# Patient Record
Sex: FEMALE | Race: WHITE | NOT HISPANIC OR LATINO | Employment: OTHER | URBAN - METROPOLITAN AREA
[De-identification: names, ages, dates, MRNs, and addresses within clinical notes are randomized per-mention and may not be internally consistent; named-entity substitution may affect disease eponyms.]

---

## 2017-03-17 ENCOUNTER — DOCUMENTATION ONLY (OUTPATIENT)
Dept: FAMILY MEDICINE | Facility: CLINIC | Age: 67
End: 2017-03-17

## 2017-03-17 NOTE — PROGRESS NOTES
Pre-Visit Chart Review  For Appointment Scheduled on 3-20-17    Health Maintenance Due   Topic Date Due    Fecal Occult Blood Test (FOBT)  1950    Colonoscopy  06/21/2001    Influenza Vaccine  08/01/2016

## 2017-04-24 DIAGNOSIS — J44.9 CHRONIC OBSTRUCTIVE PULMONARY DISEASE, UNSPECIFIED COPD TYPE: Primary | ICD-10-CM

## 2017-04-24 RX ORDER — LEVALBUTEROL TARTRATE 45 UG/1
AEROSOL, METERED ORAL
Qty: 3 INHALER | Refills: 1 | Status: SHIPPED | OUTPATIENT
Start: 2017-04-24 | End: 2017-06-27 | Stop reason: SDUPTHER

## 2017-06-19 ENCOUNTER — DOCUMENTATION ONLY (OUTPATIENT)
Dept: FAMILY MEDICINE | Facility: CLINIC | Age: 67
End: 2017-06-19

## 2017-06-19 NOTE — PROGRESS NOTES
Pre-Visit Chart Review  For Appointment Scheduled on 06/27/2017    Health Maintenance Due   Topic Date Due    TETANUS VACCINE  06/21/1969    Fecal Occult Blood Test (FOBT)/[employer]  06/21/2001

## 2017-06-19 NOTE — PATIENT INSTRUCTIONS
Controlling High Blood Pressure  High blood pressure (hypertension) is often called the silent killer. This is because many people who have it dont know it. High blood pressure is defined as 140/90 mm Hg or higher. Know your blood pressure and remember to check it regularly. Doing so can save your life. Here are some things you can do to help control your blood pressure.    Choose heart-healthy foods  · Select low-salt, low-fat foods. Limit sodium intake to 2,400 mg per day or the amount suggested by your healthcare provider.  · Limit canned, dried, cured, packaged, and fast foods. These can contain a lot of salt.  · Eat 8 to 10 servings of fruits and vegetables every day.  · Choose lean meats, fish, or chicken.  · Eat whole-grain pasta, brown rice, and beans.  · Eat 2 to 3 servings of low-fat or fat-free dairy products.  · Ask your doctor about the DASH eating plan. This plan helps reduce blood pressure.  · When you go to a restaurant, ask that your meal be prepared with no added salt.  Maintain a healthy weight  · Ask your healthcare provider how many calories to eat a day. Then stick to that number.  · Ask your healthcare provider what weight range is healthiest for you. If you are overweight, a weight loss of only 3% to 5% of your body weight can help lower blood pressure. Generally, a good weight loss goal is to lose 10% of your body weight in a year.  · Limit snacks and sweets.  · Get regular exercise.  Get up and get active  · Choose activities you enjoy. Find ones you can do with friends or family. This includes bicycling, dancing, walking, and jogging.  · Park farther away from building entrances.  · Use stairs instead of the elevator.  · When you can, walk or bike instead of driving.  · [place] leaves, garden, or do household repairs.  · Be active at a moderate to vigorous level of physical activity for at least 40 minutes for a minimum of 3 to 4 days a week.   Manage stress  · Make time to relax and enjoy  life. Find time to laugh.  · Communicate your concerns with your loved ones and your healthcare provider.  · Visit with family and friends, and keep up with hobbies.  Limit alcohol and quit smoking  · Men should have no more than 2 drinks per day.  · Women should have no more than 1 drink per day.  · Talk with your healthcare provider about quitting smoking. Smoking significantly increases your risk for heart disease and stroke. Ask your healthcare provider about community smoking cessation programs and other options.  Medicines  If lifestyle changes arent enough, your healthcare provider may prescribe high blood pressure medicine. Take all medicines as prescribed. If you have any questions about your medicines, ask your healthcare provider before stopping or changing them.   Date Last Reviewed: 4/27/2016  © 6176-3841 [employer]. [address] [place] [address], [place] [address]. All rights reserved. This information is not intended as a substitute for professional medical care. Always follow your healthcare professional's instructions.

## 2017-06-27 ENCOUNTER — LAB VISIT (OUTPATIENT)
Dept: LAB | Facility: HOSPITAL | Age: 67
End: 2017-06-27
Attending: FAMILY MEDICINE
Payer: MEDICARE

## 2017-06-27 ENCOUNTER — OFFICE VISIT (OUTPATIENT)
Dept: FAMILY MEDICINE | Facility: CLINIC | Age: 67
End: 2017-06-27
Payer: MEDICARE

## 2017-06-27 VITALS
TEMPERATURE: 98 F | BODY MASS INDEX: 19.95 KG/M2 | DIASTOLIC BLOOD PRESSURE: 72 MMHG | HEIGHT: 60 IN | HEART RATE: 70 BPM | WEIGHT: 101.63 LBS | SYSTOLIC BLOOD PRESSURE: 128 MMHG

## 2017-06-27 DIAGNOSIS — E78.5 HYPERLIPIDEMIA LDL GOAL <130: ICD-10-CM

## 2017-06-27 DIAGNOSIS — K59.09 CHRONIC CONSTIPATION: ICD-10-CM

## 2017-06-27 DIAGNOSIS — J01.00 ACUTE NON-RECURRENT MAXILLARY SINUSITIS: ICD-10-CM

## 2017-06-27 DIAGNOSIS — N32.81 OAB (OVERACTIVE BLADDER): ICD-10-CM

## 2017-06-27 DIAGNOSIS — I10 ESSENTIAL HYPERTENSION: ICD-10-CM

## 2017-06-27 DIAGNOSIS — E87.1 HYPONATREMIA: ICD-10-CM

## 2017-06-27 DIAGNOSIS — E87.6 HYPOKALEMIA: ICD-10-CM

## 2017-06-27 DIAGNOSIS — M85.80 OSTEOPENIA, UNSPECIFIED LOCATION: ICD-10-CM

## 2017-06-27 DIAGNOSIS — R94.6 BORDERLINE ABNORMAL TFTS: ICD-10-CM

## 2017-06-27 DIAGNOSIS — J44.9 CHRONIC OBSTRUCTIVE PULMONARY DISEASE, UNSPECIFIED COPD TYPE: Primary | ICD-10-CM

## 2017-06-27 DIAGNOSIS — R09.89 BILATERAL CAROTID BRUITS: ICD-10-CM

## 2017-06-27 LAB
25(OH)D3+25(OH)D2 SERPL-MCNC: 51 NG/ML
ALBUMIN SERPL BCP-MCNC: 4.1 G/DL
ALP SERPL-CCNC: 68 U/L
ALT SERPL W/O P-5'-P-CCNC: 12 U/L
ANION GAP SERPL CALC-SCNC: 9 MMOL/L
AST SERPL-CCNC: 20 U/L
BASOPHILS # BLD AUTO: 0.05 K/UL
BASOPHILS NFR BLD: 0.6 %
BILIRUB SERPL-MCNC: 0.5 MG/DL
BUN SERPL-MCNC: 7 MG/DL
CALCIUM SERPL-MCNC: 9.7 MG/DL
CHLORIDE SERPL-SCNC: 101 MMOL/L
CHOLEST/HDLC SERPL: 3 {RATIO}
CO2 SERPL-SCNC: 23 MMOL/L
CREAT SERPL-MCNC: 0.6 MG/DL
DIFFERENTIAL METHOD: NORMAL
EOSINOPHIL # BLD AUTO: 0.5 K/UL
EOSINOPHIL NFR BLD: 5.6 %
ERYTHROCYTE [DISTWIDTH] IN BLOOD BY AUTOMATED COUNT: 14.2 %
EST. GFR  (AFRICAN AMERICAN): >60 ML/MIN/1.73 M^2
EST. GFR  (NON AFRICAN AMERICAN): >60 ML/MIN/1.73 M^2
GLUCOSE SERPL-MCNC: 93 MG/DL
HCT VFR BLD AUTO: 38.7 %
HDL/CHOLESTEROL RATIO: 33 %
HDLC SERPL-MCNC: 185 MG/DL
HDLC SERPL-MCNC: 61 MG/DL
HGB BLD-MCNC: 13 G/DL
LDLC SERPL CALC-MCNC: 110.8 MG/DL
LYMPHOCYTES # BLD AUTO: 2.4 K/UL
LYMPHOCYTES NFR BLD: 27.2 %
MAGNESIUM SERPL-MCNC: 1.7 MG/DL
MCH RBC QN AUTO: 30.7 PG
MCHC RBC AUTO-ENTMCNC: 33.6 %
MCV RBC AUTO: 92 FL
MONOCYTES # BLD AUTO: 0.7 K/UL
MONOCYTES NFR BLD: 8 %
NEUTROPHILS # BLD AUTO: 5.2 K/UL
NEUTROPHILS NFR BLD: 58.4 %
NONHDLC SERPL-MCNC: 124 MG/DL
OSMOLALITY SERPL: 277 MOSM/KG
OSMOLALITY UR: 319 MOSM/KG
PLATELET # BLD AUTO: 333 K/UL
PMV BLD AUTO: 9.9 FL
POTASSIUM SERPL-SCNC: 4.3 MMOL/L
PROT SERPL-MCNC: 7.6 G/DL
RBC # BLD AUTO: 4.23 M/UL
SODIUM SERPL-SCNC: 133 MMOL/L
T4 FREE SERPL-MCNC: 1.04 NG/DL
TRIGL SERPL-MCNC: 66 MG/DL
TSH SERPL DL<=0.005 MIU/L-ACNC: 0.01 UIU/ML
WBC # BLD AUTO: 8.97 K/UL

## 2017-06-27 PROCEDURE — 36415 COLL VENOUS BLD VENIPUNCTURE: CPT | Mod: PO

## 2017-06-27 PROCEDURE — 84439 ASSAY OF FREE THYROXINE: CPT

## 2017-06-27 PROCEDURE — 83930 ASSAY OF BLOOD OSMOLALITY: CPT

## 2017-06-27 PROCEDURE — 84443 ASSAY THYROID STIM HORMONE: CPT

## 2017-06-27 PROCEDURE — 83735 ASSAY OF MAGNESIUM: CPT

## 2017-06-27 PROCEDURE — 80061 LIPID PANEL: CPT

## 2017-06-27 PROCEDURE — 99999 PR PBB SHADOW E&M-EST. PATIENT-LVL III: CPT | Mod: PBBFAC,,, | Performed by: FAMILY MEDICINE

## 2017-06-27 PROCEDURE — 99213 OFFICE O/P EST LOW 20 MIN: CPT | Mod: PBBFAC,PO | Performed by: FAMILY MEDICINE

## 2017-06-27 PROCEDURE — 80053 COMPREHEN METABOLIC PANEL: CPT

## 2017-06-27 PROCEDURE — 1159F MED LIST DOCD IN RCRD: CPT | Mod: ,,, | Performed by: FAMILY MEDICINE

## 2017-06-27 PROCEDURE — 85025 COMPLETE CBC W/AUTO DIFF WBC: CPT

## 2017-06-27 PROCEDURE — 82306 VITAMIN D 25 HYDROXY: CPT

## 2017-06-27 PROCEDURE — 1126F AMNT PAIN NOTED NONE PRSNT: CPT | Mod: ,,, | Performed by: FAMILY MEDICINE

## 2017-06-27 PROCEDURE — 99214 OFFICE O/P EST MOD 30 MIN: CPT | Mod: S$PBB,,, | Performed by: FAMILY MEDICINE

## 2017-06-27 RX ORDER — GLUCOSAMINE HCL 500 MG
TABLET ORAL
COMMUNITY

## 2017-06-27 RX ORDER — CALCIUM CARBONATE 600 MG
600 TABLET ORAL 2 TIMES DAILY WITH MEALS
COMMUNITY
End: 2019-08-21

## 2017-06-27 RX ORDER — AMOXICILLIN AND CLAVULANATE POTASSIUM 875; 125 MG/1; MG/1
1 TABLET, FILM COATED ORAL 2 TIMES DAILY
Qty: 20 TABLET | Refills: 0 | Status: SHIPPED | OUTPATIENT
Start: 2017-06-27 | End: 2017-07-26 | Stop reason: ALTCHOICE

## 2017-06-27 RX ORDER — LEVALBUTEROL TARTRATE 45 UG/1
AEROSOL, METERED ORAL
Qty: 3 INHALER | Status: SHIPPED | OUTPATIENT
Start: 2017-06-27 | End: 2018-09-07 | Stop reason: SDUPTHER

## 2017-06-27 RX ORDER — POTASSIUM CHLORIDE 750 MG/1
10 TABLET, EXTENDED RELEASE ORAL DAILY
Qty: 90 TABLET | Refills: 11 | Status: SHIPPED | OUTPATIENT
Start: 2017-06-27 | End: 2017-08-29 | Stop reason: SDUPTHER

## 2017-06-27 RX ORDER — BUDESONIDE AND FORMOTEROL FUMARATE DIHYDRATE 80; 4.5 UG/1; UG/1
AEROSOL RESPIRATORY (INHALATION)
Qty: 30.6 INHALER | Refills: 3 | Status: SHIPPED | OUTPATIENT
Start: 2017-06-27 | End: 2018-05-19 | Stop reason: SDUPTHER

## 2017-06-27 NOTE — PROGRESS NOTES
Subjective:       Patient ID: Anuja Cool is a 67 y.o. female.    Chief Complaint: Establish Care; Cough; and Sinus Problem    Patient Active Problem List   Diagnosis    Asthma    Allergy    Hypertension    Hyperlipidemia LDL goal <130    Tobacco use    COPD (chronic obstructive pulmonary disease)    Hyponatremia    Bilateral carotid bruits    Hypokalemia    OAB (overactive bladder)    Osteopenia       HPI  Review of Systems   Constitutional: Positive for fatigue. Negative for chills and fever.   HENT: Positive for congestion, postnasal drip, rhinorrhea, sneezing and sore throat. Negative for ear discharge, ear pain and sinus pressure.    Respiratory: Positive for cough. Negative for shortness of breath and wheezing.        Objective:      Physical Exam   Constitutional: She appears well-developed and well-nourished.   HENT:   Right Ear: Tympanic membrane and ear canal normal.   Left Ear: Tympanic membrane and ear canal normal.   Nose: Mucosal edema present. Right sinus exhibits maxillary sinus tenderness. Right sinus exhibits no frontal sinus tenderness. Left sinus exhibits maxillary sinus tenderness. Left sinus exhibits no frontal sinus tenderness.   Mouth/Throat: Mucous membranes are normal. Posterior oropharyngeal erythema present. No oropharyngeal exudate, posterior oropharyngeal edema or tonsillar abscesses.   Cardiovascular: Normal rate, regular rhythm and normal heart sounds.    Pulmonary/Chest: Effort normal and breath sounds normal.   Skin: Skin is warm.   Psychiatric: She has a normal mood and affect.   Vitals reviewed.      Assessment:       1. Chronic obstructive pulmonary disease, unspecified COPD type    2. Essential hypertension    3. Bilateral carotid bruits    4. Hyperlipidemia LDL goal <130    5. Hyponatremia    6. Hypokalemia    7. Borderline abnormal TFTs    8. Osteopenia, unspecified location    9. OAB (overactive bladder)    10. Chronic constipation    11. Acute non-recurrent  maxillary sinusitis        Plan:   1. Chronic obstructive pulmonary disease, unspecified COPD type  Controlled on current medications.  Continue current medications.    - budesonide-formoterol 80-4.5 mcg (SYMBICORT) 80-4.5 mcg/actuation HFAA; INHALE TWO PUFFS BY MOUTH TWICE A DAY  Dispense: 30.6 Inhaler; Refill: 3  - levalbuterol (XOPENEX HFA) 45 mcg/actuation inhaler; INHALE ONE OR TWO PUFFS BY MOUTH EVERY FOUR HOURS AS NEEDED FOR WHEEZING OR SHORTNESS OF BREATH  Dispense: 3 Inhaler; Refill: prn    2. Essential hypertension  Controlled on current medications.  Continue current medications.      3. Bilateral carotid bruits  Order:  - Radiology US Carotid Bilateral; Future    4. Hyperlipidemia LDL goal <130  Stable condition.  Continue current medications.  Will adjust based on lab findings or if condition changes.    - CBC auto differential; Future  - Comprehensive metabolic panel; Future  - Lipid panel; Future    5. Hyponatremia  Screen and treat as indicated:    - OSMOLALITY, URINE RANDOM  - OSMOLALITY; Future    6. Hypokalemia  Screen and treat as indicated:  - Magnesium; Future  - potassium chloride (KLOR-CON) 10 MEQ TbSR; Take 1 tablet (10 mEq total) by mouth once daily.  Dispense: 90 tablet; Refill: 11    7. Borderline abnormal TFTs  Screen and treat as indicated:    - TSH; Future  - T4, free; Future    8. Osteopenia, unspecified location  Screen and treat as indicated:    - Vitamin D; Future    9. OAB (overactive bladder)  Controlled on current medications.  Continue current medications.    - mirabegron (MYRBETRIQ) 25 mg Tb24 ER tablet; Take 1 tablet (25 mg total) by mouth once daily.  Dispense: 30 tablet; Refill: 11    10. Chronic constipation  Refer for eval and treatment  - Case request GI: COLONOSCOPY  Patient was counseled that these lifestyle changes can help prevent constipation:  · Diet. Eat a high-fiber diet, with fresh fruit and vegetables, and reduce dairy intake, meats, and processed foods.  An  over the counter fiber supplement is recommended such as Fiberchoice chewables or Metamucil.  · Fluids. It's important to get enough fluids each day. Drink plenty of water when you eat more fiber. If you are on diet that limits the amount of fluid you can have, talk about this with your health care provider.  · Regular exercise. Check with your health care provider first.  I also advised use of over the counter stool softeners like docusate as needed for persistent constipation.  OTC probiotics may also be of benefit like Align to help regulation.  If acutely constipated the patient was advised to use otc fleets enema(s) and/or magnesium citrate to relieve symptoms.      11. Acute non-recurrent maxillary sinusitis  Treat:  - amoxicillin-clavulanate 875-125mg (AUGMENTIN) 875-125 mg per tablet; Take 1 tablet by mouth 2 (two) times daily.  Dispense: 20 tablet; Refill: 0  I counseled the patient on general home care guidelines for cough and congestion including increasing fluid intake, getting plenty of rest and use of OTC cough and cold medications.  I recommended guafenesin for congestion and dextromethorphan as directed for cough.  A brand like Mucinex DM is recommended.  Avoidance of decongestants is recommended for patients with heart problems and hypertension.  Extra vitamin C may also benefit.  Return to clinic if symptoms last longer than 10 days or sooner if worsen with symptoms like fever > 100.4, severe sinus pain or headache, thick yellow nasal discharge or sputum, dehydration or lethargy.      [facility] goal documentation:  Patient readiness: acceptance and barriers:readiness  During the course of the visit the patient was educated and counseled about the following: Hypertension:   Dietary sodium restriction.  Regular aerobic exercise.  Goals: Hypertension: Reduce Blood Pressure  Goal/Outcomes met:Hypertension  The following self management tools provided:none  Patient Instructions (the written plan) was given  to the patient/family: Yes  Time spent with patient: 20 minutes    Patient with be reevaluated in 6 months or sooner prn    Greater than 50% of this visit was spent counseling as described in above documentation:Yes

## 2017-06-27 NOTE — PATIENT INSTRUCTIONS
Viactiv chocolate chews      Patient was counseled that these lifestyle changes can help prevent constipation:  · Diet. Eat a high-fiber diet, with fresh fruit and vegetables, and reduce dairy intake, meats, and processed foods.  An over the counter fiber supplement is recommended such as Fiberchoice chewables or Metamucil.  · Fluids. It's important to get enough fluids each day. Drink plenty of water when you eat more fiber. If you are on diet that limits the amount of fluid you can have, talk about this with your health care provider.  · Regular exercise. Check with your health care provider first.  I also advised use of over the counter stool softeners like docusate as needed for persistent constipation.  OTC probiotics may also be of benefit like Align to help regulation.  If acutely constipated the patient was advised to use otc fleets enema(s) and/or magnesium citrate to relieve symptoms.      Miralax OTC as needed as directed      Claritin (loratadine) for allergies

## 2017-06-29 ENCOUNTER — HOSPITAL ENCOUNTER (OUTPATIENT)
Dept: RADIOLOGY | Facility: CLINIC | Age: 67
Discharge: HOME | End: 2017-06-29
Attending: FAMILY MEDICINE
Payer: MEDICARE

## 2017-06-29 DIAGNOSIS — R09.89 BILATERAL CAROTID BRUITS: ICD-10-CM

## 2017-06-29 PROCEDURE — 93880 EXTRACRANIAL BILAT STUDY: CPT | Mod: TC,PO

## 2017-06-29 PROCEDURE — 93880 EXTRACRANIAL BILAT STUDY: CPT | Mod: 26,,, | Performed by: RADIOLOGY

## 2017-07-08 ENCOUNTER — TELEPHONE (OUTPATIENT)
Dept: FAMILY MEDICINE | Facility: CLINIC | Age: 67
End: 2017-07-08

## 2017-07-08 DIAGNOSIS — I65.29 STENOSIS OF CAROTID ARTERY, UNSPECIFIED LATERALITY: Primary | ICD-10-CM

## 2017-07-08 DIAGNOSIS — R94.6 BORDERLINE ABNORMAL TFTS: ICD-10-CM

## 2017-07-08 NOTE — TELEPHONE ENCOUNTER
Notify patient:  Your labs are all essentially in the normal range including: blood sugar, kidney function, liver function, blood cell counts and cholesterol levels.  Your sodium has improved and we will continue to monitor it periodically.  Your vitamin D and magnesium are normal.  Your thyroid labs are still indicating a borderline abnormality suggesting excessive thyroid hormone.  I will refer you to endocrine for an eval.       The carotid ultrasound showed Moderate (50-69%) stenosis involving the proximal right ICA segment. Otherwise, bilateral atherosclerosis with mild (<50%) bilateral stenosis by velocity criteria.  I will also refer you for a vascular consult

## 2017-07-08 NOTE — TELEPHONE ENCOUNTER
----- Message from Norma Square, MA sent at 7/7/2017  2:33 PM CDT -----  Contact: Patient  Patient requesting results of US and labs.  ----- Message -----  From: Carolina Erickson  Sent: 7/7/2017   2:22 PM  To: Louann MCKEON Staff    Patient states that she would like to know the results of her test and her Ultrasound.  Can you please call her back at [phone].  Thank you

## 2017-07-10 NOTE — TELEPHONE ENCOUNTER
Spoke with patient about her labs and US. I was able to get her scheduled with vascular surgery but not with endocrine. I did schedule patient with Dr. Lew in August to discuss options. Patient is ok with this .

## 2017-07-26 ENCOUNTER — OFFICE VISIT (OUTPATIENT)
Dept: VASCULAR SURGERY | Facility: CLINIC | Age: 67
End: 2017-07-26
Payer: MEDICARE

## 2017-07-26 VITALS
DIASTOLIC BLOOD PRESSURE: 78 MMHG | SYSTOLIC BLOOD PRESSURE: 159 MMHG | BODY MASS INDEX: 20.12 KG/M2 | HEART RATE: 88 BPM | HEIGHT: 60 IN | WEIGHT: 102.5 LBS

## 2017-07-26 DIAGNOSIS — I77.9 LEFT-SIDED CAROTID ARTERY DISEASE: Primary | ICD-10-CM

## 2017-07-26 PROCEDURE — 1159F MED LIST DOCD IN RCRD: CPT | Mod: S$GLB,,, | Performed by: THORACIC SURGERY (CARDIOTHORACIC VASCULAR SURGERY)

## 2017-07-26 PROCEDURE — 99204 OFFICE O/P NEW MOD 45 MIN: CPT | Mod: S$GLB,,, | Performed by: THORACIC SURGERY (CARDIOTHORACIC VASCULAR SURGERY)

## 2017-07-26 PROCEDURE — 1126F AMNT PAIN NOTED NONE PRSNT: CPT | Mod: S$GLB,,, | Performed by: THORACIC SURGERY (CARDIOTHORACIC VASCULAR SURGERY)

## 2017-07-26 NOTE — LETTER
July 27, 2017      Isis Lew MD  [address]  [place] [address]           [place] [facility]  [address], [address]  [place] [address]  Phone: [phone]          Patient: Anuja Cool   MR Number: [id number]   YOB: 1950   Date of Visit: 7/26/2017       Dear Dr. Isis Lew:    Thank you for referring Anuja Cool to me for evaluation. Attached you will find relevant portions of my assessment and plan of care.    If you have questions, please do not hesitate to call me. I look forward to following Anuja Cool along with you.    Sincerely,    Jayesh Beatty MD    Enclosure  CC:  No Recipients    If you would like to receive this communication electronically, please contact [email or web address] or [phone] to request more information on [employer] access.    For providers and/or their staff who would like to refer a patient to [facility], please contact us through our one-stop-shop provider referral line, [facility] [place], at [phone].    If you feel you have received this communication in error or would no longer like to receive these types of communications, please e-mail [email or web address]

## 2017-07-26 NOTE — PROGRESS NOTES
OFFICE NOTE    HISTORY OF PRESENT ILLNESS:  This is a pleasant 67-year-old lady with a right   carotid stenosis.  She has been asymptomatic.  The patient has a moderate degree   of stenosis.  The patient has hypertension, hyperlipidemia and COPD from   smoking.  She continues to smoke, but tells me she is trying to cut back and   smoking less than a pack a day.  Medicines are noted and they are part of the   recent EPIC record.  Her problem list is reviewed.    FAMILY AND SOCIAL HISTORY:  Otherwise, unremarkable.    REVIEW OF SYSTEMS:  Fairly innocuous as well.    PHYSICAL EXAMINATION:  VITAL SIGNS:  Stable.  HEENT:  Pupils equal, round and reactive to light.  Nose and throat are clear.  NECK:  Supple.  CHEST:  Clear to auscultation.  HEART:  Regular rate and rhythm.  ABDOMEN:  Benign.  EXTREMITIES:  Femoral pulses are equal.  Perfusion to the legs and feet seems to   be satisfactory.    At this point, as she has a moderate right carotid stenosis, I recommend a   repeat carotid ultrasound in three to four months with an abdominal aortic   ultrasound to rule out abdominal aortic aneurysm as a screening test.  Based on   results of these studies, we will make further recommendations, but I think   medical management with an aspirin right now would be satisfactory.      MAZIN  dd: 07/26/2017 11:39:44 (CDT)  td: 07/26/2017 12:29:58 (CDT)  Doc ID   #[id number]  Job ID #[id number]    CC:

## 2017-08-25 ENCOUNTER — DOCUMENTATION ONLY (OUTPATIENT)
Dept: FAMILY MEDICINE | Facility: CLINIC | Age: 67
End: 2017-08-25

## 2017-08-25 NOTE — PROGRESS NOTES
Pre-Visit Chart Review  For Appointment Scheduled on 8-28-17    Health Maintenance Due   Topic Date Due    TETANUS VACCINE  06/21/1969    Colonoscopy  06/21/2001    Influenza Vaccine  08/01/2017

## 2017-08-28 ENCOUNTER — TELEPHONE (OUTPATIENT)
Dept: FAMILY MEDICINE | Facility: CLINIC | Age: 67
End: 2017-08-28

## 2017-08-28 DIAGNOSIS — E05.90 OVERACTIVE THYROID GLAND: Primary | ICD-10-CM

## 2017-08-28 NOTE — TELEPHONE ENCOUNTER
----- Message from Daiana Morrow sent at 8/28/2017  9:03 AM CDT -----  Contact: self  Patient [phone] had to cancel her appt with Dr Lew today but is asking to speak with the nurse about giving her a thyroid prescription/please call patient to discuss

## 2017-08-28 NOTE — TELEPHONE ENCOUNTER
Patient does not want to come in today due to the weather. Wants you to write her a thyroid script. States she was just coming in to discuss that issue. Please advise.

## 2017-08-29 DIAGNOSIS — E87.6 HYPOKALEMIA: ICD-10-CM

## 2017-08-29 RX ORDER — POTASSIUM CHLORIDE 750 MG/1
10 TABLET, EXTENDED RELEASE ORAL DAILY
Qty: 90 TABLET | Refills: 3 | Status: SHIPPED | OUTPATIENT
Start: 2017-08-29 | End: 2017-12-19

## 2017-08-29 RX ORDER — OXYBUTYNIN CHLORIDE 10 MG/1
10 TABLET, EXTENDED RELEASE ORAL DAILY
Qty: 30 TABLET | Refills: 11 | OUTPATIENT
Start: 2017-08-29 | End: 2018-08-29

## 2017-08-29 NOTE — TELEPHONE ENCOUNTER
I do not have any thyroid medication on her current or past list. It looks like she may have overactive thyroid and needs a consult and work up.  Endocrine consult placed 7/8/17.  What is the status of it?  Also needs a thyroid ultrasound.  Will order.  Make f/u appt after u/s with me or SHANI Bah

## 2017-08-29 NOTE — TELEPHONE ENCOUNTER
Spoke with patient about testing and appointments that we need to schedule for her. She will have the US on Thursday 3/31, See SHANI Torrez on 9/1 and endocrine in November Dr. James. Unless Dr. Graves comes back.

## 2017-08-31 ENCOUNTER — DOCUMENTATION ONLY (OUTPATIENT)
Dept: FAMILY MEDICINE | Facility: CLINIC | Age: 67
End: 2017-08-31

## 2017-08-31 ENCOUNTER — HOSPITAL ENCOUNTER (OUTPATIENT)
Dept: RADIOLOGY | Facility: CLINIC | Age: 67
Discharge: HOME | End: 2017-08-31
Attending: FAMILY MEDICINE
Payer: MEDICARE

## 2017-08-31 DIAGNOSIS — E05.90 OVERACTIVE THYROID GLAND: ICD-10-CM

## 2017-08-31 PROCEDURE — 76536 US EXAM OF HEAD AND NECK: CPT | Mod: 26,,, | Performed by: RADIOLOGY

## 2017-08-31 PROCEDURE — 76536 US EXAM OF HEAD AND NECK: CPT | Mod: TC,PO

## 2017-08-31 NOTE — PROGRESS NOTES
Pre-Visit Chart Review  For Appointment Scheduled on 09/01/2017    Health Maintenance Due   Topic Date Due    TETANUS VACCINE  06/21/1969    Colonoscopy  06/21/2001    Influenza Vaccine  08/01/2017

## 2017-09-01 ENCOUNTER — TELEPHONE (OUTPATIENT)
Dept: FAMILY MEDICINE | Facility: CLINIC | Age: 67
End: 2017-09-01

## 2017-09-01 ENCOUNTER — OFFICE VISIT (OUTPATIENT)
Dept: FAMILY MEDICINE | Facility: CLINIC | Age: 67
End: 2017-09-01
Payer: MEDICARE

## 2017-09-01 VITALS
BODY MASS INDEX: 19.78 KG/M2 | WEIGHT: 100.75 LBS | SYSTOLIC BLOOD PRESSURE: 130 MMHG | HEIGHT: 60 IN | TEMPERATURE: 98 F | DIASTOLIC BLOOD PRESSURE: 77 MMHG | HEART RATE: 88 BPM

## 2017-09-01 DIAGNOSIS — R63.6 UNDERWEIGHT: ICD-10-CM

## 2017-09-01 DIAGNOSIS — I10 ESSENTIAL HYPERTENSION: ICD-10-CM

## 2017-09-01 DIAGNOSIS — Z72.0 TOBACCO USE: ICD-10-CM

## 2017-09-01 DIAGNOSIS — E04.2 MULTINODULAR GOITER: Primary | ICD-10-CM

## 2017-09-01 PROCEDURE — 99214 OFFICE O/P EST MOD 30 MIN: CPT | Mod: PBBFAC,PO | Performed by: PHYSICIAN ASSISTANT

## 2017-09-01 PROCEDURE — 1159F MED LIST DOCD IN RCRD: CPT | Mod: ,,, | Performed by: PHYSICIAN ASSISTANT

## 2017-09-01 PROCEDURE — 99999 PR PBB SHADOW E&M-EST. PATIENT-LVL IV: CPT | Mod: PBBFAC,,, | Performed by: PHYSICIAN ASSISTANT

## 2017-09-01 PROCEDURE — 1126F AMNT PAIN NOTED NONE PRSNT: CPT | Mod: ,,, | Performed by: PHYSICIAN ASSISTANT

## 2017-09-01 PROCEDURE — 3075F SYST BP GE 130 - 139MM HG: CPT | Mod: ,,, | Performed by: PHYSICIAN ASSISTANT

## 2017-09-01 PROCEDURE — 3078F DIAST BP <80 MM HG: CPT | Mod: ,,, | Performed by: PHYSICIAN ASSISTANT

## 2017-09-01 PROCEDURE — 99213 OFFICE O/P EST LOW 20 MIN: CPT | Mod: S$PBB,,, | Performed by: PHYSICIAN ASSISTANT

## 2017-09-01 NOTE — TELEPHONE ENCOUNTER
Please call radiology department and let them know that the impression for the US was cut off and I don't know if they had any additional information or findings to report.

## 2017-09-01 NOTE — TELEPHONE ENCOUNTER
Spoke to Tian in radiology he said the report is finished to take off the at, at the end of the sentence. No additional information or findings.

## 2017-09-01 NOTE — PROGRESS NOTES
Subjective:       Patient ID: Anuja Cool is a 67 y.o. female.    Chief Complaint: Follow-up (discuss US result)    Mrs. Cool is a 67 year old female who presents to clinic for follow up on abnormal thyroid function tests. She was found to have suppressed TSH on recent labs and additional work up was ordered. She has no history of thyroid disease or family hx of endocrine problem. Thyroid ultrasound:  Multinodular goiter with replacement particularly of the left lobe of the thyroid gland. At least one nodule meets the size criteria for which fine needle aspiration or biopsy which could be considered. Fine-needle aspiration of the largest nodule nodule in the left with followup of the others or followup for size stability of all of the nodules in approximately 6 months would be reasonable given the multiplicity of the findings. She denies symptoms of hyper/hypothyroidism. She has cut back on her smoking to 5 cigarettes per day, but is interested in the smoking cessation program. She is due for colonoscopy and is trying to arrange transportation for this.       Review of Systems   Constitutional: Negative for activity change, appetite change, chills, fatigue and fever.   Eyes: Negative for visual disturbance.   Respiratory: Negative for cough and shortness of breath.    Cardiovascular: Negative for chest pain, palpitations and leg swelling.   Gastrointestinal: Negative for abdominal pain, constipation, diarrhea, nausea and vomiting.   Musculoskeletal: Negative for arthralgias.   Neurological: Negative for dizziness, weakness, light-headedness and headaches.       Objective:      Vitals:    09/01/17 0814   BP: 130/77   Pulse: 88   Temp: 98.1 °F (36.7 °C)     Physical Exam   Constitutional: She is oriented to person, place, and time. She appears well-developed.   Thin framed female.    HENT:   Head: Normocephalic and atraumatic.   Eyes: Conjunctivae and EOM are normal. Pupils are equal, round, and reactive to  light.   Cardiovascular: Normal rate, regular rhythm, normal heart sounds and intact distal pulses.    Pulmonary/Chest: Effort normal and breath sounds normal.   Neurological: She is alert and oriented to person, place, and time.   Skin: Skin is warm and dry.   Psychiatric: She has a normal mood and affect. Her behavior is normal.   Vitals reviewed.      Assessment:       1. Multinodular goiter    2. Essential hypertension    3. Underweight    4. BMI less than 19,adult    5. Tobacco use        Plan:       Multinodular goiter  -     US Fine Needle Aspiration Thyroid Multi site (xpd); Future; Expected date: 09/01/2017        - Endocrinology consultation scheduled for November    Essential hypertension        - Controlled, not currently on anti-hypertensive medications    Underweight        - Weight has been stable for the last year; however, there was a 20 lb decrease between 4732-3658. Continue to follow with PCP.     BMI less than 19,adult    Tobacco use  -     Ambulatory referral to Smoking Cessation Program      Patient readiness: acceptance and barriers:none    During the course of the visit the patient was educated and counseled about the following:     Hypertension:   Regular aerobic exercise.  Underweight:  Follow up and re-weight in: 3  months and as needed.     Goals: Hypertension: Reduce Blood Pressure and Underweight: Increase calorie intake and BMI      Did patient meet goals/outcomes: Yes to HTN, No to Underweight    The following self management tools provided: declined    Patient Instructions (the written plan) was given to the patient/family.     Time spent with patient: 15 minutes

## 2017-09-12 ENCOUNTER — HOSPITAL ENCOUNTER (OUTPATIENT)
Dept: RADIOLOGY | Facility: HOSPITAL | Age: 67
Discharge: HOME | End: 2017-09-12
Attending: PHYSICIAN ASSISTANT
Payer: MEDICARE

## 2017-09-12 DIAGNOSIS — E04.2 MULTINODULAR GOITER: ICD-10-CM

## 2017-09-12 PROCEDURE — 76942 ECHO GUIDE FOR BIOPSY: CPT | Mod: 26,,, | Performed by: RADIOLOGY

## 2017-09-12 PROCEDURE — 10022 US FINE NEEDLE ASPIRATION THYROID MULTI SITE (XPD): CPT | Mod: 59,,, | Performed by: RADIOLOGY

## 2017-09-12 PROCEDURE — 88173 CYTOPATH EVAL FNA REPORT: CPT | Performed by: PATHOLOGY

## 2017-09-12 PROCEDURE — 76942 ECHO GUIDE FOR BIOPSY: CPT | Mod: TC

## 2017-09-19 ENCOUNTER — TELEPHONE (OUTPATIENT)
Dept: FAMILY MEDICINE | Facility: CLINIC | Age: 67
End: 2017-09-19

## 2017-09-19 DIAGNOSIS — E04.2 MULTINODULAR GOITER: Primary | ICD-10-CM

## 2017-09-19 NOTE — TELEPHONE ENCOUNTER
Thyroid biopsy was benign. We will continue to monitor her thyroid with serial US and repeat is due in 6 months, please schedule.

## 2017-09-19 NOTE — TELEPHONE ENCOUNTER
Spoke to patient and read message as written. Patient verbalized understanding and booked her follow up appointment for 3-16-18.

## 2017-11-28 DIAGNOSIS — I65.23 CAROTID STENOSIS, BILATERAL: Primary | ICD-10-CM

## 2017-11-28 DIAGNOSIS — I71.40 ABDOMINAL AORTIC ANEURYSM WITHOUT RUPTURE: ICD-10-CM

## 2017-12-12 ENCOUNTER — HOSPITAL ENCOUNTER (OUTPATIENT)
Dept: RADIOLOGY | Facility: HOSPITAL | Age: 67
Discharge: HOME | End: 2017-12-12
Attending: THORACIC SURGERY (CARDIOTHORACIC VASCULAR SURGERY)
Payer: MEDICARE

## 2017-12-12 DIAGNOSIS — I65.23 CAROTID STENOSIS, BILATERAL: ICD-10-CM

## 2017-12-12 DIAGNOSIS — I71.40 ABDOMINAL AORTIC ANEURYSM WITHOUT RUPTURE: ICD-10-CM

## 2017-12-12 PROCEDURE — 76775 US EXAM ABDO BACK WALL LIM: CPT | Mod: 26,,, | Performed by: RADIOLOGY

## 2017-12-12 PROCEDURE — 93880 EXTRACRANIAL BILAT STUDY: CPT | Mod: 26,,, | Performed by: RADIOLOGY

## 2017-12-12 PROCEDURE — 76775 US EXAM ABDO BACK WALL LIM: CPT | Mod: TC

## 2017-12-12 PROCEDURE — 93880 EXTRACRANIAL BILAT STUDY: CPT | Mod: TC

## 2017-12-15 DIAGNOSIS — Z12.11 COLON CANCER SCREENING: Primary | ICD-10-CM

## 2017-12-18 ENCOUNTER — DOCUMENTATION ONLY (OUTPATIENT)
Dept: FAMILY MEDICINE | Facility: CLINIC | Age: 67
End: 2017-12-18

## 2017-12-18 NOTE — PROGRESS NOTES
Pre-Visit Chart Review  For Appointment Scheduled on 12-19-17    Health Maintenance Due   Topic Date Due    TETANUS VACCINE  06/21/1969    Colonoscopy  06/21/2001    Influenza Vaccine  08/01/2017    Pneumococcal (65+) (2 of 2 - PCV13) 11/17/2017

## 2017-12-19 ENCOUNTER — OFFICE VISIT (OUTPATIENT)
Dept: FAMILY MEDICINE | Facility: CLINIC | Age: 67
End: 2017-12-19
Payer: MEDICARE

## 2017-12-19 ENCOUNTER — LAB VISIT (OUTPATIENT)
Dept: LAB | Facility: HOSPITAL | Age: 67
End: 2017-12-19
Attending: FAMILY MEDICINE
Payer: MEDICARE

## 2017-12-19 VITALS
DIASTOLIC BLOOD PRESSURE: 90 MMHG | SYSTOLIC BLOOD PRESSURE: 152 MMHG | WEIGHT: 112 LBS | HEIGHT: 60 IN | BODY MASS INDEX: 21.99 KG/M2 | HEART RATE: 73 BPM | TEMPERATURE: 98 F

## 2017-12-19 DIAGNOSIS — Z12.11 COLON CANCER SCREENING: ICD-10-CM

## 2017-12-19 DIAGNOSIS — Z23 NEED FOR PNEUMOCOCCAL VACCINATION: ICD-10-CM

## 2017-12-19 DIAGNOSIS — E78.5 HYPERLIPIDEMIA LDL GOAL <130: ICD-10-CM

## 2017-12-19 DIAGNOSIS — E87.6 HYPOKALEMIA: ICD-10-CM

## 2017-12-19 DIAGNOSIS — E05.90 OVERACTIVE THYROID GLAND: ICD-10-CM

## 2017-12-19 DIAGNOSIS — I10 ESSENTIAL HYPERTENSION: Primary | ICD-10-CM

## 2017-12-19 LAB
ALBUMIN SERPL BCP-MCNC: 3.7 G/DL
ALP SERPL-CCNC: 73 U/L
ALT SERPL W/O P-5'-P-CCNC: 12 U/L
ANION GAP SERPL CALC-SCNC: 8 MMOL/L
AST SERPL-CCNC: 19 U/L
BASOPHILS # BLD AUTO: 0.06 K/UL
BASOPHILS NFR BLD: 0.8 %
BILIRUB SERPL-MCNC: 0.6 MG/DL
BUN SERPL-MCNC: 12 MG/DL
CALCIUM SERPL-MCNC: 9.5 MG/DL
CHLORIDE SERPL-SCNC: 100 MMOL/L
CHOLEST SERPL-MCNC: 193 MG/DL
CHOLEST/HDLC SERPL: 2.9 {RATIO}
CO2 SERPL-SCNC: 29 MMOL/L
CREAT SERPL-MCNC: 0.6 MG/DL
DIFFERENTIAL METHOD: NORMAL
EOSINOPHIL # BLD AUTO: 0.4 K/UL
EOSINOPHIL NFR BLD: 4.7 %
ERYTHROCYTE [DISTWIDTH] IN BLOOD BY AUTOMATED COUNT: 13.4 %
EST. GFR  (AFRICAN AMERICAN): >60 ML/MIN/1.73 M^2
EST. GFR  (NON AFRICAN AMERICAN): >60 ML/MIN/1.73 M^2
GLUCOSE SERPL-MCNC: 103 MG/DL
HCT VFR BLD AUTO: 39.8 %
HDLC SERPL-MCNC: 67 MG/DL
HDLC SERPL: 34.7 %
HGB BLD-MCNC: 13.2 G/DL
IMM GRANULOCYTES # BLD AUTO: 0.02 K/UL
IMM GRANULOCYTES NFR BLD AUTO: 0.3 %
LDLC SERPL CALC-MCNC: 108.6 MG/DL
LYMPHOCYTES # BLD AUTO: 2.6 K/UL
LYMPHOCYTES NFR BLD: 33.4 %
MAGNESIUM SERPL-MCNC: 1.6 MG/DL
MCH RBC QN AUTO: 29.9 PG
MCHC RBC AUTO-ENTMCNC: 33.2 G/DL
MCV RBC AUTO: 90 FL
MONOCYTES # BLD AUTO: 0.7 K/UL
MONOCYTES NFR BLD: 9.2 %
NEUTROPHILS # BLD AUTO: 4 K/UL
NEUTROPHILS NFR BLD: 51.6 %
NONHDLC SERPL-MCNC: 126 MG/DL
NRBC BLD-RTO: 0 /100 WBC
PLATELET # BLD AUTO: 338 K/UL
PMV BLD AUTO: 9.7 FL
POTASSIUM SERPL-SCNC: 4.5 MMOL/L
PROT SERPL-MCNC: 7.1 G/DL
RBC # BLD AUTO: 4.41 M/UL
SODIUM SERPL-SCNC: 137 MMOL/L
T3FREE SERPL-MCNC: 2.6 PG/ML
T4 FREE SERPL-MCNC: 0.77 NG/DL
TRIGL SERPL-MCNC: 87 MG/DL
TSH SERPL DL<=0.005 MIU/L-ACNC: 0.04 UIU/ML
WBC # BLD AUTO: 7.79 K/UL

## 2017-12-19 PROCEDURE — 99999 PR PBB SHADOW E&M-EST. PATIENT-LVL III: CPT | Mod: PBBFAC,,, | Performed by: FAMILY MEDICINE

## 2017-12-19 PROCEDURE — 90670 PCV13 VACCINE IM: CPT | Mod: PBBFAC,PO

## 2017-12-19 PROCEDURE — 80061 LIPID PANEL: CPT

## 2017-12-19 PROCEDURE — 84481 FREE ASSAY (FT-3): CPT

## 2017-12-19 PROCEDURE — 85025 COMPLETE CBC W/AUTO DIFF WBC: CPT

## 2017-12-19 PROCEDURE — 80053 COMPREHEN METABOLIC PANEL: CPT

## 2017-12-19 PROCEDURE — 83735 ASSAY OF MAGNESIUM: CPT

## 2017-12-19 PROCEDURE — 36415 COLL VENOUS BLD VENIPUNCTURE: CPT | Mod: PO

## 2017-12-19 PROCEDURE — 99213 OFFICE O/P EST LOW 20 MIN: CPT | Mod: PBBFAC,PO | Performed by: FAMILY MEDICINE

## 2017-12-19 PROCEDURE — 99214 OFFICE O/P EST MOD 30 MIN: CPT | Mod: S$PBB,,, | Performed by: FAMILY MEDICINE

## 2017-12-19 PROCEDURE — 84443 ASSAY THYROID STIM HORMONE: CPT

## 2017-12-19 PROCEDURE — 84439 ASSAY OF FREE THYROXINE: CPT

## 2017-12-19 PROCEDURE — G0009 ADMIN PNEUMOCOCCAL VACCINE: HCPCS | Mod: PBBFAC,PO

## 2017-12-19 RX ORDER — LISINOPRIL 5 MG/1
5 TABLET ORAL DAILY
Qty: 90 TABLET | Refills: 3 | Status: SHIPPED | OUTPATIENT
Start: 2017-12-19 | End: 2018-04-10 | Stop reason: SDUPTHER

## 2017-12-19 NOTE — PROGRESS NOTES
Subjective:       Patient ID: Anuja Cool is a 67 y.o. female.    Chief Complaint: Follow-up    Patient Active Problem List   Diagnosis    Asthma    Allergy    Hypertension    Hyperlipidemia LDL goal <130    Tobacco use    COPD (chronic obstructive pulmonary disease)    Hyponatremia    Bilateral carotid bruits    Hypokalemia    OAB (overactive bladder)    Osteopenia    Underweight    BMI less than 19,adult     HPI  Review of Systems   Constitutional: Negative for fatigue and unexpected weight change.   Respiratory: Negative for chest tightness and shortness of breath.    Cardiovascular: Negative for chest pain, palpitations and leg swelling.   Gastrointestinal: Negative for abdominal pain.   Musculoskeletal: Negative for arthralgias.   Neurological: Negative for dizziness, syncope, light-headedness and headaches.       Objective:      Physical Exam   Constitutional: She is oriented to person, place, and time. She appears well-developed and well-nourished.   Cardiovascular: Normal rate, regular rhythm and normal heart sounds.    Pulmonary/Chest: Effort normal and breath sounds normal.   Musculoskeletal: She exhibits no edema.   Neurological: She is alert and oriented to person, place, and time.   Skin: Skin is warm and dry.   Psychiatric: She has a normal mood and affect.   Nursing note and vitals reviewed.      Assessment:       1. Essential hypertension    2. Hyperlipidemia LDL goal <130    3. Hypokalemia    4. Overactive thyroid gland    5. Need for pneumococcal vaccination    6. Colon cancer screening        Plan:       1. Essential hypertension  Uncontrolled.  Add:  - lisinopril (PRINIVIL,ZESTRIL) 5 MG tablet; Take 1 tablet (5 mg total) by mouth once daily.  Dispense: 90 tablet; Refill: 3    2. Hyperlipidemia LDL goal <130  Stable condition.  Continue current medications.  Will adjust based on lab findings or if condition changes.    - Lipid panel; Future    3. Hypokalemia  Screen and treat as  indicated:    - Magnesium; Future    4. Overactive thyroid gland  Screen and treat as indicated:    - TSH; Future  - CBC auto differential; Future  - Comprehensive metabolic panel; Future  - T3, free; Future  - T4, free; Future    5. Need for pneumococcal vaccination  Immunize today.  Counseled patient on risks, benefits and side effects.  Patient elected to proceed with vaccination.      6. Colon cancer screening  Screen and treat as indicated:    - Ambulatory referral to Gastroenterology    [facility] goal documentation:  Patient readiness: acceptance and barriers:readiness  During the course of the visit the patient was educated and counseled about the following: Hypertension:   Dietary sodium restriction.  Regular aerobic exercise.  Goals: Hypertension: Reduce Blood Pressure  Goal/Outcomes met:Hypertension  The following self management tools provided:none  Patient Instructions (the written plan) was given to the patient/family: Yes  Time spent with patient: 20 minutes    Patient with be reevaluated in 3 months or sooner prn    Greater than 50% of this visit was spent counseling as described in above documentation:Yes

## 2018-02-25 DIAGNOSIS — R79.89 LOW SERUM THYROID STIMULATING HORMONE (TSH): Primary | ICD-10-CM

## 2018-03-01 ENCOUNTER — TELEPHONE (OUTPATIENT)
Dept: FAMILY MEDICINE | Facility: CLINIC | Age: 68
End: 2018-03-01

## 2018-03-01 NOTE — TELEPHONE ENCOUNTER
----- Message from Snehal Perez sent at 2/26/2018  3:59 PM CST -----  Test results.  Please call patient at [phone].

## 2018-03-16 ENCOUNTER — HOSPITAL ENCOUNTER (OUTPATIENT)
Dept: RADIOLOGY | Facility: CLINIC | Age: 68
Discharge: HOME | End: 2018-03-16
Attending: PHYSICIAN ASSISTANT
Payer: MEDICARE

## 2018-03-16 DIAGNOSIS — E04.2 MULTINODULAR GOITER: ICD-10-CM

## 2018-03-16 PROCEDURE — 76536 US EXAM OF HEAD AND NECK: CPT | Mod: 26,,, | Performed by: RADIOLOGY

## 2018-03-16 PROCEDURE — 76536 US EXAM OF HEAD AND NECK: CPT | Mod: TC,PO

## 2018-04-10 ENCOUNTER — OFFICE VISIT (OUTPATIENT)
Dept: FAMILY MEDICINE | Facility: CLINIC | Age: 68
End: 2018-04-10
Payer: MEDICARE

## 2018-04-10 VITALS
TEMPERATURE: 98 F | SYSTOLIC BLOOD PRESSURE: 150 MMHG | WEIGHT: 116.38 LBS | HEIGHT: 60 IN | DIASTOLIC BLOOD PRESSURE: 78 MMHG | BODY MASS INDEX: 22.85 KG/M2 | HEART RATE: 91 BPM

## 2018-04-10 DIAGNOSIS — Z12.31 ENCOUNTER FOR SCREENING MAMMOGRAM FOR MALIGNANT NEOPLASM OF BREAST: ICD-10-CM

## 2018-04-10 DIAGNOSIS — E87.1 HYPONATREMIA: ICD-10-CM

## 2018-04-10 DIAGNOSIS — E55.9 VITAMIN D DEFICIENCY: ICD-10-CM

## 2018-04-10 DIAGNOSIS — J01.00 ACUTE NON-RECURRENT MAXILLARY SINUSITIS: ICD-10-CM

## 2018-04-10 DIAGNOSIS — I10 ESSENTIAL HYPERTENSION: ICD-10-CM

## 2018-04-10 DIAGNOSIS — E78.5 HYPERLIPIDEMIA LDL GOAL <130: Primary | ICD-10-CM

## 2018-04-10 DIAGNOSIS — E87.6 HYPOKALEMIA: ICD-10-CM

## 2018-04-10 DIAGNOSIS — R79.89 LOW SERUM THYROID STIMULATING HORMONE (TSH): ICD-10-CM

## 2018-04-10 DIAGNOSIS — I65.23 OCCLUSION AND STENOSIS OF BILATERAL CAROTID ARTERIES: ICD-10-CM

## 2018-04-10 DIAGNOSIS — J44.9 CHRONIC OBSTRUCTIVE PULMONARY DISEASE, UNSPECIFIED COPD TYPE: ICD-10-CM

## 2018-04-10 DIAGNOSIS — Z12.11 COLON CANCER SCREENING: ICD-10-CM

## 2018-04-10 DIAGNOSIS — E04.2 MULTINODULAR GOITER: ICD-10-CM

## 2018-04-10 DIAGNOSIS — I65.29 STENOSIS OF CAROTID ARTERY, UNSPECIFIED LATERALITY: ICD-10-CM

## 2018-04-10 PROCEDURE — 99214 OFFICE O/P EST MOD 30 MIN: CPT | Mod: PBBFAC,PO | Performed by: FAMILY MEDICINE

## 2018-04-10 PROCEDURE — 99214 OFFICE O/P EST MOD 30 MIN: CPT | Mod: S$PBB,,, | Performed by: FAMILY MEDICINE

## 2018-04-10 PROCEDURE — 96372 THER/PROPH/DIAG INJ SC/IM: CPT | Mod: PBBFAC,PO

## 2018-04-10 PROCEDURE — 99999 PR PBB SHADOW E&M-EST. PATIENT-LVL IV: CPT | Mod: PBBFAC,,, | Performed by: FAMILY MEDICINE

## 2018-04-10 RX ORDER — ASPIRIN 81 MG/1
81 TABLET ORAL DAILY
COMMUNITY
End: 2023-01-01

## 2018-04-10 RX ORDER — METHYLPREDNISOLONE ACETATE 40 MG/ML
60 INJECTION, SUSPENSION INTRA-ARTICULAR; INTRALESIONAL; INTRAMUSCULAR; SOFT TISSUE
Status: COMPLETED | OUTPATIENT
Start: 2018-04-10 | End: 2018-04-10

## 2018-04-10 RX ORDER — LISINOPRIL 10 MG/1
10 TABLET ORAL DAILY
Qty: 90 TABLET | Refills: 3 | Status: SHIPPED | OUTPATIENT
Start: 2018-04-10 | End: 2018-07-10 | Stop reason: SDUPTHER

## 2018-04-10 RX ORDER — AMOXICILLIN AND CLAVULANATE POTASSIUM 875; 125 MG/1; MG/1
1 TABLET, FILM COATED ORAL 2 TIMES DAILY
Qty: 20 TABLET | Refills: 0 | Status: ON HOLD | OUTPATIENT
Start: 2018-04-10 | End: 2018-05-08 | Stop reason: ALTCHOICE

## 2018-04-10 RX ADMIN — METHYLPREDNISOLONE ACETATE 60 MG: 40 INJECTION, SUSPENSION INTRALESIONAL; INTRAMUSCULAR; INTRASYNOVIAL; SOFT TISSUE at 09:04

## 2018-04-10 NOTE — PROGRESS NOTES
Subjective:       Patient ID: Anuja Cool is a 67 y.o. female.    Chief Complaint: Follow-up and Sinus Problem    Patient Active Problem List   Diagnosis    Asthma    Allergy    Hypertension    Hyperlipidemia LDL goal <130    Tobacco use    COPD (chronic obstructive pulmonary disease)    Hyponatremia    Bilateral carotid bruits    Hypokalemia    OAB (overactive bladder)    Osteopenia    Underweight    BMI less than 19,adult    Low serum thyroid stimulating hormone (TSH)    Multinodular goiter    Stenosis of carotid artery   endocrine appt 5/18 Dr. Graves for low tsh and multi nodular goiter.     C/o sinus congestion, cough prod of yellow      HPI  Review of Systems   Constitutional: Positive for fatigue. Negative for chills and fever.   HENT: Positive for congestion, postnasal drip, rhinorrhea, sneezing and sore throat. Negative for ear discharge, ear pain and sinus pressure.    Respiratory: Positive for cough. Negative for shortness of breath and wheezing.        Objective:      Physical Exam   Constitutional: She appears well-developed and well-nourished.   HENT:   Right Ear: Tympanic membrane and ear canal normal.   Left Ear: Tympanic membrane and ear canal normal.   Nose: Mucosal edema present. Right sinus exhibits maxillary sinus tenderness. Right sinus exhibits no frontal sinus tenderness. Left sinus exhibits maxillary sinus tenderness. Left sinus exhibits no frontal sinus tenderness.   Mouth/Throat: Mucous membranes are normal. She has dentures. Posterior oropharyngeal erythema present. No oropharyngeal exudate, posterior oropharyngeal edema or tonsillar abscesses.   Cardiovascular: Normal rate, regular rhythm and normal heart sounds.    Pulmonary/Chest: Effort normal and breath sounds normal.   Skin: Skin is warm.   Psychiatric: She has a normal mood and affect.   Vitals reviewed.      Assessment:       1. Hyperlipidemia LDL goal <130    2. Essential hypertension    3. Hyponatremia     4. Hypokalemia    5. Low serum thyroid stimulating hormone (TSH)    6. Chronic obstructive pulmonary disease, unspecified COPD type    7. Multinodular goiter    8. Stenosis of carotid artery, unspecified laterality    9. Vitamin D deficiency    10. Occlusion and stenosis of bilateral carotid arteries     11. Colon cancer screening    12. Encounter for screening mammogram for malignant neoplasm of breast    13. Acute non-recurrent maxillary sinusitis        Plan:       1. Hyperlipidemia LDL goal <130  Stable condition.  Continue current medications.  Will adjust based on lab findings or if condition changes.    - CBC auto differential; Future  - Comprehensive metabolic panel; Future  - Lipid panel; Future    2. Essential hypertension  Uncontrolled.  Increase to  - lisinopril 10 MG tablet; Take 1 tablet (10 mg total) by mouth once daily.  Dispense: 90 tablet; Refill: 3    3. Hyponatremia  Screen and treat as indicated:      4. Hypokalemia  Screen and treat as indicated:    - Magnesium; Future    5. Low serum thyroid stimulating hormone (TSH)  Subclinical hyperthyroid.  Keep endocrine appt as scheduled    6. Chronic obstructive pulmonary disease, unspecified COPD type  Controlled on current medications.  Continue current medications.      7. Multinodular goiter  Stable.  Stable condition.  Continue current medications.  Will adjust based on lab findings or if condition changes.    - TSH; Future  - T4, free; Future    8. Stenosis of carotid artery, unspecified laterality  6 month f/u as recommended per vasc surg  - US Carotid Bilateral; Future    9. Vitamin D deficiency  Screen and treat as indicated:    - Vitamin D; Future    10. Occlusion and stenosis of bilateral carotid arteries   Screen and treat as indicated:    - US Carotid Bilateral; Future    11. Colon cancer screening  Screen and treat as indicated:    - Ambulatory referral to Gastroenterology    12. Encounter for screening mammogram for malignant neoplasm  of breast  Screen and treat as indicated:    - Mammo Digital Screening Bilateral With CAD; Future    13. Acute non-recurrent maxillary sinusitis  Treat:  - amoxicillin-clavulanate 875-125mg (AUGMENTIN) 875-125 mg per tablet; Take 1 tablet by mouth 2 (two) times daily.  Dispense: 20 tablet; Refill: 0  - methylPREDNISolone acetate injection 60 mg; Inject 1.5 mLs (60 mg total) into the muscle one time.  I counseled the patient on general home care guidelines for cough and congestion including increasing fluid intake, getting plenty of rest and use of OTC cough and cold medications.  I recommended guafenesin for congestion and dextromethorphan as directed for cough.  A brand like Mucinex DM is recommended.  Avoidance of decongestants is recommended for patients with heart problems and hypertension.  Extra vitamin C may also benefit.  Return to clinic if symptoms last longer than 10 days or sooner if worsen with symptoms like fever > 100.4, severe sinus pain or headache, thick yellow nasal discharge or sputum, dehydration or lethargy.      [facility] goal documentation:  Patient readiness: acceptance and barriers:readiness  During the course of the visit the patient was educated and counseled about the following: Hypertension:   Dietary sodium restriction.  Regular aerobic exercise.  Goals: Hypertension: Reduce Blood Pressure  Goal/Outcomes met:Hypertension  The following self management tools provided:none  Patient Instructions (the written plan) was given to the patient/family: Yes  Time spent with patient: 20 minutes    Patient with be reevaluated in 3 months or sooner prn    Greater than 50% of this visit was spent counseling as described in above documentation:Yes

## 2018-04-10 NOTE — NURSING
2 Patient identifiers used (name & ). Administered 60 mg Depo Medrol IM to R Glt. Patient tolerated well. No bleeding at insertion site noted. Pain scale 0/10. Allergies reviewed. Aseptic technique maintained.

## 2018-04-11 ENCOUNTER — HOSPITAL ENCOUNTER (OUTPATIENT)
Dept: RADIOLOGY | Facility: CLINIC | Age: 68
Discharge: HOME | End: 2018-04-11
Attending: FAMILY MEDICINE
Payer: MEDICARE

## 2018-04-11 DIAGNOSIS — I65.23 OCCLUSION AND STENOSIS OF BILATERAL CAROTID ARTERIES: ICD-10-CM

## 2018-04-11 DIAGNOSIS — Z12.31 ENCOUNTER FOR SCREENING MAMMOGRAM FOR MALIGNANT NEOPLASM OF BREAST: ICD-10-CM

## 2018-04-11 DIAGNOSIS — I65.29 STENOSIS OF CAROTID ARTERY, UNSPECIFIED LATERALITY: ICD-10-CM

## 2018-04-11 PROCEDURE — 93880 EXTRACRANIAL BILAT STUDY: CPT | Mod: TC,PO

## 2018-04-11 PROCEDURE — 77063 BREAST TOMOSYNTHESIS BI: CPT | Mod: 26,,, | Performed by: RADIOLOGY

## 2018-04-11 PROCEDURE — 77067 SCR MAMMO BI INCL CAD: CPT | Mod: TC,PO

## 2018-04-11 PROCEDURE — 77067 SCR MAMMO BI INCL CAD: CPT | Mod: 26,,, | Performed by: RADIOLOGY

## 2018-04-11 PROCEDURE — 93880 EXTRACRANIAL BILAT STUDY: CPT | Mod: 26,,, | Performed by: RADIOLOGY

## 2018-04-12 ENCOUNTER — TELEPHONE (OUTPATIENT)
Dept: FAMILY MEDICINE | Facility: CLINIC | Age: 68
End: 2018-04-12

## 2018-04-12 NOTE — TELEPHONE ENCOUNTER
----- Message from Isis Lew MD sent at 4/12/2018  1:51 PM CDT -----  Notify patient: Your mammogram was normal.  You will need another screening in 1 year.

## 2018-04-17 ENCOUNTER — TELEPHONE (OUTPATIENT)
Dept: FAMILY MEDICINE | Facility: CLINIC | Age: 68
End: 2018-04-17

## 2018-04-17 NOTE — TELEPHONE ENCOUNTER
----- Message from Leticia Leblanc sent at 4/17/2018  7:03 AM CDT -----  Contact: self [phone]  Please call her to reschedule the nurse appt that she cancelled.  Thank you!

## 2018-05-04 ENCOUNTER — TELEPHONE (OUTPATIENT)
Dept: FAMILY MEDICINE | Facility: CLINIC | Age: 68
End: 2018-05-04

## 2018-05-04 NOTE — TELEPHONE ENCOUNTER
----- Message from Pedro Santacruz sent at 5/4/2018  1:16 PM CDT -----  Contact: self   Patient want to know if you can call her some antibiotic in for cough up and chocking please send to [facility], Please call patient back at [phone] (home)       [facility] [address] [address]  [address]  [address]  [place] [address]  Phone: [phone] Fax: [phone]

## 2018-05-04 NOTE — TELEPHONE ENCOUNTER
Spoke with patient about her symptoms. She has not had a fever. No N/V. She does have thick pnd mucus which chokes her. I advised she depending on how she feels to go to urgent care this evening or call first thing in the am and get an appointment with our urgent care [place]. I also advised her to take some mucinex D with the cough suppressant and increase her fluid intake while on it to help her get the mucus out. She verbalized understanding.

## 2018-05-08 ENCOUNTER — HOSPITAL ENCOUNTER (OUTPATIENT)
Facility: HOSPITAL | Age: 68
Discharge: HOME | End: 2018-05-08
Attending: INTERNAL MEDICINE | Admitting: INTERNAL MEDICINE
Payer: MEDICARE

## 2018-05-08 ENCOUNTER — ANESTHESIA EVENT (OUTPATIENT)
Dept: ENDOSCOPY | Facility: HOSPITAL | Age: 68
End: 2018-05-08
Payer: MEDICARE

## 2018-05-08 ENCOUNTER — SURGERY (OUTPATIENT)
Age: 68
End: 2018-05-08

## 2018-05-08 ENCOUNTER — ANESTHESIA (OUTPATIENT)
Dept: ENDOSCOPY | Facility: HOSPITAL | Age: 68
End: 2018-05-08
Payer: MEDICARE

## 2018-05-08 DIAGNOSIS — K64.8 INTERNAL HEMORRHOIDS: ICD-10-CM

## 2018-05-08 DIAGNOSIS — K57.30 DIVERTICULOSIS OF LARGE INTESTINE WITHOUT HEMORRHAGE: Primary | ICD-10-CM

## 2018-05-08 DIAGNOSIS — K59.00 CONSTIPATION: ICD-10-CM

## 2018-05-08 PROCEDURE — 63600175 PHARM REV CODE 636 W HCPCS: Performed by: NURSE ANESTHETIST, CERTIFIED REGISTERED

## 2018-05-08 PROCEDURE — D9220A PRA ANESTHESIA: Mod: ANES,,, | Performed by: ANESTHESIOLOGY

## 2018-05-08 PROCEDURE — G0121 COLON CA SCRN NOT HI RSK IND: HCPCS | Performed by: INTERNAL MEDICINE

## 2018-05-08 PROCEDURE — 37000009 HC ANESTHESIA EA ADD 15 MINS: Performed by: INTERNAL MEDICINE

## 2018-05-08 PROCEDURE — D9220A PRA ANESTHESIA: Mod: CRNA,,, | Performed by: NURSE ANESTHETIST, CERTIFIED REGISTERED

## 2018-05-08 PROCEDURE — 25000003 PHARM REV CODE 250: Performed by: INTERNAL MEDICINE

## 2018-05-08 PROCEDURE — 37000008 HC ANESTHESIA 1ST 15 MINUTES: Performed by: INTERNAL MEDICINE

## 2018-05-08 PROCEDURE — G0121 COLON CA SCRN NOT HI RSK IND: HCPCS | Mod: ,,, | Performed by: INTERNAL MEDICINE

## 2018-05-08 RX ORDER — LIDOCAINE HYDROCHLORIDE 20 MG/ML
INJECTION, SOLUTION EPIDURAL; INFILTRATION; INTRACAUDAL; PERINEURAL
Status: DISCONTINUED
Start: 2018-05-08 | End: 2018-05-08 | Stop reason: HOSPADM

## 2018-05-08 RX ORDER — PROPOFOL 10 MG/ML
VIAL (ML) INTRAVENOUS
Status: DISCONTINUED | OUTPATIENT
Start: 2018-05-08 | End: 2018-05-08

## 2018-05-08 RX ORDER — SODIUM CHLORIDE 9 MG/ML
INJECTION, SOLUTION INTRAVENOUS CONTINUOUS
Status: DISCONTINUED | OUTPATIENT
Start: 2018-05-08 | End: 2018-05-08 | Stop reason: HOSPADM

## 2018-05-08 RX ORDER — LIDOCAINE HCL/PF 100 MG/5ML
SYRINGE (ML) INTRAVENOUS
Status: DISCONTINUED | OUTPATIENT
Start: 2018-05-08 | End: 2018-05-08

## 2018-05-08 RX ORDER — PROPOFOL 10 MG/ML
INJECTION, EMULSION INTRAVENOUS
Status: COMPLETED
Start: 2018-05-08 | End: 2018-05-08

## 2018-05-08 RX ADMIN — PROPOFOL 50 MG: 10 INJECTION, EMULSION INTRAVENOUS at 10:05

## 2018-05-08 RX ADMIN — LIDOCAINE HYDROCHLORIDE 50 MG: 20 INJECTION, SOLUTION INTRAVENOUS at 09:05

## 2018-05-08 RX ADMIN — SODIUM CHLORIDE: 0.9 INJECTION, SOLUTION INTRAVENOUS at 09:05

## 2018-05-08 RX ADMIN — PROPOFOL 80 MG: 10 INJECTION, EMULSION INTRAVENOUS at 09:05

## 2018-05-08 NOTE — TRANSFER OF CARE
Anesthesia Transfer of Care Note    Patient: Anuja Cool    Procedure(s) Performed: Procedure(s) (LRB):  COLONOSCOPY (N/A)    Patient location: PACU    Anesthesia Type: general    Transport from OR: Transported from OR on room air with adequate spontaneous ventilation    Post pain: adequate analgesia    Post assessment: no apparent anesthetic complications and tolerated procedure well    Post vital signs: stable    Level of consciousness: alert, oriented and awake    Nausea/Vomiting: no nausea/vomiting    Complications: none    Transfer of care protocol was followed      Last vitals:   Visit Vitals  /66 (BP Location: Left arm, Patient Position: Lying)   Pulse 105   Temp 36.7 °C (98.1 °F) (Skin)   Resp 16   SpO2 100%   Breastfeeding? No

## 2018-05-08 NOTE — PROVATION PATIENT INSTRUCTIONS
Discharge Summary/Instructions after an Endoscopic Procedure  Patient Name: Anuja Cool  Patient MRN: [id number]  Patient YOB: 1950  Tuesday, May 08, 2018  Grey Harvey MD  RESTRICTIONS:  During your procedure today, you received medications for sedation.  These   medications may affect your judgment, balance and coordination.  Therefore,   for 24 hours, you have the following restrictions:   - DO NOT drive a car, operate machinery, make legal/financial decisions,   sign important papers or drink alcohol.    ACTIVITY:  The following day: return to full activity including work, except no heavy   lifting, straining or running for 3 days if polyps were removed.  DIET:  Eat and drink normally unless instructed otherwise.     TREATMENT FOR COMMON SIDE EFFECTS:  - Mild abdominal pain, nausea, belching, bloating or excessive gas:  rest,   eat lightly and use a heating pad.  - Sore Throat: treat with throat lozenges and/or gargle with warm salt   water.  - Because air was used during the procedure, expelling large amounts of air   from your rectum or belching is normal.  - If a bowel prep was taken, you may not have a bowel movement for 1-3 days.    This is normal.  SYMPTOMS TO WATCH FOR AND REPORT TO YOUR PHYSICIAN:  1. Abdominal pain or bloating, other than gas cramps.  2. Chest pain.  3. Back pain.  4. Signs of infection such as: chills or fever occurring within 24 hours   after the procedure.  5. Rectal bleeding, which would show as bright red, maroon, or black stools.   (A tablespoon of blood from the rectum is not serious, especially if   hemorrhoids are present.)  6. Vomiting.  7. Weakness or dizziness.  GO DIRECTLY TO THE NEAREST EMERGENCY ROOM IF YOU HAVE ANY OF THE FOLLOWING:      Difficulty breathing              Chills and/or fever over 101 F   Persistent vomiting and/or vomiting blood   Severe abdominal pain   Severe chest pain   Black, tarry stools   Bleeding- more than one tablespoon   Any  other symptom or condition that you feel may need urgent attention  Your doctor recommends these additional instructions:  If any biopsies were taken, your doctors clinic will contact you in 1 to 2   weeks with any results.  - Patient has a contact number available for emergencies.  The signs and   symptoms of potential delayed complications were discussed with the   patient.  Return to normal activities tomorrow.  Written discharge   instructions were provided to the patient.   - Resume previous diet.   - Continue present medications.   - Resume aspirin at prior dose today.   - Repeat colonoscopy in 10 years for screening purposes.   - Discharge patient to home (ambulatory).   - Return to my office PRN.  For questions, problems or results please call your physician - Grey Harvey MD at Work:  [phone].  [facility], EMERGENCY ROOM PHONE NUMBER: [phone]  IF A COMPLICATION OR EMERGENCY SITUATION ARISES AND YOU ARE UNABLE TO REACH   YOUR PHYSICIAN - GO DIRECTLY TO THE EMERGENCY ROOM.  Grey Harvey MD  5/8/2018 10:17:52 AM  This report has been verified and signed electronically.

## 2018-05-08 NOTE — DISCHARGE INSTRUCTIONS
Eating a High-Fiber Diet  Fiber is what gives strength and structure to plants. Most grains, beans, vegetables, and fruits contain fiber. Foods rich in fiber are often low in calories and fat, and they fill you up more. They may also reduce your risks for certain health problems. To find out the amount of fiber in canned, packaged, or frozen foods, read the Nutrition Facts label. It tells you how much fiber is in a serving.    Types of fiber and their benefits  There are two types of fiber: insoluble and soluble. They both aid digestion and help you maintain a healthy weight.  · Insoluble fiber. This is found in whole grains, cereals, certain fruits and vegetables such as apple skin, corn, and carrots. Insoluble fiber may prevent constipation and reduce the risk for certain types of cancer.  · Soluble fiber. This type of fiber is in oats, beans, and certain fruits and vegetables such as strawberries and peas. Soluble fiber can reduce cholesterol, which may help lower the risk for heart disease. It also helps control blood sugar levels.  Look for high-fiber foods  Try these foods to add fiber to your diet:  · Whole-grain breads and cereals. Try to eat 6 to 8 ounces a day. Include wheat and oat bran cereals, whole-wheat muffins or toast, and corn tortillas in your meals.  · Fruits. Try to eat 2 cups a day. Apples, oranges, strawberries, pears, and bananas are good sources. (Note: Fruit juice is low in fiber.)  · Vegetables. Try to eat at least 2.5 cups a day. Add asparagus, carrots, broccoli, peas, and corn to your meals.  · Beans. One cup of cooked lentils gives you over 15 grams of fiber. Try navy beans, lentils, and chickpeas.  · Seeds. A small handful of seeds gives you about 3 grams of fiber. Try sunflower seeds.  Keep track of your fiber  Keep track of how much fiber you eat. Start by reading food labels. Then eat a variety of foods high in fiber. As you begin to eat more fiber, ask your healthcare provider  how much water you should be drinking to keep your digestive system working smoothly.  You should aim for a certain amount of fiber in your diet each day. If you are a woman, that amount is between 25 and 28 grams per day. Men should aim for 30 to 33 grams per day. After age 50, your daily fiber needs drop to 22 grams for women and 28 grams for men.  Before you reach for the fiber supplements, think about this. Fiber is found naturally in healthy whole foods. It gives you that feeling of fullness after you eat. Taking fiber supplements or eating fiber-enriched foods will not give you this full feeling.  Your fiber intake is a good measure for the quality of your overall diet. If you are missing out on your daily amount of fiber, you may be lacking other important nutrients as well.  Date Last Reviewed: 5/11/2015 © 2000-2017 [employer]. [address] [place] [address]. All rights reserved. This information is not intended as a substitute for professional medical care. Always follow your healthcare professional's instructions.        Colonoscopy     A camera attached to a flexible tube with a viewing lens is used to take video pictures.     Colonoscopy is a test to view the inside of your lower digestive tract (colon and rectum). Sometimes it can show the last part of the small intestine (ileum). During the test, small pieces of tissue may be removed for testing. This is called a biopsy. Small growths, such as polyps, may also be removed.   Why is colonoscopy done?  The test is done to help look for colon cancer. And it can help find the source of abdominal pain, bleeding, and changes in bowel habits. It may be needed once a year, depending on factors such as your:  · Age  · Health history  · Family health history  · Symptoms  · Results from any prior colonoscopy  Risks and possible complications  These include:  · Bleeding               · A puncture or tear in the colon   · Risks of  anesthesia  · A cancer lesion not being seen  Getting ready   To prepare for the test:  · Talk with your healthcare provider about the risks of the test (see below). Also ask your healthcare provider about alternatives to the test.  · Tell your healthcare provider about any medicines you take. Also tell him or her about any health conditions you may have.  · Make sure your rectum and colon are empty for the test. Follow the diet and bowel prep instructions exactly. If you dont, the test may need to be rescheduled.  · Plan for a friend or family member to drive you home after the test.     Colonoscopy provides an inside view of the entire colon.     You may discuss the results with your doctor right away or at a future visit.  During the test   The test is usually done in the hospital on an outpatient basis. This means you go home the same day. The procedure takes about 30 minutes. During that time:  · You are given relaxing (sedating) medicine through an IV line. You may be drowsy, or fully asleep.  · The healthcare provider will first give you a physical exam to check for anal and rectal problems.  · Then the anus is lubricated and the scope inserted.  · If you are awake, you may have a feeling similar to needing to have a bowel movement. You may also feel pressure as air is pumped into the colon. Its OK to pass gas during the procedure.  · Biopsy, polyp removal, or other treatments may be done during the test.  After the test   You may have gas right after the test. It can help to try to pass it to help prevent later bloating. Your healthcare provider may discuss the results with you right away. Or you may need to schedule a follow-up visit to talk about the results. After the test, you can go back to your normal eating and other activities. You may be tired from the sedation and need to rest for a few hours.  Date Last Reviewed: 11/1/2016  © 6655-3828 [employer]. [address] [place] [address], [facility]  "[place] [address]. All rights reserved. This information is not intended as a substitute for professional medical care. Always follow your healthcare professional's instructions.      Discharge Instructions: After Your Surgery/Procedure  Youve just had surgery. During surgery you were given medicine called anesthesia to keep you relaxed and free of pain. After surgery you may have some pain or nausea. This is common. Here are some tips for feeling better and getting well after surgery.     Stay on schedule with your medication.   Going home  Your doctor or nurse will show you how to take care of yourself when you go home. He or she will also answer your questions. Have an adult family member or friend drive you home.      For your safety we recommend these precaution for the first 24 hours after your procedure:  · Do not drive or use heavy equipment.  · Do not make important decisions or sign legal papers.  · Do not drink alcohol.  · Have someone stay with you, if needed. He or she can watch for problems and help keep you safe.  · Your concentration, balance, coordination, and judgement may be impaired for many hours after anesthesia.  Use caution when ambulating or standing up.     · You may feel weak and "washed out" after anesthesia and surgery.      Subtle residual effects of general anesthesia or sedation with regional / local anesthesia can last more than 24 hours.  Rest for the remainder of the day or longer if your Doctor/Surgeon has advised you to do so.  Although you may feel normal within the first 24 hours, your reflexes and mental ability may be impaired without you realizing it.  You may feel dizzy, lightheaded or sleepy for 24 hours or longer.      Be sure to go to all follow-up visits with your doctor. And rest after your surgery for as long as your doctor tells you to.  Coping with pain  If you have pain after surgery, pain medicine will help you feel better. Take it as told, before pain becomes severe. " Also, ask your doctor or pharmacist about other ways to control pain. This might be with heat, ice, or relaxation. And follow any other instructions your surgeon or nurse gives you.  Tips for taking pain medicine  To get the best relief possible, remember these points:  · Pain medicines can upset your stomach. Taking them with a little food may help.  · Most pain relievers taken by mouth need at least 20 to 30 minutes to start to work.  · Taking medicine on a schedule can help you remember to take it. Try to time your medicine so that you can take it before starting an activity. This might be before you get dressed, go for a walk, or sit down for dinner.  · Constipation is a common side effect of pain medicines. Call your doctor before taking any medicines such as laxatives or stool softeners to help ease constipation. Also ask if you should skip any foods. Drinking lots of fluids and eating foods such as fruits and vegetables that are high in fiber can also help. Remember, do not take laxatives unless your surgeon has prescribed them.  · Drinking alcohol and taking pain medicine can cause dizziness and slow your breathing. It can even be deadly. Do not drink alcohol while taking pain medicine.  · Pain medicine can make you react more slowly to things. Do not drive or run machinery while taking pain medicine.  Your health care provider may tell you to take acetaminophen to help ease your pain. Ask him or her how much you are supposed to take each day. Acetaminophen or other pain relievers may interact with your prescription medicines or other over-the-counter (OTC) drugs. Some prescription medicines have acetaminophen and other ingredients. Using both prescription and OTC acetaminophen for pain can cause you to overdose. Read the labels on your OTC medicines with care. This will help you to clearly know the list of ingredients, how much to take, and any warnings. It may also help you not take too  much acetaminophen. If you have questions or do not understand the information, ask your pharmacist or health care provider to explain it to you before you take the OTC medicine.  Managing nausea  Some people have an upset stomach after surgery. This is often because of anesthesia, pain, or pain medicine, or the stress of surgery. These tips will help you handle nausea and eat healthy foods as you get better. If you were on a special food plan before surgery, ask your doctor if you should follow it while you get better. These tips may help:  · Do not push yourself to eat. Your body will tell you when to eat and how much.  · Start off with clear liquids and soup. They are easier to digest.  · Next try semi-solid foods, such as mashed potatoes, applesauce, and gelatin, as you feel ready.  · Slowly move to solid foods. Dont eat fatty, rich, or spicy foods at first.  · Do not force yourself to have 3 large meals a day. Instead eat smaller amounts more often.  · Take pain medicines with a small amount of solid food, such as crackers or toast, to avoid nausea.     Call your surgeon if  · You still have pain an hour after taking medicine. The medicine may not be strong enough.  · You feel too sleepy, dizzy, or groggy. The medicine may be too strong.  · You have side effects like nausea, vomiting, or skin changes, such as rash, itching, or hives.       If you have obstructive sleep apnea  You were given anesthesia medicine during surgery to keep you comfortable and free of pain. After surgery, you may have more apnea spells because of this medicine and other medicines you were given. The spells may last longer than usual.   At home:  · Keep using the continuous positive airway pressure (CPAP) device when you sleep. Unless your health care provider tells you not to, use it when you sleep, day or night. CPAP is a common device used to treat obstructive sleep apnea.  · Talk with your provider before taking any pain medicine,  muscle relaxants, or sedatives. Your provider will tell you about the possible dangers of taking these medicines.  © 7874-1580 [employer]. [address] [place] [address], [place] [address]. All rights reserved. This information is not intended as a substitute for professional medical care. Always follow your healthcare professional's instructions.    Diverticulosis    Diverticulosis means that small pouches have formed in the wall of your large intestine (colon). Most often, this problem causes no symptoms and is common as people age. But the pouches in the colon are at risk of becoming infected. When this happens, the condition is called diverticulitis. Although most people with diverticulosis never develop diverticulitis, it is still not uncommon. Rectal bleeding can also occur and in less common situations, a type of colon inflammation called colitis.  While most people do not have symptoms, some people with diverticulosis may have:  · Abdominal cramps and pain  · Bloating  · Constipation  · Change in bowel habits  Causes  The exact cause of diverticulosis (and diverticulitis) has not been proved, but a few things are associated with the condition:  · Low-fiber diet  · Constipation  · Lack of exercise  Your healthcare provider will talk with you about how to manage your condition. Diet changes may be all that are needed to help control diverticulosis and prevent progression to diverticulitis. If you develop diverticulitis, you will likely need other treatments.  Home care  You may be told to take fiber supplements daily. Fiber adds bulk to the stool so that it passes through the colon more easily. Stool softeners may be recommended. You may also be given medications for pain relief. Be sure to take all medications as directed.  In the past, people were told to avoid corn, nuts, and seeds. This is no longer necessary.  Follow these guidelines when caring for yourself at home:  · Eat unprocessed foods that  are high in fiber. Whole grains, fruits, and vegetables are good choices.  · Drink 6 to 8 glasses of water every day unless your healthcare provider has you limit how much fluid you should have.  · Watch for changes in your bowel movements. Tell your provider if you notice any changes.  · Begin an exercise program. Ask your provider how to get started. Generally, walking is the best.  · Get plenty of rest and sleep.  Follow-up care  Follow up with your healthcare provider, or as advised. Regular visits may be needed to check on your health. Sometimes special procedures such as colonoscopy, are needed after an episode of diverticulitis or blooding. Be sure to keep all your appointments.  If a stool sample was taken, or cultures were done, you should be told if they are positive, or if your treatment needs to be changed. You can call as directed for the results.  If X-rays were done, a radiologist will look at them. You will be told if there is a change in your treatment.  If antibiotics were prescribed, be sure to finish them all.  When to seek medical advice  Call your healthcare provider right away if any of these occur:  · Fever of 100.4°F (38°C) or higher, or as directed by your healthcare provider  · Severe cramps in the lower left side of the abdomen or pain that is getting worse  · Tenderness in the lower left side of the abdomen or worsening pain throughout the abdomen  · Diarrhea or constipation that doesn't get better within 24 hours  · Nausea and vomiting  · Bleeding from the rectum  Call 911  Call emergency services if any of the following occur:  · Trouble breathing  · Confusion  · Very drowsy or trouble awakening  · Fainting or loss of consciousness  · Rapid heart rate  · Chest pain  Date Last Reviewed: 12/30/2015  © 4375-0781 [employer]. [address] [place] [address], [place] [address]. All rights reserved. This information is not intended as a substitute for professional medical care. Always  follow your healthcare professional's instructions.

## 2018-05-08 NOTE — ANESTHESIA POSTPROCEDURE EVALUATION
Anesthesia Post Evaluation    Patient: Anuja Cool    Procedure(s) Performed: Procedure(s) (LRB):  COLONOSCOPY (N/A)    Final Anesthesia Type: general  Patient location during evaluation: PACU  Patient participation: Yes- Able to Participate  Level of consciousness: awake and alert  Post-procedure vital signs: reviewed and stable  Pain management: adequate  Airway patency: patent  PONV status at discharge: No PONV  Anesthetic complications: no      Cardiovascular status: hemodynamically stable  Respiratory status: unassisted and room air  Hydration status: euvolemic  Follow-up not needed.        Visit Vitals  /71   Pulse 79   Temp 36.7 °C (98 °F) (Skin)   Resp 18   SpO2 97%   Breastfeeding? No       Pain/Miky Score: Pain Assessment Performed: Yes (5/8/2018 10:50 AM)  Presence of Pain: denies (5/8/2018 10:50 AM)  Miky Score: 10 (5/8/2018 10:50 AM)

## 2018-05-08 NOTE — H&P
CC: Screening for colorectal cancer - first occurrence    67 year old female with above. States that symptoms are absent, no alleviating/exacerbating factors. No family history of CA. No personal history of polyps. No bleeding or weight loss.     ROS:  No headache, no fever/chills, no chest pain/SOB, no nausea/vomiting/diarrhea/constipation/GI bleeding/abdominal pain, no dysuria/hematuria.    VSSAF   Exam:   Alert and oriented x 3; no apparent distress   PERRLA, sclera anicteric  CV: Regular rate/rhythm, normal PMI   Lungs: Clear bilaterally with no wheeze/rales   Abdomen: Soft, NT/ND, normal bowel sounds   Ext: No cyanosis, clubbing     Impression:   As above    Plan:   Proceed with endoscopy. Further recs to follow.

## 2018-05-08 NOTE — ANESTHESIA PREPROCEDURE EVALUATION
05/08/2018  Anuja Cool is a 67 y.o., female.    Anesthesia Evaluation    I have reviewed the Patient Summary Reports.    I have reviewed the Nursing Notes.   I have reviewed the Medications.     Review of Systems  Anesthesia Hx:  No problems with previous Anesthesia    Social:  Smoker    Hematology/Oncology:  Hematology Normal   Oncology Normal     Cardiovascular:   Hypertension hyperlipidemia    Pulmonary:   COPD Asthma    Renal/:  Renal/ Normal     Hepatic/GI:   Bowel Prep.    Musculoskeletal:   Arthritis     Neurological:   Neuromuscular Disease,    Endocrine:  Endocrine Normal    Dermatological:  Skin Normal    Psych:  Psychiatric Normal           Physical Exam  General:  Well nourished    Airway/Jaw/Neck:  Airway Findings: Mouth Opening: Normal Tongue: Normal  General Airway Assessment: Adult  Mallampati: II  TM Distance: Normal, at least 6 cm        Eyes/Ears/Nose:  EYES/EARS/NOSE FINDINGS: Normal   Dental:  Dental Findings: Upper Dentures, Lower Dentures   Chest/Lungs:  Chest/Lungs Findings: Normal Respiratory Rate, Expiratory Wheezes, Mild     Heart/Vascular:  Heart Findings: Rate: Normal  Rhythm: Regular Rhythm  Sounds: Normal        Mental Status:  Mental Status Findings:  Cooperative, Alert and Oriented         Anesthesia Plan  Type of Anesthesia, risks & benefits discussed:  Anesthesia Type:  general  Patient's Preference:   Intra-op Monitoring Plan: standard ASA monitors  Intra-op Monitoring Plan Comments:   Post Op Pain Control Plan:   Post Op Pain Control Plan Comments:   Induction:   IV  Beta Blocker:  Patient is not currently on a Beta-Blocker (No further documentation required).       Informed Consent: Patient understands risks and agrees with Anesthesia plan.  Questions answered. Anesthesia consent signed with patient.  ASA Score: 3     Day of Surgery Review of History &  Physical: I have interviewed and examined the patient. I have reviewed the patient's H&P dated:  There are no significant changes.  H&P update referred to the provider.         Ready For Surgery From Anesthesia Perspective.

## 2018-05-09 VITALS
RESPIRATION RATE: 18 BRPM | TEMPERATURE: 98 F | OXYGEN SATURATION: 97 % | HEART RATE: 79 BPM | SYSTOLIC BLOOD PRESSURE: 134 MMHG | DIASTOLIC BLOOD PRESSURE: 71 MMHG

## 2018-05-10 ENCOUNTER — TELEPHONE (OUTPATIENT)
Dept: FAMILY MEDICINE | Facility: CLINIC | Age: 68
End: 2018-05-10

## 2018-05-10 RX ORDER — ATORVASTATIN CALCIUM 40 MG/1
40 TABLET, FILM COATED ORAL DAILY
Qty: 90 TABLET | Refills: 3 | Status: SHIPPED | OUTPATIENT
Start: 2018-05-10 | End: 2019-08-21

## 2018-05-10 NOTE — TELEPHONE ENCOUNTER
----- Message from Isis Lew MD sent at 5/10/2018  8:19 AM CDT -----  Lipitor 40mg called in. I recommend starting atorvastatin 40mg to your help lower your cholesterol and risk of death from cardiovascular events.  I have called a prescription in to your pharmacy.  Statin drugs can cause side effects like muscle pain, liver enzyme elevation and liver inflammation.  In rare instances more serious reactions can occur like severe muscle breakdown called rhabdomyolysis or liver damage.  If you experience muscle pain, pain in the right side of the abdomen or other worsening problems please let me know right away.  We will also monitor your liver enzymes every 3-6 months.

## 2018-05-10 NOTE — TELEPHONE ENCOUNTER
Patient advised that a new medication was sent in for her to help lower her cholesterol. I re-educated on the diet modifications and exercise. I also made her aware of the possible SE and what to do if they occur. She verbalized understanding.

## 2018-05-19 DIAGNOSIS — J44.9 CHRONIC OBSTRUCTIVE PULMONARY DISEASE, UNSPECIFIED COPD TYPE: ICD-10-CM

## 2018-05-21 RX ORDER — BUDESONIDE AND FORMOTEROL FUMARATE DIHYDRATE 80; 4.5 UG/1; UG/1
AEROSOL RESPIRATORY (INHALATION)
Qty: 33 G | Refills: 3 | Status: SHIPPED | OUTPATIENT
Start: 2018-05-21 | End: 2018-07-10

## 2018-05-22 ENCOUNTER — OFFICE VISIT (OUTPATIENT)
Dept: ENDOCRINOLOGY | Facility: CLINIC | Age: 68
End: 2018-05-22
Payer: MEDICARE

## 2018-05-22 ENCOUNTER — LAB VISIT (OUTPATIENT)
Dept: LAB | Facility: HOSPITAL | Age: 68
End: 2018-05-22
Attending: INTERNAL MEDICINE
Payer: MEDICARE

## 2018-05-22 VITALS
WEIGHT: 112.44 LBS | HEART RATE: 85 BPM | HEIGHT: 60 IN | SYSTOLIC BLOOD PRESSURE: 185 MMHG | DIASTOLIC BLOOD PRESSURE: 72 MMHG | RESPIRATION RATE: 18 BRPM | BODY MASS INDEX: 22.07 KG/M2

## 2018-05-22 DIAGNOSIS — R79.89 LOW SERUM THYROID STIMULATING HORMONE (TSH): ICD-10-CM

## 2018-05-22 DIAGNOSIS — E07.9 THYROID DISEASE: ICD-10-CM

## 2018-05-22 DIAGNOSIS — Z72.0 TOBACCO USE: ICD-10-CM

## 2018-05-22 DIAGNOSIS — E05.90 HYPERTHYROIDISM: ICD-10-CM

## 2018-05-22 DIAGNOSIS — I25.10 ASCVD (ARTERIOSCLEROTIC CARDIOVASCULAR DISEASE): ICD-10-CM

## 2018-05-22 DIAGNOSIS — E04.9 GOITER: ICD-10-CM

## 2018-05-22 DIAGNOSIS — E06.9 THYROIDITIS: ICD-10-CM

## 2018-05-22 DIAGNOSIS — M85.80 OSTEOPENIA, UNSPECIFIED LOCATION: ICD-10-CM

## 2018-05-22 DIAGNOSIS — E04.2 MULTINODULAR GOITER: Primary | ICD-10-CM

## 2018-05-22 DIAGNOSIS — E05.90 THYROTOXICOSIS WITHOUT THYROID STORM, UNSPECIFIED THYROTOXICOSIS TYPE: ICD-10-CM

## 2018-05-22 DIAGNOSIS — R79.89 ABNORMAL THYROID BLOOD TEST: ICD-10-CM

## 2018-05-22 DIAGNOSIS — E78.5 HYPERLIPIDEMIA LDL GOAL <130: ICD-10-CM

## 2018-05-22 DIAGNOSIS — J44.9 CHRONIC OBSTRUCTIVE PULMONARY DISEASE, UNSPECIFIED COPD TYPE: ICD-10-CM

## 2018-05-22 DIAGNOSIS — Z78.0 POSTMENOPAUSAL: ICD-10-CM

## 2018-05-22 DIAGNOSIS — I10 ESSENTIAL HYPERTENSION: ICD-10-CM

## 2018-05-22 DIAGNOSIS — E04.2 MULTINODULAR GOITER: ICD-10-CM

## 2018-05-22 LAB
25(OH)D3+25(OH)D2 SERPL-MCNC: 33 NG/ML
CA-I BLDV-SCNC: 1.23 MMOL/L
CHOLEST SERPL-MCNC: 159 MG/DL
CHOLEST/HDLC SERPL: 2.4 {RATIO}
HDLC SERPL-MCNC: 67 MG/DL
HDLC SERPL: 42.1 %
LDLC SERPL CALC-MCNC: 78 MG/DL
NONHDLC SERPL-MCNC: 92 MG/DL
PTH-INTACT SERPL-MCNC: 33 PG/ML
T3 SERPL-MCNC: 141 NG/DL
T4 FREE SERPL-MCNC: 1.27 NG/DL
THYROGLOB AB SERPL IA-ACNC: <4 IU/ML
THYROPEROXIDASE IGG SERPL-ACNC: <6 IU/ML
TRIGL SERPL-MCNC: 70 MG/DL
TSH SERPL DL<=0.005 MIU/L-ACNC: <0.01 UIU/ML
URATE SERPL-MCNC: 2.7 MG/DL

## 2018-05-22 PROCEDURE — 84482 T3 REVERSE: CPT

## 2018-05-22 PROCEDURE — 82088 ASSAY OF ALDOSTERONE: CPT

## 2018-05-22 PROCEDURE — 83519 RIA NONANTIBODY: CPT

## 2018-05-22 PROCEDURE — 84443 ASSAY THYROID STIM HORMONE: CPT

## 2018-05-22 PROCEDURE — 82330 ASSAY OF CALCIUM: CPT

## 2018-05-22 PROCEDURE — 86800 THYROGLOBULIN ANTIBODY: CPT | Mod: 91

## 2018-05-22 PROCEDURE — 84439 ASSAY OF FREE THYROXINE: CPT

## 2018-05-22 PROCEDURE — 86376 MICROSOMAL ANTIBODY EACH: CPT

## 2018-05-22 PROCEDURE — 99204 OFFICE O/P NEW MOD 45 MIN: CPT | Mod: S$PBB,,, | Performed by: INTERNAL MEDICINE

## 2018-05-22 PROCEDURE — 99999 PR PBB SHADOW E&M-EST. PATIENT-LVL IV: CPT | Mod: PBBFAC,,, | Performed by: INTERNAL MEDICINE

## 2018-05-22 PROCEDURE — 80061 LIPID PANEL: CPT

## 2018-05-22 PROCEDURE — 99214 OFFICE O/P EST MOD 30 MIN: CPT | Mod: PBBFAC,PO | Performed by: INTERNAL MEDICINE

## 2018-05-22 PROCEDURE — 86800 THYROGLOBULIN ANTIBODY: CPT

## 2018-05-22 PROCEDURE — 84445 ASSAY OF TSI GLOBULIN: CPT

## 2018-05-22 PROCEDURE — 84480 ASSAY TRIIODOTHYRONINE (T3): CPT

## 2018-05-22 PROCEDURE — 84550 ASSAY OF BLOOD/URIC ACID: CPT

## 2018-05-22 PROCEDURE — 82306 VITAMIN D 25 HYDROXY: CPT

## 2018-05-22 PROCEDURE — 83970 ASSAY OF PARATHORMONE: CPT

## 2018-05-22 NOTE — LETTER
May 22, 2018      Isis Lew MD  [address]  [place] [address]           [facility]  [address]  [place] [address]  Phone: [phone]          Patient: Anuja Cool   MR Number: [id number]   YOB: 1950   Date of Visit: 5/22/2018       Dear Dr. Isis Lew:    Thank you for referring Anuja Cool to me for evaluation. Attached you will find relevant portions of my assessment and plan of care.    If you have questions, please do not hesitate to call me. I look forward to following Anuja Cool along with you.    Sincerely,    Piter Graves MD    Enclosure  CC:  No Recipients    If you would like to receive this communication electronically, please contact [email or web address] or [phone] to request more information on [facility] access.    For providers and/or their staff who would like to refer a patient to [facility], please contact us through our one-stop-shop provider referral line, [facility], at [phone].    If you feel you have received this communication in error or would no longer like to receive these types of communications, please e-mail [email or web address]

## 2018-05-22 NOTE — PROGRESS NOTES
Subjective:      Patient ID: Anuja Cool is a 67 y.o. female.    Chief Complaint:  67 yr old postmenopausal lady with thyroid nodular disease seen for initial care visit today.    History of Present Illness    Patient is a 67 yr postmenopausal lady seen for initial care visit today on account of thyroid nodular disease and TSH suppression suggestive of possible hyperthyroidism.  Patients most recent thyroid USS was from 08/17 and thus her next ffup thyroid USS should be for ~ 08/18. She also has had an USS guided FNAB done that showed benign thyroid colloid lesion.  Her associated background comorbidities are detailed below.  Patient Active Problem List   Diagnosis    Asthma    Allergy    Hypertension    Hyperlipidemia LDL goal <130    Tobacco use    COPD (chronic obstructive pulmonary disease)    Hyponatremia    Bilateral carotid bruits    Hypokalemia    OAB (overactive bladder)    Osteopenia    Underweight    BMI less than 19,adult    Low serum thyroid stimulating hormone (TSH)    Multinodular goiter    Stenosis of carotid artery    Constipation    Abnormal thyroid blood test    Thyroid disease    Thyroiditis    Postmenopausal    ASCVD (arteriosclerotic cardiovascular disease)    Goiter    Thyrotoxicosis    Hyperthyroidism     Her most recent DEXA was from 11/16 and showed osteopenia. Her next DEXA thus should be for ~ 11/18.  Her baseline Sloan score is 6. Patient  Was found  To have thyroid disease when she had screening labs obtained on account of involuntary weight loss.   There is no family history of thyroid disease.  Patient has long standing poor sleep. Patients sleep is fragmented mainly because of nocturia and frequency.  Grav 3 Para 3+0 (2 alive). She has no palpitations, She has occasional hoarseness but has no odynophagia nor dysphagia.  She has no major muscle weakness but has intermittent myalgias. She has no constipation but has occasional diarrhea. She has no skin  rashes, leg swelling. She no undue eye prominence nor excessive tearing of the eyes. She does have bilataal cataracts L >> R for which she is pending cataract surgery.  Patient smokes ~ 1PPD for 50 yrs. Patient drinks 2-3 cups of caffienated coffee.      Review of Systems    Objective:   BP (!) 185/72   Pulse 85   Resp 18   Ht 5' (1.524 m)   Wt 51 kg (112 lb 7 oz)   BMI 21.96 kg/m²  Body surface area is 1.47 meters squared.         Physical Exam   Constitutional: She is oriented to person, place, and time. Vital signs are normal. She appears well-developed and well-nourished. She does not appear ill. No distress.   Pleasant elderly lady. Thin asthenic frame. Not pale, anicteric and afebrile. Well hydrated. Not in any acute distress.   HENT:   Head: Normocephalic and atraumatic. Not macrocephalic. Head is without right periorbital erythema and without left periorbital erythema. Hair is normal.   Nose: Nose normal.   Mouth/Throat: Oropharynx is clear and moist. Mucous membranes are not pale and not dry. No oropharyngeal exudate.   Eyes: Conjunctivae, EOM and lids are normal. Pupils are equal, round, and reactive to light. Right eye exhibits no discharge. Left eye exhibits no discharge. No scleral icterus.   No evidence of any opthalmopathy of note.   Neck: Trachea normal, normal range of motion, full passive range of motion without pain and phonation normal. Neck supple. Normal carotid pulses and no JVD present. Carotid bruit is not present. No tracheal deviation present. No thyroid mass and no thyromegaly present.       No nuchal AN. Mallampati grade 1 fauces.   Cardiovascular: Normal rate, regular rhythm, S1 normal, S2 normal, intact distal pulses and normal pulses.  PMI is not displaced.  Exam reveals no gallop.    Murmur (soft 3/6 ESM maximal over cardiac base with no radiation to the neck) heard.  Pulmonary/Chest: Effort normal and breath sounds normal. No respiratory distress. She has no wheezes. She has no  rales.   Abdominal: Soft. Bowel sounds are normal. She exhibits no distension and no mass. There is no hepatosplenomegaly, splenomegaly or hepatomegaly. There is no tenderness. There is no rebound, no guarding and no CVA tenderness. No hernia. Hernia confirmed negative in the ventral area.   Musculoskeletal: She exhibits no edema or tenderness.        Right shoulder: She exhibits normal range of motion, no bony tenderness, no crepitus, no deformity, no pain, no spasm, normal pulse and normal strength.   No pedal edema. Has no digital clubbing and no nail changes suggestive of thyroid acropachy. She has no pretibial myxedema but does have fine tremors of the outstretched hands.   Lymphadenopathy:     She has no cervical adenopathy.   Neurological: She is alert and oriented to person, place, and time. She has normal strength and normal reflexes. She displays no atrophy, no tremor and normal reflexes. No cranial nerve deficit or sensory deficit. She exhibits normal muscle tone. Coordination and gait normal.   Skin: Skin is warm, dry and intact. No bruising, no ecchymosis, no petechiae and no rash noted. She is not diaphoretic. No cyanosis or erythema. No pallor. Nails show no clubbing.   Age appropriate diffuse cutaneous atrophy. No ecchymoses seen.   Psychiatric: She has a normal mood and affect. Her speech is normal and behavior is normal. Judgment and thought content normal. Cognition and memory are normal.   Nursing note and vitals reviewed.      Lab Review:     Results for KAILASH SEVILLA (MRN [id number]) as of 5/22/2018 08:57   Ref. Range 3/16/2018 14:27 4/11/2018 08:01 4/11/2018 12:22   WBC Latest Ref Range: 3.90 - 12.70 K/uL  10.44    RBC Latest Ref Range: 4.00 - 5.40 M/uL  4.41    Hemoglobin Latest Ref Range: 12.0 - 16.0 g/dL  13.4    Hematocrit Latest Ref Range: 37.0 - 48.5 %  42.2    MCV Latest Ref Range: 82 - 98 fL  96    MCH Latest Ref Range: 27.0 - 31.0 pg  30.4    MCHC Latest Ref Range: 32.0 - 36.0 g/dL   31.8 (L)    RDW Latest Ref Range: 11.5 - 14.5 %  13.3    Platelets Latest Ref Range: 150 - 350 K/uL  350    MPV Latest Ref Range: 9.2 - 12.9 fL  10.0    Gran% Latest Ref Range: 38.0 - 73.0 %  58.2    Gran # (ANC) Latest Ref Range: 1.8 - 7.7 K/uL  6.1    Immature Granulocytes Latest Ref Range: 0.0 - 0.5 %  0.2    Immature Grans (Abs) Latest Ref Range: 0.00 - 0.04 K/uL  0.02    Lymph% Latest Ref Range: 18.0 - 48.0 %  23.6    Lymph # Latest Ref Range: 1.0 - 4.8 K/uL  2.5    Mono% Latest Ref Range: 4.0 - 15.0 %  7.0    Mono # Latest Ref Range: 0.3 - 1.0 K/uL  0.7    Eosinophil% Latest Ref Range: 0.0 - 8.0 %  10.2 (H)    Eos # Latest Ref Range: 0.0 - 0.5 K/uL  1.1 (H)    Basophil% Latest Ref Range: 0.0 - 1.9 %  0.8    Baso # Latest Ref Range: 0.00 - 0.20 K/uL  0.08    nRBC Latest Ref Range: 0 /100 WBC  0    Sodium Latest Ref Range: 136 - 145 mmol/L  138    Potassium Latest Ref Range: 3.5 - 5.1 mmol/L  4.3    Chloride Latest Ref Range: 95 - 110 mmol/L  102    CO2 Latest Ref Range: 23 - 29 mmol/L  26    Anion Gap Latest Ref Range: 8 - 16 mmol/L  10    BUN, Bld Latest Ref Range: 8 - 23 mg/dL  11    Creatinine Latest Ref Range: 0.5 - 1.4 mg/dL  0.6    eGFR if non African American Latest Ref Range: >60 mL/min/1.73 m^2  >60.0    eGFR if African American Latest Ref Range: >60 mL/min/1.73 m^2  >60.0    Glucose Latest Ref Range: 70 - 110 mg/dL  98    Calcium Latest Ref Range: 8.7 - 10.5 mg/dL  9.4    Magnesium Latest Ref Range: 1.6 - 2.6 mg/dL  1.8    Alkaline Phosphatase Latest Ref Range: 55 - 135 U/L  66    Total Protein Latest Ref Range: 6.0 - 8.4 g/dL  7.4    Albumin Latest Ref Range: 3.5 - 5.2 g/dL  3.9    Total Bilirubin Latest Ref Range: 0.1 - 1.0 mg/dL  0.4    AST Latest Ref Range: 10 - 40 U/L  18    ALT Latest Ref Range: 10 - 44 U/L  13    Triglycerides Latest Ref Range: 30 - 150 mg/dL  93    Cholesterol Latest Ref Range: 120 - 199 mg/dL  205 (H)    HDL Latest Ref Range: 40 - 75 mg/dL  70    LDL Cholesterol Latest Ref  Range: 63.0 - 159.0 mg/dL  116.4    Total Cholesterol/HDL Ratio Latest Ref Range: 2.0 - 5.0   2.9    Vit D, 25-Hydroxy Latest Ref Range: 30 - 96 ng/mL  41    TSH Latest Ref Range: 0.400 - 4.000 uIU/mL  0.010 (L)    Free T4 Latest Ref Range: 0.71 - 1.51 ng/dL  1.00    US CAROTID BILATERAL Unknown   Rpt   US SOFT TISSUE HEAD NECK THYROID Unknown Rpt     Differential Method Unknown  Automated    HDL/Chol Ratio Latest Ref Range: 20.0 - 50.0 %  34.1    Non-HDL Cholesterol Latest Units: mg/dL  135        Assessment:     1. Multinodular goiter  Thyroglobulin    Thyroid stimulating immunoglobulin    Iodine, Serum    PTH, intact    NM Thyroid Uptake and Scan   2. Low serum thyroid stimulating hormone (TSH)  T4, free    T3    TSH    T3, reverse    Thyroid stimulating immunoglobulin    Iodine, Serum    NM Thyroid Uptake and Scan   3. Abnormal thyroid blood test  T4, free    T3    TSH    T3, reverse   4. Thyroid disease  Thyroid stimulating immunoglobulin    Iodine, Serum   5. Thyroiditis  Anti-thyroglobulin antibody    Thyroid peroxidase antibody   6. Essential hypertension  Uric acid    Lipid panel    PTH, intact    Aldosterone    Renin    Microalbumin/creatinine urine ratio    Urinalysis   7. Hyperlipidemia LDL goal <130  Lipid panel   8. Osteopenia, unspecified location  Calcium, ionized    Uric acid    Vitamin D    PTH, intact   9. Tobacco use     10. Chronic obstructive pulmonary disease, unspecified COPD type     11. Postmenopausal     12. ASCVD (arteriosclerotic cardiovascular disease)  Lipid panel   13. Goiter     14. Thyrotoxicosis without thyroid storm, unspecified thyrotoxicosis type  T4, free    T3    TSH    Thyrotropin-Binding Inhibitory Immunoglobulin (TBII)    Thyrotropin receptor antibody    Thyroid stimulating immunoglobulin    NM Thyroid Uptake and Scan   15. Hyperthyroidism  T4, free    T3    TSH    Thyrotropin-Binding Inhibitory Immunoglobulin (TBII)    Thyrotropin receptor antibody    Thyroid stimulating  immunoglobulin    NM Thyroid Uptake and Scan      Regarding possible hyperthyroidism; to evaluate to discover the underlying etiology for her hyperthyroidism viz thyroiditis, thyroid nodular disease with toxic multinodular goiter (plummers disease), toxic nodules vs Graves.  To obtain full lab panel as detailed above and to obtain I-123 diagnostic scan and uptake.  Regarding ASCVD; to continue low dose asa as before.  Regarding thyroid nodular disease; to obtain ffup thyroid USS ~ 08/18.  Regarding osteopenia; to continue calcium and vitamin D OTC supplements and will repeat DEXA ~ 11/18.  Regarding Tobacco use; patient is aware of the risks inherent in ongoing tobacco use and is continuing to progressively reduce her daily tobacco use with the ultimate aim of stopping use entirely in the near future.  Regarding hypertension; To continue serial tracking of BP trends in ambulatory setting. Depending on results of latets TFTS will benefit from commencing beta blockade.      Plan:     FFup in ~ 3mths

## 2018-05-23 LAB
IODINE SERPL-MCNC: 67 NG/ML (ref 40–92)
THRYOGLOBULIN INTERPRETATION: ABNORMAL
THYROGLOB AB SERPL-ACNC: <1.8 IU/ML
THYROGLOB SERPL-MCNC: 32 NG/ML

## 2018-05-24 LAB
ALDOST SERPL-MCNC: <3 NG/DL
RENIN PLAS-CCNC: <0.6 NG/ML/H
T3REVERSE SERPL-MCNC: 19.4 NG/DL (ref 9–27)
TSH RECEP AB SER-ACNC: <1 IU/L
TSI SER-ACNC: <0.1 IU/L

## 2018-05-29 LAB — TSH BII SER-ACNC: <2 %INHIBITION

## 2018-06-04 ENCOUNTER — HOSPITAL ENCOUNTER (OUTPATIENT)
Dept: RADIOLOGY | Facility: HOSPITAL | Age: 68
Discharge: HOME | End: 2018-06-04
Attending: INTERNAL MEDICINE
Payer: MEDICARE

## 2018-06-04 DIAGNOSIS — E04.2 MULTINODULAR GOITER: ICD-10-CM

## 2018-06-04 DIAGNOSIS — E05.90 HYPERTHYROIDISM: ICD-10-CM

## 2018-06-04 DIAGNOSIS — R79.89 LOW SERUM THYROID STIMULATING HORMONE (TSH): ICD-10-CM

## 2018-06-04 DIAGNOSIS — E05.90 THYROTOXICOSIS WITHOUT THYROID STORM, UNSPECIFIED THYROTOXICOSIS TYPE: ICD-10-CM

## 2018-06-04 PROCEDURE — 78014 THYROID IMAGING W/BLOOD FLOW: CPT | Mod: 26,,, | Performed by: RADIOLOGY

## 2018-06-05 ENCOUNTER — HOSPITAL ENCOUNTER (OUTPATIENT)
Dept: RADIOLOGY | Facility: HOSPITAL | Age: 68
Discharge: HOME | End: 2018-06-05
Attending: INTERNAL MEDICINE
Payer: MEDICARE

## 2018-06-05 DIAGNOSIS — I10 HYPERTENSION, UNSPECIFIED TYPE: ICD-10-CM

## 2018-06-05 DIAGNOSIS — E05.90 HYPERTHYROIDISM: Primary | ICD-10-CM

## 2018-06-05 PROCEDURE — A9516 IODINE I-123 SOD IODIDE MIC: HCPCS

## 2018-06-05 RX ORDER — ATENOLOL 50 MG/1
50 TABLET ORAL DAILY
Qty: 90 TABLET | Refills: 3 | Status: SHIPPED | OUTPATIENT
Start: 2018-06-05 | End: 2019-01-22

## 2018-07-10 ENCOUNTER — OFFICE VISIT (OUTPATIENT)
Dept: FAMILY MEDICINE | Facility: CLINIC | Age: 68
End: 2018-07-10
Payer: MEDICARE

## 2018-07-10 VITALS
OXYGEN SATURATION: 96 % | SYSTOLIC BLOOD PRESSURE: 172 MMHG | HEART RATE: 80 BPM | TEMPERATURE: 98 F | HEIGHT: 60 IN | BODY MASS INDEX: 21.9 KG/M2 | WEIGHT: 111.56 LBS | DIASTOLIC BLOOD PRESSURE: 88 MMHG

## 2018-07-10 DIAGNOSIS — J30.89 NON-SEASONAL ALLERGIC RHINITIS, UNSPECIFIED TRIGGER: ICD-10-CM

## 2018-07-10 DIAGNOSIS — J44.89 COPD WITH ASTHMA: Primary | ICD-10-CM

## 2018-07-10 DIAGNOSIS — I10 ESSENTIAL HYPERTENSION: ICD-10-CM

## 2018-07-10 DIAGNOSIS — Z72.0 TOBACCO USE: ICD-10-CM

## 2018-07-10 DIAGNOSIS — R53.83 FATIGUE, UNSPECIFIED TYPE: ICD-10-CM

## 2018-07-10 PROCEDURE — 96372 THER/PROPH/DIAG INJ SC/IM: CPT | Mod: PBBFAC,PO

## 2018-07-10 PROCEDURE — 99214 OFFICE O/P EST MOD 30 MIN: CPT | Mod: S$PBB,,, | Performed by: PHYSICIAN ASSISTANT

## 2018-07-10 PROCEDURE — 99999 PR PBB SHADOW E&M-EST. PATIENT-LVL V: CPT | Mod: PBBFAC,,, | Performed by: PHYSICIAN ASSISTANT

## 2018-07-10 PROCEDURE — 99215 OFFICE O/P EST HI 40 MIN: CPT | Mod: PBBFAC,PO,25 | Performed by: PHYSICIAN ASSISTANT

## 2018-07-10 RX ORDER — BUDESONIDE AND FORMOTEROL FUMARATE DIHYDRATE 160; 4.5 UG/1; UG/1
2 AEROSOL RESPIRATORY (INHALATION) EVERY 12 HOURS
Qty: 10.2 G | Refills: 0 | Status: SHIPPED | OUTPATIENT
Start: 2018-07-10 | End: 2018-08-16 | Stop reason: SDUPTHER

## 2018-07-10 RX ORDER — METHYLPREDNISOLONE ACETATE 40 MG/ML
60 INJECTION, SUSPENSION INTRA-ARTICULAR; INTRALESIONAL; INTRAMUSCULAR; SOFT TISSUE
Status: COMPLETED | OUTPATIENT
Start: 2018-07-10 | End: 2018-07-10

## 2018-07-10 RX ORDER — LISINOPRIL 20 MG/1
20 TABLET ORAL DAILY
Qty: 30 TABLET | Refills: 1 | Status: SHIPPED | OUTPATIENT
Start: 2018-07-10 | End: 2018-09-24 | Stop reason: SDUPTHER

## 2018-07-10 RX ORDER — BUPROPION HYDROCHLORIDE 150 MG/1
150 TABLET ORAL DAILY
Qty: 30 TABLET | Refills: 1 | Status: SHIPPED | OUTPATIENT
Start: 2018-07-10 | End: 2018-08-21 | Stop reason: SDUPTHER

## 2018-07-10 RX ORDER — MONTELUKAST SODIUM 10 MG/1
10 TABLET ORAL NIGHTLY
Qty: 30 TABLET | Refills: 1 | Status: SHIPPED | OUTPATIENT
Start: 2018-07-10 | End: 2018-08-09

## 2018-07-10 RX ADMIN — METHYLPREDNISOLONE ACETATE 60 MG: 40 INJECTION, SUSPENSION INTRALESIONAL; INTRAMUSCULAR; INTRASYNOVIAL; SOFT TISSUE at 09:07

## 2018-07-10 NOTE — PROGRESS NOTES
Subjective:       Patient ID: Anuja Cool is a 68 y.o. female.    Chief Complaint: No chief complaint on file.    Mrs. Cool is a 68 year old female who presents to clinic for 3 month f/u on chronic conditions. She was recently started on atenolol 50 mg daily by Endocrinology for mild hyperthyroidism. She reports daytime fatigue since starting this medication. Her BP remains elevated in clinic and she does not check this at home. She denies chest pain, sob, or leg swelling. She has been compliant with lisinopril. She is still smoking 1/2 pack cigarettes per day, but is interested in quitting. She does not have reliable transportation for the smok cessation program. She also complains of worsening sinus allergies including sinus pressure, itchy ears, and clear rhinorrhea. She has tried zyrtec/claritin in the past. She also reports cough and chest congestion, but no purulent phelgm. She denies fever or chills.       Review of Systems   Constitutional: Negative for activity change, appetite change, chills, fatigue and fever.   HENT: Positive for postnasal drip, rhinorrhea, sinus pain and sinus pressure. Negative for ear pain and sore throat.    Eyes: Negative for visual disturbance.   Respiratory: Positive for cough, shortness of breath and wheezing.    Cardiovascular: Negative for chest pain, palpitations and leg swelling.   Gastrointestinal: Negative for abdominal pain, constipation, diarrhea, nausea and vomiting.   Musculoskeletal: Negative for arthralgias.   Allergic/Immunologic: Positive for environmental allergies.   Neurological: Negative for dizziness, weakness, light-headedness and headaches.       Objective:      Vitals:    07/10/18 0906   BP: (!) 165/76   Pulse: 80   Temp: 98.2 °F (36.8 °C)     Physical Exam   Constitutional: She is oriented to person, place, and time. She appears well-developed.   HENT:   Head: Normocephalic and atraumatic.   Nose: Rhinorrhea present. Right sinus exhibits no  maxillary sinus tenderness and no frontal sinus tenderness. Left sinus exhibits no maxillary sinus tenderness and no frontal sinus tenderness.   Mouth/Throat: Oropharynx is clear and moist.   Eyes: Conjunctivae and EOM are normal. Pupils are equal, round, and reactive to light.   Cardiovascular: Normal rate, regular rhythm, normal heart sounds and intact distal pulses.    Pulmonary/Chest: Effort normal. She has wheezes (throughout).   Neurological: She is alert and oriented to person, place, and time.   Skin: Skin is warm and dry.   Psychiatric: She has a normal mood and affect. Her behavior is normal.   Vitals reviewed.      Assessment:       1. COPD with asthma    2. Non-seasonal allergic rhinitis, unspecified trigger    3. Essential hypertension    4. Fatigue, unspecified type    5. Tobacco use        Plan:       COPD with asthma  -     Increase budesonide-formoterol 160-4.5 mcg (SYMBICORT) 160-4.5 mcg/actuation HFAA; Inhale 2 puffs into the lungs every 12 (twelve) hours. Controller  Dispense: 10.2 g; Refill: 0  -     montelukast (SINGULAIR) 10 mg tablet; Take 1 tablet (10 mg total) by mouth every evening.  Dispense: 30 tablet; Refill: 1    Non-seasonal allergic rhinitis, unspecified trigger  -     methylPREDNISolone acetate injection 60 mg; Inject 1.5 mLs (60 mg total) into the muscle one time.        -     montelukast (SINGULAIR) 10 mg tablet; Take 1 tablet (10 mg total) by mouth every evening.  Dispense: 30 tablet; Refill: 1    Essential hypertension  -     Increase lisinopril (PRINIVIL,ZESTRIL) 20 MG tablet; Take 1 tablet (20 mg total) by mouth once daily.  Dispense: 30 tablet; Refill: 1  - Monitor BP at home and keep a log. Notify clinic if BP is persistently >140/90.      Fatigue, unspecified type         - Will send a message to Dr. Graves to consider lowering dose of atenolol 25 mg daily.    Tobacco use  -     buPROPion (WELLBUTRIN XL) 150 MG TB24 tablet; Take 1 tablet (150 mg total) by mouth once daily.   Dispense: 30 tablet; Refill: 1 Discussed s/e profile.  - Start 1 week prior to quit date.       Patient readiness: acceptance and barriers:none    During the course of the visit the patient was educated and counseled about the following:     Hypertension:   Medication: increasing lisinopril 20 mg.    Goals: Hypertension: Reduce Blood Pressure    Did patient meet goals/outcomes: No    The following self management tools provided: declined    Patient Instructions (the written plan) was given to the patient/family.     Time spent with patient: 30 minutes     Follow up in 1 month

## 2018-07-10 NOTE — PATIENT INSTRUCTIONS
Established High Blood Pressure    High blood pressure (hypertension) is a chronic disease. Often, healthcare providers dont know what causes it. But it can be caused by certain health conditions and medicines.  If you have high blood pressure, you may not have any symptoms. If you do have symptoms, they may include headache, dizziness, changes in your vision, chest pain, and shortness of breath. But even without symptoms, high blood pressure thats not treated raises your risk for heart attack and stroke. High blood pressure is a serious health risk and shouldnt be ignored.  A blood pressure reading is made up of two numbers: a higher number over a lower number. The top number is the systolic pressure. The bottom number is the diastolic pressure. A normal blood pressure is a systolic pressure of  less than 120 over a diastolic pressure of less than 80. You will see your blood pressure readings written together. For example, a person with a systolic pressure of 188 and a diastolic pressure of 78 will have 118/78 written in the medical record.  High blood pressure is when either the top number is 140 or higher, or the bottom number is 90 or higher. This must be the result when taking your blood pressure a number of times. The blood pressures between normal and high are called prehypertension.  Home care  If you have high blood pressure, you should do what is listed below to lower your blood pressure. If you are taking medicines for high blood pressure, these methods may reduce or end your need for medicines in the future.  · Begin a weight-loss program if you are overweight.  · Cut back on how much salt you get in your diet. Heres how to do this:  ¨ Dont eat foods that have a lot of salt. These include olives, pickles, smoked meats, and salted potato chips.  ¨ Dont add salt to your food at the table.  ¨ Use only small amounts of salt when cooking.  · Start an exercise program. Talk with your healthcare  provider about the type of exercise program that would be best for you. It doesn't have to be hard. Even brisk walking for 20 minutes 3 times a week is a good form of exercise.  · Dont take medicines that stimulate the heart. This includes many over-the-counter cold and sinus decongestant pills and sprays, as well as diet pills. Check the warnings about hypertension on the label. Before buying any over-the-counter medicines or supplements, always ask the pharmacist about the product's potential interaction with your high blood pressure and your high blood pressure medicines.  · Stimulants such as amphetamine or cocaine could be deadly for someone with high blood pressure. Never take these.  · Limit how much caffeine you get in your diet. Switch to caffeine-free products.  · Stop smoking. If you are a long-time smoker, this can be hard. Talk to your healthcare provider about medicines and nicotine replacement options to help you. Also, enroll in a stop-smoking program to make it more likely that you will quit for good.  · Learn how to handle stress. This is an important part of any program to lower blood pressure. Learn about relaxation methods like meditation, yoga, or biofeedback.  · If your provider prescribed medicines, take them exactly as directed. Missing doses may cause your blood pressure get out of control.  · If you miss a dose or doses, check with your healthcare provider or pharmacist about what to do.  · Consider buying an automatic blood pressure machine. Ask your provider for a recommendation. You can get one of these at most pharmacies.     The American Heart Association recommends the following guidelines for home blood pressure monitoring:  · Don't smoke or drink coffee for 30 minutes before taking your blood pressure.  · Go to the bathroom before the test.  · Relax for 5 minutes before taking the measurement.  · Sit with your back supported (don't sit on a couch or soft chair); keep your feet on  the floor uncrossed. Place your arm on a solid flat surface (like a table) with the upper part of the arm at heart level. Place the middle of the cuff directly above the eye of the elbow. Check the monitor's instruction manual for an illustration.  · Take multiple readings. When you measure, take 2 to 3 readings one minute apart and record all of the results.  · Take your blood pressure at the same time every day, or as your healthcare provider recommends.  · Record the date, time, and blood pressure reading.  · Take the record with you to your next medical appointment. If your blood pressure monitor has a built-in memory, simply take the monitor with you to your next appointment.  · Call your provider if you have several high readings. Don't be frightened by a single high blood pressure reading, but if you get several high readings, check in with your healthcare provider.  · Note: When blood pressure reaches a systolic (top number) of 180 or higher OR diastolic (bottom number) of 110 or higher, seek emergency medical treatment.  Follow-up care  You will need to see your healthcare provider regularly. This is to check your blood pressure and to make changes to your medicines. Make a follow-up appointment as directed. Bring the record of your home blood pressure readings to the appointment.  When to seek medical advice  Call your healthcare provider right away if any of these occur:  · Blood pressure reaches a systolic (upper number) of 180 or higher OR a diastolic (bottom number) of 110 or higher  · Chest pain or shortness of breath  · Severe headache  · Throbbing or rushing sound in the ears  · Nosebleed  · Sudden severe pain in your belly (abdomen)  · Extreme drowsiness, confusion, or fainting  · Dizziness or spinning sensation (vertigo)  · Weakness of an arm or leg or one side of the face  · You have problems speaking or seeing   Date Last Reviewed: 12/1/2016  © 0842-9513 [employer]. [address] [place] [address]  [place] [address]. All rights reserved. This information is not intended as a substitute for professional medical care. Always follow your healthcare professional's instructions.

## 2018-08-06 ENCOUNTER — DOCUMENTATION ONLY (OUTPATIENT)
Dept: FAMILY MEDICINE | Facility: CLINIC | Age: 68
End: 2018-08-06

## 2018-08-06 NOTE — PROGRESS NOTES
Pre-Visit Chart Review  For Appointment Scheduled on 8/6/18    Health Maintenance Due   Topic Date Due    TETANUS VACCINE  06[birth year]21/1968    Influenza Vaccine  08/01/2018

## 2018-08-13 DIAGNOSIS — J44.89 COPD WITH ASTHMA: ICD-10-CM

## 2018-08-13 RX ORDER — BUDESONIDE AND FORMOTEROL FUMARATE DIHYDRATE 160; 4.5 UG/1; UG/1
2 AEROSOL RESPIRATORY (INHALATION) EVERY 12 HOURS
Qty: 10.2 G | Refills: 0 | Status: CANCELLED | OUTPATIENT
Start: 2018-08-13 | End: 2019-08-13

## 2018-08-13 NOTE — TELEPHONE ENCOUNTER
----- Message from Gisel Mason sent at 8/13/2018  9:32 AM CDT -----  Type:  RX Refill Request    Who Called:  Patient   Refill or New Rx:  refill  RX Name and Strength:  budesonide-formoterol 160-4.5 mcg (SYMBICORT) 160-4.5 mcg/actuation HFAA  How is the patient currently taking it? (ex. 1XDay):   Is this a 30 day or 90 day RX:  30  Preferred Pharmacy with phone number:    [facility] [address], [place] [address]  [address] [facility]  [facility] [address]  Phone: [phone] Fax: [phone]  Local or Mail Order:  local  Ordering Provider:  Isis Lew  Best Call Back Number:  [phone]  Additional Information:  Patient is out of medication

## 2018-08-14 ENCOUNTER — TELEPHONE (OUTPATIENT)
Dept: FAMILY MEDICINE | Facility: CLINIC | Age: 68
End: 2018-08-14

## 2018-08-14 DIAGNOSIS — J44.89 COPD WITH ASTHMA: ICD-10-CM

## 2018-08-14 NOTE — TELEPHONE ENCOUNTER
----- Message from Az Staley sent at 8/14/2018  9:51 AM CDT -----  Contact: Patient  Anuja, [phone]. Calling to check the status of refill request for budesonide-formoterol 160-4.5 mcg (SYMBICORT) 160-4.5 mcg/actuation HFAA. Says the pharmacy sent the original request on Friday, and faxed another request on yesterday and they still haven't received an approval to refill. Please advise. Thanks.    [facility]   [address] [place] [id number]  Phone: [phone] Fax: [phone]

## 2018-08-16 RX ORDER — BUDESONIDE AND FORMOTEROL FUMARATE DIHYDRATE 160; 4.5 UG/1; UG/1
2 AEROSOL RESPIRATORY (INHALATION) EVERY 12 HOURS
Qty: 10.2 G | Refills: 5 | Status: SHIPPED | OUTPATIENT
Start: 2018-08-16 | End: 2019-02-15 | Stop reason: SDUPTHER

## 2018-08-20 ENCOUNTER — DOCUMENTATION ONLY (OUTPATIENT)
Dept: FAMILY MEDICINE | Facility: CLINIC | Age: 68
End: 2018-08-20

## 2018-08-20 NOTE — PROGRESS NOTES
Pre-Visit Chart Review  For Appointment Scheduled on 8/21/18    Health Maintenance Due   Topic Date Due    TETANUS VACCINE  06/21/1968    Influenza Vaccine  08/01/2018

## 2018-08-21 ENCOUNTER — OFFICE VISIT (OUTPATIENT)
Dept: FAMILY MEDICINE | Facility: CLINIC | Age: 68
End: 2018-08-21
Payer: MEDICARE

## 2018-08-21 VITALS
WEIGHT: 110.88 LBS | HEIGHT: 60 IN | OXYGEN SATURATION: 97 % | TEMPERATURE: 98 F | BODY MASS INDEX: 21.77 KG/M2 | DIASTOLIC BLOOD PRESSURE: 78 MMHG | SYSTOLIC BLOOD PRESSURE: 140 MMHG | HEART RATE: 73 BPM

## 2018-08-21 DIAGNOSIS — E05.90 HYPERTHYROIDISM: ICD-10-CM

## 2018-08-21 DIAGNOSIS — J30.9 ALLERGIC RHINITIS, UNSPECIFIED SEASONALITY, UNSPECIFIED TRIGGER: ICD-10-CM

## 2018-08-21 DIAGNOSIS — Z72.0 TOBACCO USE: ICD-10-CM

## 2018-08-21 DIAGNOSIS — E78.5 HYPERLIPIDEMIA LDL GOAL <130: ICD-10-CM

## 2018-08-21 DIAGNOSIS — R63.6 UNDERWEIGHT: ICD-10-CM

## 2018-08-21 DIAGNOSIS — I10 ESSENTIAL HYPERTENSION: Primary | ICD-10-CM

## 2018-08-21 PROCEDURE — 99215 OFFICE O/P EST HI 40 MIN: CPT | Mod: PBBFAC,PO | Performed by: PHYSICIAN ASSISTANT

## 2018-08-21 PROCEDURE — 99213 OFFICE O/P EST LOW 20 MIN: CPT | Mod: S$PBB,,, | Performed by: PHYSICIAN ASSISTANT

## 2018-08-21 PROCEDURE — 99999 PR PBB SHADOW E&M-EST. PATIENT-LVL V: CPT | Mod: PBBFAC,,, | Performed by: PHYSICIAN ASSISTANT

## 2018-08-21 RX ORDER — MONTELUKAST SODIUM 10 MG/1
10 TABLET ORAL NIGHTLY
Qty: 30 TABLET | Refills: 5 | Status: SHIPPED | OUTPATIENT
Start: 2018-08-21 | End: 2018-09-20

## 2018-08-21 RX ORDER — BUPROPION HYDROCHLORIDE 150 MG/1
150 TABLET ORAL DAILY
Qty: 30 TABLET | Refills: 5 | Status: SHIPPED | OUTPATIENT
Start: 2018-08-21 | End: 2021-04-22

## 2018-08-21 NOTE — PROGRESS NOTES
Subjective:       Patient ID: Anuja Cool is a 68 y.o. female.    Chief Complaint: Follow-up (4 weeks)    Mrs. Cool is a 68 year old female who presents to clinic for follow up on hypertension. She reports BP is much improved with increasing lisinopril 20 mg daily. She feels her allergy symptoms are significantly improved with addition of Singulair. She is still smoking, but is working on Correlor back. She is tolerating wellbutrin without side effects. She is feeling well today.       Review of Systems   Constitutional: Negative for activity change, appetite change, chills, fatigue and fever.   Eyes: Negative for visual disturbance.   Respiratory: Negative for cough and shortness of breath.    Cardiovascular: Negative for chest pain, palpitations and leg swelling.   Gastrointestinal: Negative for abdominal pain, constipation, diarrhea, nausea and vomiting.   Musculoskeletal: Negative for arthralgias.   Neurological: Negative for dizziness, weakness, light-headedness and headaches.       Objective:      Vitals:    08/21/18 1149   BP: (!) 140/78   Pulse:    Temp:      Physical Exam   Constitutional: She is oriented to person, place, and time. She appears well-developed.   Thin framed female.   HENT:   Head: Normocephalic and atraumatic.   Eyes: Conjunctivae and EOM are normal. Pupils are equal, round, and reactive to light.   Cardiovascular: Normal rate, regular rhythm, normal heart sounds and intact distal pulses.   Pulmonary/Chest: Effort normal and breath sounds normal.   Neurological: She is alert and oriented to person, place, and time.   Skin: Skin is warm and dry.   Psychiatric: She has a normal mood and affect. Her behavior is normal.   Vitals reviewed.      Assessment:       1. Essential hypertension    2. Hyperlipidemia LDL goal <130    3. Allergic rhinitis, unspecified seasonality, unspecified trigger    4. Hyperthyroidism    5. Tobacco use    6. Underweight    7. BMI 21.0-21.9, adult        Plan:        Essential hypertension           Controlled, continue current medications. Monitor BP at home and keep a log. Notify clinic if BP is persistently >140/90.    -     Comprehensive metabolic panel; Future; Expected date: 08/21/2018  -     CBC auto differential; Future; Expected date: 08/21/2018    Hyperlipidemia LDL goal <130           Stable, continue lipitor 40 mg daily  -     Comprehensive metabolic panel; Future; Expected date: 08/21/2018  -     Lipid panel; Future; Expected date: 08/21/2018    Allergic rhinitis, unspecified seasonality, unspecified trigger           Stable, continue current medication  -     montelukast (SINGULAIR) 10 mg tablet; Take 1 tablet (10 mg total) by mouth every evening.  Dispense: 30 tablet; Refill: 5    Hyperthyroidism             Repeat labs prior to f/u visit  -     TSH; Future; Expected date: 08/21/2018  -     T4, free; Future; Expected date: 08/21/2018    Tobacco use        Encouraged smoking cessation  -     buPROPion (WELLBUTRIN XL) 150 MG TB24 tablet; Take 1 tablet (150 mg total) by mouth once daily.  Dispense: 30 tablet; Refill: 5    Underweight         -  see below    BMI 21.0-21.9, adult             Patient readiness: acceptance and barriers:none    During the course of the visit the patient was educated and counseled about the following:     Hypertension:   Medication: no change.  Underweight:  Follow up and re-weight in: 3  months and as needed.     Goals: Hypertension: Reduce Blood Pressure and Underweight: Increase calorie intake and BMI      Did patient meet goals/outcomes: No    The following self management tools provided: declined    Patient Instructions (the written plan) was given to the patient/family.     Time spent with patient: 15 minutes      Follow up with Dr. Lew in 3-4 months with labs prior

## 2018-09-07 DIAGNOSIS — J44.9 CHRONIC OBSTRUCTIVE PULMONARY DISEASE, UNSPECIFIED COPD TYPE: ICD-10-CM

## 2018-09-07 RX ORDER — LEVALBUTEROL TARTRATE 45 UG/1
AEROSOL, METERED ORAL
Qty: 1 INHALER | Refills: 11 | Status: SHIPPED | OUTPATIENT
Start: 2018-09-07 | End: 2019-09-02 | Stop reason: SDUPTHER

## 2018-09-12 DIAGNOSIS — I71.40 ABDOMINAL AORTIC ANEURYSM (AAA) WITHOUT RUPTURE: Primary | ICD-10-CM

## 2018-09-12 DIAGNOSIS — I65.23 CAROTID STENOSIS, BILATERAL: ICD-10-CM

## 2018-09-14 ENCOUNTER — TELEPHONE (OUTPATIENT)
Dept: FAMILY MEDICINE | Facility: CLINIC | Age: 68
End: 2018-09-14

## 2018-09-14 NOTE — TELEPHONE ENCOUNTER
----- Message from Kamala Ortiz sent at 9/14/2018  1:45 PM CDT -----  Type:  Pharmacy Calling to Clarify an RX    Name of Caller: pt  Pharm[facility]    [facility] [id number]  [address]  [address] [facility]  [place] [address]  Phone: [phone] Fax: [phone]    Prescription Name:  symbicort 160/4.5    Best Call Back Number:  [phone]

## 2018-09-18 ENCOUNTER — CLINICAL SUPPORT (OUTPATIENT)
Dept: FAMILY MEDICINE | Facility: CLINIC | Age: 68
End: 2018-09-18
Payer: MEDICARE

## 2018-09-18 VITALS — SYSTOLIC BLOOD PRESSURE: 100 MMHG | DIASTOLIC BLOOD PRESSURE: 70 MMHG

## 2018-09-18 PROCEDURE — 99211 OFF/OP EST MAY X REQ PHY/QHP: CPT | Mod: PBBFAC,PO | Performed by: PHYSICIAN ASSISTANT

## 2018-09-18 PROCEDURE — 99999 PR PBB SHADOW E&M-EST. PATIENT-LVL I: CPT | Mod: PBBFAC,,, | Performed by: PHYSICIAN ASSISTANT

## 2018-09-24 DIAGNOSIS — I10 ESSENTIAL HYPERTENSION: ICD-10-CM

## 2018-09-24 RX ORDER — LISINOPRIL 20 MG/1
20 TABLET ORAL DAILY
Qty: 30 TABLET | Refills: 5 | Status: SHIPPED | OUTPATIENT
Start: 2018-09-24 | End: 2018-10-18 | Stop reason: SDUPTHER

## 2018-09-28 ENCOUNTER — HOSPITAL ENCOUNTER (OUTPATIENT)
Dept: RADIOLOGY | Facility: HOSPITAL | Age: 68
Discharge: HOME | End: 2018-09-28
Attending: THORACIC SURGERY (CARDIOTHORACIC VASCULAR SURGERY)
Payer: MEDICARE

## 2018-09-28 DIAGNOSIS — I65.23 CAROTID STENOSIS, BILATERAL: ICD-10-CM

## 2018-09-28 DIAGNOSIS — I71.40 ABDOMINAL AORTIC ANEURYSM (AAA) WITHOUT RUPTURE: ICD-10-CM

## 2018-09-28 PROCEDURE — 93880 EXTRACRANIAL BILAT STUDY: CPT | Mod: TC

## 2018-09-28 PROCEDURE — 76775 US EXAM ABDO BACK WALL LIM: CPT | Mod: 26,,, | Performed by: RADIOLOGY

## 2018-09-28 PROCEDURE — 93880 EXTRACRANIAL BILAT STUDY: CPT | Mod: 26,,, | Performed by: RADIOLOGY

## 2018-09-28 PROCEDURE — 76775 US EXAM ABDO BACK WALL LIM: CPT | Mod: TC

## 2018-10-12 DIAGNOSIS — J30.9 ALLERGIC RHINITIS, UNSPECIFIED SEASONALITY, UNSPECIFIED TRIGGER: ICD-10-CM

## 2018-10-16 ENCOUNTER — LAB VISIT (OUTPATIENT)
Dept: LAB | Facility: HOSPITAL | Age: 68
End: 2018-10-16
Attending: PHYSICIAN ASSISTANT
Payer: MEDICARE

## 2018-10-16 ENCOUNTER — OFFICE VISIT (OUTPATIENT)
Dept: ENDOCRINOLOGY | Facility: CLINIC | Age: 68
End: 2018-10-16
Payer: MEDICARE

## 2018-10-16 VITALS
HEIGHT: 60 IN | DIASTOLIC BLOOD PRESSURE: 71 MMHG | WEIGHT: 111.13 LBS | RESPIRATION RATE: 18 BRPM | TEMPERATURE: 98 F | BODY MASS INDEX: 21.82 KG/M2 | SYSTOLIC BLOOD PRESSURE: 146 MMHG | HEART RATE: 77 BPM

## 2018-10-16 DIAGNOSIS — M89.9 DISORDER OF BONE: ICD-10-CM

## 2018-10-16 DIAGNOSIS — I10 ESSENTIAL HYPERTENSION: ICD-10-CM

## 2018-10-16 DIAGNOSIS — E05.90 HYPERTHYROIDISM: Primary | ICD-10-CM

## 2018-10-16 DIAGNOSIS — E04.2 MULTINODULAR GOITER: ICD-10-CM

## 2018-10-16 DIAGNOSIS — Z72.0 TOBACCO USE: ICD-10-CM

## 2018-10-16 DIAGNOSIS — M85.80 OSTEOPENIA, UNSPECIFIED LOCATION: ICD-10-CM

## 2018-10-16 DIAGNOSIS — E05.90 HYPERTHYROIDISM: ICD-10-CM

## 2018-10-16 LAB
ALBUMIN SERPL BCP-MCNC: 3.7 G/DL
ALP SERPL-CCNC: 77 U/L
ALT SERPL W/O P-5'-P-CCNC: 10 U/L
ANION GAP SERPL CALC-SCNC: 8 MMOL/L
AST SERPL-CCNC: 16 U/L
BASOPHILS # BLD AUTO: 0.04 K/UL
BASOPHILS NFR BLD: 0.3 %
BILIRUB SERPL-MCNC: 0.4 MG/DL
BUN SERPL-MCNC: 7 MG/DL
CALCIUM SERPL-MCNC: 8.7 MG/DL
CHLORIDE SERPL-SCNC: 102 MMOL/L
CO2 SERPL-SCNC: 30 MMOL/L
CREAT SERPL-MCNC: 0.6 MG/DL
DIFFERENTIAL METHOD: ABNORMAL
EOSINOPHIL # BLD AUTO: 0.2 K/UL
EOSINOPHIL NFR BLD: 1.7 %
ERYTHROCYTE [DISTWIDTH] IN BLOOD BY AUTOMATED COUNT: 14.1 %
EST. GFR  (AFRICAN AMERICAN): >60 ML/MIN/1.73 M^2
EST. GFR  (NON AFRICAN AMERICAN): >60 ML/MIN/1.73 M^2
GLUCOSE SERPL-MCNC: 83 MG/DL
HCT VFR BLD AUTO: 39.7 %
HGB BLD-MCNC: 13 G/DL
IMM GRANULOCYTES # BLD AUTO: 0.04 K/UL
IMM GRANULOCYTES NFR BLD AUTO: 0.3 %
LYMPHOCYTES # BLD AUTO: 2.2 K/UL
LYMPHOCYTES NFR BLD: 15.9 %
MCH RBC QN AUTO: 30.2 PG
MCHC RBC AUTO-ENTMCNC: 32.7 G/DL
MCV RBC AUTO: 92 FL
MONOCYTES # BLD AUTO: 0.9 K/UL
MONOCYTES NFR BLD: 6.5 %
NEUTROPHILS # BLD AUTO: 10.3 K/UL
NEUTROPHILS NFR BLD: 75.3 %
NRBC BLD-RTO: 0 /100 WBC
PLATELET # BLD AUTO: 319 K/UL
PMV BLD AUTO: 10.5 FL
POTASSIUM SERPL-SCNC: 3.9 MMOL/L
PROT SERPL-MCNC: 6.8 G/DL
RBC # BLD AUTO: 4.3 M/UL
SODIUM SERPL-SCNC: 140 MMOL/L
T3 SERPL-MCNC: 105 NG/DL
T4 FREE SERPL-MCNC: 0.82 NG/DL
TSH SERPL DL<=0.005 MIU/L-ACNC: 0.01 UIU/ML
WBC # BLD AUTO: 13.64 K/UL

## 2018-10-16 PROCEDURE — 84443 ASSAY THYROID STIM HORMONE: CPT

## 2018-10-16 PROCEDURE — 99213 OFFICE O/P EST LOW 20 MIN: CPT | Mod: PBBFAC,PO | Performed by: PHYSICIAN ASSISTANT

## 2018-10-16 PROCEDURE — 80053 COMPREHEN METABOLIC PANEL: CPT

## 2018-10-16 PROCEDURE — 84439 ASSAY OF FREE THYROXINE: CPT

## 2018-10-16 PROCEDURE — 85025 COMPLETE CBC W/AUTO DIFF WBC: CPT

## 2018-10-16 PROCEDURE — 99213 OFFICE O/P EST LOW 20 MIN: CPT | Mod: S$PBB,,, | Performed by: PHYSICIAN ASSISTANT

## 2018-10-16 PROCEDURE — 84432 ASSAY OF THYROGLOBULIN: CPT

## 2018-10-16 PROCEDURE — 99999 PR PBB SHADOW E&M-EST. PATIENT-LVL III: CPT | Mod: PBBFAC,,, | Performed by: PHYSICIAN ASSISTANT

## 2018-10-16 PROCEDURE — 84480 ASSAY TRIIODOTHYRONINE (T3): CPT

## 2018-10-16 PROCEDURE — 36415 COLL VENOUS BLD VENIPUNCTURE: CPT | Mod: PO

## 2018-10-16 NOTE — PROGRESS NOTES
Subjective:      Patient ID: Anuja Cool is a 68 y.o. female.    Chief Complaint:  Nodular thyroid disease/hyperthyroidism    History of Present Illness    Anuja Colo is a 68 y.o. female here for a f/u care visit today on account of thyroid nodular disease and TSH suppression suggestive of possible hyperthyroidism.  Last thyroid USS was from 3/18 and thus her next ffup thyroid USS should be for ~ 03/19. She also has had an USS guided FNAB done that showed benign thyroid colloid lesion. No fhx of DM or thyroid disease.    No SOB or dysphagia. Occasional voice changes while coughing.    + diarrhea/constipation, insomnia (trouble staying asleep)    No hair loss, changes in nails, sweating or weight changes    Her last DEXA was from 11/16 and showed osteopenia. Next DEXA thus should be for ~ 11/18. No fractures, falls. She had one steriod injection during a URI this year.     Patient has long standing poor sleep. Patients sleep is fragmented mainly because of nocturia and frequency.  Grav 3 Para 3+0 (2 alive).     No tearing or grittiness. She does have bilataal cataracts L >> R for which she is pending cataract surgery.  She smokes 0.5 ppd now. She smoked for 50 years. She will drink a glass of wine 4x weekly. Patient drinks 2 cups of decaffienated coffee.    Review of Systems   ROS:   Constitutional:elederly female,  Stable weight  Eyes: No recent visual changes, no SOB  Cardiovascular: Denies current anginal symptoms  Respiratory: + cough  Gastrointestinal: Denies recent bowel disturbances  GenitoUrinary - No dysuria  Skin: No new skin rash  Neurologic: No focal neurologic complaints  Endo: no polyphagia, polyuria or polydipsia  Remainder ROS negative     Objective:   BP (!) 146/71 (BP Location: Left arm, Patient Position: Sitting, BP Method: Medium (Automatic))   Pulse 77   Temp 98.4 °F (36.9 °C) (Oral)   Resp 18   Ht 5' (1.524 m)   Wt 50.4 kg (111 lb 1.8 oz)   BMI 21.70 kg/m²  Body surface area  is 1.46 meters squared.         Physical Exam   Constitutional: She is oriented to person, place, and time. Vital signs are normal. She appears well-developed and well-nourished. She does not appear ill. No distress.   Elderly female  Not pale, anicteric and afebrile. Well hydrated. Not in any acute distress.   HENT:   Head: Normocephalic and atraumatic. Not macrocephalic. Head is without right periorbital erythema and without left periorbital erythema. Hair is normal.   Nose: Nose normal.   Mouth/Throat: Oropharynx is clear and moist. Mucous membranes are not pale and not dry. No oropharyngeal exudate.   Eyes: Conjunctivae, EOM and lids are normal. Pupils are equal, round, and reactive to light. Right eye exhibits no discharge. Left eye exhibits no discharge. No scleral icterus.   No evidence of any opthalmopathy   Neck: Trachea normal, normal range of motion, full passive range of motion without pain and phonation normal. Neck supple. Normal carotid pulses and no JVD present. Carotid bruit is not present. No tracheal deviation present. No thyroid mass and no thyromegaly present.       No nuchal AN.    Cardiovascular: Normal rate, regular rhythm, S1 normal, S2 normal, intact distal pulses and normal pulses. PMI is not displaced. Exam reveals no gallop.   Murmur (soft 3/6 ESM maximal over cardiac base with no radiation to the neck) heard.  Pulmonary/Chest: Effort normal and breath sounds normal. No respiratory distress. She has no wheezes. She has no rales.   Abdominal: Soft. There is no hepatosplenomegaly, splenomegaly or hepatomegaly. There is no CVA tenderness. Hernia confirmed negative in the ventral area.   Musculoskeletal: She exhibits no edema or tenderness.        Right shoulder: She exhibits normal range of motion, no bony tenderness, no crepitus, no deformity, no pain, no spasm, normal pulse and normal strength.   No pedal edema. Has no digital clubbing and no nail changes suggestive of thyroid acropachy.  She has no pretibial myxedema but does have fine tremors of the outstretched hands.   Lymphadenopathy:     She has no cervical adenopathy.   Neurological: She is alert and oriented to person, place, and time. She has normal strength and normal reflexes. She displays no atrophy, no tremor and normal reflexes. No cranial nerve deficit or sensory deficit. She exhibits normal muscle tone. Coordination and gait normal.   Skin: Skin is warm, dry and intact. No bruising, no ecchymosis, no petechiae and no rash noted. She is not diaphoretic. No cyanosis or erythema. No pallor. Nails show no clubbing.   Age appropriate diffuse cutaneous atrophy. No ecchymoses seen.   Psychiatric: She has a normal mood and affect. Her speech is normal and behavior is normal. Judgment and thought content normal. Cognition and memory are normal.   Nursing note and vitals reviewed.      Lab Review:     Lab Visit on 10/16/2018   Component Date Value Ref Range Status    T3, Total 10/16/2018 105  60 - 180 ng/dL Final    Free T4 10/16/2018 0.82  0.71 - 1.51 ng/dL Final    TSH 10/16/2018 0.010* 0.400 - 4.000 uIU/mL Final    Sodium 10/16/2018 140  136 - 145 mmol/L Final    Potassium 10/16/2018 3.9  3.5 - 5.1 mmol/L Final    Chloride 10/16/2018 102  95 - 110 mmol/L Final    CO2 10/16/2018 30* 23 - 29 mmol/L Final    Glucose 10/16/2018 83  70 - 110 mg/dL Final    BUN, Bld 10/16/2018 7* 8 - 23 mg/dL Final    Creatinine 10/16/2018 0.6  0.5 - 1.4 mg/dL Final    Calcium 10/16/2018 8.7  8.7 - 10.5 mg/dL Final    Total Protein 10/16/2018 6.8  6.0 - 8.4 g/dL Final    Albumin 10/16/2018 3.7  3.5 - 5.2 g/dL Final    Total Bilirubin 10/16/2018 0.4  0.1 - 1.0 mg/dL Final    Comment: For infants and newborns, interpretation of results should be based  on gestational age, weight and in agreement with clinical  observations.  Premature Infant recommended reference ranges:  Up to 24 hours.............<8.0 mg/dL  Up to 48 hours............<12.0 mg/dL  3-5  days..................<15.0 mg/dL  6-29 days.................<15.0 mg/dL      Alkaline Phosphatase 10/16/2018 77  55 - 135 U/L Final    AST 10/16/2018 16  10 - 40 U/L Final    ALT 10/16/2018 10  10 - 44 U/L Final    Anion Gap 10/16/2018 8  8 - 16 mmol/L Final    eGFR if African American 10/16/2018 >60.0  >60 mL/min/1.73 m^2 Final    eGFR if non African American 10/16/2018 >60.0  >60 mL/min/1.73 m^2 Final    Comment: Calculation used to obtain the estimated glomerular filtration  rate (eGFR) is the CKD-EPI equation.       WBC 10/16/2018 13.64* 3.90 - 12.70 K/uL Final    RBC 10/16/2018 4.30  4.00 - 5.40 M/uL Final    Hemoglobin 10/16/2018 13.0  12.0 - 16.0 g/dL Final    Hematocrit 10/16/2018 39.7  37.0 - 48.5 % Final    MCV 10/16/2018 92  82 - 98 fL Final    MCH 10/16/2018 30.2  27.0 - 31.0 pg Final    MCHC 10/16/2018 32.7  32.0 - 36.0 g/dL Final    RDW 10/16/2018 14.1  11.5 - 14.5 % Final    Platelets 10/16/2018 319  150 - 350 K/uL Final    MPV 10/16/2018 10.5  9.2 - 12.9 fL Final    Immature Granulocytes 10/16/2018 0.3  0.0 - 0.5 % Final    Gran # (ANC) 10/16/2018 10.3* 1.8 - 7.7 K/uL Final    Immature Grans (Abs) 10/16/2018 0.04  0.00 - 0.04 K/uL Final    Comment: Mild elevation in immature granulocytes is non specific and   can be seen in a variety of conditions including stress response,   acute inflammation, trauma and pregnancy. Correlation with other   laboratory and clinical findings is essential.      Lymph # 10/16/2018 2.2  1.0 - 4.8 K/uL Final    Mono # 10/16/2018 0.9  0.3 - 1.0 K/uL Final    Eos # 10/16/2018 0.2  0.0 - 0.5 K/uL Final    Baso # 10/16/2018 0.04  0.00 - 0.20 K/uL Final    nRBC 10/16/2018 0  0 /100 WBC Final    Gran% 10/16/2018 75.3* 38.0 - 73.0 % Final    Lymph% 10/16/2018 15.9* 18.0 - 48.0 % Final    Mono% 10/16/2018 6.5  4.0 - 15.0 % Final    Eosinophil% 10/16/2018 1.7  0.0 - 8.0 % Final    Basophil% 10/16/2018 0.3  0.0 - 1.9 % Final    Differential Method  10/16/2018 Automated   Final   Lab Visit on 05/22/2018   Component Date Value Ref Range Status    Microalbum.,U,Random 05/22/2018 4.0  ug/mL Final    Creatinine, Random Ur 05/22/2018 30.0  15.0 - 325.0 mg/dL Final    Comment: The random urine reference ranges provided were established   for 24 hour urine collections.  No reference ranges exist for  random urine specimens.  Correlate clinically.      Microalb Creat Ratio 05/22/2018 13.3  0.0 - 30.0 ug/mg Final    Specimen UA 05/22/2018 Urine, Clean Catch   Final    Color, UA 05/22/2018 Yellow  Yellow, Straw, Cecilia Final    Appearance, UA 05/22/2018 Clear  Clear Final    pH, UA 05/22/2018 6.0  5.0 - 8.0 Final    Specific Gravity, UA 05/22/2018 1.010  1.005 - 1.030 Final    Protein, UA 05/22/2018 Negative  Negative Final    Comment: Recommend a 24 hour urine protein or a urine   protein/creatinine ratio if globulin induced proteinuria is  clinically suspected.      Glucose, UA 05/22/2018 Negative  Negative Final    Ketones, UA 05/22/2018 Trace* Negative Final    Bilirubin (UA) 05/22/2018 Negative  Negative Final    Occult Blood UA 05/22/2018 Negative  Negative Final    Nitrite, UA 05/22/2018 Negative  Negative Final    Urobilinogen, UA 05/22/2018 Negative  <2.0 EU/dL Final    Leukocytes, UA 05/22/2018 Trace* Negative Final    RBC, UA 05/22/2018 2  0 - 4 /hpf Final    WBC, UA 05/22/2018 1  0 - 5 /hpf Final    Bacteria, UA 05/22/2018 Rare  None-Occ /hpf Final    Squam Epithel, UA 05/22/2018 1  /hpf Final    Microscopic Comment 05/22/2018 SEE COMMENT   Final    Comment: Other formed elements not mentioned in the report are not   present in the microscopic examination.      Lab Visit on 05/22/2018   Component Date Value Ref Range Status    Thyroglobulin, Tumor Marker 05/22/2018 32* ng/mL Final    Comment: -------------------REFERENCE VALUE--------------------------  Athyrotic <0.1   Intact Thyroid <=33      Thyroglobulin Antibody Screen 05/22/2018  <1.8  <4.0 IU/mL Final    Thyroglobulin Interpretation 05/22/2018 SEE BELOW   Final    Comment: Thyroglobulin (Tg) levels must be interpreted in the context  of TSH levels, serial Tg measurements and radioiodine   ablation status.  Tg levels of > or = 10 ng/mL in athyrotic  individuals on suppressive therapy indicate a significant   (>25%) risk of clinically detectable recurrent   papillary/follicular thyroid cancer.  -------------------ADDITIONAL INFORMATION-------------------  PLEASE NOTE: Thyroglobulin flagging is based on athyrotic  reference values.  The thyroglobulin and thyroglobulin antibody testing   methods are immunoenzymatic assays manufactured by [employer] and performed on the [employer].  Values obtained from different assay methods or kits  may be different and cannot be used interchangeably.  The results cannot be interpreted as absolute evidence  for the presence or absence of malignant disease.  Test Performed by:  [facility]  [address] [place] [address]      Free T4 05/22/2018 1.27  0.71 - 1.51 ng/dL Final    T3, Total 05/22/2018 141  60 - 180 ng/dL Final    TSH 05/22/2018 <0.010* 0.400 - 4.000 uIU/mL Final    T3, Reverse 05/22/2018 19.4  9.0 - 27.0 ng/dL Final    Comment: If applicable, any drug confirmation testing reported  here was developed and the performance characteristics  determined by [facility]. This   confirmation testing has not been cleared or approved  by the FDA. The laboratory is regulated under CLIA as  qualified to perform high-complexity testing. This test  is used for patient testing purposes. It should not be  regarded as investigational or for research.  Test performed at [facility],  [address] , [place]  [address] [phone]  Chano Correa MD  - Medical Director      Thyroglobulin Ab Screen 05/22/2018 <4.0  0.0 - 3.9 IU/mL Final    Thyroperoxidase  Antibodies 05/22/2018 <6.0  <6.0 IU/mL Final    Thyrotropin-Binding Inhibitory Imm* 05/22/2018 <2  <17 %Inhibition Final    Comment: @ Test Performed By:  [facility]  Rubens Earl M.D., Ph.D.,   [address] [place] [address]  CLIA  [id number]      Thyrotropin Receptor Ab 05/22/2018 <1.00  0.00 - 1.75 IU/L Final    Comment: -------------------ADDITIONAL INFORMATION-------------------  At a decision limit of 1.75 IU/L, this assay   has 97% sensitivity and 99% specificity for   detection of Graves' disease. In healthy   individuals and in patients with thyroid disease   without diagnosis of Graves' disease, the upper   limit of anti-TSHR values are 1.22 IU/L and   1.58 IU/L, respectively (97.5th percentiles).  Test Performed by:  [facility]  [facility] [place] [address]      Thyroid-Stim IG Quantitative 05/22/2018 <0.10  <0.10 IU/L Final    Comment: Thyroid stimulating immunoglobulins (TSI) concentrations greater  than or equal to (>=) 0.55 IU/L have a clinical sensitivity of at  least 98.6%, and a clinical specificity of at least 98.5%, for the   differential diagnosis of Graves' Disease.  TSI concentrations for patients with other thyroid or autoimmune  diseases range from 0.11 to 0.39 IU/L.  Test performed at [facility],  [facility] [place] [address] [place]  [address]     [phone]  Chano Correa MD  - Medical Director      Iodine, Serum 05/22/2018 67  40 - 92 ng/mL Final    Comment: -------------------ADDITIONAL INFORMATION-------------------  This test was developed and its performance characteristics   determined by [facility] in a manner consistent with CLIA   requirements. This test has not been cleared or approved by   the U.S. Food and Drug Administration.  Test Performed by:  [facility] [place] [address]       Calcium, Ion 05/22/2018 1.23  1.06 - 1.42 mmol/L Final    Uric Acid 05/22/2018 2.7  2.4 - 5.7 mg/dL Final    Vit D, 25-Hydroxy 05/22/2018 33  30 - 96 ng/mL Final    Comment: Vitamin D deficiency.........<10 ng/mL                              Vitamin D insufficiency......10-29 ng/mL       Vitamin D sufficiency........> or equal to 30 ng/mL  Vitamin D toxicity............>100 ng/mL      Cholesterol 05/22/2018 159  120 - 199 mg/dL Final    Comment: The National Cholesterol Education Program (NCEP) has set the  following guidelines (reference ranges) for Cholesterol:  Optimal.....................<200 mg/dL  Borderline High.............200-239 mg/dL  High........................> or = 240 mg/dL      Triglycerides 05/22/2018 70  30 - 150 mg/dL Final    Comment: The National Cholesterol Education Program (NCEP) has set the  following guidelines (reference values) for triglycerides:  Normal......................<150 mg/dL  Borderline High.............150-199 mg/dL  High........................200-499 mg/dL      HDL 05/22/2018 67  40 - 75 mg/dL Final    Comment: The National Cholesterol Education Program (NCEP) has set the  following guidelines (reference values) for HDL Cholesterol:  Low...............<40 mg/dL  Optimal...........>60 mg/dL      LDL Cholesterol 05/22/2018 78.0  63.0 - 159.0 mg/dL Final    Comment: The National Cholesterol Education Program (NCEP) has set the  following guidelines (reference values) for LDL Cholesterol:  Optimal.......................<130 mg/dL  Borderline High...............130-159 mg/dL  High..........................160-189 mg/dL  Very High.....................>190 mg/dL      HDL/Chol Ratio 05/22/2018 42.1  20.0 - 50.0 % Final    Total Cholesterol/HDL Ratio 05/22/2018 2.4  2.0 - 5.0 Final    Non-HDL Cholesterol 05/22/2018 92  mg/dL Final    Comment: Risk category and Non-HDL cholesterol goals:  Coronary heart disease (CHD)or equivalent (10-year risk of CHD >20%):  Non-HDL  cholesterol goal     <130 mg/dL  Two or more CHD risk factors and 10-year risk of CHD <= 20%:  Non-HDL cholesterol goal     <160 mg/dL  0 to 1 CHD risk factor:  Non-HDL cholesterol goal     <190 mg/dL      PTH, Intact 05/22/2018 33.0  9.0 - 77.0 pg/mL Final    Aldosterone 05/22/2018 <3.0  ng/dL Final    Comment: REFERENCE RANGE:  Upright              <= 39.2 ng/dL    Supine               <= 23.2 ng/dL  Test performed at [facility],  [address] [place]  [address]     [phone]  Chano Correa MD  - Medical Director      Renin Activity 05/22/2018 <0.6  ng/mL/h Final    Comment: -------------------REFERENCE VALUE--------------------------  (Peripheral vein specimen)  Na-deplete, upright:  Mean: 5.9  Range: 2.9-10.8  Na-replete, upright:  Mean: 1.0  Range: < or =0.6-3.0  -------------------ADDITIONAL INFORMATION-------------------  Testing performed by Liquid Chromatography-Tandem Mass   Spectrometry (LC-MS/MS).  This test was developed and its performance characteristics   determined by [facility] in a manner consistent with CLIA   requirements. This test has not been cleared or approved by   the U.S. Food and Drug Administration.  Test Performed by:  [facility]  [address] [place] [address]        Assessment:     1. Hyperthyroidism  T3    T4, free    Thyroglobulin    TSH    Comprehensive metabolic panel    CBC auto differential   2. Multinodular goiter  Calcitonin   3. Osteopenia, unspecified location  DXA Bone Density Spine And Hip   4. Tobacco use     5. Essential hypertension     6. Disorder of bone   DXA Bone Density Spine And Hip      Hyperthyroidism-continue atenolol. TFTs today.  Nodular thyroid disease-repeat thyroid u/s 3/19  Osteopenia-continue calcium and vitamin D OTC supplements and will repeat DEXA ~ 11/18.  Tobacco use-she is not ready to quit smoking  Hypertension-stable-continue meds  Disorder of bone-DEXA scan  11/18    Plan:   FFup in ~ 6 mths

## 2018-10-17 ENCOUNTER — TELEPHONE (OUTPATIENT)
Dept: FAMILY MEDICINE | Facility: CLINIC | Age: 68
End: 2018-10-17

## 2018-10-17 ENCOUNTER — TELEPHONE (OUTPATIENT)
Dept: ENDOCRINOLOGY | Facility: CLINIC | Age: 68
End: 2018-10-17

## 2018-10-17 LAB
CALCIT SERPL-MCNC: <5 PG/ML
THRYOGLOBULIN INTERPRETATION: ABNORMAL
THYROGLOB AB SERPL-ACNC: <1.8 IU/ML
THYROGLOB SERPL-MCNC: 40 NG/ML

## 2018-10-17 NOTE — TELEPHONE ENCOUNTER
----- Message from Allyssa Gasca sent at 10/17/2018  2:26 PM CDT -----  Contact: Self  Patient had labs done yesterday that Christy Garner ordered, and she just got a call from her office telling the patient to contact Dr Lew or go to Urgent Care, because she has an infection.  Could not tell her what kind of infection or where she had the infection.  Please have Dr Lew review the results and call her back as soon as possible, they have her quite concerned.  Call back at [phone].  Thanks

## 2018-10-17 NOTE — TELEPHONE ENCOUNTER
----- Message from Mikaela Bateman sent at 10/17/2018  1:05 PM CDT -----  Contact: self  Type:  Patient Returning Call    Who Called:  self  Who Left Message for Patient:  Not sure  Does the patient know what this is regarding?:  yes  Best Call Back Number:  [phone]  Additional Information:  Patient had labs recently. Thanks!

## 2018-10-17 NOTE — TELEPHONE ENCOUNTER
Spoke to patient notified her WBC count was elevated. Notified patient normally this is from possible infection like uti or uri symptoms. Patient states she has a possible sinus infection appointment schedule for tomorrow with gopi BYNUM patient states understanding

## 2018-10-17 NOTE — TELEPHONE ENCOUNTER
Called patient and she says that she received a call from the [facility] but no message was left and she was returning the call. Informed the patient that I did not see a reason for the call but she does have results from lab. Informed patient of her results as written, patient verbalized understanding.

## 2018-10-18 ENCOUNTER — OFFICE VISIT (OUTPATIENT)
Dept: FAMILY MEDICINE | Facility: CLINIC | Age: 68
End: 2018-10-18
Payer: MEDICARE

## 2018-10-18 VITALS
BODY MASS INDEX: 21.6 KG/M2 | HEIGHT: 60 IN | HEART RATE: 77 BPM | SYSTOLIC BLOOD PRESSURE: 154 MMHG | DIASTOLIC BLOOD PRESSURE: 80 MMHG | WEIGHT: 110 LBS | TEMPERATURE: 98 F

## 2018-10-18 DIAGNOSIS — R35.1 NOCTURIA MORE THAN TWICE PER NIGHT: ICD-10-CM

## 2018-10-18 DIAGNOSIS — D72.829 LEUKOCYTOSIS, UNSPECIFIED TYPE: Primary | ICD-10-CM

## 2018-10-18 DIAGNOSIS — Z72.0 TOBACCO USE: ICD-10-CM

## 2018-10-18 DIAGNOSIS — E78.5 HYPERLIPIDEMIA LDL GOAL <130: ICD-10-CM

## 2018-10-18 DIAGNOSIS — I10 ESSENTIAL HYPERTENSION: ICD-10-CM

## 2018-10-18 DIAGNOSIS — R35.0 URINARY FREQUENCY: ICD-10-CM

## 2018-10-18 DIAGNOSIS — E05.90 HYPERTHYROIDISM: ICD-10-CM

## 2018-10-18 DIAGNOSIS — J44.9 CHRONIC OBSTRUCTIVE PULMONARY DISEASE, UNSPECIFIED COPD TYPE: ICD-10-CM

## 2018-10-18 LAB
BILIRUB SERPL-MCNC: NORMAL MG/DL
BLOOD URINE, POC: NORMAL
COLOR, POC UA: YELLOW
GLUCOSE UR QL STRIP: NORMAL
KETONES UR QL STRIP: NORMAL
LEUKOCYTE ESTERASE URINE, POC: NORMAL
NITRITE, POC UA: NORMAL
PH, POC UA: 6
PROTEIN, POC: NORMAL
SPECIFIC GRAVITY, POC UA: 1.01
UROBILINOGEN, POC UA: NORMAL

## 2018-10-18 PROCEDURE — 81002 URINALYSIS NONAUTO W/O SCOPE: CPT | Mod: PBBFAC,PO | Performed by: NURSE PRACTITIONER

## 2018-10-18 PROCEDURE — 99214 OFFICE O/P EST MOD 30 MIN: CPT | Mod: PBBFAC,PO | Performed by: NURSE PRACTITIONER

## 2018-10-18 PROCEDURE — 99214 OFFICE O/P EST MOD 30 MIN: CPT | Mod: S$PBB,,, | Performed by: NURSE PRACTITIONER

## 2018-10-18 PROCEDURE — 99999 PR PBB SHADOW E&M-EST. PATIENT-LVL IV: CPT | Mod: PBBFAC,,, | Performed by: NURSE PRACTITIONER

## 2018-10-18 RX ORDER — LISINOPRIL 30 MG/1
30 TABLET ORAL DAILY
Qty: 90 TABLET | Refills: 3 | Status: SHIPPED | OUTPATIENT
Start: 2018-10-18 | End: 2018-11-08 | Stop reason: SDUPTHER

## 2018-10-18 NOTE — PATIENT INSTRUCTIONS
Controlling High Blood Pressure  High blood pressure (hypertension) is often called the silent killer. This is because many people who have it dont know it. High blood pressure is defined as 140/90 mm Hg or higher. Know your blood pressure and remember to check it regularly. Doing so can save your life. Here are some things you can do to help control your blood pressure.    Choose heart-healthy foods  · Select low-salt, low-fat foods. Limit sodium intake to 2,400 mg per day or the amount suggested by your healthcare provider.  · Limit canned, dried, cured, packaged, and fast foods. These can contain a lot of salt.  · Eat 8 to 10 servings of fruits and vegetables every day.  · Choose lean meats, fish, or chicken.  · Eat whole-grain pasta, brown rice, and beans.  · Eat 2 to 3 servings of low-fat or fat-free dairy products.  · Ask your doctor about the DASH eating plan. This plan helps reduce blood pressure.  · When you go to a restaurant, ask that your meal be prepared with no added salt.  Maintain a healthy weight  · Ask your healthcare provider how many calories to eat a day. Then stick to that number.  · Ask your healthcare provider what weight range is healthiest for you. If you are overweight, a weight loss of only 3% to 5% of your body weight can help lower blood pressure. Generally, a good weight loss goal is to lose 10% of your body weight in a year.  · Limit snacks and sweets.  · Get regular exercise.  Get up and get active  · Choose activities you enjoy. Find ones you can do with friends or family. This includes bicycling, dancing, walking, and jogging.  · Park farther away from building entrances.  · Use stairs instead of the elevator.  · When you can, walk or bike instead of driving.  · Shunk leaves, garden, or do household repairs.  · Be active at a moderate to vigorous level of physical activity for at least 40 minutes for a minimum of 3 to 4 days a week.   Manage stress  · Make time to relax and enjoy  life. Find time to laugh.  · Communicate your concerns with your loved ones and your healthcare provider.  · Visit with family and friends, and keep up with hobbies.  Limit alcohol and quit smoking  · Men should have no more than 2 drinks per day.  · Women should have no more than 1 drink per day.  · Talk with your healthcare provider about quitting smoking. Smoking significantly increases your risk for heart disease and stroke. Ask your healthcare provider about community smoking cessation programs and other options.  Medicines  If lifestyle changes arent enough, your healthcare provider may prescribe high blood pressure medicine. Take all medicines as prescribed. If you have any questions about your medicines, ask your healthcare provider before stopping or changing them.   Date Last Reviewed: 4/27/2016  © 5568-0707 [employer]. [address] [place] [address], [place] [address]. All rights reserved. This information is not intended as a substitute for professional medical care. Always follow your healthcare professional's instructions.

## 2018-10-18 NOTE — PROGRESS NOTES
Subjective:       Patient ID: Anuja Cool is a 68 y.o. female.    Chief Complaint: Follow-up (Labs )    Chief Complaint  Chief Complaint   Patient presents with    Follow-up     Labs        HPI  Anuja Cool is a 68 y.o. female with medical diagnoses as listed in the medical history and problem list that presents for a follow up of recent blood work that showed an elevated WBC count of 13.64.  Blood work was ordered by endocrinology who follows patient for hyperthyroidism and osteopenia. Patient is treated for hypertension, COPD/asthma and is a current every day smoker of 3/4 PPD and is cutting down, and arthritis.  Blood pressure is elevated today 164/78, 156/76, repeat at end of visit 154/80, and has been elevated at most recent visits. BMI today is 21.48 and weight is unchanged from last visit to family medicine on 8/21/18.        PAST MEDICAL HISTORY:  Past Medical History:   Diagnosis Date    Allergy     Arthritis     Asthma     Bursitis     COPD (chronic obstructive pulmonary disease)     Hypertension     Low sodium     Thyroid disease     nodules    Tinea pedis        PAST SURGICAL HISTORY:  Past Surgical History:   Procedure Laterality Date    COLONOSCOPY N/A 5/8/2018    Procedure: COLONOSCOPY;  Surgeon: Grey Roberts MD;  Location: [facility];  Service: Endoscopy;  Laterality: N/A;    COLONOSCOPY N/A 5/8/2018    Performed by Grey Roberts MD at [facility] ENDO    HYSTERECTOMY      OOPHORECTOMY         SOCIAL HISTORY:  Social History     Socioeconomic History    Marital status:      Spouse name: Not on file    Number of children: Not on file    Years of education: Not on file    Highest education level: Not on file   Social Needs    Financial resource strain: Not on file    Food insecurity - worry: Not on file    Food insecurity - inability: Not on file    Transportation needs - medical: Not on file    Transportation needs - non-medical: Not on file   Occupational  History    Not on file   Tobacco Use    Smoking status: Current Every Day Smoker     Packs/day: 0.50    Smokeless tobacco: Never Used   Substance and Sexual Activity    Alcohol use: Yes     Alcohol/week: 2.4 oz     Types: 4 Glasses of wine per week     Comment: occ. glass of wine    Drug use: No    Sexual activity: Not on file   Other Topics Concern    Not on file   Social History Narrative    Not on file       FAMILY HISTORY:  Family History   Problem Relation Age of Onset    Heart disease Mother     Cancer Mother         lung    Heart disease Father     Cancer Father         lymphoma    Diabetes Neg Hx        ALLERGIES AND MEDICATIONS: updated and reviewed.  Review of patient's allergies indicates:   Allergen Reactions    Tinactin [tolnaftate] Dermatitis    Advair diskus [fluticasone-salmeterol]      shakes    Albuterol      tremors    Sulfa (sulfonamide antibiotics)      unknown     Current Outpatient Medications   Medication Sig Dispense Refill    aspirin (ECOTRIN) 81 MG EC tablet Take 81 mg by mouth once daily.      atenolol (TENORMIN) 50 MG tablet Take 1 tablet (50 mg total) by mouth once daily. 90 tablet 3    atorvastatin (LIPITOR) 40 MG tablet Take 1 tablet (40 mg total) by mouth once daily. 90 tablet 3    budesonide-formoterol 160-4.5 mcg (SYMBICORT) 160-4.5 mcg/actuation HFAA Inhale 2 puffs into the lungs every 12 (twelve) hours. Controller 10.2 g 5    buPROPion (WELLBUTRIN XL) 150 MG TB24 tablet Take 1 tablet (150 mg total) by mouth once daily. 30 tablet 5    calcium carbonate (OS-JANICE) 600 mg calcium (1,500 mg) Tab Take 600 mg by mouth 2 (two) times daily with meals.      cholecalciferol, vitamin D3, 3,000 unit Tab Take by mouth.      ibuprofen (ADVIL,MOTRIN) 200 MG tablet Take 200 mg by mouth every 6 (six) hours as needed for Pain.      lisinopril (PRINIVIL,ZESTRIL) 30 MG tablet Take 1 tablet (30 mg total) by mouth once daily. 90 tablet 3    UNABLE TO FIND Move free for joints.       XOPENEX HFA 45 mcg/actuation inhaler INHALE ONE TO TWO PUFFS INTO LUNGS EVERY 4 HOURS AS NEEDED FOR WHEEZING AND FOR SHORTNESS OF BREATH 1 Inhaler 11     No current facility-administered medications for this visit.        I have reviewed the patient's medical history in detail and updated the computerized patient record.    Review of Systems   Constitutional: Negative for activity change, appetite change, chills, fatigue and fever.   HENT: Positive for rhinorrhea. Negative for congestion, ear pain, postnasal drip, sinus pressure, sinus pain and sore throat.    Eyes: Negative for visual disturbance.   Respiratory: Positive for cough and shortness of breath. Negative for wheezing.         Productive cough of gray secretions and does get short of breath with exertion   Cardiovascular: Negative for chest pain, palpitations and leg swelling.   Gastrointestinal: Negative for abdominal pain, constipation, diarrhea, nausea and vomiting.   Genitourinary: Negative for difficulty urinating, dysuria and urgency.        Nocturia 3-5 times nightly.    Musculoskeletal: Negative for arthralgias and myalgias.   Skin: Negative for rash and wound.   Neurological: Negative for dizziness and numbness.   Hematological: Negative for adenopathy. Bruises/bleeds easily.        Bruises easy and takes advil as needed for arthritic pain   Psychiatric/Behavioral: Positive for sleep disturbance. Negative for dysphoric mood. The patient is not nervous/anxious.         Difficulty staying asleep due to frequent waking to urinate         Objective:      Vitals:    10/18/18 0840 10/18/18 0843 10/18/18 0921   BP: (!) 164/78 (!) 156/76 (!) 154/80   BP Location: Right arm Left arm Right arm   Patient Position: Sitting Sitting Sitting   BP Method: Medium (Automatic) Medium (Automatic) Large (Manual)   Pulse: 77     Temp: 98.2 °F (36.8 °C)     TempSrc: Oral     Weight: 49.9 kg (110 lb)     Height: 5' (1.524 m)       Physical Exam   Constitutional:  She is oriented to person, place, and time. She appears well-developed and well-nourished. No distress.   Blood pressure elevated at recheck 154/80   HENT:   Head: Normocephalic and atraumatic.   Right Ear: Tympanic membrane, external ear and ear canal normal.   Left Ear: Tympanic membrane, external ear and ear canal normal.   Nose: Mucosal edema present. Right sinus exhibits no maxillary sinus tenderness and no frontal sinus tenderness. Left sinus exhibits no maxillary sinus tenderness and no frontal sinus tenderness.   Mouth/Throat: Oropharynx is clear and moist and mucous membranes are normal. No oropharyngeal exudate.   Nasal mucosa with erythema and edema bilateral   Eyes: Conjunctivae and lids are normal. Pupils are equal, round, and reactive to light. Right eye exhibits no discharge. Left eye exhibits no discharge. Right conjunctiva is not injected. Left conjunctiva is not injected.   Neck: Normal range of motion. Neck supple. Normal carotid pulses present. Carotid bruit is not present. No thyromegaly present.   Cardiovascular: Normal rate, regular rhythm, S1 normal, S2 normal, normal heart sounds, intact distal pulses and normal pulses.   No murmur heard.  Pulses:       Carotid pulses are 2+ on the right side, and 2+ on the left side.       Radial pulses are 2+ on the right side, and 2+ on the left side.        Posterior tibial pulses are 2+ on the right side, and 2+ on the left side.   No edema   Pulmonary/Chest: Effort normal and breath sounds normal. No respiratory distress. She has no decreased breath sounds. She has no wheezes. She has no rhonchi. She has no rales.   Abdominal: Soft. Normal appearance, normal aorta and bowel sounds are normal. She exhibits no distension, no abdominal bruit, no pulsatile midline mass and no mass. There is no hepatosplenomegaly. There is no tenderness. No hernia.   No CVA tenderness bilateral   Musculoskeletal: Normal range of motion. She exhibits no edema, tenderness or  deformity.   Lymphadenopathy:     She has no cervical adenopathy.        Right: No supraclavicular adenopathy present.        Left: No supraclavicular adenopathy present.   Neurological: She is alert and oriented to person, place, and time. She has normal strength.   Skin: Skin is warm, dry and intact. No rash noted. She is not diaphoretic. No erythema. No pallor.   Psychiatric: She has a normal mood and affect. Her speech is normal and behavior is normal. Judgment and thought content normal. Her mood appears not anxious. Cognition and memory are normal. She does not exhibit a depressed mood.   Nursing note and vitals reviewed.        Assessment:       1. Leukocytosis, unspecified type    2. Urinary frequency    3. Nocturia more than twice per night    4. Chronic obstructive pulmonary disease, unspecified COPD type    5. Essential hypertension    6. Hyperlipidemia LDL goal <130    7. Hyperthyroidism    8. Tobacco use    9. BMI 21.0-21.9, adult          Plan:       Anuja was seen today for follow-up.    Diagnoses and all orders for this visit:    Leukocytosis, unspecified type  -     CBC auto differential; Future  -   Lab Results   Component Value Date    WBC 13.64 (H) 10/16/2018    HGB 13.0 10/16/2018    HCT 39.7 10/16/2018    MCV 92 10/16/2018     10/16/2018   - Patient with normal physical exam, no evidence of infectious process  - Plan to recheck CBC in one week    Urinary frequency  -     POCT URINE DIPSTICK WITHOUT MICROSCOPE, normal    Nocturia more than twice per night  -     POCT URINE DIPSTICK WITHOUT MICROSCOPE, normal    Chronic obstructive pulmonary disease, unspecified COPD type   Chronic cough and dyspnea with exertion   Strongly encouraged smoking cessation and patient is actively trying to quit bu cutting back cigarette use, down to 3/4 PPD   Continue symbicort inhaler as prescribed, symptoms stable with medication    Essential hypertension  -     lisinopril (PRINIVIL,ZESTRIL) 30 MG tablet;  Take 1 tablet (30 mg total) by mouth once daily.  - Blood pressure elevated today with best reading 154/80, elevated blood pressure at most previous recent visits  - Lisinopril dose increased from 20 mg to 30 mg today  - Plan for 2 week nurse blood pressure check  - Educational handouts provided. Controlling high blood pressure    Hyperlipidemia LDL goal <130     Lab Results   Component Value Date    CHOL 159 05/22/2018    CHOL 205 (H) 04/11/2018    CHOL 193 12/19/2017     Lab Results   Component Value Date    HDL 67 05/22/2018    HDL 70 04/11/2018    HDL 67 12/19/2017     Lab Results   Component Value Date    LDLCALC 78.0 05/22/2018    LDLCALC 116.4 04/11/2018    LDLCALC 108.6 12/19/2017     Lab Results   Component Value Date    TRIG 70 05/22/2018    TRIG 93 04/11/2018    TRIG 87 12/19/2017     Lab Results   Component Value Date    CHOLHDL 42.1 05/22/2018    CHOLHDL 34.1 04/11/2018    CHOLHDL 34.7 12/19/2017      Continue lipitor 40 mg daily  Hyperthyroidism     Lab Results   Component Value Date    TSH 0.010 (L) 10/16/2018      Patient to continue follow up with endocrinology as instructed  Tobacco use   Strongly encouraged smoking cessation and patient is actively trying to quit bu cutting back cigarette use, down to 3/4 PPD  BMI 21.0-21.9, adult   BMI 21.48 today with weight unchanged at 110 pounds   Maintain healthy weight with heathy diet and exercise/active lifestyle   The patient's BMI has been recorded in the chart. The patient has been provided educational materials regarding the benefits of attaining and maintaining a normal weight. We will continue to address and follow this issue during follow up visits.    Follow-up for 2 week nurse blood pressure check, keep appointment with Dr. Lew in January.      Patient readiness: acceptance and barriers:none    During the course of the visit the patient was educated and counseled about the following:     Hypertension:   Medication: increase to lisinopril 30  mg daily.  Dietary sodium restriction.  Regular aerobic exercise.  Follow up: 2 weeks and as needed.    Goals: Hypertension: Reduce Blood Pressure    Did patient meet goals/outcomes: No    The following self management tools provided: declined    Patient Instructions (the written plan) was given to the patient/family.     Time spent with patient: 30 minutes    Barriers to medications present (no )    Adverse reactions to current medications (no)    Over the counter medications reviewed (Yes)

## 2018-10-26 ENCOUNTER — LAB VISIT (OUTPATIENT)
Dept: LAB | Facility: HOSPITAL | Age: 68
End: 2018-10-26
Attending: NURSE PRACTITIONER
Payer: MEDICARE

## 2018-10-26 DIAGNOSIS — D72.829 LEUKOCYTOSIS, UNSPECIFIED TYPE: ICD-10-CM

## 2018-10-26 LAB
BASOPHILS # BLD AUTO: 0.05 K/UL
BASOPHILS NFR BLD: 0.5 %
DIFFERENTIAL METHOD: ABNORMAL
EOSINOPHIL # BLD AUTO: 0.3 K/UL
EOSINOPHIL NFR BLD: 2.4 %
ERYTHROCYTE [DISTWIDTH] IN BLOOD BY AUTOMATED COUNT: 14.5 %
HCT VFR BLD AUTO: 38.7 %
HGB BLD-MCNC: 12.4 G/DL
IMM GRANULOCYTES # BLD AUTO: 0.03 K/UL
IMM GRANULOCYTES NFR BLD AUTO: 0.3 %
LYMPHOCYTES # BLD AUTO: 2.2 K/UL
LYMPHOCYTES NFR BLD: 20.6 %
MCH RBC QN AUTO: 29.6 PG
MCHC RBC AUTO-ENTMCNC: 32 G/DL
MCV RBC AUTO: 92 FL
MONOCYTES # BLD AUTO: 1 K/UL
MONOCYTES NFR BLD: 9.1 %
NEUTROPHILS # BLD AUTO: 7.2 K/UL
NEUTROPHILS NFR BLD: 67.1 %
NRBC BLD-RTO: 0 /100 WBC
PLATELET # BLD AUTO: 371 K/UL
PMV BLD AUTO: 9.9 FL
RBC # BLD AUTO: 4.19 M/UL
WBC # BLD AUTO: 10.68 K/UL

## 2018-10-26 PROCEDURE — 85025 COMPLETE CBC W/AUTO DIFF WBC: CPT

## 2018-10-26 PROCEDURE — 36415 COLL VENOUS BLD VENIPUNCTURE: CPT | Mod: PO

## 2018-10-31 ENCOUNTER — TELEPHONE (OUTPATIENT)
Dept: FAMILY MEDICINE | Facility: CLINIC | Age: 68
End: 2018-10-31

## 2018-10-31 NOTE — TELEPHONE ENCOUNTER
----- Message from Wesley Simon sent at 10/31/2018  9:25 AM CDT -----  Contact: pt  Pt is calling to reschedule her nurses visit appt for tomorrow afternoon, pls call pt back to schedule   Call Back#[phone]  Thanks

## 2018-10-31 NOTE — TELEPHONE ENCOUNTER
Spoke to patient requesting to reschedule appointment nurse visit appointment rescheduled patient confirmed appointment

## 2018-11-07 ENCOUNTER — TELEPHONE (OUTPATIENT)
Dept: FAMILY MEDICINE | Facility: CLINIC | Age: 68
End: 2018-11-07

## 2018-11-07 ENCOUNTER — CLINICAL SUPPORT (OUTPATIENT)
Dept: FAMILY MEDICINE | Facility: CLINIC | Age: 68
End: 2018-11-07
Payer: MEDICARE

## 2018-11-07 VITALS — DIASTOLIC BLOOD PRESSURE: 76 MMHG | SYSTOLIC BLOOD PRESSURE: 162 MMHG

## 2018-11-07 DIAGNOSIS — I10 ESSENTIAL HYPERTENSION: ICD-10-CM

## 2018-11-07 PROCEDURE — 99999 PR PBB SHADOW E&M-EST. PATIENT-LVL I: CPT | Mod: PBBFAC,,, | Performed by: FAMILY MEDICINE

## 2018-11-07 PROCEDURE — 99211 OFF/OP EST MAY X REQ PHY/QHP: CPT | Mod: PBBFAC,PO | Performed by: FAMILY MEDICINE

## 2018-11-07 NOTE — PROGRESS NOTES
Patient came to clinic for bp check. Right arm manual 170/78 after resting 162/76  Patient takes medication as prescribed.

## 2018-11-08 RX ORDER — LISINOPRIL 40 MG/1
40 TABLET ORAL DAILY
Qty: 90 TABLET | Refills: 3 | Status: SHIPPED | OUTPATIENT
Start: 2018-11-08 | End: 2020-07-10 | Stop reason: SDUPTHER

## 2018-12-07 ENCOUNTER — TELEPHONE (OUTPATIENT)
Dept: FAMILY MEDICINE | Facility: CLINIC | Age: 68
End: 2018-12-07

## 2018-12-07 NOTE — TELEPHONE ENCOUNTER
Attempted to call patient to re schedule nurse visit. No answer, left voicemail. Canceled appointment on 12/11.

## 2018-12-07 NOTE — TELEPHONE ENCOUNTER
----- Message from Albino An sent at 12/7/2018  9:44 AM CST -----  Contact: same  Patient called in and stated she needs to cancel her nurse visit appt scheduled for 12/11/18 & would like a call back to reschedule at [phone]

## 2019-01-08 ENCOUNTER — PATIENT OUTREACH (OUTPATIENT)
Dept: ADMINISTRATIVE | Facility: HOSPITAL | Age: 69
End: 2019-01-08

## 2019-01-08 NOTE — LETTER
January 8, 2019    Anujapatrice Chawla Travon  [address]  [place] [address]             [facility]  [address]  [place] [address]  Phone: [phone] Dear [facility] is committed to your overall health and would like to ensure that you are up to date on your recommended test and/or procedures.   Isis Lew MD  has found that your chart shows you may be due for the following:    BONE MINERAL DENSITY SCAN      If you have had any of the above done at another facility, please let us know so that we may obtain copies from that facility.  If you have a copy of these records, please provide a copy for us to scan into your chart.  You are welcome to request that the report be faxed to us at  ([phone]).     Otherwise, please schedule these appointments at your earliest convenience by calling [phone] or going to [email or web address].    If you have an upcoming scheduled appointment, please disregard this letter.    Sincerely,  Your [facility]  MD Susana Teresa LPN Clinical Care Coordinator  [facility]  [address] [address]  Phone [phone]  Fax [phone]

## 2019-01-22 ENCOUNTER — OFFICE VISIT (OUTPATIENT)
Dept: FAMILY MEDICINE | Facility: CLINIC | Age: 69
End: 2019-01-22
Payer: MEDICARE

## 2019-01-22 VITALS
DIASTOLIC BLOOD PRESSURE: 77 MMHG | HEIGHT: 60 IN | WEIGHT: 110.88 LBS | HEART RATE: 79 BPM | SYSTOLIC BLOOD PRESSURE: 150 MMHG | RESPIRATION RATE: 12 BRPM | BODY MASS INDEX: 21.77 KG/M2 | OXYGEN SATURATION: 98 % | TEMPERATURE: 98 F

## 2019-01-22 DIAGNOSIS — Z72.0 TOBACCO USE: ICD-10-CM

## 2019-01-22 DIAGNOSIS — Z23 FLU VACCINE NEED: ICD-10-CM

## 2019-01-22 DIAGNOSIS — E87.6 HYPOKALEMIA: ICD-10-CM

## 2019-01-22 DIAGNOSIS — I65.29 STENOSIS OF CAROTID ARTERY, UNSPECIFIED LATERALITY: ICD-10-CM

## 2019-01-22 DIAGNOSIS — I10 ESSENTIAL HYPERTENSION: Primary | ICD-10-CM

## 2019-01-22 DIAGNOSIS — J44.9 CHRONIC OBSTRUCTIVE PULMONARY DISEASE, UNSPECIFIED COPD TYPE: ICD-10-CM

## 2019-01-22 DIAGNOSIS — E05.90 HYPERTHYROIDISM: ICD-10-CM

## 2019-01-22 DIAGNOSIS — M85.80 OSTEOPENIA, UNSPECIFIED LOCATION: ICD-10-CM

## 2019-01-22 DIAGNOSIS — E87.1 HYPONATREMIA: ICD-10-CM

## 2019-01-22 DIAGNOSIS — J30.1 SEASONAL ALLERGIC RHINITIS DUE TO POLLEN: ICD-10-CM

## 2019-01-22 DIAGNOSIS — E04.2 MULTINODULAR GOITER: ICD-10-CM

## 2019-01-22 DIAGNOSIS — I10 HYPERTENSION, UNSPECIFIED TYPE: ICD-10-CM

## 2019-01-22 DIAGNOSIS — E78.5 HYPERLIPIDEMIA LDL GOAL <130: ICD-10-CM

## 2019-01-22 DIAGNOSIS — N95.9 MENOPAUSAL AND PERIMENOPAUSAL DISORDER: ICD-10-CM

## 2019-01-22 PROBLEM — E06.9 THYROIDITIS: Status: RESOLVED | Noted: 2018-05-22 | Resolved: 2019-01-22

## 2019-01-22 PROBLEM — E04.9 GOITER: Status: RESOLVED | Noted: 2018-05-22 | Resolved: 2019-01-22

## 2019-01-22 PROBLEM — R63.6 UNDERWEIGHT: Status: RESOLVED | Noted: 2017-09-01 | Resolved: 2019-01-22

## 2019-01-22 PROBLEM — E07.9 THYROID DISEASE: Status: RESOLVED | Noted: 2018-05-22 | Resolved: 2019-01-22

## 2019-01-22 PROBLEM — R79.89 ABNORMAL THYROID BLOOD TEST: Status: RESOLVED | Noted: 2018-05-22 | Resolved: 2019-01-22

## 2019-01-22 PROCEDURE — 99999 PR PBB SHADOW E&M-EST. PATIENT-LVL IV: CPT | Mod: PBBFAC,,, | Performed by: FAMILY MEDICINE

## 2019-01-22 PROCEDURE — 99214 OFFICE O/P EST MOD 30 MIN: CPT | Mod: S$PBB,,, | Performed by: FAMILY MEDICINE

## 2019-01-22 PROCEDURE — 99214 OFFICE O/P EST MOD 30 MIN: CPT | Mod: PBBFAC,PO,25 | Performed by: FAMILY MEDICINE

## 2019-01-22 PROCEDURE — 99999 PR PBB SHADOW E&M-EST. PATIENT-LVL IV: ICD-10-PCS | Mod: PBBFAC,,, | Performed by: FAMILY MEDICINE

## 2019-01-22 PROCEDURE — 99214 PR OFFICE/OUTPT VISIT, EST, LEVL IV, 30-39 MIN: ICD-10-PCS | Mod: S$PBB,,, | Performed by: FAMILY MEDICINE

## 2019-01-22 PROCEDURE — 90662 IIV NO PRSV INCREASED AG IM: CPT | Mod: PBBFAC,PO

## 2019-01-22 RX ORDER — ATENOLOL 100 MG/1
100 TABLET ORAL DAILY
Qty: 90 TABLET | Refills: 3 | Status: SHIPPED | OUTPATIENT
Start: 2019-01-22 | End: 2020-05-04

## 2019-01-22 RX ORDER — MONTELUKAST SODIUM 10 MG/1
10 TABLET ORAL NIGHTLY
Qty: 90 TABLET | Refills: 3 | Status: SHIPPED | OUTPATIENT
Start: 2019-01-22 | End: 2019-02-21

## 2019-01-22 NOTE — PATIENT INSTRUCTIONS
Recommend Shingrix Shingles vaccine series from pharmacy      Established High Blood Pressure    High blood pressure (hypertension) is a chronic disease. Often, healthcare providers dont know what causes it. But it can be caused by certain health conditions and medicines.  If you have high blood pressure, you may not have any symptoms. If you do have symptoms, they may include headache, dizziness, changes in your vision, chest pain, and shortness of breath. But even without symptoms, high blood pressure thats not treated raises your risk for heart attack and stroke. High blood pressure is a serious health risk and shouldnt be ignored.  A blood pressure reading is made up of two numbers: a higher number over a lower number. The top number is the systolic pressure. The bottom number is the diastolic pressure. A normal blood pressure is a systolic pressure of  less than 120 over a diastolic pressure of less than 80. You will see your blood pressure readings written together. For example, a person with a systolic pressure of 188 and a diastolic pressure of 78 will have 118/78 written in the medical record.  High blood pressure is when either the top number is 140 or higher, or the bottom number is 90 or higher. This must be the result when taking your blood pressure a number of times. The blood pressures between normal and high are called prehypertension.  Home care  If you have high blood pressure, you should do what is listed below to lower your blood pressure. If you are taking medicines for high blood pressure, these methods may reduce or end your need for medicines in the future.  · Begin a weight-loss program if you are overweight.  · Cut back on how much salt you get in your diet. Heres how to do this:  ¨ Dont eat foods that have a lot of salt. These include olives, pickles, smoked meats, and salted potato chips.  ¨ Dont add salt to your food at the table.  ¨ Use only small amounts of salt when  cooking.  · Start an exercise program. Talk with your healthcare provider about the type of exercise program that would be best for you. It doesn't have to be hard. Even brisk walking for 20 minutes 3 times a week is a good form of exercise.  · Dont take medicines that stimulate the heart. This includes many over-the-counter cold and sinus decongestant pills and sprays, as well as diet pills. Check the warnings about hypertension on the label. Before buying any over-the-counter medicines or supplements, always ask the pharmacist about the product's potential interaction with your high blood pressure and your high blood pressure medicines.  · Stimulants such as amphetamine or cocaine could be deadly for someone with high blood pressure. Never take these.  · Limit how much caffeine you get in your diet. Switch to caffeine-free products.  · Stop smoking. If you are a long-time smoker, this can be hard. Talk to your healthcare provider about medicines and nicotine replacement options to help you. Also, enroll in a stop-smoking program to make it more likely that you will quit for good.  · Learn how to handle stress. This is an important part of any program to lower blood pressure. Learn about relaxation methods like meditation, yoga, or biofeedback.  · If your provider prescribed medicines, take them exactly as directed. Missing doses may cause your blood pressure get out of control.  · If you miss a dose or doses, check with your healthcare provider or pharmacist about what to do.  · Consider buying an automatic blood pressure machine. Ask your provider for a recommendation. You can get one of these at most pharmacies.     The American Heart Association recommends the following guidelines for home blood pressure monitoring:  · Don't smoke or drink coffee for 30 minutes before taking your blood pressure.  · Go to the bathroom before the test.  · Relax for 5 minutes before taking the measurement.  · Sit with your back  supported (don't sit on a couch or soft chair); keep your feet on the floor uncrossed. Place your arm on a solid flat surface (like a table) with the upper part of the arm at heart level. Place the middle of the cuff directly above the eye of the elbow. Check the monitor's instruction manual for an illustration.  · Take multiple readings. When you measure, take 2 to 3 readings one minute apart and record all of the results.  · Take your blood pressure at the same time every day, or as your healthcare provider recommends.  · Record the date, time, and blood pressure reading.  · Take the record with you to your next medical appointment. If your blood pressure monitor has a built-in memory, simply take the monitor with you to your next appointment.  · Call your provider if you have several high readings. Don't be frightened by a single high blood pressure reading, but if you get several high readings, check in with your healthcare provider.  · Note: When blood pressure reaches a systolic (top number) of 180 or higher OR diastolic (bottom number) of 110 or higher, seek emergency medical treatment.  Follow-up care  You will need to see your healthcare provider regularly. This is to check your blood pressure and to make changes to your medicines. Make a follow-up appointment as directed. Bring the record of your home blood pressure readings to the appointment.  When to seek medical advice  Call your healthcare provider right away if any of these occur:  · Blood pressure reaches a systolic (upper number) of 180 or higher OR a diastolic (bottom number) of 110 or higher  · Chest pain or shortness of breath  · Severe headache  · Throbbing or rushing sound in the ears  · Nosebleed  · Sudden severe pain in your belly (abdomen)  · Extreme drowsiness, confusion, or fainting  · Dizziness or spinning sensation (vertigo)  · Weakness of an arm or leg or one side of the face  · You have problems speaking or seeing   Date Last  Reviewed: 12/1/2016  © 3402-5898 [employer], [employer]. [address] [place] [address], [place] [address]. All rights reserved. This information is not intended as a substitute for professional medical care. Always follow your healthcare professional's instructions.

## 2019-01-22 NOTE — PROGRESS NOTES
Subjective:       Patient ID: Anuja Cool is a 68 y.o. female.    Chief Complaint: Follow-up (6mth f/u hypertension)    HPI  Review of Systems   Constitutional: Negative for fatigue and unexpected weight change.   Respiratory: Negative for chest tightness and shortness of breath.    Cardiovascular: Negative for chest pain, palpitations and leg swelling.   Gastrointestinal: Negative for abdominal pain and nausea.        Occ diarrhea   Musculoskeletal: Negative for arthralgias.   Neurological: Negative for dizziness, syncope, light-headedness and headaches.       Patient Active Problem List   Diagnosis    Asthma    Allergy    Hypertension    Hyperlipidemia LDL goal <130    Tobacco use    COPD (chronic obstructive pulmonary disease)    Bilateral carotid bruits    OAB (overactive bladder)    Osteopenia    BMI 21.0-21.9, adult    Low serum thyroid stimulating hormone (TSH)    Multinodular goiter    Stenosis of carotid artery    Constipation    Postmenopausal    ASCVD (arteriosclerotic cardiovascular disease)    Hyperthyroidism     Patient is here for a chronic conditions follow up.    Endocrine Dr. Graves's team-H/o tsh suppression and nodular thyroid disease with otherwise normal TFTs-minor sx of thyroid disease occ diarrhea and insomnia . On  Atenolol.  Weight is stable. Had NM uptake 5/18- normal. Last thyroid u/s 3/18- Multinodular thyroid.  Stable nodules bilaterally.  The largest nodule on the left was previously subjected to fine-needle aspiration.    Vasc surg Dr. Beatty following for carotid stenosis. Last u/s 9/18- no sign stenosis. Had AAA screening -negative 9/18    Due for dexa-has h/o osteopenia    Objective:      Physical Exam   Constitutional: She is oriented to person, place, and time. She appears well-developed and well-nourished.   Cardiovascular: Normal rate and regular rhythm.   Murmur heard.   Systolic murmur is present.  Pulmonary/Chest: Effort normal. She has decreased  breath sounds.   Musculoskeletal: She exhibits no edema.   Neurological: She is alert and oriented to person, place, and time.   Skin: Skin is warm and dry.   Psychiatric: She has a normal mood and affect.   Nursing note and vitals reviewed.      Assessment:       1. Essential hypertension    2. Hyperlipidemia LDL goal <130    3. Stenosis of carotid artery, unspecified laterality    4. BMI 21.0-21.9, adult    5. Multinodular goiter    6. Hyperthyroidism    7. Osteopenia, unspecified location    8. Tobacco use    9. Hyponatremia    10. Hypokalemia    11. Chronic obstructive pulmonary disease, unspecified COPD type        Plan:           1. Essential hypertension  Uncontrolled. Increase  - atenolol (TENORMIN) 100 MG tablet; Take 1 tablet (100 mg total) by mouth once daily.  Dispense: 90 tablet; Refill: 3    2. Hyperlipidemia LDL goal <130  Stable condition.  Continue current medications.  Will adjust based on lab findings or if condition changes.    - Comprehensive metabolic panel; Future  - Lipid panel; Future    3. Stenosis of carotid artery, unspecified laterality  No sig stenosis- repeat u/s 9/19    4. BMI 21.0-21.9, adult  Stable.  No nutritional concerns at this time    5. Multinodular goiter  Cont endocrine monitoring    6. Hyperthyroidism  Subclinical.  continue  - atenolol (TENORMIN) 100 MG tablet; Take 1 tablet (100 mg total) by mouth once daily.  Dispense: 90 tablet; Refill: 3    7. Osteopenia, unspecified location  Repeat Dexa    8. Tobacco use  Patient counseled on smoking cessation. I discussed options such as nicotine replacement products, wellbutrin, and chantix.  Side effects, benefits and risks were discussed regarding each.  Printed materials were given.  I offered a referral to [facility] smoking cessation program.  All questions were answered.      9. Hyponatremia  resolved    10. Hypokalemia  resolved    11. Chronic obstructive pulmonary disease, unspecified COPD type  Cont current regimen.  Use  singulair 10mg prn    12. Flu vaccine need  Immunize today.  Counseled patient on risks, benefits and side effects.  Patient elected to proceed with vaccination.    - Influenza - High Dose (65+) (PF) (IM)    13. Menopausal and perimenopausal disorder  Screen and treat as indicated:    - DXA Bone Density Spine And Hip; Future    14. Seasonal allergic rhinitis due to pollen  Recommend otc non-sedating antihistamine such as Loratadine and/or steroid nasal spray such as Flonase as directed and as needed.  Please return to clinic if symptoms persist after these interventions.  Add  - montelukast (SINGULAIR) 10 mg tablet; Take 1 tablet (10 mg total) by mouth every evening.  Dispense: 90 tablet; Refill: 3    15. Hypertension, unspecified type  Increase  - atenolol (TENORMIN) 100 MG tablet; Take 1 tablet (100 mg total) by mouth once daily.  Dispense: 90 tablet; Refill: 3  Time spent with patient: 20 minutes    Patient with be reevaluated in 6 months or sooner prn. 4 weeks nurse visit    Greater than 50% of this visit was spent counseling as described in above documentation:Yes

## 2019-01-22 NOTE — PROGRESS NOTES
Administered HD-Flu IM in [place]. Patient tolerated well. No bleeding at insertion site noted. Pain scale 0/10 with injection. Two patient identifiers used (Name and ). Asceptic technique maintained.

## 2019-02-14 DIAGNOSIS — J44.89 COPD WITH ASTHMA: ICD-10-CM

## 2019-02-14 NOTE — TELEPHONE ENCOUNTER
----- Message from Sharon Lezama sent at 2/14/2019 12:14 PM CST -----  Type:  RX Refill Request    Who Called:  Patietn  Refill or New Rx:  refill  RX Name and Strength:  budesonide-formoterol 160-4.5 mcg (SYMBICORT) 160-4.5 mcg/actuation HFAA  How is the patient currently taking it? (ex. 1XDay):  Na  Is this a 30 day or 90 day RX:  90  Preferred Pharmacy with phone number:    [facility] [address] [place], [place] - [address]  [address]  [facility] [address]  Phone: [phone] Fax: [phone]  Local or Mail Order:  local  Ordering Provider:  Olvin Munoz Call Back Number:  [phone] (home)     Additional Information:  Sent a request from the pharmacy yesterday, patient is almost out of medication. Please advise

## 2019-02-15 ENCOUNTER — TELEPHONE (OUTPATIENT)
Dept: FAMILY MEDICINE | Facility: CLINIC | Age: 69
End: 2019-02-15

## 2019-02-15 RX ORDER — BUDESONIDE AND FORMOTEROL FUMARATE DIHYDRATE 160; 4.5 UG/1; UG/1
2 AEROSOL RESPIRATORY (INHALATION) EVERY 12 HOURS
Qty: 3 INHALER | Refills: 3 | Status: SHIPPED | OUTPATIENT
Start: 2019-02-15 | End: 2019-08-05 | Stop reason: SDUPTHER

## 2019-02-15 RX ORDER — BUDESONIDE AND FORMOTEROL FUMARATE DIHYDRATE 160; 4.5 UG/1; UG/1
2 AEROSOL RESPIRATORY (INHALATION) EVERY 12 HOURS
Qty: 3 INHALER | Refills: 3 | Status: SHIPPED | OUTPATIENT
Start: 2019-02-15 | End: 2019-02-15 | Stop reason: SDUPTHER

## 2019-02-15 NOTE — TELEPHONE ENCOUNTER
----- Message from Albino BELCHER Nataliya sent at 2/15/2019 10:12 AM CST -----  Contact: same  Type:  RX Refill Request    Who Called:  patient  Refill or New Rx:  refill  RX Name and Strength:  budesonide-formoterol 160-4.5 mcg (SYMBICORT) 160-4.5 mcg/actuation HFAA  How is the patient currently taking it? (ex. 1XDay):  Inhale 2 puffs into the lungs every 12 (twelve) hours. Controller - Inhalation  Is this a 30 day or 90 day RX:  10.2 grams, with 5 refills last filled on 8/16/18  Preferred Pharmacy with phone number:    [facility] [address] [place], [place] - [address]  [address]  [facility] [address]  Phone: [phone] Fax: [phone]  Ordering Provider:  Louann Munoz Call Back Number:  [phone]  Additional Information:  n/a

## 2019-02-15 NOTE — PROGRESS NOTES
Refill Authorization Note     is requesting a refill authorization.    Brief assessment and rationale for refill: APPROVE: prr  Name and strength of medication: budesonide-formoterol 160-4.5 mcg (SYMBICORT) 160-4.5 mcg/actuation HFAA       Medication Therapy Plan: COPD/ASTHMA- lco(lov); Approve 12 more months     Medication reconciliation completed: No              How patient will take medication: utd          Comments: Pended for [facility], but routed to refill pool      Appointments (past 12 m or future  authorizing provider)  Last visit with PCP: Isis Lew MD:01/22/2019         Next visit with PCP:   Isis Lew MD:          Future Appointments   Date Time Provider Department Center   2/20/2019  9:00 AM Isis Lew MD [facility] [place]   2/20/2019  9:40 AM [facility] DEXA1 [facility] [place]   4/2/2019 11:00 AM Piter Graves MD [facility] ENDOCRN [place]   8/14/2019  8:00 AM LAB, [facility] [place]   8/21/2019  9:40 AM Isis Lew MD [facility] [place]

## 2019-02-15 NOTE — TELEPHONE ENCOUNTER
Called pt regarding below message. Informed that the request was sent in earlier.  Pt verbalized understanding with no further questions.

## 2019-02-15 NOTE — TELEPHONE ENCOUNTER
Last office visit 1/22/19  Last refill 8/16/18  Follow up 8/21/19      budesonide-formoterol 160-4.5 mcg (SYMBICORT) 160-4.5 mcg/actuation HFAA

## 2019-02-20 ENCOUNTER — TELEPHONE (OUTPATIENT)
Dept: FAMILY MEDICINE | Facility: CLINIC | Age: 69
End: 2019-02-20

## 2019-02-20 NOTE — TELEPHONE ENCOUNTER
----- Message from Neeta Sanchez sent at 2/20/2019  7:28 AM CST -----  Contact: Patient  Type: Needs Medical Advice    Who Called:  Patient  Best Call Back Number:   Additional Information: Calling to reschedule her Nurse visit today for a bp check due to the weather. Please advise.

## 2019-02-20 NOTE — TELEPHONE ENCOUNTER
Called pt regarding below message. Rescheduled nurse visit due to weather.  Pt verbalized appt date, time, and location with no further questions

## 2019-02-26 ENCOUNTER — TELEPHONE (OUTPATIENT)
Dept: FAMILY MEDICINE | Facility: CLINIC | Age: 69
End: 2019-02-26

## 2019-02-26 NOTE — TELEPHONE ENCOUNTER
----- Message from Kamala Ortiz sent at 2/26/2019  7:02 AM CST -----    Pt  Is calling to  Reschedule     Nurse  Visit  Today // please call [phone]

## 2019-02-26 NOTE — TELEPHONE ENCOUNTER
Spoke with patient to reschedule her nurse BP check. I also scheduled her for her DEXA on the same day. Next Thursday 3/7.

## 2019-04-01 ENCOUNTER — TELEPHONE (OUTPATIENT)
Dept: ENDOCRINOLOGY | Facility: CLINIC | Age: 69
End: 2019-04-01

## 2019-04-01 NOTE — TELEPHONE ENCOUNTER
----- Message from Kristyn Arriaza sent at 4/1/2019  9:23 AM CDT -----  Contact: PT  PT had to cancel tomorrow's appointment and needed to reschedule.   Wants something way sooner than what I have already scheduled her for.   Current Appointment: 9/16 @11am.     Appointment Reason: thyroid nodule    Callback: [phone]

## 2019-07-10 ENCOUNTER — TELEPHONE (OUTPATIENT)
Dept: FAMILY MEDICINE | Facility: CLINIC | Age: 69
End: 2019-07-10

## 2019-07-10 NOTE — TELEPHONE ENCOUNTER
Left voicemail for patient letting her know I called pharmacy and told them to fill the generic per Dr. Lew.

## 2019-07-10 NOTE — TELEPHONE ENCOUNTER
----- Message from Nataliia OLIVERAKimberly Nortonmalahakeem sent at 7/10/2019  2:18 PM CDT -----  Contact: [phone] self   Pt is requesting to speak with you re: XOPENEX HFA 45 mcg/actuation inhaler. Pt states that the pharmacy is out of the inhaler and would like to know if she take generic inhaler. Please advise

## 2019-07-11 ENCOUNTER — TELEPHONE (OUTPATIENT)
Dept: FAMILY MEDICINE | Facility: CLINIC | Age: 69
End: 2019-07-11

## 2019-07-11 NOTE — TELEPHONE ENCOUNTER
----- Message from Yvonne Carranza sent at 7/11/2019 11:17 AM CDT -----  Contact: Daria with [facility]  Type:  Pharmacy Calling to Clarify an RX    Name of Caller:   Daria  Pharmacy Name:    [facility] [address] [place], [place] - [address]  [address]  [facility] [address]  Phone: [phone] Fax: [phone]     Prescription Name:  XOPENEX HFA 45 mcg/actuation inhaler  What do they need to clarify?:  Needs new prescription for generic  Best Call Back Number:  [phone]  Additional Information:  na

## 2019-08-05 DIAGNOSIS — J44.89 COPD WITH ASTHMA: ICD-10-CM

## 2019-08-05 RX ORDER — BUDESONIDE AND FORMOTEROL FUMARATE DIHYDRATE 160; 4.5 UG/1; UG/1
2 AEROSOL RESPIRATORY (INHALATION) EVERY 12 HOURS
Qty: 3 INHALER | Refills: 3 | Status: SHIPPED | OUTPATIENT
Start: 2019-08-05 | End: 2020-02-07 | Stop reason: SDUPTHER

## 2019-08-07 ENCOUNTER — PATIENT OUTREACH (OUTPATIENT)
Dept: ADMINISTRATIVE | Facility: HOSPITAL | Age: 69
End: 2019-08-07

## 2019-08-14 ENCOUNTER — LAB VISIT (OUTPATIENT)
Dept: LAB | Facility: HOSPITAL | Age: 69
End: 2019-08-14
Attending: FAMILY MEDICINE
Payer: MEDICARE

## 2019-08-14 DIAGNOSIS — E78.5 HYPERLIPIDEMIA LDL GOAL <130: ICD-10-CM

## 2019-08-14 LAB
ALBUMIN SERPL BCP-MCNC: 3.8 G/DL (ref 3.5–5.2)
ALP SERPL-CCNC: 97 U/L (ref 55–135)
ALT SERPL W/O P-5'-P-CCNC: 13 U/L (ref 10–44)
ANION GAP SERPL CALC-SCNC: 9 MMOL/L (ref 8–16)
AST SERPL-CCNC: 19 U/L (ref 10–40)
BILIRUB SERPL-MCNC: 0.3 MG/DL (ref 0.1–1)
BUN SERPL-MCNC: 6 MG/DL (ref 8–23)
CALCIUM SERPL-MCNC: 9.2 MG/DL (ref 8.7–10.5)
CHLORIDE SERPL-SCNC: 102 MMOL/L (ref 95–110)
CHOLEST SERPL-MCNC: 221 MG/DL (ref 120–199)
CHOLEST/HDLC SERPL: 4.3 {RATIO} (ref 2–5)
CO2 SERPL-SCNC: 26 MMOL/L (ref 23–29)
CREAT SERPL-MCNC: 0.6 MG/DL (ref 0.5–1.4)
EST. GFR  (AFRICAN AMERICAN): >60 ML/MIN/1.73 M^2
EST. GFR  (NON AFRICAN AMERICAN): >60 ML/MIN/1.73 M^2
GLUCOSE SERPL-MCNC: 70 MG/DL (ref 70–110)
HDLC SERPL-MCNC: 52 MG/DL (ref 40–75)
HDLC SERPL: 23.5 % (ref 20–50)
LDLC SERPL CALC-MCNC: 132 MG/DL (ref 63–159)
NONHDLC SERPL-MCNC: 169 MG/DL
POTASSIUM SERPL-SCNC: 4.7 MMOL/L (ref 3.5–5.1)
PROT SERPL-MCNC: 7.1 G/DL (ref 6–8.4)
SODIUM SERPL-SCNC: 137 MMOL/L (ref 136–145)
TRIGL SERPL-MCNC: 185 MG/DL (ref 30–150)

## 2019-08-14 PROCEDURE — 36415 COLL VENOUS BLD VENIPUNCTURE: CPT | Mod: PO

## 2019-08-14 PROCEDURE — 80053 COMPREHEN METABOLIC PANEL: CPT

## 2019-08-14 PROCEDURE — 80061 LIPID PANEL: CPT

## 2019-08-21 ENCOUNTER — OFFICE VISIT (OUTPATIENT)
Dept: FAMILY MEDICINE | Facility: CLINIC | Age: 69
End: 2019-08-21
Payer: MEDICARE

## 2019-08-21 ENCOUNTER — DOCUMENTATION ONLY (OUTPATIENT)
Dept: FAMILY MEDICINE | Facility: CLINIC | Age: 69
End: 2019-08-21

## 2019-08-21 VITALS
OXYGEN SATURATION: 98 % | RESPIRATION RATE: 14 BRPM | WEIGHT: 113.13 LBS | HEART RATE: 66 BPM | HEIGHT: 60 IN | BODY MASS INDEX: 22.21 KG/M2 | DIASTOLIC BLOOD PRESSURE: 80 MMHG | TEMPERATURE: 98 F | SYSTOLIC BLOOD PRESSURE: 110 MMHG

## 2019-08-21 DIAGNOSIS — N95.9 MENOPAUSAL AND PERIMENOPAUSAL DISORDER: ICD-10-CM

## 2019-08-21 DIAGNOSIS — E05.90 HYPERTHYROIDISM: ICD-10-CM

## 2019-08-21 DIAGNOSIS — I10 ESSENTIAL HYPERTENSION: ICD-10-CM

## 2019-08-21 DIAGNOSIS — E78.5 HYPERLIPIDEMIA LDL GOAL <130: ICD-10-CM

## 2019-08-21 DIAGNOSIS — M85.80 OSTEOPENIA, UNSPECIFIED LOCATION: ICD-10-CM

## 2019-08-21 DIAGNOSIS — E04.2 MULTINODULAR GOITER: ICD-10-CM

## 2019-08-21 DIAGNOSIS — J44.1 COPD EXACERBATION: ICD-10-CM

## 2019-08-21 DIAGNOSIS — I65.29 STENOSIS OF CAROTID ARTERY, UNSPECIFIED LATERALITY: ICD-10-CM

## 2019-08-21 DIAGNOSIS — J44.9 CHRONIC OBSTRUCTIVE PULMONARY DISEASE, UNSPECIFIED COPD TYPE: ICD-10-CM

## 2019-08-21 DIAGNOSIS — Z12.31 ENCOUNTER FOR SCREENING MAMMOGRAM FOR MALIGNANT NEOPLASM OF BREAST: ICD-10-CM

## 2019-08-21 DIAGNOSIS — R13.10 DYSPHAGIA, UNSPECIFIED TYPE: Primary | ICD-10-CM

## 2019-08-21 PROCEDURE — 99999 PR PBB SHADOW E&M-EST. PATIENT-LVL V: ICD-10-PCS | Mod: PBBFAC,,, | Performed by: FAMILY MEDICINE

## 2019-08-21 PROCEDURE — 99214 PR OFFICE/OUTPT VISIT, EST, LEVL IV, 30-39 MIN: ICD-10-PCS | Mod: S$PBB,,, | Performed by: FAMILY MEDICINE

## 2019-08-21 PROCEDURE — 99214 OFFICE O/P EST MOD 30 MIN: CPT | Mod: S$PBB,,, | Performed by: FAMILY MEDICINE

## 2019-08-21 PROCEDURE — 99215 OFFICE O/P EST HI 40 MIN: CPT | Mod: PBBFAC,PO | Performed by: FAMILY MEDICINE

## 2019-08-21 PROCEDURE — 99999 PR PBB SHADOW E&M-EST. PATIENT-LVL V: CPT | Mod: PBBFAC,,, | Performed by: FAMILY MEDICINE

## 2019-08-21 RX ORDER — PREDNISONE 20 MG/1
20 TABLET ORAL DAILY
Qty: 5 TABLET | Refills: 0 | Status: SHIPPED | OUTPATIENT
Start: 2019-08-21 | End: 2019-08-26

## 2019-08-21 RX ORDER — ROSUVASTATIN CALCIUM 20 MG/1
20 TABLET, COATED ORAL DAILY
Qty: 90 TABLET | Refills: 3 | Status: SHIPPED | OUTPATIENT
Start: 2019-08-21 | End: 2020-12-06

## 2019-08-21 RX ORDER — MOXIFLOXACIN HYDROCHLORIDE 400 MG/1
400 TABLET ORAL DAILY
Qty: 5 TABLET | Refills: 0 | Status: SHIPPED | OUTPATIENT
Start: 2019-08-21 | End: 2021-04-22

## 2019-08-21 RX ORDER — ATORVASTATIN CALCIUM 40 MG/1
40 TABLET, FILM COATED ORAL DAILY
COMMUNITY
End: 2019-08-21

## 2019-08-21 NOTE — PROGRESS NOTES
Subjective:       Patient ID: Anuja Cool is a 69 y.o. female.    Chief Complaint: Follow-up (6mth f/u hypertension)    HPI  Review of Systems   Constitutional: Negative for fatigue and unexpected weight change.   Respiratory: Negative for chest tightness and shortness of breath.    Cardiovascular: Negative for chest pain, palpitations and leg swelling.   Gastrointestinal: Negative for abdominal pain.   Musculoskeletal: Negative for arthralgias.   Neurological: Negative for dizziness, syncope, light-headedness and headaches.       Patient Active Problem List   Diagnosis    Asthma    Allergy    Hypertension    Hyperlipidemia LDL goal <130    Tobacco use    COPD (chronic obstructive pulmonary disease)    Bilateral carotid bruits    OAB (overactive bladder)    Osteopenia    BMI 21.0-21.9, adult    Low serum thyroid stimulating hormone (TSH)    Multinodular goiter    Stenosis of carotid artery    Constipation    Postmenopausal    ASCVD (arteriosclerotic cardiovascular disease)    Hyperthyroidism     Patient is here for a chronic conditions follow up.    Reviewed labs 8/19-lipids elevated. Taking lipitor 40mg daily     URI sx x 4 d. Loss of voice, st, eas itching, congestion. takin symbicort twice daily.  xopenex use occ-heat triggers    Having swallowing difficulty-even with pills    H/o carotid stenosis 9/18 last u/s- No evidence of a hemodynamically significant carotid bifurcation stenosis.    Endocrine Dr. Graves H/o hyperthyroidism, goiter. Upcoming appt  Objective:      Physical Exam   Constitutional: She is oriented to person, place, and time. She appears well-developed and well-nourished.   Neck: Normal range of motion. Neck supple. No thyromegaly present.   Cardiovascular: Normal rate, regular rhythm and normal heart sounds.   Pulmonary/Chest: Effort normal and breath sounds normal.   Musculoskeletal: She exhibits no edema.   Lymphadenopathy:     She has no cervical adenopathy.    Neurological: She is alert and oriented to person, place, and time.   Skin: Skin is warm and dry.   Psychiatric: She has a normal mood and affect.   Nursing note and vitals reviewed.      Assessment:       1. Dysphagia, unspecified type    2. Hyperlipidemia LDL goal <130    3. Encounter for screening mammogram for malignant neoplasm of breast    4. Menopausal and perimenopausal disorder    5. Stenosis of carotid artery, unspecified laterality    6. Essential hypertension    7. Chronic obstructive pulmonary disease, unspecified COPD type    8. Multinodular goiter    9. Hyperthyroidism    10. Osteopenia, unspecified location    11. COPD exacerbation        Plan:       1. Dysphagia, unspecified type  Refer for eval and treat  - Ambulatory referral to Gastroenterology    2. Hyperlipidemia LDL goal <130  D/c lipitor for higher potency  - rosuvastatin (CRESTOR) 20 MG tablet; Take 1 tablet (20 mg total) by mouth once daily.  Dispense: 90 tablet; Refill: 3  - CBC auto differential; Future  - Comprehensive metabolic panel; Future  - Lipid panel; Future    3. Encounter for screening mammogram for malignant neoplasm of breast  Screen and treat as indicated:    - Mammo Digital Screening Bilateral With CAD; Future    4. Menopausal and perimenopausal disorder  Screen and treat as indicated:    - DXA Bone Density Spine And Hip; Future    5. Stenosis of carotid artery, unspecified laterality  Cont monitoring -repeat u/s 1 year    6. Essential hypertension  Controlled on current medications.  Continue current medications.      7. Chronic obstructive pulmonary disease, unspecified COPD type  Cont current regimen    8. Multinodular goiter  F/u endocrine as planned    9. Hyperthyroidism  Sx controlled. Mild. F/u endocrine as planned    10. Osteopenia, unspecified location  Screen and treat as indicated:      11. COPD exacerbation  Treat  - predniSONE (DELTASONE) 20 MG tablet; Take 1 tablet (20 mg total) by mouth once daily. for 5  days  Dispense: 5 tablet; Refill: 0  - moxifloxacin (AVELOX) 400 mg tablet; Take 1 tablet (400 mg total) by mouth once daily.  Dispense: 5 tablet; Refill: 0  I counseled the patient on general home care guidelines for cough and congestion including increasing fluid intake, getting plenty of rest and use of OTC cough and cold medications.  I recommended guafenesin for congestion and dextromethorphan as directed for cough.  A brand like Mucinex DM is recommended.  Avoidance of decongestants is recommended for patients with heart problems and hypertension.  Extra vitamin C may also benefit.  Return to clinic if symptoms last longer than 10 days or sooner if worsen with symptoms like fever > 100.4, severe sinus pain or headache, thick yellow nasal discharge or sputum, dehydration or lethargy.            Time spent with patient: 20 minutes    Patient with be reevaluated in 6 months or sooner prn    Greater than 50% of this visit was spent counseling as described in above documentation:Yes

## 2019-08-21 NOTE — PATIENT INSTRUCTIONS
Established High Blood Pressure    High blood pressure (hypertension) is a chronic disease. Often, healthcare providers dont know what causes it. But it can be caused by certain health conditions and medicines.  If you have high blood pressure, you may not have any symptoms. If you do have symptoms, they may include headache, dizziness, changes in your vision, chest pain, and shortness of breath. But even without symptoms, high blood pressure thats not treated raises your risk for heart attack and stroke. High blood pressure is a serious health risk and shouldnt be ignored.  A blood pressure reading is made up of two numbers: a higher number over a lower number. The top number is the systolic pressure. The bottom number is the diastolic pressure. A normal blood pressure is a systolic pressure of  less than 120 over a diastolic pressure of less than 80. You will see your blood pressure readings written together. For example, a person with a systolic pressure of 188 and a diastolic pressure of 78 will have 118/78 written in the medical record.  High blood pressure is when either the top number is 140 or higher, or the bottom number is 90 or higher. This must be the result when taking your blood pressure a number of times. The blood pressures between normal and high are called prehypertension.  Home care  If you have high blood pressure, you should do what is listed below to lower your blood pressure. If you are taking medicines for high blood pressure, these methods may reduce or end your need for medicines in the future.  · Begin a weight-loss program if you are overweight.  · Cut back on how much salt you get in your diet. Heres how to do this:  ¨ Dont eat foods that have a lot of salt. These include olives, pickles, smoked meats, and salted potato chips.  ¨ Dont add salt to your food at the table.  ¨ Use only small amounts of salt when cooking.  · Start an exercise program. Talk with your healthcare  provider about the type of exercise program that would be best for you. It doesn't have to be hard. Even brisk walking for 20 minutes 3 times a week is a good form of exercise.  · Dont take medicines that stimulate the heart. This includes many over-the-counter cold and sinus decongestant pills and sprays, as well as diet pills. Check the warnings about hypertension on the label. Before buying any over-the-counter medicines or supplements, always ask the pharmacist about the product's potential interaction with your high blood pressure and your high blood pressure medicines.  · Stimulants such as amphetamine or cocaine could be deadly for someone with high blood pressure. Never take these.  · Limit how much caffeine you get in your diet. Switch to caffeine-free products.  · Stop smoking. If you are a long-time smoker, this can be hard. Talk to your healthcare provider about medicines and nicotine replacement options to help you. Also, enroll in a stop-smoking program to make it more likely that you will quit for good.  · Learn how to handle stress. This is an important part of any program to lower blood pressure. Learn about relaxation methods like meditation, yoga, or biofeedback.  · If your provider prescribed medicines, take them exactly as directed. Missing doses may cause your blood pressure get out of control.  · If you miss a dose or doses, check with your healthcare provider or pharmacist about what to do.  · Consider buying an automatic blood pressure machine. Ask your provider for a recommendation. You can get one of these at most pharmacies.     The American Heart Association recommends the following guidelines for home blood pressure monitoring:  · Don't smoke or drink coffee for 30 minutes before taking your blood pressure.  · Go to the bathroom before the test.  · Relax for 5 minutes before taking the measurement.  · Sit with your back supported (don't sit on a couch or soft chair); keep your feet on  the floor uncrossed. Place your arm on a solid flat surface (like a table) with the upper part of the arm at heart level. Place the middle of the cuff directly above the eye of the elbow. Check the monitor's instruction manual for an illustration.  · Take multiple readings. When you measure, take 2 to 3 readings one minute apart and record all of the results.  · Take your blood pressure at the same time every day, or as your healthcare provider recommends.  · Record the date, time, and blood pressure reading.  · Take the record with you to your next medical appointment. If your blood pressure monitor has a built-in memory, simply take the monitor with you to your next appointment.  · Call your provider if you have several high readings. Don't be frightened by a single high blood pressure reading, but if you get several high readings, check in with your healthcare provider.  · Note: When blood pressure reaches a systolic (top number) of 180 or higher OR diastolic (bottom number) of 110 or higher, seek emergency medical treatment.  Follow-up care  You will need to see your healthcare provider regularly. This is to check your blood pressure and to make changes to your medicines. Make a follow-up appointment as directed. Bring the record of your home blood pressure readings to the appointment.  When to seek medical advice  Call your healthcare provider right away if any of these occur:  · Blood pressure reaches a systolic (upper number) of 180 or higher OR a diastolic (bottom number) of 110 or higher  · Chest pain or shortness of breath  · Severe headache  · Throbbing or rushing sound in the ears  · Nosebleed  · Sudden severe pain in your belly (abdomen)  · Extreme drowsiness, confusion, or fainting  · Dizziness or spinning sensation (vertigo)  · Weakness of an arm or leg or one side of the face  · You have problems speaking or seeing   Date Last Reviewed: 12/1/2016  © 6148-7667 [employer]. [address] [place] [address]  [place] [address]. All rights reserved. This information is not intended as a substitute for professional medical care. Always follow your healthcare professional's instructions.

## 2019-09-02 DIAGNOSIS — J44.9 CHRONIC OBSTRUCTIVE PULMONARY DISEASE, UNSPECIFIED COPD TYPE: ICD-10-CM

## 2019-09-02 RX ORDER — LEVALBUTEROL TARTRATE 45 UG/1
AEROSOL, METERED ORAL
Qty: 1 INHALER | Refills: 11 | Status: SHIPPED | OUTPATIENT
Start: 2019-09-02 | End: 2020-08-27 | Stop reason: SDUPTHER

## 2019-11-07 ENCOUNTER — IMMUNIZATION (OUTPATIENT)
Dept: FAMILY MEDICINE | Facility: CLINIC | Age: 69
End: 2019-11-07
Payer: MEDICARE

## 2019-11-07 PROCEDURE — 90662 IIV NO PRSV INCREASED AG IM: CPT | Mod: PBBFAC,PO

## 2020-02-07 ENCOUNTER — TELEPHONE (OUTPATIENT)
Dept: FAMILY MEDICINE | Facility: CLINIC | Age: 70
End: 2020-02-07

## 2020-02-07 DIAGNOSIS — J44.89 COPD WITH ASTHMA: ICD-10-CM

## 2020-02-07 NOTE — TELEPHONE ENCOUNTER
----- Message from Kael Castellanos sent at 2/7/2020 10:59 AM CST -----  Contact: KAILASH SEVILLA [[id number]]  Name of Who is Calling:KAILASH SEVILLA [[id number]]       What is the request in detail: Pt is requesting a call back from clinical team in regard to SYMBICORT Pt state her insurance will only pay for the brand name not the generic      Please contact to further discuss and advise.          Can the clinic reply by [facility]:       What Number to Call Back if not in [facility]: [phone]

## 2020-02-07 NOTE — TELEPHONE ENCOUNTER
Patient called back and stated that her insurance company will not cover the generic brand of her medication so she needs Dr. Lew to send a new prescription to the pharmacy

## 2020-02-07 NOTE — TELEPHONE ENCOUNTER
----- Message from Alysa Uriostegui sent at 2/7/2020  2:59 PM CST -----  Contact: Patient  Type:  Patient Returning Call    Who Called:  Patient   Who Left Message for Patient:  Awilda  Does the patient know what this is regarding?:  n/a  Best Call Back Number:  [phone]  Additional Information:  Patient stated she's returning Awidla's message and she did not specify what Awilda was calling about. Please call back and advise.

## 2020-02-12 RX ORDER — BUDESONIDE AND FORMOTEROL FUMARATE DIHYDRATE 160; 4.5 UG/1; UG/1
2 AEROSOL RESPIRATORY (INHALATION) EVERY 12 HOURS
Qty: 3 INHALER | Refills: 3 | Status: SHIPPED | OUTPATIENT
Start: 2020-02-12 | End: 2021-01-22

## 2020-03-02 ENCOUNTER — OFFICE VISIT (OUTPATIENT)
Dept: ENDOCRINOLOGY | Facility: CLINIC | Age: 70
End: 2020-03-02
Payer: MEDICARE

## 2020-03-02 ENCOUNTER — HOSPITAL ENCOUNTER (OUTPATIENT)
Dept: RADIOLOGY | Facility: CLINIC | Age: 70
Discharge: HOME | End: 2020-03-02
Attending: PHYSICIAN ASSISTANT
Payer: MEDICARE

## 2020-03-02 ENCOUNTER — TELEPHONE (OUTPATIENT)
Dept: ENDOCRINOLOGY | Facility: CLINIC | Age: 70
End: 2020-03-02

## 2020-03-02 ENCOUNTER — PATIENT OUTREACH (OUTPATIENT)
Dept: ADMINISTRATIVE | Facility: OTHER | Age: 70
End: 2020-03-02

## 2020-03-02 VITALS
TEMPERATURE: 98 F | BODY MASS INDEX: 19.55 KG/M2 | SYSTOLIC BLOOD PRESSURE: 100 MMHG | HEIGHT: 60 IN | DIASTOLIC BLOOD PRESSURE: 60 MMHG | WEIGHT: 99.56 LBS | HEART RATE: 67 BPM

## 2020-03-02 DIAGNOSIS — E04.2 MULTINODULAR GOITER: ICD-10-CM

## 2020-03-02 DIAGNOSIS — Z78.0 POSTMENOPAUSAL: ICD-10-CM

## 2020-03-02 DIAGNOSIS — J44.9 CHRONIC OBSTRUCTIVE PULMONARY DISEASE, UNSPECIFIED COPD TYPE: ICD-10-CM

## 2020-03-02 DIAGNOSIS — M85.80 OSTEOPENIA, UNSPECIFIED LOCATION: ICD-10-CM

## 2020-03-02 DIAGNOSIS — E05.90 HYPERTHYROIDISM: Primary | ICD-10-CM

## 2020-03-02 DIAGNOSIS — E78.5 HYPERLIPIDEMIA, UNSPECIFIED HYPERLIPIDEMIA TYPE: ICD-10-CM

## 2020-03-02 DIAGNOSIS — Z72.0 TOBACCO USE: ICD-10-CM

## 2020-03-02 DIAGNOSIS — I10 HYPERTENSION, UNSPECIFIED TYPE: ICD-10-CM

## 2020-03-02 PROCEDURE — 76536 US EXAM OF HEAD AND NECK: CPT | Mod: TC,PO

## 2020-03-02 PROCEDURE — 77080 DEXA BONE DENSITY SPINE HIP: ICD-10-PCS | Mod: 26,,, | Performed by: RADIOLOGY

## 2020-03-02 PROCEDURE — 99999 PR PBB SHADOW E&M-EST. PATIENT-LVL V: CPT | Mod: PBBFAC,,, | Performed by: PHYSICIAN ASSISTANT

## 2020-03-02 PROCEDURE — 99214 PR OFFICE/OUTPT VISIT, EST, LEVL IV, 30-39 MIN: ICD-10-PCS | Mod: S$PBB,,, | Performed by: PHYSICIAN ASSISTANT

## 2020-03-02 PROCEDURE — 76536 US SOFT TISSUE HEAD NECK THYROID: ICD-10-PCS | Mod: 26,,, | Performed by: RADIOLOGY

## 2020-03-02 PROCEDURE — 99214 OFFICE O/P EST MOD 30 MIN: CPT | Mod: S$PBB,,, | Performed by: PHYSICIAN ASSISTANT

## 2020-03-02 PROCEDURE — 99999 PR PBB SHADOW E&M-EST. PATIENT-LVL V: ICD-10-PCS | Mod: PBBFAC,,, | Performed by: PHYSICIAN ASSISTANT

## 2020-03-02 PROCEDURE — 77080 DXA BONE DENSITY AXIAL: CPT | Mod: 26,,, | Performed by: RADIOLOGY

## 2020-03-02 PROCEDURE — 77080 DXA BONE DENSITY AXIAL: CPT | Mod: TC,PO

## 2020-03-02 PROCEDURE — 99215 OFFICE O/P EST HI 40 MIN: CPT | Mod: PBBFAC,25,PO | Performed by: PHYSICIAN ASSISTANT

## 2020-03-02 PROCEDURE — 76536 US EXAM OF HEAD AND NECK: CPT | Mod: 26,,, | Performed by: RADIOLOGY

## 2020-03-02 NOTE — TELEPHONE ENCOUNTER
----- Message from Gisel Dobbins sent at 3/2/2020  3:36 PM CST -----  Type: Needs Medical Advice    Who Called:  Patient  Best Call Back Number: [phone]  Additional Information: Patient was seen today/stated could not read aftercare instructions due to light ink/has question concerning medication/please call back to advise.

## 2020-03-02 NOTE — PROGRESS NOTES
Subjective:      Patient ID: Anuja Cool is a 69 y.o. female.    Chief Complaint:  Nodular thyroid disease/hyperthyroidism    History of Present Illness    Anuja Cool is a 69 y.o. female here for a f/u care visit today on account of thyroid nodular disease and TSH suppression suggestive of possible hyperthyroidism.   Last thyroid USS was from 3/19 and thus her next ffup thyroid USS should be for ~ 03/20. She also has had an USS guided FNAB done that showed benign thyroid colloid lesion. No fhx of DM or thyroid disease. No hx of radiation. Her diet is low in seafood, kelp and soy. Her son has lung cancer.     No dysphagia. Occasional voice changes while coughing and sob.    + constipation, insomnia (trouble staying asleep), wt loss (14 lbs), muscle aches.     No hair loss, tremors, changes in nails, sweating or weight changes.     Her last DEXA was from 3/20 and showed osteopenia with a high fracture risk. Taking otc vitamin d. No fractures or falls. She had one steriod injection during a URI this year. No exercise.     Patient has long standing poor sleep. Patients sleep is fragmented mainly because of nocturia and frequency.  Grav 3 Para 3+0 (2 alive).     No tearing or grittiness. She does have bilataal cataracts L >> R for which she is pending cataract surgery.  She smokes 0.5 ppd now. She smoked for 50 years. She will drink a glass of wine 4x weekly. Patient drinks 2 cups of decaffienated coffee.    Review of Systems   ROS:   Constitutional:elderly female,  Difficulty sleeping, weight loss  Eyes: No recent visual changes, no SOB  Cardiovascular: Denies current anginal symptoms  Respiratory: + cough, sob (COPD)  Gastrointestinal: Denies recent bowel disturbances  GenitoUrinary - No dysuria  Skin: No new skin rash  Neurologic: No focal neurologic complaints  Endo: no polyphagia, polyuria or polydipsia  Remainder ROS negative     Objective:   /60 (BP Location: Left arm, Patient Position: Sitting,  BP Method: Medium (Manual))   Pulse 67   Temp 97.7 °F (36.5 °C) (Oral)   Ht 5' (1.524 m)   Wt 45.1 kg (99 lb 8.6 oz)   BMI 19.44 kg/m²  Body surface area is 1.38 meters squared.         Physical Exam   Constitutional: She is oriented to person, place, and time. Vital signs are normal. She appears well-developed and well-nourished. She does not appear ill. No distress.   Elderly female  Not pale, anicteric and afebrile. Well hydrated. Not in any acute distress.   HENT:   Head: Normocephalic and atraumatic. Not macrocephalic. Head is without right periorbital erythema and without left periorbital erythema. Hair is normal.   Nose: Nose normal.   Mouth/Throat: Oropharynx is clear and moist. Mucous membranes are not pale and not dry. No oropharyngeal exudate.   Eyes: Pupils are equal, round, and reactive to light. Conjunctivae, EOM and lids are normal. Right eye exhibits no discharge. Left eye exhibits no discharge. No scleral icterus.   No evidence of any opthalmopathy   Neck: Trachea normal, normal range of motion, full passive range of motion without pain and phonation normal. Neck supple. Normal carotid pulses and no JVD present. Carotid bruit is not present. No tracheal deviation present. No thyroid mass and no thyromegaly present.       No nuchal AN.    Cardiovascular: Normal rate, regular rhythm, S1 normal, S2 normal, intact distal pulses and normal pulses. PMI is not displaced. Exam reveals no gallop.   Murmur (soft 3/6 ESM maximal over cardiac base with no radiation to the neck) heard.  Pulmonary/Chest: Effort normal and breath sounds normal. No respiratory distress. She has no wheezes. She has no rales.   Abdominal: Soft. There is no hepatosplenomegaly, splenomegaly or hepatomegaly. There is no CVA tenderness. Hernia confirmed negative in the ventral area.   Musculoskeletal: She exhibits no edema or tenderness.        Right shoulder: She exhibits normal range of motion, no bony tenderness, no crepitus, no  deformity, no pain, no spasm, normal pulse and normal strength.   No pedal edema. Has no digital clubbing and no nail changes suggestive of thyroid acropachy. She has no pretibial myxedema but does have fine tremors of the outstretched hands.   Lymphadenopathy:     She has no cervical adenopathy.   Neurological: She is alert and oriented to person, place, and time. She has normal strength and normal reflexes. She displays no atrophy, no tremor and normal reflexes. No cranial nerve deficit or sensory deficit. She exhibits normal muscle tone. Coordination and gait normal.   Skin: Skin is warm, dry and intact. No bruising, no ecchymosis, no petechiae and no rash noted. She is not diaphoretic. No cyanosis or erythema. No pallor. Nails show no clubbing.   Age appropriate diffuse cutaneous atrophy. No ecchymoses seen.   Psychiatric: She has a normal mood and affect. Her speech is normal and behavior is normal. Judgment and thought content normal. Cognition and memory are normal.   Nursing note and vitals reviewed.    Lab Review:     Personally reviewed labs below:    No visits with results within 6 Month(s) from this visit.   Latest known visit with results is:   Lab Visit on 08/14/2019   Component Date Value Ref Range Status    Sodium 08/14/2019 137  136 - 145 mmol/L Final    Potassium 08/14/2019 4.7  3.5 - 5.1 mmol/L Final    Chloride 08/14/2019 102  95 - 110 mmol/L Final    CO2 08/14/2019 26  23 - 29 mmol/L Final    Glucose 08/14/2019 70  70 - 110 mg/dL Final    BUN, Bld 08/14/2019 6* 8 - 23 mg/dL Final    Creatinine 08/14/2019 0.6  0.5 - 1.4 mg/dL Final    Calcium 08/14/2019 9.2  8.7 - 10.5 mg/dL Final    Total Protein 08/14/2019 7.1  6.0 - 8.4 g/dL Final    Albumin 08/14/2019 3.8  3.5 - 5.2 g/dL Final    Total Bilirubin 08/14/2019 0.3  0.1 - 1.0 mg/dL Final    Comment: For infants and newborns, interpretation of results should be based  on gestational age, weight and in agreement with  clinical  observations.  Premature Infant recommended reference ranges:  Up to 24 hours.............<8.0 mg/dL  Up to 48 hours............<12.0 mg/dL  3-5 days..................<15.0 mg/dL  6-29 days.................<15.0 mg/dL      Alkaline Phosphatase 08/14/2019 97  55 - 135 U/L Final    AST 08/14/2019 19  10 - 40 U/L Final    ALT 08/14/2019 13  10 - 44 U/L Final    Anion Gap 08/14/2019 9  8 - 16 mmol/L Final    eGFR if African American 08/14/2019 >60.0  >60 mL/min/1.73 m^2 Final    eGFR if non African American 08/14/2019 >60.0  >60 mL/min/1.73 m^2 Final    Comment: Calculation used to obtain the estimated glomerular filtration  rate (eGFR) is the CKD-EPI equation.       Cholesterol 08/14/2019 221* 120 - 199 mg/dL Final    Comment: The National Cholesterol Education Program (NCEP) has set the  following guidelines (reference ranges) for Cholesterol:  Optimal.....................<200 mg/dL  Borderline High.............200-239 mg/dL  High........................> or = 240 mg/dL      Triglycerides 08/14/2019 185* 30 - 150 mg/dL Final    Comment: The National Cholesterol Education Program (NCEP) has set the  following guidelines (reference values) for triglycerides:  Normal......................<150 mg/dL  Borderline High.............150-199 mg/dL  High........................200-499 mg/dL      HDL 08/14/2019 52  40 - 75 mg/dL Final    Comment: The National Cholesterol Education Program (NCEP) has set the  following guidelines (reference values) for HDL Cholesterol:  Low...............<40 mg/dL  Optimal...........>60 mg/dL      LDL Cholesterol 08/14/2019 132.0  63.0 - 159.0 mg/dL Final    Comment: The National Cholesterol Education Program (NCEP) has set the  following guidelines (reference values) for LDL Cholesterol:  Optimal.......................<130 mg/dL  Borderline High...............130-159 mg/dL  High..........................160-189 mg/dL  Very High.....................>190 mg/dL       Hdl/Cholesterol Ratio 08/14/2019 23.5  20.0 - 50.0 % Final    Total Cholesterol/HDL Ratio 08/14/2019 4.3  2.0 - 5.0 Final    Non-HDL Cholesterol 08/14/2019 169  mg/dL Final    Comment: Risk category and Non-HDL cholesterol goals:  Coronary heart disease (CHD)or equivalent (10-year risk of CHD >20%):  Non-HDL cholesterol goal     <130 mg/dL  Two or more CHD risk factors and 10-year risk of CHD <= 20%:  Non-HDL cholesterol goal     <160 mg/dL  0 to 1 CHD risk factor:  Non-HDL cholesterol goal     <190 mg/dL        Assessment:     1. Hyperthyroidism  TSH    T4, free    T3    Thyrotropin-Binding Inhibitory Immunoglobulin (TBII)    Thyrotropin receptor antibody    Thyroid stimulating immunoglobulin    Comprehensive metabolic panel    CBC auto differential   2. Multinodular goiter  US Soft Tissue Head Neck Thyroid   3. Osteopenia, unspecified location     4. Tobacco use  Ambulatory referral/consult to Smoking Cessation Program   5. Postmenopausal  DXA Bone Density Spine And Hip   6. Hypertension, unspecified type     7. Chronic obstructive pulmonary disease, unspecified COPD type  Ambulatory referral/consult to Pulmonology   8. Hyperlipidemia, unspecified hyperlipidemia type  Lipid panel      Hyperthyroidism-continue atenolol. TFTs today. Check abs for Graves' Disease. Pt is losing weight. Discussed treatment options for hyperthyroidism including methimazole, MONTAÑO or an elective thyroidectomy. The pt elects to start methimazole 5 mg daily. Recheck TFTs in six weeks.  Nodular thyroid disease-repeat thyroid u/s 3/21  Osteopenia with a high fracture risk-continue calcium and vitamin D OTC supplements and will repeat DEXA ~ 3/22. Start alendronate 70 mg weekly.  Tobacco use-she is not ready to quit smoking  Hypertension-stable-continue meds  COPD-referral to pulmonology  LON-[id number]-[id number] statin    Additional labs approved by  to evaluate thyroid function.    F/u in 6 mths  Plan:   FFup in ~ 6 mths

## 2020-03-03 DIAGNOSIS — E05.90 HYPERTHYROIDISM: Primary | ICD-10-CM

## 2020-03-03 RX ORDER — METHIMAZOLE 5 MG/1
5 TABLET ORAL DAILY
Qty: 30 TABLET | Refills: 11 | Status: SHIPPED | OUTPATIENT
Start: 2020-03-03 | End: 2020-12-30 | Stop reason: SDUPTHER

## 2020-03-04 DIAGNOSIS — M85.80 OSTEOPENIA WITH HIGH RISK OF FRACTURE: Primary | ICD-10-CM

## 2020-03-04 RX ORDER — ALENDRONATE SODIUM 70 MG/1
TABLET ORAL
Qty: 12 TABLET | Refills: 3 | Status: SHIPPED | OUTPATIENT
Start: 2020-03-04 | End: 2021-03-05

## 2020-03-11 ENCOUNTER — PATIENT OUTREACH (OUTPATIENT)
Dept: ADMINISTRATIVE | Facility: OTHER | Age: 70
End: 2020-03-11

## 2020-03-16 ENCOUNTER — TELEPHONE (OUTPATIENT)
Dept: FAMILY MEDICINE | Facility: CLINIC | Age: 70
End: 2020-03-16

## 2020-03-16 NOTE — TELEPHONE ENCOUNTER
----- Message from Kamala Ortiz sent at 3/16/2020 11:42 AM CDT -----    Type: Needs Medical Advice    Who Called:  pt  Best Call Back Number: [phone]  Additional Information: pt  Asking   If  She  Should  Come  In tomorrow  For lab and is  It  nessasary

## 2020-03-16 NOTE — TELEPHONE ENCOUNTER
I spoke to the patient and canceled the lab appt since her labs done on 3/2/2020 already covered the labs she would have had drawn tomorrow 3/17/2020.

## 2020-05-04 DIAGNOSIS — E05.90 HYPERTHYROIDISM: ICD-10-CM

## 2020-05-04 DIAGNOSIS — I10 HYPERTENSION, UNSPECIFIED TYPE: ICD-10-CM

## 2020-05-04 DIAGNOSIS — I10 ESSENTIAL HYPERTENSION: ICD-10-CM

## 2020-05-04 RX ORDER — ATENOLOL 100 MG/1
TABLET ORAL
Qty: 90 TABLET | Refills: 0 | Status: SHIPPED | OUTPATIENT
Start: 2020-05-04 | End: 2020-05-05

## 2020-05-05 ENCOUNTER — PATIENT MESSAGE (OUTPATIENT)
Dept: ADMINISTRATIVE | Facility: HOSPITAL | Age: 70
End: 2020-05-05

## 2020-05-05 RX ORDER — ATENOLOL 100 MG/1
TABLET ORAL
Qty: 90 TABLET | Refills: 3 | Status: SHIPPED | OUTPATIENT
Start: 2020-05-05 | End: 2020-08-10

## 2020-06-16 ENCOUNTER — PATIENT OUTREACH (OUTPATIENT)
Dept: ADMINISTRATIVE | Facility: HOSPITAL | Age: 70
End: 2020-06-16

## 2020-06-25 ENCOUNTER — PATIENT OUTREACH (OUTPATIENT)
Dept: ADMINISTRATIVE | Facility: OTHER | Age: 70
End: 2020-06-25

## 2020-06-25 NOTE — PROGRESS NOTES
Requested updates within Care Everywhere.  Patient's chart was reviewed for overdue ZURI topics.  Immunizations reconciled.

## 2020-06-30 ENCOUNTER — OFFICE VISIT (OUTPATIENT)
Dept: ENDOCRINOLOGY | Facility: CLINIC | Age: 70
End: 2020-06-30
Payer: MEDICARE

## 2020-06-30 ENCOUNTER — HOSPITAL ENCOUNTER (OUTPATIENT)
Dept: RADIOLOGY | Facility: CLINIC | Age: 70
Discharge: HOME | End: 2020-06-30
Attending: FAMILY MEDICINE
Payer: MEDICARE

## 2020-06-30 VITALS
TEMPERATURE: 98 F | BODY MASS INDEX: 19.56 KG/M2 | DIASTOLIC BLOOD PRESSURE: 70 MMHG | WEIGHT: 99.63 LBS | SYSTOLIC BLOOD PRESSURE: 120 MMHG | HEART RATE: 70 BPM | HEIGHT: 60 IN

## 2020-06-30 DIAGNOSIS — E05.90 HYPERTHYROIDISM: Primary | ICD-10-CM

## 2020-06-30 DIAGNOSIS — E04.2 MULTINODULAR GOITER: ICD-10-CM

## 2020-06-30 DIAGNOSIS — M85.80 OSTEOPENIA, UNSPECIFIED LOCATION: ICD-10-CM

## 2020-06-30 DIAGNOSIS — Z12.31 ENCOUNTER FOR SCREENING MAMMOGRAM FOR MALIGNANT NEOPLASM OF BREAST: ICD-10-CM

## 2020-06-30 DIAGNOSIS — E61.1 IRON DEFICIENCY: ICD-10-CM

## 2020-06-30 DIAGNOSIS — E55.9 HYPOVITAMINOSIS D: ICD-10-CM

## 2020-06-30 DIAGNOSIS — E78.5 HYPERLIPIDEMIA, UNSPECIFIED HYPERLIPIDEMIA TYPE: ICD-10-CM

## 2020-06-30 DIAGNOSIS — Z72.0 TOBACCO USE: ICD-10-CM

## 2020-06-30 DIAGNOSIS — I10 HYPERTENSION, UNSPECIFIED TYPE: ICD-10-CM

## 2020-06-30 DIAGNOSIS — J44.9 CHRONIC OBSTRUCTIVE PULMONARY DISEASE, UNSPECIFIED COPD TYPE: ICD-10-CM

## 2020-06-30 PROCEDURE — 77063 MAMMO DIGITAL SCREENING BILAT WITH TOMOSYNTHESIS_CAD: ICD-10-PCS | Mod: 26,,, | Performed by: RADIOLOGY

## 2020-06-30 PROCEDURE — 99213 OFFICE O/P EST LOW 20 MIN: CPT | Mod: S$PBB,,, | Performed by: PHYSICIAN ASSISTANT

## 2020-06-30 PROCEDURE — 99213 PR OFFICE/OUTPT VISIT, EST, LEVL III, 20-29 MIN: ICD-10-PCS | Mod: S$PBB,,, | Performed by: PHYSICIAN ASSISTANT

## 2020-06-30 PROCEDURE — 77063 BREAST TOMOSYNTHESIS BI: CPT | Mod: 26,,, | Performed by: RADIOLOGY

## 2020-06-30 PROCEDURE — 77067 MAMMO DIGITAL SCREENING BILAT WITH TOMOSYNTHESIS_CAD: ICD-10-PCS | Mod: 26,,, | Performed by: RADIOLOGY

## 2020-06-30 PROCEDURE — 77067 SCR MAMMO BI INCL CAD: CPT | Mod: TC,PO

## 2020-06-30 PROCEDURE — 77067 SCR MAMMO BI INCL CAD: CPT | Mod: 26,,, | Performed by: RADIOLOGY

## 2020-06-30 PROCEDURE — 99215 OFFICE O/P EST HI 40 MIN: CPT | Mod: PBBFAC,PO | Performed by: PHYSICIAN ASSISTANT

## 2020-06-30 PROCEDURE — 99999 PR PBB SHADOW E&M-EST. PATIENT-LVL V: ICD-10-PCS | Mod: PBBFAC,,, | Performed by: PHYSICIAN ASSISTANT

## 2020-06-30 PROCEDURE — 99999 PR PBB SHADOW E&M-EST. PATIENT-LVL V: CPT | Mod: PBBFAC,,, | Performed by: PHYSICIAN ASSISTANT

## 2020-06-30 NOTE — PROGRESS NOTES
Subjective:      Patient ID: Anuja Cool is a 70 y.o. female.    Chief Complaint:  Nodular thyroid disease/hyperthyroidism    History of Present Illness    Anuja Cool is a 70 y.o. female here for a f/u care visit today on account of thyroid nodular disease and TSH suppression suggestive of possible hyperthyroidism.   Last thyroid USS was from 3/20 which was unchanged from the prior. She also has had an USS guided FNAB done that showed benign thyroid colloid lesion. No fhx of DM or thyroid disease. No hx of radiation. Her diet is low in seafood, kelp and soy. Her son has lung cancer. Taking 5 mg of methimazole daily and 100 mg of atenolol.    No dysphagia. Occasional voice changes while coughing and sob.    + constipation, insomnia (trouble staying asleep).     No hair loss, tremors, changes in nails, sweating, wt loss.     Her last DEXA was from 3/20 and showed osteopenia with a high fracture risk. Taking fosamax 70 mg weekly (3/20) and otc vitamin d (2000 IU). No fractures or falls. She had one steriod injection during a URI this year. No exercise.     Patient has long standing poor sleep. Patients sleep is fragmented mainly because of nocturia and frequency.  Grav 3 Para 3+0 (2 alive).     No tearing or grittiness. She does have bilataal cataracts L >> R for which she is pending cataract surgery.  She smokes 0.5 ppd now. She smoked for 50 years. She will drink a glass of wine 4x weekly. Patient drinks 2 cups of decaffienated coffee.    ROS:   Constitutional: energy is improved, Difficulty sleeping, weight loss  Eyes: No recent visual changes, no SOB  Cardiovascular: Denies current anginal symptoms  Respiratory: + cough, sob (COPD)  Gastrointestinal: Denies recent bowel disturbances  GenitoUrinary - No dysuria  Skin: No new skin rash  Neurologic: No focal neurologic complaints  Endo: no polyphagia, polyuria or polydipsia  Remainder ROS negative     Objective:   /70 (BP Location: Left arm, Patient  Position: Sitting, BP Method: Medium (Manual))   Pulse 70   Temp 98.2 °F (36.8 °C) (Oral)   Ht 5' (1.524 m)   Wt 45.2 kg (99 lb 10.4 oz)   BMI 19.46 kg/m²  Body surface area is 1.38 meters squared.         Physical Exam  Vitals signs and nursing note reviewed.   Constitutional:       General: She is not in acute distress.     Appearance: She is well-developed. She is not ill-appearing or diaphoretic.      Comments: Elderly female  Not pale, anicteric and afebrile. Well hydrated. Not in any acute distress.   HENT:      Head: Normocephalic and atraumatic. Not macrocephalic. No right periorbital erythema or left periorbital erythema. Hair is normal.      Nose: Nose normal.      Mouth/Throat:      Mouth: Mucous membranes are not pale and not dry.      Pharynx: No oropharyngeal exudate.   Eyes:      General: Lids are normal. No scleral icterus.        Right eye: No discharge.         Left eye: No discharge.      Conjunctiva/sclera: Conjunctivae normal.      Pupils: Pupils are equal, round, and reactive to light.      Comments: No evidence of any opthalmopathy   Neck:      Musculoskeletal: Full passive range of motion without pain, normal range of motion and neck supple.      Thyroid: No thyroid mass or thyromegaly.      Vascular: Normal carotid pulses. No carotid bruit or JVD.      Trachea: Trachea and phonation normal. No tracheal deviation.        Comments: No nuchal AN.   Cardiovascular:      Rate and Rhythm: Normal rate and regular rhythm.      Chest Wall: PMI is not displaced.      Pulses: Normal pulses.      Heart sounds: S1 normal and S2 normal. Murmur (soft 3/6 ESM maximal over cardiac base with no radiation to the neck) present. No gallop.    Pulmonary:      Effort: Pulmonary effort is normal. No respiratory distress.      Breath sounds: Normal breath sounds. No wheezing or rales.   Abdominal:      Palpations: Abdomen is soft. There is no hepatomegaly or splenomegaly.      Hernia: There is no hernia in the  ventral area.   Musculoskeletal:         General: No tenderness.      Right shoulder: She exhibits normal range of motion, no bony tenderness, no crepitus, no deformity, no pain, no spasm, normal pulse and normal strength.      Comments: No pedal edema. Has no digital clubbing and no nail changes suggestive of thyroid acropachy. She has no pretibial myxedema but does have fine tremors of the outstretched hands.   Lymphadenopathy:      Cervical: No cervical adenopathy.   Skin:     General: Skin is warm and dry.      Coloration: Skin is not pale.      Findings: No bruising, ecchymosis, erythema, petechiae or rash.      Nails: There is no clubbing.        Comments: Age appropriate diffuse cutaneous atrophy. No ecchymoses seen.   Neurological:      Mental Status: She is alert and oriented to person, place, and time.      Cranial Nerves: No cranial nerve deficit.      Sensory: No sensory deficit.      Motor: No tremor, atrophy or abnormal muscle tone.      Coordination: Coordination normal.      Gait: Gait normal.      Deep Tendon Reflexes: Reflexes are normal and symmetric. Reflexes normal.   Psychiatric:         Speech: Speech normal.         Behavior: Behavior normal.         Thought Content: Thought content normal.         Judgment: Judgment normal.       Lab Review:     Personally reviewed labs below:    Lab Visit on 03/02/2020   Component Date Value Ref Range Status    TSH 03/02/2020 <0.010* 0.400 - 4.000 uIU/mL Final    Free T4 03/02/2020 0.94  0.71 - 1.51 ng/dL Final    T3, Total 03/02/2020 112  60 - 180 ng/dL Final    Thyrotropin-Binding Inhibitory Imm* 03/02/2020 1.02  IU/L Final    Comment: Reference Range:  < OR = 2.00  This test was performed using the TRAb Antibody SIDNEY method which is  standardized against the 1st International Standard 90/672 and is  reported in International Units (IU/L). The reference range reported  was established specifically for this test method.  Test Performed at:  [facility]  [address] [place]  [address]     JEROME Anderson MD, PhD, STELLA      Thyrotropin Receptor Ab 03/02/2020 <1.00  0.00 - 1.75 IU/L Final    Comment: -------------------ADDITIONAL INFORMATION-------------------  At a decision limit of 1.75 IU/L, this assay   has 97% sensitivity and 99% specificity for   detection of Graves' disease. In healthy   individuals and in patients with thyroid disease   without diagnosis of Graves' disease, the upper   limit of anti-TSHR values are 1.22 IU/L and   1.58 IU/L, respectively (97.5th percentiles).  Test Performed by:  [facility]  [address] [place] [address]  : Chano Case M.D. Ph.D.; CLIA# [id number]      Thyroid-Stim IG Quantitative 03/02/2020 <0.10  <0.10 IU/L Final    Comment: Thyroid stimulating immunoglobulins (TSI) concentrations greater  than or equal to (>=) 0.55 IU/L have a clinical sensitivity of at  least 98.6%, and a clinical specificity of at least 98.5%, for the   differential diagnosis of Graves' Disease.  TSI concentrations for patients with other thyroid or autoimmune  diseases range from 0.11 to 0.39 IU/L.  Test performed at [facility],  [facility] [place], [place]  [id number] [phone]  Chano Correa MD  - Medical Director      Sodium 03/02/2020 141  136 - 145 mmol/L Final    Potassium 03/02/2020 3.6  3.5 - 5.1 mmol/L Final    Chloride 03/02/2020 105  95 - 110 mmol/L Final    CO2 03/02/2020 28  23 - 29 mmol/L Final    Glucose 03/02/2020 78  70 - 110 mg/dL Final    BUN, Bld 03/02/2020 11  8 - 23 mg/dL Final    Creatinine 03/02/2020 0.6  0.5 - 1.4 mg/dL Final    Calcium 03/02/2020 9.3  8.7 - 10.5 mg/dL Final    Total Protein 03/02/2020 6.8  6.0 - 8.4 g/dL Final    Albumin 03/02/2020 3.6  3.5 - 5.2 g/dL Final    Total Bilirubin 03/02/2020 0.3  0.1 - 1.0 mg/dL Final    Comment: For infants and newborns,  interpretation of results should be based  on gestational age, weight and in agreement with clinical  observations.  Premature Infant recommended reference ranges:  Up to 24 hours.............<8.0 mg/dL  Up to 48 hours............<12.0 mg/dL  3-5 days..................<15.0 mg/dL  6-29 days.................<15.0 mg/dL      Alkaline Phosphatase 03/02/2020 67  55 - 135 U/L Final    AST 03/02/2020 17  10 - 40 U/L Final    ALT 03/02/2020 9* 10 - 44 U/L Final    Anion Gap 03/02/2020 8  8 - 16 mmol/L Final    eGFR if African American 03/02/2020 >60.0  >60 mL/min/1.73 m^2 Final    eGFR if non African American 03/02/2020 >60.0  >60 mL/min/1.73 m^2 Final    Comment: Calculation used to obtain the estimated glomerular filtration  rate (eGFR) is the CKD-EPI equation.       WBC 03/02/2020 9.51  3.90 - 12.70 K/uL Final    RBC 03/02/2020 4.18  4.00 - 5.40 M/uL Final    Hemoglobin 03/02/2020 12.3  12.0 - 16.0 g/dL Final    Hematocrit 03/02/2020 41.0  37.0 - 48.5 % Final    Mean Corpuscular Volume 03/02/2020 98  82 - 98 fL Final    Mean Corpuscular Hemoglobin 03/02/2020 29.4  27.0 - 31.0 pg Final    Mean Corpuscular Hemoglobin Conc 03/02/2020 30.0* 32.0 - 36.0 g/dL Final    RDW 03/02/2020 14.2  11.5 - 14.5 % Final    Platelets 03/02/2020 342  150 - 350 K/uL Final    MPV 03/02/2020 10.9  9.2 - 12.9 fL Final    Immature Granulocytes 03/02/2020 0.1  0.0 - 0.5 % Final    Gran # (ANC) 03/02/2020 4.6  1.8 - 7.7 K/uL Final    Immature Grans (Abs) 03/02/2020 0.01  0.00 - 0.04 K/uL Final    Comment: Mild elevation in immature granulocytes is non specific and   can be seen in a variety of conditions including stress response,   acute inflammation, trauma and pregnancy. Correlation with other   laboratory and clinical findings is essential.      Lymph # 03/02/2020 3.5  1.0 - 4.8 K/uL Final    Mono # 03/02/2020 1.0  0.3 - 1.0 K/uL Final    Eos # 03/02/2020 0.4  0.0 - 0.5 K/uL Final    Baso # 03/02/2020 0.05  0.00 - 0.20  K/uL Final    nRBC 03/02/2020 0  0 /100 WBC Final    Gran% 03/02/2020 48.8  38.0 - 73.0 % Final    Lymph% 03/02/2020 36.6  18.0 - 48.0 % Final    Mono% 03/02/2020 10.2  4.0 - 15.0 % Final    Eosinophil% 03/02/2020 3.8  0.0 - 8.0 % Final    Basophil% 03/02/2020 0.5  0.0 - 1.9 % Final    Differential Method 03/02/2020 Automated   Final    Cholesterol 03/02/2020 132  120 - 199 mg/dL Final    Comment: The National Cholesterol Education Program (NCEP) has set the  following guidelines (reference ranges) for Cholesterol:  Optimal.....................<200 mg/dL  Borderline High.............200-239 mg/dL  High........................> or = 240 mg/dL      Triglycerides 03/02/2020 91  30 - 150 mg/dL Final    Comment: The National Cholesterol Education Program (NCEP) has set the  following guidelines (reference values) for triglycerides:  Normal......................<150 mg/dL  Borderline High.............150-199 mg/dL  High........................200-499 mg/dL      HDL 03/02/2020 44  40 - 75 mg/dL Final    Comment: The National Cholesterol Education Program (NCEP) has set the  following guidelines (reference values) for HDL Cholesterol:  Low...............<40 mg/dL  Optimal...........>60 mg/dL      LDL Cholesterol 03/02/2020 69.8  63.0 - 159.0 mg/dL Final    Comment: The National Cholesterol Education Program (NCEP) has set the  following guidelines (reference values) for LDL Cholesterol:  Optimal.......................<130 mg/dL  Borderline High...............130-159 mg/dL  High..........................160-189 mg/dL  Very High.....................>190 mg/dL      Hdl/Cholesterol Ratio 03/02/2020 33.3  20.0 - 50.0 % Final    Total Cholesterol/HDL Ratio 03/02/2020 3.0  2.0 - 5.0 Final    Non-HDL Cholesterol 03/02/2020 88  mg/dL Final    Comment: Risk category and Non-HDL cholesterol goals:  Coronary heart disease (CHD)or equivalent (10-year risk of CHD >20%):  Non-HDL cholesterol goal     <130 mg/dL  Two or more  CHD risk factors and 10-year risk of CHD <= 20%:  Non-HDL cholesterol goal     <160 mg/dL  0 to 1 CHD risk factor:  Non-HDL cholesterol goal     <190 mg/dL        Assessment:     1. Hyperthyroidism  CBC auto differential   2. Graves' disease     3. Multinodular goiter     4. Osteopenia, unspecified location     5. Tobacco use     6. Hypertension, unspecified type     7. Chronic obstructive pulmonary disease, unspecified COPD type     8. Hyperlipidemia, unspecified hyperlipidemia type     9. Iron deficiency  Iron and TIBC   10. Hypovitaminosis D  Vitamin D      Hyperthyroidism-continue atenolol. TFTs today.  TFTs today. Abs for Graves' Disease were negative. Discussed treatment options for hyperthyroidism including methimazole, MONTAÑO or an elective thyroidectomy. The pt elects to continue methimazole 5 mg daily.   Nodular thyroid disease-repeat thyroid u/s 3/21  Osteopenia with a high fracture risk-continue calcium and vitamin D OTC supplements and will repeat DEXA ~ 3/22. Continue alendronate 70 mg weekly.  Tobacco use-encouraged to quit smoking  Hypertension-stable-continue meds  COPD-referral to pulmonology  XKF-gpalne-nkugziic statin    F/u in 6 mths

## 2020-07-01 DIAGNOSIS — R92.8 ABNORMAL MAMMOGRAM OF RIGHT BREAST: Primary | ICD-10-CM

## 2020-07-09 ENCOUNTER — HOSPITAL ENCOUNTER (OUTPATIENT)
Dept: RADIOLOGY | Facility: HOSPITAL | Age: 70
Discharge: HOME | End: 2020-07-09
Attending: FAMILY MEDICINE
Payer: MEDICARE

## 2020-07-09 DIAGNOSIS — R92.8 ABNORMAL MAMMOGRAM OF RIGHT BREAST: ICD-10-CM

## 2020-07-09 PROCEDURE — 77061 BREAST TOMOSYNTHESIS UNI: CPT | Mod: 26,,, | Performed by: RADIOLOGY

## 2020-07-09 PROCEDURE — 77065 DX MAMMO INCL CAD UNI: CPT | Mod: TC

## 2020-07-09 PROCEDURE — 77061 MAMMO DIGITAL DIAGNOSTIC RIGHT WITH TOMOSYNTHESIS_CAD: ICD-10-PCS | Mod: 26,,, | Performed by: RADIOLOGY

## 2020-07-09 PROCEDURE — 77065 MAMMO DIGITAL DIAGNOSTIC RIGHT WITH TOMOSYNTHESIS_CAD: ICD-10-PCS | Mod: 26,,, | Performed by: RADIOLOGY

## 2020-07-09 PROCEDURE — 77065 DX MAMMO INCL CAD UNI: CPT | Mod: 26,,, | Performed by: RADIOLOGY

## 2020-07-10 DIAGNOSIS — I10 ESSENTIAL HYPERTENSION: ICD-10-CM

## 2020-07-10 RX ORDER — LISINOPRIL 30 MG/1
TABLET ORAL
Qty: 90 TABLET | Refills: 3 | Status: SHIPPED | OUTPATIENT
Start: 2020-07-10 | End: 2021-08-20

## 2020-08-10 DIAGNOSIS — I10 HYPERTENSION, UNSPECIFIED TYPE: ICD-10-CM

## 2020-08-10 DIAGNOSIS — E05.90 HYPERTHYROIDISM: ICD-10-CM

## 2020-08-10 DIAGNOSIS — I10 ESSENTIAL HYPERTENSION: ICD-10-CM

## 2020-08-10 RX ORDER — ATENOLOL 100 MG/1
TABLET ORAL
Qty: 90 TABLET | Refills: 0 | Status: SHIPPED | OUTPATIENT
Start: 2020-08-10 | End: 2022-09-12

## 2020-08-27 DIAGNOSIS — J44.9 CHRONIC OBSTRUCTIVE PULMONARY DISEASE, UNSPECIFIED COPD TYPE: ICD-10-CM

## 2020-08-27 RX ORDER — LEVALBUTEROL TARTRATE 45 UG/1
AEROSOL, METERED ORAL
Qty: 1 INHALER | Refills: 11 | Status: SHIPPED | OUTPATIENT
Start: 2020-08-27 | End: 2020-12-30 | Stop reason: SDUPTHER

## 2020-08-27 NOTE — TELEPHONE ENCOUNTER
----- Message from Wagner Haynes sent at 8/27/2020  8:30 AM CDT -----  Contact: pt  Type:  RX Refill Request    Who Called:  bpt  Refill or New Rx:  refill  RX Name and Strength:  XOPENEX HFA 45 mcg/actuation inhaler  How is the patient currently taking it? (ex. 1XDay):  as needed  Is this a 30 day or 90 day RX:  90  Preferred Pharmacy with phone number:      [id number] - [address] AT [facility] [address]  [address]  [place] [address]  Phone: [phone] Fax: [phone]    Local or Mail Order:    Ordering Provider:    Best Call Back Number:  [phone] (home)     Additional Information:

## 2020-09-29 ENCOUNTER — PATIENT MESSAGE (OUTPATIENT)
Dept: OTHER | Facility: OTHER | Age: 70
End: 2020-09-29

## 2020-10-02 NOTE — TELEPHONE ENCOUNTER
----- Message from Yvonne Carranza sent at 8/5/2019  1:44 PM CDT -----  Contact: Patient  Type:  RX Refill Request    Who Called: Patient  Refill or New Rx:  refill  RX Name and Strength:  budesonide-formoterol 160-4.5 mcg (SYMBICORT) 160-4.5 mcg/actuation HFAA  How is the patient currently taking it? (ex. 1XDay):  2x day  Is this a 30 day or 90 day RX:  30 day  Preferred Pharmacy with phone number:    [facility] [address]  [place], [place] - [address]  [address]  [facility] [address]  Phone: [phone] Fax: [phone]     Local or Mail Order:  local  Ordering Provider:  Dr. Louann Munoz Call Back Number:  [phone] (home)    Additional Information:  Patient is completely out and is not due until 8/16. Patient requesting 90 day supply       Mail-Order Delivery  Prescriptions Delivered to Your Door -- for Free  Get your one-time or monthly prescriptions mailed to your home automatically -- it’s easier than ever, and the delivery is free. We offer mail-order delivery to [place], [place], [place], [place], [place], [place] and [place].    Signing up for [facility]’s mail-order program is simple. Get started online today, or call us toll free at [phone]. You can also sign up at your preferred [facility] location.

## 2020-10-05 ENCOUNTER — PATIENT MESSAGE (OUTPATIENT)
Dept: ADMINISTRATIVE | Facility: HOSPITAL | Age: 70
End: 2020-10-05

## 2020-10-14 ENCOUNTER — IMMUNIZATION (OUTPATIENT)
Dept: PRIMARY CARE CLINIC | Facility: CLINIC | Age: 70
End: 2020-10-14
Payer: MEDICARE

## 2020-10-14 PROCEDURE — 90694 FLU VACCINE - QUADRIVALENT - ADJUVANTED: ICD-10-PCS | Mod: S$GLB,,, | Performed by: FAMILY MEDICINE

## 2020-10-14 PROCEDURE — G0008 ADMIN INFLUENZA VIRUS VAC: HCPCS | Mod: S$GLB,,, | Performed by: FAMILY MEDICINE

## 2020-10-14 PROCEDURE — 90694 VACC AIIV4 NO PRSRV 0.5ML IM: CPT | Mod: S$GLB,,, | Performed by: FAMILY MEDICINE

## 2020-10-14 PROCEDURE — G0008 FLU VACCINE - QUADRIVALENT - ADJUVANTED: ICD-10-PCS | Mod: S$GLB,,, | Performed by: FAMILY MEDICINE

## 2020-10-20 ENCOUNTER — TELEPHONE (OUTPATIENT)
Dept: FAMILY MEDICINE | Facility: CLINIC | Age: 70
End: 2020-10-20

## 2020-10-20 NOTE — TELEPHONE ENCOUNTER
Patient requesting Pneumonia Vaccine. Please advise if due. Thank you.           ----- Message from Val Brownlee sent at 10/19/2020  2:42 PM CDT -----  Contact: pt  Type: Needs Medical Advice    Who Called:  PT  Best Call Back Number: [phone]  Additional Information: Requesting a call back regarding schedule her pneumonia shot   Please Advise ---Thank you

## 2020-10-21 ENCOUNTER — TELEPHONE (OUTPATIENT)
Dept: FAMILY MEDICINE | Facility: CLINIC | Age: 70
End: 2020-10-21

## 2020-10-21 NOTE — TELEPHONE ENCOUNTER
----- Message from Allyssa Gasca sent at 10/21/2020  8:56 AM CDT -----  Contact: self  Patient is requesting an nurse appt to have her pneumonia shot.  Please call back at [phone] (home).  Thanks

## 2020-10-26 NOTE — TELEPHONE ENCOUNTER
Patient has had both pneumonia vaccines 13 and 23 after she turned 66. No further pneumonia vaccines needed

## 2020-12-11 ENCOUNTER — PATIENT MESSAGE (OUTPATIENT)
Dept: OTHER | Facility: OTHER | Age: 70
End: 2020-12-11

## 2020-12-18 ENCOUNTER — TELEPHONE (OUTPATIENT)
Dept: ENDOCRINOLOGY | Facility: CLINIC | Age: 70
End: 2020-12-18

## 2020-12-18 ENCOUNTER — LAB VISIT (OUTPATIENT)
Dept: LAB | Facility: HOSPITAL | Age: 70
End: 2020-12-18
Attending: PHYSICIAN ASSISTANT
Payer: MEDICARE

## 2020-12-18 DIAGNOSIS — E05.90 HYPERTHYROIDISM: Primary | ICD-10-CM

## 2020-12-18 DIAGNOSIS — E05.90 HYPERTHYROIDISM: ICD-10-CM

## 2020-12-18 DIAGNOSIS — M85.80 OSTEOPENIA, UNSPECIFIED LOCATION: ICD-10-CM

## 2020-12-18 DIAGNOSIS — E61.1 IRON DEFICIENCY: ICD-10-CM

## 2020-12-18 LAB
25(OH)D3+25(OH)D2 SERPL-MCNC: 36 NG/ML (ref 30–96)
BASOPHILS # BLD AUTO: 0.04 K/UL (ref 0–0.2)
BASOPHILS NFR BLD: 0.4 % (ref 0–1.9)
DIFFERENTIAL METHOD: ABNORMAL
EOSINOPHIL # BLD AUTO: 0.3 K/UL (ref 0–0.5)
EOSINOPHIL NFR BLD: 2.6 % (ref 0–8)
ERYTHROCYTE [DISTWIDTH] IN BLOOD BY AUTOMATED COUNT: 13.9 % (ref 11.5–14.5)
HCT VFR BLD AUTO: 39.6 % (ref 37–48.5)
HGB BLD-MCNC: 12.6 G/DL (ref 12–16)
IMM GRANULOCYTES # BLD AUTO: 0.02 K/UL (ref 0–0.04)
IMM GRANULOCYTES NFR BLD AUTO: 0.2 % (ref 0–0.5)
IRON SERPL-MCNC: 107 UG/DL (ref 30–160)
LYMPHOCYTES # BLD AUTO: 3 K/UL (ref 1–4.8)
LYMPHOCYTES NFR BLD: 31.3 % (ref 18–48)
MCH RBC QN AUTO: 30.5 PG (ref 27–31)
MCHC RBC AUTO-ENTMCNC: 31.8 G/DL (ref 32–36)
MCV RBC AUTO: 96 FL (ref 82–98)
MONOCYTES # BLD AUTO: 0.7 K/UL (ref 0.3–1)
MONOCYTES NFR BLD: 7.7 % (ref 4–15)
NEUTROPHILS # BLD AUTO: 5.5 K/UL (ref 1.8–7.7)
NEUTROPHILS NFR BLD: 57.8 % (ref 38–73)
NRBC BLD-RTO: 0 /100 WBC
PLATELET # BLD AUTO: 390 K/UL (ref 150–350)
PMV BLD AUTO: 9.9 FL (ref 9.2–12.9)
RBC # BLD AUTO: 4.13 M/UL (ref 4–5.4)
SATURATED IRON: 32 % (ref 20–50)
T3 SERPL-MCNC: 123 NG/DL (ref 60–180)
T4 FREE SERPL-MCNC: 0.98 NG/DL (ref 0.71–1.51)
TOTAL IRON BINDING CAPACITY: 336 UG/DL (ref 250–450)
TRANSFERRIN SERPL-MCNC: 227 MG/DL (ref 200–375)
TSH SERPL DL<=0.005 MIU/L-ACNC: 0.13 UIU/ML (ref 0.4–4)
WBC # BLD AUTO: 9.55 K/UL (ref 3.9–12.7)

## 2020-12-18 PROCEDURE — 36415 COLL VENOUS BLD VENIPUNCTURE: CPT | Mod: PO

## 2020-12-18 PROCEDURE — 84439 ASSAY OF FREE THYROXINE: CPT

## 2020-12-18 PROCEDURE — 83540 ASSAY OF IRON: CPT

## 2020-12-18 PROCEDURE — 84443 ASSAY THYROID STIM HORMONE: CPT

## 2020-12-18 PROCEDURE — 82306 VITAMIN D 25 HYDROXY: CPT

## 2020-12-18 PROCEDURE — 85025 COMPLETE CBC W/AUTO DIFF WBC: CPT

## 2020-12-18 PROCEDURE — 84480 ASSAY TRIIODOTHYRONINE (T3): CPT

## 2020-12-28 ENCOUNTER — PATIENT OUTREACH (OUTPATIENT)
Dept: ADMINISTRATIVE | Facility: OTHER | Age: 70
End: 2020-12-28

## 2020-12-28 NOTE — PROGRESS NOTES
Chart was reviewed for overdue Proactive [facility] Encounters (Z[facility])  topics  Updates were requested from care everywhere  Health Maintenance has been updated  LINKS immunization registry triggered

## 2020-12-30 ENCOUNTER — OFFICE VISIT (OUTPATIENT)
Dept: ENDOCRINOLOGY | Facility: CLINIC | Age: 70
End: 2020-12-30
Payer: MEDICARE

## 2020-12-30 VITALS
OXYGEN SATURATION: 96 % | DIASTOLIC BLOOD PRESSURE: 60 MMHG | BODY MASS INDEX: 19.83 KG/M2 | SYSTOLIC BLOOD PRESSURE: 130 MMHG | WEIGHT: 101 LBS | HEIGHT: 60 IN | TEMPERATURE: 98 F | HEART RATE: 95 BPM | RESPIRATION RATE: 14 BRPM

## 2020-12-30 DIAGNOSIS — E05.90 HYPERTHYROIDISM: Primary | ICD-10-CM

## 2020-12-30 DIAGNOSIS — E78.5 HYPERLIPIDEMIA, UNSPECIFIED HYPERLIPIDEMIA TYPE: ICD-10-CM

## 2020-12-30 DIAGNOSIS — E04.2 MULTINODULAR GOITER: ICD-10-CM

## 2020-12-30 DIAGNOSIS — I10 HYPERTENSION, UNSPECIFIED TYPE: ICD-10-CM

## 2020-12-30 DIAGNOSIS — J44.9 CHRONIC OBSTRUCTIVE PULMONARY DISEASE, UNSPECIFIED COPD TYPE: ICD-10-CM

## 2020-12-30 DIAGNOSIS — M85.80 OSTEOPENIA, UNSPECIFIED LOCATION: ICD-10-CM

## 2020-12-30 DIAGNOSIS — Z72.0 TOBACCO USE: ICD-10-CM

## 2020-12-30 PROCEDURE — 99999 PR PBB SHADOW E&M-EST. PATIENT-LVL IV: CPT | Mod: PBBFAC,,, | Performed by: PHYSICIAN ASSISTANT

## 2020-12-30 PROCEDURE — 99214 OFFICE O/P EST MOD 30 MIN: CPT | Mod: PBBFAC,PO | Performed by: PHYSICIAN ASSISTANT

## 2020-12-30 PROCEDURE — 99214 PR OFFICE/OUTPT VISIT, EST, LEVL IV, 30-39 MIN: ICD-10-PCS | Mod: S$PBB,,, | Performed by: PHYSICIAN ASSISTANT

## 2020-12-30 PROCEDURE — 99214 OFFICE O/P EST MOD 30 MIN: CPT | Mod: S$PBB,,, | Performed by: PHYSICIAN ASSISTANT

## 2020-12-30 PROCEDURE — 99999 PR PBB SHADOW E&M-EST. PATIENT-LVL IV: ICD-10-PCS | Mod: PBBFAC,,, | Performed by: PHYSICIAN ASSISTANT

## 2020-12-30 RX ORDER — LEVALBUTEROL TARTRATE 45 UG/1
AEROSOL, METERED ORAL
Qty: 3 INHALER | Refills: 1 | Status: SHIPPED | OUTPATIENT
Start: 2020-12-30 | End: 2021-03-24 | Stop reason: SDUPTHER

## 2020-12-30 RX ORDER — METHIMAZOLE 5 MG/1
TABLET ORAL
Qty: 180 TABLET | Refills: 3 | Status: SHIPPED | OUTPATIENT
Start: 2020-12-30 | End: 2022-03-10 | Stop reason: SDUPTHER

## 2020-12-30 NOTE — PATIENT INSTRUCTIONS
Take one tablet of Methimazole (5 mg) on the weekdays and 2 tablets (10 mg) on Saturday and Sunday. Recheck thyroid labs in 6 weeks.

## 2020-12-30 NOTE — PROGRESS NOTES
Subjective:      Patient ID: Anuja Cool is a 70 y.o. female.    Chief Complaint:  Nodular thyroid disease/hyperthyroidism    History of Present Illness    Anuja Cool is a 70 y.o. female here for a f/u care visit today on account of thyroid nodular disease and TSH suppression suggestive of possible hyperthyroidism.   Last thyroid USS was from 3/20 which was unchanged from the prior. She also has had an USS guided FNAB done that showed benign thyroid colloid lesion. No fhx of DM or thyroid disease. No hx of radiation. Her diet is low in seafood, kelp and soy. Her son has lung cancer. Taking 5 mg of methimazole daily and 100 mg of atenolol.    No dysphagia. Occasional voice changes while coughing and sob.    + constipation, insomnia (trouble staying asleep).     No hair loss, tremors, changes in nails, sweating, wt loss.     Her last DEXA was from 3/20 and showed osteopenia with a high fracture risk. Taking fosamax 70 mg weekly (3/20) and otc vitamin d (2000 IU). No fractures or falls. She had one steriod injection during a URI this year. No exercise.     Patient has long standing poor sleep. Patients sleep is fragmented mainly because of nocturia and frequency.  Grav 3 Para 3+0 (2 alive).     No tearing or grittiness. She does have bilataal cataracts L >> R for which she is pending cataract surgery.  She smokes 0.5 ppd now. She smoked for 50 years. She will drink a glass of wine 4x weekly. Patient drinks 2 cups of decaffienated coffee.    ROS:   Constitutional: energy is improved, Difficulty sleeping, weight loss  Eyes: No recent visual changes, no SOB  Cardiovascular: Denies current anginal symptoms  Respiratory: + cough, sob (COPD)  Gastrointestinal: Denies recent bowel disturbances  GenitoUrinary - No dysuria  Skin: No new skin rash  Neurologic: No focal neurologic complaints  Endo: no polyphagia, polyuria or polydipsia  Remainder ROS negative     Objective:   /60 (BP Location: Left arm, Patient  Position: Sitting, BP Method: Small (Manual))   Pulse 95   Temp 97.8 °F (36.6 °C) (Temporal)   Resp 14   Ht 5' (1.524 m)   Wt 45.8 kg (100 lb 15.5 oz)   SpO2 96%   BMI 19.72 kg/m²  There is no height or weight on file to calculate BSA.     Physical Exam  Vitals signs and nursing note reviewed.   Constitutional:       General: She is not in acute distress.     Appearance: She is well-developed. She is not ill-appearing or diaphoretic.      Comments: Elderly female  Not pale, anicteric and afebrile. Well hydrated. Not in any acute distress.   HENT:      Head: Normocephalic and atraumatic. Not macrocephalic. No right periorbital erythema or left periorbital erythema. Hair is normal.      Nose: Nose normal.      Mouth/Throat:      Mouth: Mucous membranes are not pale and not dry.      Pharynx: No oropharyngeal exudate.   Eyes:      General: Lids are normal. No scleral icterus.        Right eye: No discharge.         Left eye: No discharge.      Conjunctiva/sclera: Conjunctivae normal.      Pupils: Pupils are equal, round, and reactive to light.      Comments: No evidence of any opthalmopathy   Neck:      Musculoskeletal: Full passive range of motion without pain, normal range of motion and neck supple.      Thyroid: No thyroid mass or thyromegaly.      Vascular: Normal carotid pulses. No carotid bruit or JVD.      Trachea: Trachea and phonation normal. No tracheal deviation.        Comments: No nuchal AN.   Cardiovascular:      Rate and Rhythm: Normal rate and regular rhythm.      Chest Wall: PMI is not displaced.      Pulses: Normal pulses.      Heart sounds: S1 normal and S2 normal. Murmur (soft 3/6 ESM maximal over cardiac base with no radiation to the neck) present. No gallop.    Pulmonary:      Effort: Pulmonary effort is normal. No respiratory distress.      Breath sounds: Normal breath sounds. No wheezing or rales.   Abdominal:      Palpations: Abdomen is soft. There is no hepatomegaly or splenomegaly.       Hernia: There is no hernia in the ventral area.   Musculoskeletal:         General: No tenderness.      Right shoulder: She exhibits normal range of motion, no bony tenderness, no crepitus, no deformity, no pain, no spasm, normal pulse and normal strength.      Comments: No pedal edema. Has no digital clubbing and no nail changes suggestive of thyroid acropachy. She has no pretibial myxedema but does have fine tremors of the outstretched hands.   Lymphadenopathy:      Cervical: No cervical adenopathy.   Skin:     General: Skin is warm and dry.      Coloration: Skin is not pale.      Findings: No bruising, ecchymosis, erythema, petechiae or rash.      Nails: There is no clubbing.        Comments: Age appropriate diffuse cutaneous atrophy. No ecchymoses seen.   Neurological:      Mental Status: She is alert and oriented to person, place, and time.      Cranial Nerves: No cranial nerve deficit.      Sensory: No sensory deficit.      Motor: No tremor, atrophy or abnormal muscle tone.      Coordination: Coordination normal.      Gait: Gait normal.      Deep Tendon Reflexes: Reflexes are normal and symmetric. Reflexes normal.   Psychiatric:         Speech: Speech normal.         Behavior: Behavior normal.         Thought Content: Thought content normal.         Judgment: Judgment normal.     Lab Review:     Personally reviewed labs below:    Lab Visit on 12/18/2020   Component Date Value Ref Range Status    Free T4 12/18/2020 0.98  0.71 - 1.51 ng/dL Final    T3, Total 12/18/2020 123  60 - 180 ng/dL Final    TSH 12/18/2020 0.127* 0.400 - 4.000 uIU/mL Final    WBC 12/18/2020 9.55  3.90 - 12.70 K/uL Final    RBC 12/18/2020 4.13  4.00 - 5.40 M/uL Final    Hemoglobin 12/18/2020 12.6  12.0 - 16.0 g/dL Final    Hematocrit 12/18/2020 39.6  37.0 - 48.5 % Final    MCV 12/18/2020 96  82 - 98 fL Final    MCH 12/18/2020 30.5  27.0 - 31.0 pg Final    MCHC 12/18/2020 31.8* 32.0 - 36.0 g/dL Final    RDW 12/18/2020 13.9  11.5  - 14.5 % Final    Platelets 12/18/2020 390* 150 - 350 K/uL Final    MPV 12/18/2020 9.9  9.2 - 12.9 fL Final    Immature Granulocytes 12/18/2020 0.2  0.0 - 0.5 % Final    Gran # (ANC) 12/18/2020 5.5  1.8 - 7.7 K/uL Final    Immature Grans (Abs) 12/18/2020 0.02  0.00 - 0.04 K/uL Final    Comment: Mild elevation in immature granulocytes is non specific and   can be seen in a variety of conditions including stress response,   acute inflammation, trauma and pregnancy. Correlation with other   laboratory and clinical findings is essential.      Lymph # 12/18/2020 3.0  1.0 - 4.8 K/uL Final    Mono # 12/18/2020 0.7  0.3 - 1.0 K/uL Final    Eos # 12/18/2020 0.3  0.0 - 0.5 K/uL Final    Baso # 12/18/2020 0.04  0.00 - 0.20 K/uL Final    nRBC 12/18/2020 0  0 /100 WBC Final    Gran % 12/18/2020 57.8  38.0 - 73.0 % Final    Lymph % 12/18/2020 31.3  18.0 - 48.0 % Final    Mono % 12/18/2020 7.7  4.0 - 15.0 % Final    Eosinophil % 12/18/2020 2.6  0.0 - 8.0 % Final    Basophil % 12/18/2020 0.4  0.0 - 1.9 % Final    Differential Method 12/18/2020 Automated   Final    Iron 12/18/2020 107  30 - 160 ug/dL Final    Transferrin 12/18/2020 227  200 - 375 mg/dL Final    TIBC 12/18/2020 336  250 - 450 ug/dL Final    Saturated Iron 12/18/2020 32  20 - 50 % Final    Vit D, 25-Hydroxy 12/18/2020 36  30 - 96 ng/mL Final    Comment: Vitamin D deficiency.........<10 ng/mL                              Vitamin D insufficiency......10-29 ng/mL       Vitamin D sufficiency........> or equal to 30 ng/mL  Vitamin D toxicity............>100 ng/mL        1. Hyperthyroidism  methIMAzole (TAPAZOLE) 5 MG Tab    T4, Free    TSH    T4, Free    TSH    CBC Auto Differential   2. Osteopenia, unspecified location     3. Tobacco use     4. Hypertension, unspecified type     5. Multinodular goiter  US Soft Tissue Head Neck Thyroid   6. Chronic obstructive pulmonary disease, unspecified COPD type  XOPENEX HFA 45 mcg/actuation inhaler   7.  Hyperlipidemia, unspecified hyperlipidemia type  Lipid Panel    Comprehensive Metabolic Panel      Hyperthyroidism-continue atenolol. TSH is supressed.  Increase methimazole to 5 mg on the weekdays and 10 mg on the weekends. Abs for Graves' Disease were negative. Discussed treatment options for hyperthyroidism including methimazole, [place] or an elective thyroidectomy. The pt elects to continue methimazole.    Nodular thyroid disease-repeat thyroid u/s 3/21.  Osteopenia with a high fracture risk-continue calcium and vitamin D OTC supplements and will repeat DEXA ~ 3/22. Continue alendronate 70 mg weekly.  Tobacco use-encouraged to quit smoking  Hypertension-stable-continue meds  COPD-referral to pulmonology. Continue Xopenex.   [id number] statin-recheck    F/u in 6 mths

## 2021-01-04 ENCOUNTER — PATIENT MESSAGE (OUTPATIENT)
Dept: ADMINISTRATIVE | Facility: HOSPITAL | Age: 71
End: 2021-01-04

## 2021-01-22 DIAGNOSIS — J44.89 COPD WITH ASTHMA: ICD-10-CM

## 2021-01-22 RX ORDER — BUDESONIDE AND FORMOTEROL FUMARATE DIHYDRATE 160; 4.5 UG/1; UG/1
2 AEROSOL RESPIRATORY (INHALATION) EVERY 12 HOURS
Qty: 3 INHALER | Refills: 0 | Status: SHIPPED | OUTPATIENT
Start: 2021-01-22 | End: 2021-07-13

## 2021-02-09 ENCOUNTER — LAB VISIT (OUTPATIENT)
Dept: LAB | Facility: HOSPITAL | Age: 71
End: 2021-02-09
Attending: FAMILY MEDICINE
Payer: MEDICARE

## 2021-02-09 DIAGNOSIS — E05.90 HYPERTHYROIDISM: ICD-10-CM

## 2021-02-09 LAB
T4 FREE SERPL-MCNC: 0.91 NG/DL (ref 0.71–1.51)
TSH SERPL DL<=0.005 MIU/L-ACNC: 0.61 UIU/ML (ref 0.4–4)

## 2021-02-09 PROCEDURE — 36415 COLL VENOUS BLD VENIPUNCTURE: CPT | Mod: PO

## 2021-02-09 PROCEDURE — 84439 ASSAY OF FREE THYROXINE: CPT

## 2021-02-09 PROCEDURE — 84443 ASSAY THYROID STIM HORMONE: CPT

## 2021-03-11 ENCOUNTER — IMMUNIZATION (OUTPATIENT)
Dept: PRIMARY CARE CLINIC | Facility: CLINIC | Age: 71
End: 2021-03-11
Payer: MEDICARE

## 2021-03-11 DIAGNOSIS — Z23 NEED FOR VACCINATION: Primary | ICD-10-CM

## 2021-03-11 PROCEDURE — 0001A COVID-19, MRNA, LNP-S, PF, 30 MCG/0.3 ML DOSE VACCINE: ICD-10-PCS | Mod: CV19,S$GLB,, | Performed by: FAMILY MEDICINE

## 2021-03-11 PROCEDURE — 0001A COVID-19, MRNA, LNP-S, PF, 30 MCG/0.3 ML DOSE VACCINE: CPT | Mod: CV19,S$GLB,, | Performed by: FAMILY MEDICINE

## 2021-03-11 PROCEDURE — 91300 COVID-19, MRNA, LNP-S, PF, 30 MCG/0.3 ML DOSE VACCINE: CPT | Mod: S$GLB,,, | Performed by: FAMILY MEDICINE

## 2021-03-11 PROCEDURE — 91300 COVID-19, MRNA, LNP-S, PF, 30 MCG/0.3 ML DOSE VACCINE: ICD-10-PCS | Mod: S$GLB,,, | Performed by: FAMILY MEDICINE

## 2021-03-24 ENCOUNTER — TELEPHONE (OUTPATIENT)
Dept: ENDOCRINOLOGY | Facility: CLINIC | Age: 71
End: 2021-03-24

## 2021-03-24 DIAGNOSIS — J44.9 CHRONIC OBSTRUCTIVE PULMONARY DISEASE, UNSPECIFIED COPD TYPE: ICD-10-CM

## 2021-03-24 RX ORDER — LEVALBUTEROL TARTRATE 45 UG/1
AEROSOL, METERED ORAL
Qty: 45 G | Refills: 1 | Status: SHIPPED | OUTPATIENT
Start: 2021-03-24 | End: 2021-08-05 | Stop reason: SDUPTHER

## 2021-04-01 ENCOUNTER — IMMUNIZATION (OUTPATIENT)
Dept: PRIMARY CARE CLINIC | Facility: CLINIC | Age: 71
End: 2021-04-01
Payer: MEDICARE

## 2021-04-01 DIAGNOSIS — Z23 NEED FOR VACCINATION: Primary | ICD-10-CM

## 2021-04-01 PROCEDURE — 0002A COVID-19, MRNA, LNP-S, PF, 30 MCG/0.3 ML DOSE VACCINE: ICD-10-PCS | Mod: CV19,S$GLB,, | Performed by: FAMILY MEDICINE

## 2021-04-01 PROCEDURE — 91300 COVID-19, MRNA, LNP-S, PF, 30 MCG/0.3 ML DOSE VACCINE: ICD-10-PCS | Mod: S$GLB,,, | Performed by: FAMILY MEDICINE

## 2021-04-01 PROCEDURE — 0002A COVID-19, MRNA, LNP-S, PF, 30 MCG/0.3 ML DOSE VACCINE: CPT | Mod: CV19,S$GLB,, | Performed by: FAMILY MEDICINE

## 2021-04-01 PROCEDURE — 91300 COVID-19, MRNA, LNP-S, PF, 30 MCG/0.3 ML DOSE VACCINE: CPT | Mod: S$GLB,,, | Performed by: FAMILY MEDICINE

## 2021-04-08 ENCOUNTER — PATIENT OUTREACH (OUTPATIENT)
Dept: ADMINISTRATIVE | Facility: HOSPITAL | Age: 71
End: 2021-04-08

## 2021-04-22 ENCOUNTER — OFFICE VISIT (OUTPATIENT)
Dept: FAMILY MEDICINE | Facility: CLINIC | Age: 71
End: 2021-04-22
Payer: MEDICARE

## 2021-04-22 VITALS
SYSTOLIC BLOOD PRESSURE: 121 MMHG | TEMPERATURE: 98 F | BODY MASS INDEX: 19.56 KG/M2 | HEART RATE: 87 BPM | WEIGHT: 99.63 LBS | OXYGEN SATURATION: 95 % | RESPIRATION RATE: 14 BRPM | DIASTOLIC BLOOD PRESSURE: 73 MMHG | HEIGHT: 60 IN

## 2021-04-22 DIAGNOSIS — J44.9 CHRONIC OBSTRUCTIVE PULMONARY DISEASE, UNSPECIFIED COPD TYPE: ICD-10-CM

## 2021-04-22 DIAGNOSIS — E05.90 HYPERTHYROIDISM: ICD-10-CM

## 2021-04-22 DIAGNOSIS — E04.2 MULTINODULAR GOITER: ICD-10-CM

## 2021-04-22 DIAGNOSIS — I10 ESSENTIAL HYPERTENSION: Primary | ICD-10-CM

## 2021-04-22 DIAGNOSIS — Z12.31 ENCOUNTER FOR SCREENING MAMMOGRAM FOR MALIGNANT NEOPLASM OF BREAST: ICD-10-CM

## 2021-04-22 DIAGNOSIS — M85.80 OSTEOPENIA, UNSPECIFIED LOCATION: ICD-10-CM

## 2021-04-22 DIAGNOSIS — I25.10 ASCVD (ARTERIOSCLEROTIC CARDIOVASCULAR DISEASE): ICD-10-CM

## 2021-04-22 DIAGNOSIS — E78.5 HYPERLIPIDEMIA LDL GOAL <130: ICD-10-CM

## 2021-04-22 PROCEDURE — 99999 PR PBB SHADOW E&M-EST. PATIENT-LVL III: CPT | Mod: PBBFAC,,, | Performed by: FAMILY MEDICINE

## 2021-04-22 PROCEDURE — 99214 OFFICE O/P EST MOD 30 MIN: CPT | Mod: S$PBB,,, | Performed by: FAMILY MEDICINE

## 2021-04-22 PROCEDURE — 99214 PR OFFICE/OUTPT VISIT, EST, LEVL IV, 30-39 MIN: ICD-10-PCS | Mod: S$PBB,,, | Performed by: FAMILY MEDICINE

## 2021-04-22 PROCEDURE — 99999 PR PBB SHADOW E&M-EST. PATIENT-LVL III: ICD-10-PCS | Mod: PBBFAC,,, | Performed by: FAMILY MEDICINE

## 2021-04-22 PROCEDURE — 99213 OFFICE O/P EST LOW 20 MIN: CPT | Mod: PBBFAC,PO | Performed by: FAMILY MEDICINE

## 2021-06-23 ENCOUNTER — HOSPITAL ENCOUNTER (OUTPATIENT)
Dept: RADIOLOGY | Facility: HOSPITAL | Age: 71
Discharge: HOME | End: 2021-06-23
Attending: PHYSICIAN ASSISTANT
Payer: MEDICARE

## 2021-06-23 DIAGNOSIS — E04.2 MULTINODULAR GOITER: ICD-10-CM

## 2021-06-23 PROCEDURE — 76536 US SOFT TISSUE HEAD NECK THYROID: ICD-10-PCS | Mod: 26,,, | Performed by: RADIOLOGY

## 2021-06-23 PROCEDURE — 76536 US EXAM OF HEAD AND NECK: CPT | Mod: 26,,, | Performed by: RADIOLOGY

## 2021-06-23 PROCEDURE — 76536 US EXAM OF HEAD AND NECK: CPT | Mod: TC

## 2021-07-01 ENCOUNTER — PATIENT MESSAGE (OUTPATIENT)
Dept: ADMINISTRATIVE | Facility: OTHER | Age: 71
End: 2021-07-01

## 2021-07-13 DIAGNOSIS — J44.89 COPD WITH ASTHMA: ICD-10-CM

## 2021-07-14 ENCOUNTER — HOSPITAL ENCOUNTER (OUTPATIENT)
Dept: RADIOLOGY | Facility: CLINIC | Age: 71
Discharge: HOME | End: 2021-07-14
Attending: FAMILY MEDICINE
Payer: MEDICARE

## 2021-07-14 DIAGNOSIS — Z12.31 ENCOUNTER FOR SCREENING MAMMOGRAM FOR MALIGNANT NEOPLASM OF BREAST: ICD-10-CM

## 2021-07-14 PROCEDURE — 77067 SCR MAMMO BI INCL CAD: CPT | Mod: TC,PO

## 2021-07-14 PROCEDURE — 77067 MAMMO DIGITAL SCREENING BILAT WITH TOMO: ICD-10-PCS | Mod: 26,,, | Performed by: RADIOLOGY

## 2021-07-14 PROCEDURE — 77067 SCR MAMMO BI INCL CAD: CPT | Mod: 26,,, | Performed by: RADIOLOGY

## 2021-07-14 PROCEDURE — 77063 MAMMO DIGITAL SCREENING BILAT WITH TOMO: ICD-10-PCS | Mod: 26,,, | Performed by: RADIOLOGY

## 2021-07-14 PROCEDURE — 77063 BREAST TOMOSYNTHESIS BI: CPT | Mod: 26,,, | Performed by: RADIOLOGY

## 2021-07-16 RX ORDER — BUDESONIDE AND FORMOTEROL FUMARATE DIHYDRATE 160; 4.5 UG/1; UG/1
2 AEROSOL RESPIRATORY (INHALATION) EVERY 12 HOURS
Qty: 3 INHALER | Refills: 3 | Status: SHIPPED | OUTPATIENT
Start: 2021-07-16 | End: 2022-02-10

## 2021-08-05 DIAGNOSIS — J44.9 CHRONIC OBSTRUCTIVE PULMONARY DISEASE, UNSPECIFIED COPD TYPE: ICD-10-CM

## 2021-08-05 RX ORDER — LEVALBUTEROL TARTRATE 45 UG/1
AEROSOL, METERED ORAL
Qty: 45 G | Refills: 1 | Status: SHIPPED | OUTPATIENT
Start: 2021-08-05 | End: 2021-12-16 | Stop reason: SDUPTHER

## 2021-08-13 ENCOUNTER — OFFICE VISIT (OUTPATIENT)
Dept: FAMILY MEDICINE | Facility: CLINIC | Age: 71
End: 2021-08-13
Payer: MEDICARE

## 2021-08-13 VITALS
DIASTOLIC BLOOD PRESSURE: 64 MMHG | BODY MASS INDEX: 19.91 KG/M2 | WEIGHT: 101.44 LBS | HEART RATE: 88 BPM | HEIGHT: 60 IN | SYSTOLIC BLOOD PRESSURE: 130 MMHG | TEMPERATURE: 98 F | OXYGEN SATURATION: 97 %

## 2021-08-13 DIAGNOSIS — R09.81 SINUS CONGESTION: Primary | ICD-10-CM

## 2021-08-13 DIAGNOSIS — F17.200 SMOKER: ICD-10-CM

## 2021-08-13 PROCEDURE — 99999 PR PBB SHADOW E&M-EST. PATIENT-LVL III: CPT | Mod: PBBFAC,,, | Performed by: FAMILY MEDICINE

## 2021-08-13 PROCEDURE — 99214 OFFICE O/P EST MOD 30 MIN: CPT | Mod: S$PBB,,, | Performed by: FAMILY MEDICINE

## 2021-08-13 PROCEDURE — 99999 PR PBB SHADOW E&M-EST. PATIENT-LVL III: ICD-10-PCS | Mod: PBBFAC,,, | Performed by: FAMILY MEDICINE

## 2021-08-13 PROCEDURE — 99214 PR OFFICE/OUTPT VISIT, EST, LEVL IV, 30-39 MIN: ICD-10-PCS | Mod: S$PBB,,, | Performed by: FAMILY MEDICINE

## 2021-08-13 PROCEDURE — 99213 OFFICE O/P EST LOW 20 MIN: CPT | Mod: PBBFAC,PO | Performed by: FAMILY MEDICINE

## 2021-08-13 RX ORDER — LEVOCETIRIZINE DIHYDROCHLORIDE 5 MG/1
5 TABLET, FILM COATED ORAL NIGHTLY
Qty: 30 TABLET | Refills: 2 | Status: SHIPPED | OUTPATIENT
Start: 2021-08-13 | End: 2022-08-13

## 2021-08-13 RX ORDER — FLUTICASONE PROPIONATE 50 MCG
1 SPRAY, SUSPENSION (ML) NASAL DAILY
Qty: 18.2 ML | Refills: 1 | Status: SHIPPED | OUTPATIENT
Start: 2021-08-13 | End: 2022-02-10

## 2021-08-16 ENCOUNTER — TELEPHONE (OUTPATIENT)
Dept: FAMILY MEDICINE | Facility: CLINIC | Age: 71
End: 2021-08-16
Payer: MEDICARE

## 2021-08-16 DIAGNOSIS — H93.8X9 EAR CONGESTION, UNSPECIFIED LATERALITY: Primary | ICD-10-CM

## 2021-09-13 ENCOUNTER — TELEPHONE (OUTPATIENT)
Dept: FAMILY MEDICINE | Facility: CLINIC | Age: 71
End: 2021-09-13

## 2021-09-15 ENCOUNTER — TELEPHONE (OUTPATIENT)
Dept: ENDOCRINOLOGY | Facility: CLINIC | Age: 71
End: 2021-09-15

## 2021-09-15 DIAGNOSIS — E04.2 MULTIPLE THYROID NODULES: Primary | ICD-10-CM

## 2021-09-17 ENCOUNTER — HOSPITAL ENCOUNTER (OUTPATIENT)
Dept: RADIOLOGY | Facility: HOSPITAL | Age: 71
Discharge: HOME | End: 2021-09-17
Attending: INTERNAL MEDICINE
Payer: MEDICARE

## 2021-09-17 DIAGNOSIS — E04.2 MULTIPLE THYROID NODULES: ICD-10-CM

## 2021-09-17 PROCEDURE — 88173 CYTOPATH EVAL FNA REPORT: CPT | Mod: 26,,, | Performed by: PATHOLOGY

## 2021-09-17 PROCEDURE — 10005 FNA BX W/US GDN 1ST LES: CPT | Mod: ,,, | Performed by: RADIOLOGY

## 2021-09-17 PROCEDURE — 88173 PR  INTERPRETATION OF FNA SMEAR: ICD-10-PCS | Mod: 26,,, | Performed by: PATHOLOGY

## 2021-09-17 PROCEDURE — 10005 FNA BX W/US GDN 1ST LES: CPT

## 2021-09-17 PROCEDURE — 10005 US FINE NEEDLE ASPIRATION THYROID, FIRST LESION: ICD-10-PCS | Mod: ,,, | Performed by: RADIOLOGY

## 2021-09-17 PROCEDURE — 88173 CYTOPATH EVAL FNA REPORT: CPT | Performed by: PATHOLOGY

## 2021-09-21 LAB
FINAL PATHOLOGIC DIAGNOSIS: NORMAL
Lab: NORMAL

## 2021-09-24 ENCOUNTER — TELEPHONE (OUTPATIENT)
Dept: ENDOCRINOLOGY | Facility: CLINIC | Age: 71
End: 2021-09-24

## 2021-10-04 ENCOUNTER — LAB VISIT (OUTPATIENT)
Dept: URGENT CARE | Facility: CLINIC | Age: 71
End: 2021-10-04
Payer: MEDICARE

## 2021-10-04 DIAGNOSIS — Z20.822 CLOSE EXPOSURE TO COVID-19 VIRUS: ICD-10-CM

## 2021-10-04 DIAGNOSIS — Z20.822 ENCOUNTER FOR LABORATORY TESTING FOR COVID-19 VIRUS: ICD-10-CM

## 2021-10-04 PROCEDURE — U0003 INFECTIOUS AGENT DETECTION BY NUCLEIC ACID (DNA OR RNA); SEVERE ACUTE RESPIRATORY SYNDROME CORONAVIRUS 2 (SARS-COV-2) (CORONAVIRUS DISEASE [COVID-19]), AMPLIFIED PROBE TECHNIQUE, MAKING USE OF HIGH THROUGHPUT TECHNOLOGIES AS DESCRIBED BY CMS-2020-01-R: HCPCS | Performed by: EMERGENCY MEDICINE

## 2021-10-04 PROCEDURE — U0005 INFEC AGEN DETEC AMPLI PROBE: HCPCS | Performed by: EMERGENCY MEDICINE

## 2021-10-05 LAB
SARS-COV-2 RNA RESP QL NAA+PROBE: NOT DETECTED
SARS-COV-2- CYCLE NUMBER: NORMAL

## 2021-12-14 DIAGNOSIS — J44.9 CHRONIC OBSTRUCTIVE PULMONARY DISEASE, UNSPECIFIED COPD TYPE: ICD-10-CM

## 2021-12-14 RX ORDER — LEVALBUTEROL TARTRATE 45 UG/1
AEROSOL, METERED ORAL
Qty: 45 G | Refills: 1 | OUTPATIENT
Start: 2021-12-14

## 2021-12-16 DIAGNOSIS — J44.9 CHRONIC OBSTRUCTIVE PULMONARY DISEASE, UNSPECIFIED COPD TYPE: ICD-10-CM

## 2021-12-16 RX ORDER — LEVALBUTEROL TARTRATE 45 UG/1
AEROSOL, METERED ORAL
Qty: 45 G | Refills: 1 | OUTPATIENT
Start: 2021-12-16

## 2021-12-17 DIAGNOSIS — J44.9 CHRONIC OBSTRUCTIVE PULMONARY DISEASE, UNSPECIFIED COPD TYPE: ICD-10-CM

## 2021-12-17 RX ORDER — LEVALBUTEROL TARTRATE 45 UG/1
AEROSOL, METERED ORAL
Qty: 45 G | Refills: 1 | Status: SHIPPED | OUTPATIENT
Start: 2021-12-17 | End: 2022-01-01 | Stop reason: SDUPTHER

## 2021-12-17 RX ORDER — LEVALBUTEROL TARTRATE 45 UG/1
AEROSOL, METERED ORAL
Qty: 45 G | Refills: 1 | Status: SHIPPED | OUTPATIENT
Start: 2021-12-17 | End: 2021-12-17 | Stop reason: SDUPTHER

## 2022-01-01 ENCOUNTER — HOSPITAL ENCOUNTER (OUTPATIENT)
Dept: RADIOLOGY | Facility: HOSPITAL | Age: 72
Discharge: HOME | End: 2022-10-17
Attending: NURSE PRACTITIONER
Payer: MEDICARE

## 2022-01-01 ENCOUNTER — INFUSION (OUTPATIENT)
Dept: INFUSION THERAPY | Facility: HOSPITAL | Age: 72
End: 2022-01-01
Attending: INTERNAL MEDICINE
Payer: MEDICARE

## 2022-01-01 ENCOUNTER — HOSPITAL ENCOUNTER (OUTPATIENT)
Dept: RADIOLOGY | Facility: HOSPITAL | Age: 72
Discharge: HOME | End: 2022-11-18
Attending: INTERNAL MEDICINE
Payer: MEDICARE

## 2022-01-01 ENCOUNTER — HOSPITAL ENCOUNTER (OUTPATIENT)
Dept: RADIOLOGY | Facility: HOSPITAL | Age: 72
Discharge: HOME | End: 2022-11-07
Attending: NURSE PRACTITIONER
Payer: MEDICARE

## 2022-01-01 ENCOUNTER — OFFICE VISIT (OUTPATIENT)
Dept: ORTHOPEDICS | Facility: CLINIC | Age: 72
End: 2022-01-01
Payer: MEDICARE

## 2022-01-01 ENCOUNTER — OFFICE VISIT (OUTPATIENT)
Dept: PULMONOLOGY | Facility: CLINIC | Age: 72
End: 2022-01-01
Payer: MEDICARE

## 2022-01-01 ENCOUNTER — OFFICE VISIT (OUTPATIENT)
Dept: HEMATOLOGY/ONCOLOGY | Facility: CLINIC | Age: 72
End: 2022-01-01
Payer: MEDICARE

## 2022-01-01 ENCOUNTER — TELEPHONE (OUTPATIENT)
Dept: HEMATOLOGY/ONCOLOGY | Facility: CLINIC | Age: 72
End: 2022-01-01

## 2022-01-01 ENCOUNTER — HOSPITAL ENCOUNTER (OUTPATIENT)
Dept: RADIOLOGY | Facility: HOSPITAL | Age: 72
Discharge: HOME | End: 2022-11-10
Attending: NURSE PRACTITIONER
Payer: MEDICARE

## 2022-01-01 ENCOUNTER — TELEPHONE (OUTPATIENT)
Dept: PULMONOLOGY | Facility: CLINIC | Age: 72
End: 2022-01-01

## 2022-01-01 VITALS
OXYGEN SATURATION: 96 % | BODY MASS INDEX: 17.87 KG/M2 | HEART RATE: 93 BPM | WEIGHT: 97.69 LBS | SYSTOLIC BLOOD PRESSURE: 118 MMHG | DIASTOLIC BLOOD PRESSURE: 72 MMHG

## 2022-01-01 VITALS
TEMPERATURE: 97 F | WEIGHT: 98.19 LBS | BODY MASS INDEX: 18.07 KG/M2 | SYSTOLIC BLOOD PRESSURE: 101 MMHG | DIASTOLIC BLOOD PRESSURE: 55 MMHG | HEART RATE: 79 BPM | RESPIRATION RATE: 18 BRPM | HEIGHT: 62 IN

## 2022-01-01 VITALS
WEIGHT: 97.88 LBS | HEART RATE: 99 BPM | BODY MASS INDEX: 18.01 KG/M2 | SYSTOLIC BLOOD PRESSURE: 115 MMHG | RESPIRATION RATE: 18 BRPM | WEIGHT: 98 LBS | DIASTOLIC BLOOD PRESSURE: 53 MMHG | HEIGHT: 62 IN | OXYGEN SATURATION: 96 % | HEIGHT: 62 IN | BODY MASS INDEX: 18.03 KG/M2 | TEMPERATURE: 98 F

## 2022-01-01 VITALS
RESPIRATION RATE: 18 BRPM | SYSTOLIC BLOOD PRESSURE: 111 MMHG | HEART RATE: 88 BPM | HEIGHT: 62 IN | BODY MASS INDEX: 17.96 KG/M2 | OXYGEN SATURATION: 95 % | DIASTOLIC BLOOD PRESSURE: 67 MMHG | SYSTOLIC BLOOD PRESSURE: 144 MMHG | HEART RATE: 91 BPM | TEMPERATURE: 98 F | WEIGHT: 98 LBS | BODY MASS INDEX: 18.03 KG/M2 | TEMPERATURE: 98 F | DIASTOLIC BLOOD PRESSURE: 65 MMHG | WEIGHT: 98.19 LBS

## 2022-01-01 VITALS
TEMPERATURE: 97 F | RESPIRATION RATE: 18 BRPM | DIASTOLIC BLOOD PRESSURE: 61 MMHG | BODY MASS INDEX: 17.94 KG/M2 | HEART RATE: 80 BPM | WEIGHT: 97.5 LBS | HEIGHT: 62 IN | SYSTOLIC BLOOD PRESSURE: 136 MMHG

## 2022-01-01 VITALS
WEIGHT: 98.13 LBS | HEIGHT: 62 IN | HEART RATE: 86 BPM | BODY MASS INDEX: 18.06 KG/M2 | DIASTOLIC BLOOD PRESSURE: 63 MMHG | OXYGEN SATURATION: 88 % | SYSTOLIC BLOOD PRESSURE: 148 MMHG

## 2022-01-01 DIAGNOSIS — J44.9 CHRONIC OBSTRUCTIVE PULMONARY DISEASE, UNSPECIFIED COPD TYPE: ICD-10-CM

## 2022-01-01 DIAGNOSIS — C34.90 SMALL CELL LUNG CANCER: ICD-10-CM

## 2022-01-01 DIAGNOSIS — C34.31 MALIGNANT NEOPLASM OF LOWER LOBE OF RIGHT LUNG: ICD-10-CM

## 2022-01-01 DIAGNOSIS — M41.9 SCOLIOSIS OF LUMBAR SPINE, UNSPECIFIED SCOLIOSIS TYPE: ICD-10-CM

## 2022-01-01 DIAGNOSIS — M54.9 BACK PAIN, UNSPECIFIED BACK LOCATION, UNSPECIFIED BACK PAIN LATERALITY, UNSPECIFIED CHRONICITY: ICD-10-CM

## 2022-01-01 DIAGNOSIS — Z71.89 COMPLEX CARE COORDINATION: ICD-10-CM

## 2022-01-01 DIAGNOSIS — Z72.0 TOBACCO USE: ICD-10-CM

## 2022-01-01 DIAGNOSIS — C34.31 MALIGNANT NEOPLASM OF LOWER LOBE OF RIGHT LUNG: Primary | ICD-10-CM

## 2022-01-01 DIAGNOSIS — C34.12 MALIGNANT NEOPLASM OF UPPER LOBE OF LEFT LUNG: ICD-10-CM

## 2022-01-01 DIAGNOSIS — M43.16 SPONDYLOLISTHESIS OF LUMBAR REGION: ICD-10-CM

## 2022-01-01 DIAGNOSIS — M54.9 ACUTE BILATERAL BACK PAIN, UNSPECIFIED BACK LOCATION: ICD-10-CM

## 2022-01-01 DIAGNOSIS — R91.8 PULMONARY NODULES/LESIONS, MULTIPLE: ICD-10-CM

## 2022-01-01 DIAGNOSIS — R91.8 PULMONARY NODULES/LESIONS, MULTIPLE: Primary | ICD-10-CM

## 2022-01-01 DIAGNOSIS — F17.210 CIGARETTE NICOTINE DEPENDENCE WITHOUT COMPLICATION: ICD-10-CM

## 2022-01-01 DIAGNOSIS — J44.9 CHRONIC OBSTRUCTIVE PULMONARY DISEASE, UNSPECIFIED COPD TYPE: Primary | ICD-10-CM

## 2022-01-01 DIAGNOSIS — M51.36 DISC DEGENERATION, LUMBAR: Primary | ICD-10-CM

## 2022-01-01 DIAGNOSIS — I82.C11 ACUTE THROMBOSIS OF RIGHT INTERNAL JUGULAR VEIN: ICD-10-CM

## 2022-01-01 DIAGNOSIS — C34.12 MALIGNANT NEOPLASM OF UPPER LOBE OF LEFT LUNG: Primary | ICD-10-CM

## 2022-01-01 LAB
CREAT SERPL-MCNC: 0.5 MG/DL (ref 0.5–1.4)
SAMPLE: NORMAL

## 2022-01-01 PROCEDURE — 72070 X-RAY EXAM THORAC SPINE 2VWS: CPT | Mod: TC,PO

## 2022-01-01 PROCEDURE — 82565 ASSAY OF CREATININE: CPT | Mod: PO

## 2022-01-01 PROCEDURE — 99204 PR OFFICE/OUTPT VISIT, NEW, LEVL IV, 45-59 MIN: ICD-10-PCS | Mod: S$PBB,,, | Performed by: EMERGENCY MEDICINE

## 2022-01-01 PROCEDURE — 99203 OFFICE O/P NEW LOW 30 MIN: CPT | Mod: S$GLB,,, | Performed by: ORTHOPAEDIC SURGERY

## 2022-01-01 PROCEDURE — 72157 MRI CHEST SPINE W/O & W/DYE: CPT | Mod: TC,PO

## 2022-01-01 PROCEDURE — A4216 STERILE WATER/SALINE, 10 ML: HCPCS | Performed by: INTERNAL MEDICINE

## 2022-01-01 PROCEDURE — 25500020 PHARM REV CODE 255: Mod: PO | Performed by: NURSE PRACTITIONER

## 2022-01-01 PROCEDURE — 72100 X-RAY EXAM L-S SPINE 2/3 VWS: CPT | Mod: TC,PO

## 2022-01-01 PROCEDURE — 96523 IRRIG DRUG DELIVERY DEVICE: CPT

## 2022-01-01 PROCEDURE — A9585 GADOBUTROL INJECTION: HCPCS | Mod: PO | Performed by: NURSE PRACTITIONER

## 2022-01-01 PROCEDURE — 63600175 PHARM REV CODE 636 W HCPCS: Performed by: INTERNAL MEDICINE

## 2022-01-01 PROCEDURE — 99214 PR OFFICE/OUTPT VISIT, EST, LEVL IV, 30-39 MIN: ICD-10-PCS | Mod: S$GLB,,, | Performed by: INTERNAL MEDICINE

## 2022-01-01 PROCEDURE — 70553 MRI BRAIN STEM W/O & W/DYE: CPT | Mod: TC,PO

## 2022-01-01 PROCEDURE — 99999 PR PBB SHADOW E&M-EST. PATIENT-LVL IV: CPT | Mod: PBBFAC,,, | Performed by: EMERGENCY MEDICINE

## 2022-01-01 PROCEDURE — 72158 MRI LUMBAR SPINE W/O & W/DYE: CPT | Mod: TC,PO

## 2022-01-01 PROCEDURE — 99214 OFFICE O/P EST MOD 30 MIN: CPT | Mod: S$GLB,,, | Performed by: INTERNAL MEDICINE

## 2022-01-01 PROCEDURE — 99999 PR PBB SHADOW E&M-EST. PATIENT-LVL IV: ICD-10-PCS | Mod: PBBFAC,,, | Performed by: EMERGENCY MEDICINE

## 2022-01-01 PROCEDURE — 71250 CT THORAX DX C-: CPT | Mod: TC,PO

## 2022-01-01 PROCEDURE — 25000003 PHARM REV CODE 250: Performed by: INTERNAL MEDICINE

## 2022-01-01 PROCEDURE — 99204 OFFICE O/P NEW MOD 45 MIN: CPT | Mod: S$PBB,,, | Performed by: EMERGENCY MEDICINE

## 2022-01-01 PROCEDURE — 99214 PR OFFICE/OUTPT VISIT, EST, LEVL IV, 30-39 MIN: ICD-10-PCS | Mod: S$GLB,,, | Performed by: NURSE PRACTITIONER

## 2022-01-01 PROCEDURE — 99214 OFFICE O/P EST MOD 30 MIN: CPT | Mod: PBBFAC | Performed by: EMERGENCY MEDICINE

## 2022-01-01 PROCEDURE — 99214 OFFICE O/P EST MOD 30 MIN: CPT | Mod: S$GLB,,, | Performed by: NURSE PRACTITIONER

## 2022-01-01 PROCEDURE — 99203 PR OFFICE/OUTPT VISIT, NEW, LEVL III, 30-44 MIN: ICD-10-PCS | Mod: S$GLB,,, | Performed by: ORTHOPAEDIC SURGERY

## 2022-01-01 RX ORDER — SODIUM CHLORIDE 0.9 % (FLUSH) 0.9 %
10 SYRINGE (ML) INJECTION
Status: DISCONTINUED | OUTPATIENT
Start: 2022-01-01 | End: 2022-01-01 | Stop reason: HOSPADM

## 2022-01-01 RX ORDER — HEPARIN 100 UNIT/ML
500 SYRINGE INTRAVENOUS
Status: DISCONTINUED | OUTPATIENT
Start: 2022-01-01 | End: 2022-01-01 | Stop reason: HOSPADM

## 2022-01-01 RX ORDER — HYDROCODONE BITARTRATE AND ACETAMINOPHEN 5; 325 MG/1; MG/1
1 TABLET ORAL EVERY 6 HOURS PRN
Qty: 40 TABLET | Refills: 0 | Status: SHIPPED | OUTPATIENT
Start: 2022-01-01 | End: 2022-01-01 | Stop reason: SDUPTHER

## 2022-01-01 RX ORDER — GADOBUTROL 604.72 MG/ML
4 INJECTION INTRAVENOUS
Status: COMPLETED | OUTPATIENT
Start: 2022-01-01 | End: 2022-01-01

## 2022-01-01 RX ORDER — HEPARIN 100 UNIT/ML
500 SYRINGE INTRAVENOUS
Status: CANCELLED | OUTPATIENT
Start: 2022-01-01

## 2022-01-01 RX ORDER — GADOBUTROL 604.72 MG/ML
4.5 INJECTION INTRAVENOUS
Status: COMPLETED | OUTPATIENT
Start: 2022-01-01 | End: 2022-01-01

## 2022-01-01 RX ORDER — LEVALBUTEROL TARTRATE 45 UG/1
AEROSOL, METERED ORAL
Qty: 45 G | Refills: 1 | Status: SHIPPED | OUTPATIENT
Start: 2022-01-01 | End: 2023-01-01 | Stop reason: SDUPTHER

## 2022-01-01 RX ORDER — HYDROCODONE BITARTRATE AND ACETAMINOPHEN 5; 325 MG/1; MG/1
1 TABLET ORAL EVERY 6 HOURS PRN
Qty: 40 TABLET | Refills: 0 | Status: SHIPPED | OUTPATIENT
Start: 2022-01-01 | End: 2022-01-01

## 2022-01-01 RX ORDER — LEVALBUTEROL TARTRATE 45 UG/1
AEROSOL, METERED ORAL
Qty: 45 G | Refills: 1 | Status: CANCELLED | OUTPATIENT
Start: 2022-01-01

## 2022-01-01 RX ORDER — SODIUM CHLORIDE 0.9 % (FLUSH) 0.9 %
10 SYRINGE (ML) INJECTION
Status: CANCELLED | OUTPATIENT
Start: 2022-01-01

## 2022-01-01 RX ORDER — HYDROCODONE BITARTRATE AND ACETAMINOPHEN 5; 325 MG/1; MG/1
1 TABLET ORAL EVERY 6 HOURS PRN
Qty: 40 TABLET | Refills: 0 | Status: SHIPPED | OUTPATIENT
Start: 2022-01-01 | End: 2023-01-01 | Stop reason: SDUPTHER

## 2022-01-01 RX ADMIN — HEPARIN 500 UNITS: 100 SYRINGE at 08:11

## 2022-01-01 RX ADMIN — GADOBUTROL 4 ML: 604.72 INJECTION INTRAVENOUS at 11:11

## 2022-01-01 RX ADMIN — HEPARIN 500 UNITS: 100 SYRINGE at 09:10

## 2022-01-01 RX ADMIN — SODIUM CHLORIDE, PRESERVATIVE FREE 10 ML: 5 INJECTION INTRAVENOUS at 09:10

## 2022-01-01 RX ADMIN — GADOBUTROL 4.5 ML: 604.72 INJECTION INTRAVENOUS at 11:11

## 2022-01-11 ENCOUNTER — IMMUNIZATION (OUTPATIENT)
Dept: PRIMARY CARE CLINIC | Facility: CLINIC | Age: 72
End: 2022-01-11
Payer: MEDICARE

## 2022-01-11 DIAGNOSIS — Z23 NEED FOR VACCINATION: Primary | ICD-10-CM

## 2022-01-11 PROCEDURE — 0004A COVID-19, MRNA, LNP-S, PF, 30 MCG/0.3 ML DOSE VACCINE: ICD-10-PCS | Mod: S$GLB,,, | Performed by: FAMILY MEDICINE

## 2022-01-11 PROCEDURE — 91300 COVID-19, MRNA, LNP-S, PF, 30 MCG/0.3 ML DOSE VACCINE: ICD-10-PCS | Mod: S$GLB,,, | Performed by: FAMILY MEDICINE

## 2022-01-11 PROCEDURE — 91300 COVID-19, MRNA, LNP-S, PF, 30 MCG/0.3 ML DOSE VACCINE: CPT | Mod: S$GLB,,, | Performed by: FAMILY MEDICINE

## 2022-01-11 PROCEDURE — 0004A COVID-19, MRNA, LNP-S, PF, 30 MCG/0.3 ML DOSE VACCINE: CPT | Mod: S$GLB,,, | Performed by: FAMILY MEDICINE

## 2022-02-08 ENCOUNTER — TELEPHONE (OUTPATIENT)
Dept: FAMILY MEDICINE | Facility: CLINIC | Age: 72
End: 2022-02-08
Payer: MEDICARE

## 2022-02-08 NOTE — TELEPHONE ENCOUNTER
Called patient regarding appointment on today with Dr. Lew, no answer left detail voice message for patient to call back to rescheduled appointment.

## 2022-02-10 ENCOUNTER — OFFICE VISIT (OUTPATIENT)
Dept: FAMILY MEDICINE | Facility: CLINIC | Age: 72
End: 2022-02-10
Payer: MEDICARE

## 2022-02-10 VITALS
HEART RATE: 88 BPM | BODY MASS INDEX: 18.4 KG/M2 | HEIGHT: 60 IN | SYSTOLIC BLOOD PRESSURE: 100 MMHG | RESPIRATION RATE: 12 BRPM | OXYGEN SATURATION: 98 % | DIASTOLIC BLOOD PRESSURE: 78 MMHG | TEMPERATURE: 98 F | WEIGHT: 93.69 LBS

## 2022-02-10 DIAGNOSIS — E04.2 MULTINODULAR GOITER: ICD-10-CM

## 2022-02-10 DIAGNOSIS — Z23 FLU VACCINE NEED: ICD-10-CM

## 2022-02-10 DIAGNOSIS — J44.89 COPD WITH ASTHMA: ICD-10-CM

## 2022-02-10 DIAGNOSIS — J44.9 CHRONIC OBSTRUCTIVE PULMONARY DISEASE, UNSPECIFIED COPD TYPE: ICD-10-CM

## 2022-02-10 DIAGNOSIS — N95.9 MENOPAUSAL AND POSTMENOPAUSAL DISORDER: ICD-10-CM

## 2022-02-10 DIAGNOSIS — E05.90 HYPERTHYROIDISM: ICD-10-CM

## 2022-02-10 DIAGNOSIS — M85.80 OSTEOPENIA, UNSPECIFIED LOCATION: ICD-10-CM

## 2022-02-10 DIAGNOSIS — I10 ESSENTIAL HYPERTENSION: ICD-10-CM

## 2022-02-10 DIAGNOSIS — E78.5 HYPERLIPIDEMIA LDL GOAL <130: Primary | ICD-10-CM

## 2022-02-10 PROCEDURE — G0008 ADMIN INFLUENZA VIRUS VAC: HCPCS | Mod: PBBFAC,PO

## 2022-02-10 PROCEDURE — 99213 OFFICE O/P EST LOW 20 MIN: CPT | Mod: PBBFAC,PO,25 | Performed by: FAMILY MEDICINE

## 2022-02-10 PROCEDURE — 90694 VACC AIIV4 NO PRSRV 0.5ML IM: CPT | Mod: PBBFAC,PO

## 2022-02-10 PROCEDURE — 99214 PR OFFICE/OUTPT VISIT, EST, LEVL IV, 30-39 MIN: ICD-10-PCS | Mod: S$PBB,,, | Performed by: FAMILY MEDICINE

## 2022-02-10 PROCEDURE — 99214 OFFICE O/P EST MOD 30 MIN: CPT | Mod: S$PBB,,, | Performed by: FAMILY MEDICINE

## 2022-02-10 PROCEDURE — 99999 PR PBB SHADOW E&M-EST. PATIENT-LVL III: CPT | Mod: PBBFAC,,, | Performed by: FAMILY MEDICINE

## 2022-02-10 PROCEDURE — 99999 PR PBB SHADOW E&M-EST. PATIENT-LVL III: ICD-10-PCS | Mod: PBBFAC,,, | Performed by: FAMILY MEDICINE

## 2022-02-10 RX ORDER — FLUTICASONE FUROATE, UMECLIDINIUM BROMIDE AND VILANTEROL TRIFENATATE 200; 62.5; 25 UG/1; UG/1; UG/1
1 POWDER RESPIRATORY (INHALATION) DAILY
Qty: 90 EACH | Refills: 3 | Status: SHIPPED | OUTPATIENT
Start: 2022-02-10 | End: 2022-07-15 | Stop reason: SDUPTHER

## 2022-02-10 RX ORDER — LISINOPRIL 10 MG/1
10 TABLET ORAL DAILY
Qty: 90 TABLET | Refills: 3 | Status: SHIPPED | OUTPATIENT
Start: 2022-02-10 | End: 2022-08-10

## 2022-02-10 NOTE — PROGRESS NOTES
Subjective:       Patient ID: Anuja Cool is a 71 y.o. female.    Chief Complaint: Follow-up (6mth f/u hypertension)    HPI  Review of Systems   Constitutional: Positive for unexpected weight change. Negative for fatigue and fever.   Respiratory: Positive for shortness of breath. Negative for chest tightness.    Cardiovascular: Negative for chest pain, palpitations and leg swelling.   Gastrointestinal: Negative for abdominal pain.   Musculoskeletal: Negative for arthralgias.   Neurological: Negative for dizziness, syncope, light-headedness and headaches.       Patient Active Problem List   Diagnosis    Asthma    Allergy    Hypertension    Hyperlipidemia LDL goal <130    Tobacco use    COPD (chronic obstructive pulmonary disease)    Bilateral carotid bruits    OAB (overactive bladder)    Osteopenia    BMI 21.0-21.9, adult    Low serum thyroid stimulating hormone (TSH)    Multinodular goiter    Stenosis of carotid artery    Constipation    Postmenopausal    ASCVD (arteriosclerotic cardiovascular disease)    Hyperthyroidism    BMI less than 19,adult     Patient is here for a chronic conditions follow up.    mammo 7/2021 neg  COPD- symbicort not working as well , mild sob and torres  Losing weight- can't afford ensure.      Thyroid ultrasound 6/2021 Multinodular thyroid gland.  Slight enlargement of the largest nodules bilaterally has occurred since previous exam s/p FNA 9/2021  Benign; cytologic pattern   consistent with benign follicular nodule (see comment).    H/o carotid stenosis 9/18 last u/s- No evidence of a hemodynamically significant carotid bifurcation stenosis.     Endocrine Dr. Graves H/o hyperthyroidism, goiter. Osteopenia with high frax risk now on fosomax since 2020  Objective:      Physical Exam  Vitals and nursing note reviewed.   Constitutional:       Appearance: She is well-developed and well-nourished.   Cardiovascular:      Rate and Rhythm: Normal rate and regular rhythm.       Heart sounds: Normal heart sounds.   Pulmonary:      Effort: Pulmonary effort is normal.      Breath sounds: Normal breath sounds. Decreased air movement present.   Musculoskeletal:         General: No edema.   Skin:     General: Skin is warm and dry.   Neurological:      Mental Status: She is alert and oriented to person, place, and time.   Psychiatric:         Mood and Affect: Mood and affect normal.         Assessment:       1. Hyperlipidemia LDL goal <130    2. Hyperthyroidism    3. Multinodular goiter    4. Chronic obstructive pulmonary disease, unspecified COPD type    5. Essential hypertension    6. Osteopenia, unspecified location    7. Flu vaccine need    8. Menopausal and postmenopausal disorder    9. COPD with asthma        Plan:         1. Hyperlipidemia LDL goal <130  Stable condition.  Continue current medications.  Will adjust based on lab findings or if condition changes.    - CBC Auto Differential; Future  - Comprehensive Metabolic Panel; Future  - Lipid Panel; Future    2. Hyperthyroidism  Screen and treat as indicated:    - TSH; Future  - T4, Free; Future    3. Multinodular goiter  Cont monitoring    4. Chronic obstructive pulmonary disease, unspecified COPD type  Change symbicort to trelegy    5. Essential hypertension  Overcontrolled.  Decrease  Lisinopril from 30mg to  - lisinopriL 10 MG tablet; Take 1 tablet (10 mg total) by mouth once daily.  Dispense: 90 tablet; Refill: 3    6. Osteopenia, unspecified location  Cont fosomax.  dexa for recheck    7. Flu vaccine need  Immunize today.  Counseled patient on risks, benefits and side effects.  Patient elected to proceed with vaccination.    - Influenza (FLUAD) - Quadrivalent (Adjuvanted) *Preferred* (65+) (PF)    8. Menopausal and postmenopausal disorder  Screen and treat as indicated:    - DXA Bone Density Spine And Hip; Future    9. COPD with asthma  See above        Time spent with patient: 20 minutes    Patient with be reevaluated in 6 months  or sooner prn    Greater than 50% of this visit was spent counseling as described in above documentation:Yes

## 2022-02-14 ENCOUNTER — OFFICE VISIT (OUTPATIENT)
Dept: PULMONOLOGY | Facility: CLINIC | Age: 72
End: 2022-02-14
Payer: MEDICARE

## 2022-02-14 VITALS
WEIGHT: 96 LBS | DIASTOLIC BLOOD PRESSURE: 88 MMHG | SYSTOLIC BLOOD PRESSURE: 136 MMHG | HEART RATE: 95 BPM | OXYGEN SATURATION: 96 % | BODY MASS INDEX: 18.75 KG/M2

## 2022-02-14 DIAGNOSIS — J45.909 UNCOMPLICATED ASTHMA, UNSPECIFIED ASTHMA SEVERITY, UNSPECIFIED WHETHER PERSISTENT: ICD-10-CM

## 2022-02-14 DIAGNOSIS — J44.9 CHRONIC OBSTRUCTIVE PULMONARY DISEASE, UNSPECIFIED COPD TYPE: Primary | ICD-10-CM

## 2022-02-14 DIAGNOSIS — Z72.0 TOBACCO USE: ICD-10-CM

## 2022-02-14 DIAGNOSIS — F17.210 CIGARETTE NICOTINE DEPENDENCE WITHOUT COMPLICATION: ICD-10-CM

## 2022-02-14 DIAGNOSIS — T78.40XA ALLERGY, INITIAL ENCOUNTER: ICD-10-CM

## 2022-02-14 DIAGNOSIS — R09.89 BILATERAL CAROTID BRUITS: ICD-10-CM

## 2022-02-14 PROCEDURE — 99204 OFFICE O/P NEW MOD 45 MIN: CPT | Mod: S$GLB,,, | Performed by: INTERNAL MEDICINE

## 2022-02-14 PROCEDURE — 99204 PR OFFICE/OUTPT VISIT, NEW, LEVL IV, 45-59 MIN: ICD-10-PCS | Mod: S$GLB,,, | Performed by: INTERNAL MEDICINE

## 2022-02-14 NOTE — PROGRESS NOTES
New Office Visit/Consultation Note *    Patient Name: Anuja Cool  MRN: [id number]  : 1950      Reason for visit: COPD    HPI:     2022 - Here to establish care,  Diagnosed with COPD (severity unknown).  Recently changed to TRELEGY and feels that she is doing better on that.  Currently smoking about 3/4 PPD  (1-1.5 PPD x 50 years).  Also carries diagnosed of asthma, has h/o allergies (+ skin test, never desensitized).  HAs never had a screening CT chest and does not remember her last CXR.  ROS as below.  We discussed cigarette cessation at length.      Past Medical History    Past Medical History:   Diagnosis Date    Allergy     Arthritis     Asthma     Bursitis     COPD (chronic obstructive pulmonary disease)     Hypertension     Low sodium     Thyroid disease     nodules    Tinea pedis        Past Surgical History    Past Surgical History:   Procedure Laterality Date    COLONOSCOPY N/A 2018    Procedure: COLONOSCOPY;  Surgeon: Grey Roberts MD;  Location: [facility];  Service: Endoscopy;  Laterality: N/A;    HYSTERECTOMY      OOPHORECTOMY         Medications      Current Outpatient Medications:     alendronate (FOSAMAX) 70 MG tablet, Take 1 tablet by mouth once a week, Disp: 12 tablet, Rfl: 4    aspirin (ECOTRIN) 81 MG EC tablet, Take 81 mg by mouth once daily., Disp: , Rfl:     atenoloL (TENORMIN) 100 MG tablet, Take 1 tablet by mouth once daily, Disp: 90 tablet, Rfl: 0    cholecalciferol, vitamin D3, 3,000 unit Tab, Take by mouth., Disp: , Rfl:     fluticasone-umeclidin-vilanter (TRELEGY ELLIPTA) 200-62.5-25 mcg inhaler, Inhale 1 puff into the lungs once daily., Disp: 90 each, Rfl: 3    ibuprofen (ADVIL,MOTRIN) 200 MG tablet, Take 200 mg by mouth every 6 (six) hours as needed for Pain., Disp: , Rfl:     levalbuterol (XOPENEX HFA) 45 mcg/actuation inhaler, INHALE 1 TO 2 PUFFS INTO LUNGS EVERY 4 HOURS AS NEEDED FOR WHEEZING AND FOR SHORTNESS OF BREATH, Disp: 45 g, Rfl:  1    levocetirizine (XYZAL) 5 MG tablet, Take 1 tablet (5 mg total) by mouth every evening., Disp: 30 tablet, Rfl: 2    lisinopriL 10 MG tablet, Take 1 tablet (10 mg total) by mouth once daily., Disp: 90 tablet, Rfl: 3    methIMAzole (TAPAZOLE) 5 MG Tab, Take one tablet Monday-Friday and two tablets on Saturday and Sunday., Disp: 180 tablet, Rfl: 3    multivit with minerals/lutein (MULTIVITAMIN 50 PLUS ORAL), Take by mouth., Disp: , Rfl:     rosuvastatin (CRESTOR) 20 MG tablet, Take 1 tablet by mouth once daily, Disp: 90 tablet, Rfl: 3    Allergies    Review of patient's allergies indicates:   Allergen Reactions    Tinactin [tolnaftate] Dermatitis    Advair diskus [fluticasone propion-salmeterol]      shakes    Albuterol      tremors    Sulfa (sulfonamide antibiotics)      unknown       SocHx    Social History     Tobacco Use   Smoking Status Current Every Day Smoker    Packs/day: 0.50   Smokeless Tobacco Never Used       Social History     Substance and Sexual Activity   Alcohol Use Yes    Alcohol/week: 4.0 standard drinks    Types: 4 Glasses of wine per week    Comment: occ. glass of wine       Drug Use - no  Occupation - retired, customer service  Asbestos exposure - no  Pets - no    FMHx    Family History   Problem Relation Age of Onset    Heart disease Mother     Cancer Mother         lung    Heart disease Father     Cancer Father         lymphoma    Diabetes Neg Hx          Review of Systems  Review of Systems   Constitutional: Negative for chills, diaphoresis, fever, malaise/fatigue and weight loss.        Weight loss   HENT: Negative for congestion.    Eyes: Negative for pain.   Respiratory: Positive for shortness of breath (ELLISON). Negative for cough, hemoptysis, sputum production, wheezing and stridor.    Cardiovascular: Negative for chest pain, palpitations, orthopnea, claudication, leg swelling and PND.   Gastrointestinal: Negative for abdominal pain, constipation, diarrhea, heartburn,  nausea and vomiting.   Genitourinary: Negative for dysuria, frequency and urgency.   Musculoskeletal: Negative for falls and myalgias.   Neurological: Negative for sensory change, focal weakness and weakness.   Endo/Heme/Allergies:        + over active thyroid   Psychiatric/Behavioral: Negative for depression, substance abuse and suicidal ideas. The patient is not nervous/anxious.        Physical Exam    Vitals:    02/14/22 0929   BP: 136/88   BP Location: Left arm   Patient Position: Sitting   BP Method: X-Large (Manual)   Pulse: 95   SpO2: 96%   Weight: 43.5 kg (96 lb)       Physical Exam  Vitals and nursing note reviewed.   Constitutional:       General: She is not in acute distress.     Appearance: She is well-developed and well-nourished. She is not ill-appearing, toxic-appearing or diaphoretic.      Comments: Thin female   HENT:      Head: Normocephalic and atraumatic.      Right Ear: External ear normal.      Left Ear: External ear normal.      Nose: Nose normal.      Mouth/Throat:      Mouth: Oropharynx is clear and moist.   Eyes:      General: No scleral icterus.        Right eye: No discharge.         Left eye: No discharge.      Extraocular Movements: Extraocular movements intact and EOM normal.      Conjunctiva/sclera: Conjunctivae normal.      Pupils: Pupils are equal, round, and reactive to light.   Neck:      Thyroid: No thyromegaly.      Vascular: No JVD.      Trachea: No tracheal deviation.   Cardiovascular:      Rate and Rhythm: Normal rate and regular rhythm.      Pulses: Intact distal pulses.      Heart sounds: Normal heart sounds. No murmur heard.  No friction rub. No gallop.       Comments: + bilateral carotid bruits (known issue)  Pulmonary:      Effort: Pulmonary effort is normal. No respiratory distress.      Breath sounds: No stridor. No wheezing, rhonchi or rales.      Comments: Decreased BS  No acc m use  Chest:      Chest wall: No tenderness.   Abdominal:      General: Bowel sounds are  normal. There is no distension.      Palpations: Abdomen is soft.      Tenderness: There is no abdominal tenderness. There is no guarding.   Musculoskeletal:         General: No tenderness or edema. Normal range of motion.      Cervical back: Normal range of motion and neck supple.      Right lower leg: No edema.      Left lower leg: No edema.   Lymphadenopathy:      Cervical: No cervical adenopathy.   Skin:     General: Skin is warm and dry.   Neurological:      General: No focal deficit present.      Mental Status: She is alert and oriented to person, place, and time. Mental status is at baseline.      Cranial Nerves: No cranial nerve deficit.      Motor: No weakness.      Gait: Gait normal.   Psychiatric:         Mood and Affect: Mood and affect and mood normal.         Behavior: Behavior normal.         Thought Content: Thought content normal.         Judgment: Judgment normal.         Labs    Lab Results   Component Value Date    WBC 9.55 12/18/2020    HGB 12.6 12/18/2020    HCT 39.6 12/18/2020     (H) 12/18/2020       Sodium   Date Value Ref Range Status   06/23/2021 134 (L) 136 - 145 mmol/L Final     Potassium   Date Value Ref Range Status   06/23/2021 4.3 3.5 - 5.1 mmol/L Final     Chloride   Date Value Ref Range Status   06/23/2021 96 95 - 110 mmol/L Final     CO2   Date Value Ref Range Status   06/23/2021 31 (H) 23 - 29 mmol/L Final     Glucose   Date Value Ref Range Status   06/23/2021 101 70 - 110 mg/dL Final     BUN   Date Value Ref Range Status   06/23/2021 6 (L) 8 - 23 mg/dL Final     Creatinine   Date Value Ref Range Status   06/23/2021 0.6 0.5 - 1.4 mg/dL Final     Calcium   Date Value Ref Range Status   06/23/2021 9.5 8.7 - 10.5 mg/dL Final     Total Protein   Date Value Ref Range Status   06/23/2021 7.7 6.0 - 8.4 g/dL Final     Albumin   Date Value Ref Range Status   06/23/2021 4.0 3.5 - 5.2 g/dL Final     Total Bilirubin   Date Value Ref Range Status   06/23/2021 0.6 0.1 - 1.0 mg/dL Final      Comment:     For infants and newborns, interpretation of results should be based  on gestational age, weight and in agreement with clinical  observations.    Premature Infant recommended reference ranges:  Up to 24 hours.............<8.0 mg/dL  Up to 48 hours............<12.0 mg/dL  3-5 days..................<15.0 mg/dL  6-29 days.................<15.0 mg/dL       Alkaline Phosphatase   Date Value Ref Range Status   06/23/2021 93 55 - 135 U/L Final     AST   Date Value Ref Range Status   06/23/2021 21 10 - 40 U/L Final     ALT   Date Value Ref Range Status   06/23/2021 16 10 - 44 U/L Final     Anion Gap   Date Value Ref Range Status   06/23/2021 7 (L) 8 - 16 mmol/L Final       Xrays        Impression/Plan    Problem List Items Addressed This Visit        Pulmonary    Asthma     · Not clear about this diagnosis         COPD (chronic obstructive pulmonary disease) - Primary     · Severity unknown (I suspect moderate or worse)  · Will check PFT and walk test  · Continue with TRELEGY and prn RODNEY  · RTC 1 month         Relevant Orders    Complete PFT with bronchodilator    Six Minute Walk Test to qualify for Home Oxygen       Cardiac/Vascular    Bilateral carotid bruits     · Aware             Other    Allergy     · Aware          Tobacco use     · Will set up LDSCT CT chest           Other Visit Diagnoses     Cigarette nicotine dependence without complication        Relevant Orders    CT Chest Lung Screening Low Dose          I have spent about 45 minutes with the patient taking the history and examining the patient.  We have discussed the diagnoses and current plan and all questions have been answered.  We have discussed the follow up plan.  The patient and family (if present) know to contact the office with any questions they may have.        Jonathan Jeffries MD

## 2022-02-14 NOTE — ASSESSMENT & PLAN NOTE
· Severity unknown (I suspect moderate or worse)  · Will check PFT and walk test  · Continue with TRELEGY and prn RODNEY  · RTC 1 month

## 2022-02-16 ENCOUNTER — LAB VISIT (OUTPATIENT)
Dept: LAB | Facility: HOSPITAL | Age: 72
End: 2022-02-16
Attending: FAMILY MEDICINE
Payer: MEDICARE

## 2022-02-16 DIAGNOSIS — E05.90 HYPERTHYROIDISM: ICD-10-CM

## 2022-02-16 DIAGNOSIS — E78.5 HYPERLIPIDEMIA LDL GOAL <130: ICD-10-CM

## 2022-02-16 LAB
ALBUMIN SERPL BCP-MCNC: 3.5 G/DL (ref 3.5–5.2)
ALP SERPL-CCNC: 88 U/L (ref 55–135)
ALT SERPL W/O P-5'-P-CCNC: 10 U/L (ref 10–44)
ANION GAP SERPL CALC-SCNC: 11 MMOL/L (ref 8–16)
AST SERPL-CCNC: 18 U/L (ref 10–40)
BASOPHILS # BLD AUTO: 0.07 K/UL (ref 0–0.2)
BASOPHILS # BLD AUTO: 0.07 K/UL (ref 0–0.2)
BASOPHILS NFR BLD: 0.7 % (ref 0–1.9)
BASOPHILS NFR BLD: 0.7 % (ref 0–1.9)
BILIRUB SERPL-MCNC: 0.5 MG/DL (ref 0.1–1)
BUN SERPL-MCNC: 5 MG/DL (ref 8–23)
CALCIUM SERPL-MCNC: 9.6 MG/DL (ref 8.7–10.5)
CHLORIDE SERPL-SCNC: 99 MMOL/L (ref 95–110)
CHOLEST SERPL-MCNC: 167 MG/DL (ref 120–199)
CHOLEST/HDLC SERPL: 2.7 {RATIO} (ref 2–5)
CO2 SERPL-SCNC: 28 MMOL/L (ref 23–29)
CREAT SERPL-MCNC: 0.6 MG/DL (ref 0.5–1.4)
DIFFERENTIAL METHOD: NORMAL
DIFFERENTIAL METHOD: NORMAL
EOSINOPHIL # BLD AUTO: 0.5 K/UL (ref 0–0.5)
EOSINOPHIL # BLD AUTO: 0.5 K/UL (ref 0–0.5)
EOSINOPHIL NFR BLD: 4.9 % (ref 0–8)
EOSINOPHIL NFR BLD: 4.9 % (ref 0–8)
ERYTHROCYTE [DISTWIDTH] IN BLOOD BY AUTOMATED COUNT: 13.8 % (ref 11.5–14.5)
ERYTHROCYTE [DISTWIDTH] IN BLOOD BY AUTOMATED COUNT: 13.8 % (ref 11.5–14.5)
EST. GFR  (AFRICAN AMERICAN): >60 ML/MIN/1.73 M^2
EST. GFR  (NON AFRICAN AMERICAN): >60 ML/MIN/1.73 M^2
GLUCOSE SERPL-MCNC: 93 MG/DL (ref 70–110)
HCT VFR BLD AUTO: 43.4 % (ref 37–48.5)
HCT VFR BLD AUTO: 43.4 % (ref 37–48.5)
HDLC SERPL-MCNC: 61 MG/DL (ref 40–75)
HDLC SERPL: 36.5 % (ref 20–50)
HGB BLD-MCNC: 14.2 G/DL (ref 12–16)
HGB BLD-MCNC: 14.2 G/DL (ref 12–16)
IMM GRANULOCYTES # BLD AUTO: 0.02 K/UL (ref 0–0.04)
IMM GRANULOCYTES # BLD AUTO: 0.02 K/UL (ref 0–0.04)
IMM GRANULOCYTES NFR BLD AUTO: 0.2 % (ref 0–0.5)
IMM GRANULOCYTES NFR BLD AUTO: 0.2 % (ref 0–0.5)
LDLC SERPL CALC-MCNC: 89.2 MG/DL (ref 63–159)
LYMPHOCYTES # BLD AUTO: 2.3 K/UL (ref 1–4.8)
LYMPHOCYTES # BLD AUTO: 2.3 K/UL (ref 1–4.8)
LYMPHOCYTES NFR BLD: 23.9 % (ref 18–48)
LYMPHOCYTES NFR BLD: 23.9 % (ref 18–48)
MCH RBC QN AUTO: 30.6 PG (ref 27–31)
MCH RBC QN AUTO: 30.6 PG (ref 27–31)
MCHC RBC AUTO-ENTMCNC: 32.7 G/DL (ref 32–36)
MCHC RBC AUTO-ENTMCNC: 32.7 G/DL (ref 32–36)
MCV RBC AUTO: 94 FL (ref 82–98)
MCV RBC AUTO: 94 FL (ref 82–98)
MONOCYTES # BLD AUTO: 0.9 K/UL (ref 0.3–1)
MONOCYTES # BLD AUTO: 0.9 K/UL (ref 0.3–1)
MONOCYTES NFR BLD: 8.9 % (ref 4–15)
MONOCYTES NFR BLD: 8.9 % (ref 4–15)
NEUTROPHILS # BLD AUTO: 6 K/UL (ref 1.8–7.7)
NEUTROPHILS # BLD AUTO: 6 K/UL (ref 1.8–7.7)
NEUTROPHILS NFR BLD: 61.4 % (ref 38–73)
NEUTROPHILS NFR BLD: 61.4 % (ref 38–73)
NONHDLC SERPL-MCNC: 106 MG/DL
NRBC BLD-RTO: 0 /100 WBC
NRBC BLD-RTO: 0 /100 WBC
PLATELET # BLD AUTO: 407 K/UL (ref 150–450)
PLATELET # BLD AUTO: 407 K/UL (ref 150–450)
PMV BLD AUTO: 10.5 FL (ref 9.2–12.9)
PMV BLD AUTO: 10.5 FL (ref 9.2–12.9)
POTASSIUM SERPL-SCNC: 4.9 MMOL/L (ref 3.5–5.1)
PROT SERPL-MCNC: 7.6 G/DL (ref 6–8.4)
RBC # BLD AUTO: 4.64 M/UL (ref 4–5.4)
RBC # BLD AUTO: 4.64 M/UL (ref 4–5.4)
SODIUM SERPL-SCNC: 138 MMOL/L (ref 136–145)
T4 FREE SERPL-MCNC: 0.69 NG/DL (ref 0.71–1.51)
TRIGL SERPL-MCNC: 84 MG/DL (ref 30–150)
TSH SERPL DL<=0.005 MIU/L-ACNC: 0.9 UIU/ML (ref 0.4–4)
WBC # BLD AUTO: 9.76 K/UL (ref 3.9–12.7)
WBC # BLD AUTO: 9.76 K/UL (ref 3.9–12.7)

## 2022-02-16 PROCEDURE — 85025 COMPLETE CBC W/AUTO DIFF WBC: CPT | Performed by: PHYSICIAN ASSISTANT

## 2022-02-16 PROCEDURE — 84443 ASSAY THYROID STIM HORMONE: CPT | Performed by: FAMILY MEDICINE

## 2022-02-16 PROCEDURE — 80061 LIPID PANEL: CPT | Performed by: FAMILY MEDICINE

## 2022-02-16 PROCEDURE — 36415 COLL VENOUS BLD VENIPUNCTURE: CPT | Mod: PO | Performed by: FAMILY MEDICINE

## 2022-02-16 PROCEDURE — 84439 ASSAY OF FREE THYROXINE: CPT | Performed by: FAMILY MEDICINE

## 2022-02-16 PROCEDURE — 80053 COMPREHEN METABOLIC PANEL: CPT | Performed by: FAMILY MEDICINE

## 2022-02-18 ENCOUNTER — TELEPHONE (OUTPATIENT)
Dept: FAMILY MEDICINE | Facility: CLINIC | Age: 72
End: 2022-02-18
Payer: MEDICARE

## 2022-02-18 NOTE — TELEPHONE ENCOUNTER
----- Message from Allyssa Gasca sent at 2/18/2022  2:01 PM CST -----  Contact: self  Type:  Test Results    Who Called:  patient  Name of Test (Lab/Mammo/Etc):  lab  Date of Test:  2/16  Ordering Provider:  Dr Lew  Where the test was performed:  [facility]  Best Call Back Number:  [phone] (home)   Additional Information:  Thanks

## 2022-02-23 ENCOUNTER — TELEPHONE (OUTPATIENT)
Dept: FAMILY MEDICINE | Facility: CLINIC | Age: 72
End: 2022-02-23
Payer: MEDICARE

## 2022-02-23 NOTE — TELEPHONE ENCOUNTER
Patient would like labs to be reviewed by provider. Will forward message to provider for further assistance.

## 2022-02-23 NOTE — TELEPHONE ENCOUNTER
Notify patient: I have reviewed your labs and the following are in the normal or acceptable range including: blood sugar, kidney function, liver function, blood cell counts and cholesterol levels.      Thyroid labs are abnormal.  Needs to f/u endocrine for further advice. Will forward labs to treating team

## 2022-02-23 NOTE — TELEPHONE ENCOUNTER
"Called and informed her per provider     " Notify patient: I have reviewed your labs and the following are in the normal or acceptable range including: blood sugar, kidney function, liver function, blood cell counts and cholesterol levels.       Thyroid labs are abnormal.  Needs to f/u endocrine for further advice. Will forward labs to treating team "    verbalized understanding.   "

## 2022-02-23 NOTE — TELEPHONE ENCOUNTER
----- Message from Cain Delgado sent at 2/23/2022  9:30 AM CST -----  Contact: pt at [phone]  Type:  Test Results    Who Called:  pt  Name of Test (Lab/Mammo/Etc):  labs  Date of Test:  2/16/22  Ordering Provider:  Louann  Where the test was performed:  [facility]  Best Call Back Number:  [phone]  Additional Information:  pt is calling the office to have the Dr call her back to go over her lab results. Please call back and advise.

## 2022-02-25 ENCOUNTER — HOSPITAL ENCOUNTER (OUTPATIENT)
Dept: RADIOLOGY | Facility: HOSPITAL | Age: 72
Discharge: HOME | End: 2022-02-25
Attending: INTERNAL MEDICINE
Payer: MEDICARE

## 2022-02-25 ENCOUNTER — HOSPITAL ENCOUNTER (OUTPATIENT)
Dept: PULMONOLOGY | Facility: HOSPITAL | Age: 72
Discharge: HOME | End: 2022-02-25
Attending: INTERNAL MEDICINE
Payer: MEDICARE

## 2022-02-25 VITALS — HEIGHT: 60 IN | WEIGHT: 96 LBS | BODY MASS INDEX: 18.85 KG/M2

## 2022-02-25 DIAGNOSIS — F17.210 CIGARETTE NICOTINE DEPENDENCE WITHOUT COMPLICATION: ICD-10-CM

## 2022-02-25 DIAGNOSIS — R91.1 LUNG NODULE: Primary | ICD-10-CM

## 2022-02-25 DIAGNOSIS — J44.9 CHRONIC OBSTRUCTIVE PULMONARY DISEASE, UNSPECIFIED COPD TYPE: ICD-10-CM

## 2022-02-25 PROCEDURE — 94727 GAS DIL/WSHOT DETER LNG VOL: CPT

## 2022-02-25 PROCEDURE — 71271 CT THORAX LUNG CANCER SCR C-: CPT | Mod: TC,PO,GZ

## 2022-02-25 PROCEDURE — 94060 EVALUATION OF WHEEZING: CPT

## 2022-02-25 PROCEDURE — 94010 BREATHING CAPACITY TEST: CPT | Mod: XB

## 2022-02-25 PROCEDURE — 94618 PULMONARY STRESS TESTING: CPT

## 2022-02-25 PROCEDURE — 94375 RESPIRATORY FLOW VOLUME LOOP: CPT

## 2022-02-25 NOTE — CARE UPDATE
02/25/22 0836   6MW Test   Ordering Provider Jonathan Jeffries MD   Diagnosis Shortness of Breath   Height 5' (1.524 m)   Weight 43.5 kg (96 lb)   BMI (Calculated) 18.7   Predicted Distance 353.21   Patient Race    6MWT Status completed without stopping   Patient Reported No complaints   Was O2 used? No   6MW Distance walked (feet) 1200 feet   Distance walked (meters) 365.76 meters   Did patient stop? No   Type of assistive device(s) used? no assistive devices   Is extra documentation required for this patient? Yes   Pre-Exercise   Oxygen Saturation 94 %   Supplemental Oxygen Room Air   Heart Rate 100 bpm   [place] Dyspnea Rating  very, very light (just noticeable)   Post Exercise   Oxygen Saturation 89 %   Supplemental Oxygen Room Air   Heart Rate 109 bpm   [place] Dyspnea Rating  light   Recovery   Oxygen Saturation 96 %   Supplemental Oxygen Room Air   Heart Rate 107 bpm   [place] Dyspnea Rating  light   Interpretation   Is procedure ready for interpretation? Yes   Did the patient stop or pause? No   Total Laps Walked 6   Final Partial Lap Distance (feet) 0 feet   Total Distance Feet (Calculated) 1200 feet   Total Distance Meters (Calculated) 365.76 meters   Predicted Distance Meters (Calculated) 484.25 meters   Percentage of Predicted (Calculated) 75.53   Peak VO2 (Calculated) 14.95   Mets 4.27   Comments This is a Non-Hypoxemic 6 min. walk.  Patient did not qualify for Home Oxygen.   Oxygen Qualification   Oxygen Qualification? No

## 2022-03-04 ENCOUNTER — HOSPITAL ENCOUNTER (OUTPATIENT)
Dept: RADIOLOGY | Facility: CLINIC | Age: 72
Discharge: HOME | End: 2022-03-04
Attending: FAMILY MEDICINE
Payer: MEDICARE

## 2022-03-04 DIAGNOSIS — N95.9 MENOPAUSAL AND POSTMENOPAUSAL DISORDER: ICD-10-CM

## 2022-03-04 PROCEDURE — 77080 DEXA BONE DENSITY SPINE HIP: ICD-10-PCS | Mod: 26,,, | Performed by: RADIOLOGY

## 2022-03-04 PROCEDURE — 77080 DXA BONE DENSITY AXIAL: CPT | Mod: TC,PO

## 2022-03-04 PROCEDURE — 77080 DXA BONE DENSITY AXIAL: CPT | Mod: 26,,, | Performed by: RADIOLOGY

## 2022-03-09 ENCOUNTER — OFFICE VISIT (OUTPATIENT)
Dept: FAMILY MEDICINE | Facility: CLINIC | Age: 72
End: 2022-03-09
Payer: MEDICARE

## 2022-03-09 VITALS
RESPIRATION RATE: 14 BRPM | HEIGHT: 60 IN | OXYGEN SATURATION: 95 % | DIASTOLIC BLOOD PRESSURE: 80 MMHG | TEMPERATURE: 98 F | WEIGHT: 97.44 LBS | HEART RATE: 63 BPM | BODY MASS INDEX: 19.13 KG/M2 | SYSTOLIC BLOOD PRESSURE: 100 MMHG

## 2022-03-09 DIAGNOSIS — E04.2 MULTINODULAR GOITER: ICD-10-CM

## 2022-03-09 DIAGNOSIS — I10 ESSENTIAL HYPERTENSION: ICD-10-CM

## 2022-03-09 DIAGNOSIS — E78.5 HYPERLIPIDEMIA LDL GOAL <130: Primary | ICD-10-CM

## 2022-03-09 DIAGNOSIS — M85.80 OSTEOPENIA, UNSPECIFIED LOCATION: ICD-10-CM

## 2022-03-09 DIAGNOSIS — J44.9 CHRONIC OBSTRUCTIVE PULMONARY DISEASE, UNSPECIFIED COPD TYPE: ICD-10-CM

## 2022-03-09 DIAGNOSIS — R91.8 MASS OF LUNG: ICD-10-CM

## 2022-03-09 DIAGNOSIS — E05.90 HYPERTHYROIDISM: ICD-10-CM

## 2022-03-09 PROCEDURE — 99999 PR PBB SHADOW E&M-EST. PATIENT-LVL III: CPT | Mod: PBBFAC,,, | Performed by: FAMILY MEDICINE

## 2022-03-09 PROCEDURE — 99214 OFFICE O/P EST MOD 30 MIN: CPT | Mod: S$PBB,,, | Performed by: FAMILY MEDICINE

## 2022-03-09 PROCEDURE — 99214 PR OFFICE/OUTPT VISIT, EST, LEVL IV, 30-39 MIN: ICD-10-PCS | Mod: S$PBB,,, | Performed by: FAMILY MEDICINE

## 2022-03-09 PROCEDURE — 99213 OFFICE O/P EST LOW 20 MIN: CPT | Mod: PBBFAC,PO | Performed by: FAMILY MEDICINE

## 2022-03-09 PROCEDURE — 99999 PR PBB SHADOW E&M-EST. PATIENT-LVL III: ICD-10-PCS | Mod: PBBFAC,,, | Performed by: FAMILY MEDICINE

## 2022-03-09 NOTE — PROGRESS NOTES
Subjective:       Patient ID: Anuja Cool is a 71 y.o. female.    Chief Complaint: Follow-up (6mth f/u )    HPI  Review of Systems   Constitutional: Negative for fatigue and unexpected weight change.   Respiratory: Negative for chest tightness and shortness of breath.    Cardiovascular: Negative for chest pain, palpitations and leg swelling.   Gastrointestinal: Negative for abdominal pain.   Musculoskeletal: Negative for arthralgias.   Neurological: Negative for dizziness, syncope, light-headedness and headaches.       Patient Active Problem List   Diagnosis    Asthma    Allergy    Essential hypertension    Hyperlipidemia LDL goal <130    Tobacco use    Chronic obstructive pulmonary disease    Bilateral carotid bruits    OAB (overactive bladder)    Osteopenia    BMI 21.0-21.9, adult    Low serum thyroid stimulating hormone (TSH)    Multinodular goiter    Stenosis of carotid artery    Constipation    Postmenopausal    ASCVD (arteriosclerotic cardiovascular disease)    Hyperthyroidism    BMI less than 19,adult    Mass of lung     Patient is here for a chronic conditions follow up.    Reviewed labs 2/2022   dexa 3/2022 osteopenia high frax hip 4%-on fosomax since 2020.  No change since last study 2020    mammo 7/2021 neg    Pulm Dr. Jeffries - CT lung screening 2/022 .  1. Subpleural masses identified in the anterior left upper lobe and superior segment of the right lower lobe. Biopsy recommended.  2.  Associated left hilar and mediastinal lymphadenopathy.  3.  Severe centrilobular emphysematous lung disease  PET scan ordered  COPD- trelegy mild sob and torres  Losing weight- can't afford ensure.       Thyroid ultrasound 6/2021 Multinodular thyroid gland.  Slight enlargement of the largest nodules bilaterally has occurred since previous exam s/p FNA 9/2021  Benign; cytologic pattern   consistent with benign follicular nodule (see comment).     H/o carotid stenosis 9/18 last u/s- No evidence of a  hemodynamically significant carotid bifurcation stenosis.     Endocrine Dr. Graves H/o hyperthyroidism, goiter. Osteopenia with high frax risk now on fosomax since 2020  Objective:      Physical Exam  Vitals and nursing note reviewed.   Constitutional:       Appearance: She is well-developed.   Cardiovascular:      Rate and Rhythm: Normal rate and regular rhythm.      Heart sounds: Normal heart sounds.   Pulmonary:      Effort: Pulmonary effort is normal.      Breath sounds: Normal breath sounds.   Skin:     General: Skin is warm and dry.   Neurological:      Mental Status: She is alert and oriented to person, place, and time.         Assessment:       1. Hyperlipidemia LDL goal <130    2. Multinodular goiter    3. Hyperthyroidism    4. Chronic obstructive pulmonary disease, unspecified COPD type    5. Essential hypertension    6. Osteopenia, unspecified location    7. Mass of lung        Plan:         1. Hyperlipidemia LDL goal <130  Controlled on current medications.  Continue current medications.      2. Multinodular goiter  Cont monitoring    3. Hyperthyroidism  Cont endocrine monitoring and mgmt    4. Chronic obstructive pulmonary disease, unspecified COPD type  Cont current mgmt    5. Essential hypertension  Controlled on current medications.  Continue current medications.      6. Osteopenia, unspecified location  Cont current mgmt    7. Mass of lung  F/u with pet scan and pulm as planned        Time spent with patient: 20 minutes    Patient with be reevaluated in 6 months or sooner prn    Greater than 50% of this visit was spent counseling as described in above documentation:Yes

## 2022-03-10 ENCOUNTER — OFFICE VISIT (OUTPATIENT)
Dept: ENDOCRINOLOGY | Facility: CLINIC | Age: 72
End: 2022-03-10
Payer: MEDICARE

## 2022-03-10 VITALS
SYSTOLIC BLOOD PRESSURE: 102 MMHG | WEIGHT: 95.56 LBS | HEART RATE: 96 BPM | HEIGHT: 60 IN | BODY MASS INDEX: 18.76 KG/M2 | OXYGEN SATURATION: 94 % | TEMPERATURE: 98 F | DIASTOLIC BLOOD PRESSURE: 74 MMHG

## 2022-03-10 DIAGNOSIS — J44.9 CHRONIC OBSTRUCTIVE PULMONARY DISEASE, UNSPECIFIED COPD TYPE: ICD-10-CM

## 2022-03-10 DIAGNOSIS — E78.5 HYPERLIPIDEMIA, UNSPECIFIED HYPERLIPIDEMIA TYPE: ICD-10-CM

## 2022-03-10 DIAGNOSIS — I10 HYPERTENSION, UNSPECIFIED TYPE: ICD-10-CM

## 2022-03-10 DIAGNOSIS — E04.2 MULTIPLE THYROID NODULES: ICD-10-CM

## 2022-03-10 DIAGNOSIS — Z72.0 TOBACCO USE: ICD-10-CM

## 2022-03-10 DIAGNOSIS — M85.80 OSTEOPENIA, UNSPECIFIED LOCATION: ICD-10-CM

## 2022-03-10 DIAGNOSIS — E05.90 HYPERTHYROIDISM: Primary | ICD-10-CM

## 2022-03-10 PROCEDURE — 99999 PR PBB SHADOW E&M-EST. PATIENT-LVL IV: ICD-10-PCS | Mod: PBBFAC,,, | Performed by: PHYSICIAN ASSISTANT

## 2022-03-10 PROCEDURE — 99999 PR PBB SHADOW E&M-EST. PATIENT-LVL IV: CPT | Mod: PBBFAC,,, | Performed by: PHYSICIAN ASSISTANT

## 2022-03-10 PROCEDURE — 99214 OFFICE O/P EST MOD 30 MIN: CPT | Mod: PBBFAC,PO | Performed by: PHYSICIAN ASSISTANT

## 2022-03-10 PROCEDURE — 99213 PR OFFICE/OUTPT VISIT, EST, LEVL III, 20-29 MIN: ICD-10-PCS | Mod: S$PBB,,, | Performed by: PHYSICIAN ASSISTANT

## 2022-03-10 PROCEDURE — 99213 OFFICE O/P EST LOW 20 MIN: CPT | Mod: S$PBB,,, | Performed by: PHYSICIAN ASSISTANT

## 2022-03-10 RX ORDER — METHIMAZOLE 5 MG/1
TABLET ORAL
Qty: 180 TABLET | Refills: 3 | Status: SHIPPED | OUTPATIENT
Start: 2022-03-10 | End: 2023-01-01

## 2022-03-10 NOTE — PROGRESS NOTES
Patient ID: Anuja Cool is a 71 y.o. female.    Chief Complaint:  Nodular thyroid disease/hyperthyroidism    History of Present Illness    Anuja Cool is a 71 y.o. female here for a f/u care visit today on account of thyroid nodular disease and TSH suppression suggestive of possible hyperthyroidism.   Last thyroid USS was from  which shows a 2.4 cm nodule in the left thyroid. FNA was benign. This was previously biopsied in 2017. No fhx of DM or thyroid disease. No hx of radiation. Her diet is low in seafood, kelp and soy. Her son has lung cancer. Taking 5 mg of methimazole during the week and 10 mg on the week.  Previously took atenolol 100 mg qd but the prescription .     No dysphagia. Occasional voice changes while coughing and sob.    + constipation, insomnia (trouble staying asleep).     No hair loss, tremors, changes in nails, sweating, wt loss.     Her last DEXA was from 3/22 and showed osteopenia with a high fracture risk. Taking fosamax 70 mg weekly (3/20) and otc vitamin d (2000 IU). No fractures or falls. She had one steriod injection during a URI this year. No exercise.     Patient has long standing poor sleep. Patients sleep is fragmented mainly because of nocturia and frequency.  Grav 3 Para 3+0 (2 alive).     No tearing or grittiness. She does have bilataal cataracts L >> R for which she is pending cataract surgery.  She smokes 5 cigg daily. She smoked for 50 years. She will drink a glass of wine 4x weekly. Patient drinks 2 cups of decaffienated coffee.    Wt Readings from Last 10 Encounters:   03/10/22 43.3 kg (95 lb 9.1 oz)   22 44.2 kg (97 lb 7.1 oz)   22 43.5 kg (96 lb)   22 43.5 kg (96 lb)   02/10/22 42.5 kg (93 lb 11.1 oz)   21 46 kg (101 lb 6.6 oz)   21 45.2 kg (99 lb 10.4 oz)   20 45.8 kg (100 lb 15.5 oz)   20 45.2 kg (99 lb 10.4 oz)   20 45.1 kg (99 lb 8.6 oz)      ROS:   Constitutional: energy is improved, Difficulty  sleeping, weight loss (5 lbs).  Eyes: No recent visual changes, no SOB  Cardiovascular: Denies current anginal symptoms  Respiratory: + cough, sob (COPD)  Gastrointestinal: Denies recent bowel disturbances  GenitoUrinary - No dysuria  Skin: No new skin rash  Neurologic: No focal neurologic complaints  Endo: no polyphagia, polyuria or polydipsia  Remainder ROS negative     Objective:   /74 (BP Location: Left arm, Patient Position: Sitting, BP Method: Small (Manual))   Pulse 96   Temp 98.2 °F (36.8 °C) (Oral)   Ht 5' (1.524 m)   Wt 43.3 kg (95 lb 9.1 oz)   SpO2 (!) 94%   BMI 18.66 kg/m²  There is no height or weight on file to calculate BSA.     Physical Exam  Vitals and nursing note reviewed.   Constitutional:       General: She is not in acute distress.     Appearance: She is well-developed. She is not ill-appearing or diaphoretic.      Comments: Elderly female  Not pale, anicteric and afebrile. Well hydrated. Not in any acute distress.   HENT:      Head: Normocephalic and atraumatic. Not macrocephalic. No right periorbital erythema or left periorbital erythema. Hair is normal.      Nose: Nose normal.      Mouth/Throat:      Mouth: Mucous membranes are not pale and not dry.      Pharynx: No oropharyngeal exudate.   Eyes:      General: Lids are normal. No scleral icterus.        Right eye: No discharge.         Left eye: No discharge.      Conjunctiva/sclera: Conjunctivae normal.      Pupils: Pupils are equal, round, and reactive to light.      Comments: No evidence of any opthalmopathy   Neck:      Thyroid: No thyroid mass or thyromegaly.      Vascular: Normal carotid pulses. No carotid bruit or JVD.      Trachea: Trachea and phonation normal. No tracheal deviation.        Comments: No nuchal AN.   Cardiovascular:      Rate and Rhythm: Normal rate and regular rhythm.      Chest Wall: PMI is not displaced.      Pulses: Normal pulses.      Heart sounds: S1 normal and S2 normal. Murmur (soft 3/6 ESM maximal  over cardiac base with no radiation to the neck) heard.     No gallop.   Pulmonary:      Effort: Pulmonary effort is normal. No respiratory distress.      Breath sounds: Normal breath sounds. No wheezing or rales.   Abdominal:      Palpations: Abdomen is soft. There is no hepatomegaly or splenomegaly.      Hernia: There is no hernia in the ventral area.   Musculoskeletal:         General: No tenderness.      Right shoulder: No deformity, bony tenderness or crepitus. Normal range of motion. Normal strength. Normal pulse.      Cervical back: Full passive range of motion without pain, normal range of motion and neck supple.      Comments: No pedal edema. Has no digital clubbing and no nail changes suggestive of thyroid acropachy. She has no pretibial myxedema but does have fine tremors of the outstretched hands.   Lymphadenopathy:      Cervical: No cervical adenopathy.   Skin:     General: Skin is warm and dry.      Coloration: Skin is not pale.      Findings: No bruising, ecchymosis, erythema, petechiae or rash.      Nails: There is no clubbing.      Comments: Age appropriate diffuse cutaneous atrophy. No ecchymoses seen.   Neurological:      Mental Status: She is alert and oriented to person, place, and time.      Cranial Nerves: No cranial nerve deficit.      Sensory: No sensory deficit.      Motor: No tremor, atrophy or abnormal muscle tone.      Coordination: Coordination normal.      Gait: Gait normal.      Deep Tendon Reflexes: Reflexes are normal and symmetric. Reflexes normal.   Psychiatric:         Speech: Speech normal.         Behavior: Behavior normal.         Thought Content: Thought content normal.         Judgment: Judgment normal.     Lab Review:     Personally reviewed labs below:    Lab Visit on 02/16/2022   Component Date Value Ref Range Status    WBC 02/16/2022 9.76  3.90 - 12.70 K/uL Final    RBC 02/16/2022 4.64  4.00 - 5.40 M/uL Final    Hemoglobin 02/16/2022 14.2  12.0 - 16.0 g/dL Final     Hematocrit 02/16/2022 43.4  37.0 - 48.5 % Final    MCV 02/16/2022 94  82 - 98 fL Final    MCH 02/16/2022 30.6  27.0 - 31.0 pg Final    MCHC 02/16/2022 32.7  32.0 - 36.0 g/dL Final    RDW 02/16/2022 13.8  11.5 - 14.5 % Final    Platelets 02/16/2022 407  150 - 450 K/uL Final    MPV 02/16/2022 10.5  9.2 - 12.9 fL Final    Immature Granulocytes 02/16/2022 0.2  0.0 - 0.5 % Final    Gran # (ANC) 02/16/2022 6.0  1.8 - 7.7 K/uL Final    Immature Grans (Abs) 02/16/2022 0.02  0.00 - 0.04 K/uL Final    Comment: Mild elevation in immature granulocytes is non specific and   can be seen in a variety of conditions including stress response,   acute inflammation, trauma and pregnancy. Correlation with other   laboratory and clinical findings is essential.      Lymph # 02/16/2022 2.3  1.0 - 4.8 K/uL Final    Mono # 02/16/2022 0.9  0.3 - 1.0 K/uL Final    Eos # 02/16/2022 0.5  0.0 - 0.5 K/uL Final    Baso # 02/16/2022 0.07  0.00 - 0.20 K/uL Final    nRBC 02/16/2022 0  0 /100 WBC Final    Gran % 02/16/2022 61.4  38.0 - 73.0 % Final    Lymph % 02/16/2022 23.9  18.0 - 48.0 % Final    Mono % 02/16/2022 8.9  4.0 - 15.0 % Final    Eosinophil % 02/16/2022 4.9  0.0 - 8.0 % Final    Basophil % 02/16/2022 0.7  0.0 - 1.9 % Final    Differential Method 02/16/2022 Automated   Final    WBC 02/16/2022 9.76  3.90 - 12.70 K/uL Final    RBC 02/16/2022 4.64  4.00 - 5.40 M/uL Final    Hemoglobin 02/16/2022 14.2  12.0 - 16.0 g/dL Final    Hematocrit 02/16/2022 43.4  37.0 - 48.5 % Final    MCV 02/16/2022 94  82 - 98 fL Final    MCH 02/16/2022 30.6  27.0 - 31.0 pg Final    MCHC 02/16/2022 32.7  32.0 - 36.0 g/dL Final    RDW 02/16/2022 13.8  11.5 - 14.5 % Final    Platelets 02/16/2022 407  150 - 450 K/uL Final    MPV 02/16/2022 10.5  9.2 - 12.9 fL Final    Immature Granulocytes 02/16/2022 0.2  0.0 - 0.5 % Final    Gran # (ANC) 02/16/2022 6.0  1.8 - 7.7 K/uL Final    Immature Grans (Abs) 02/16/2022 0.02  0.00 - 0.04 K/uL  Final    Comment: Mild elevation in immature granulocytes is non specific and   can be seen in a variety of conditions including stress response,   acute inflammation, trauma and pregnancy. Correlation with other   laboratory and clinical findings is essential.      Lymph # 02/16/2022 2.3  1.0 - 4.8 K/uL Final    Mono # 02/16/2022 0.9  0.3 - 1.0 K/uL Final    Eos # 02/16/2022 0.5  0.0 - 0.5 K/uL Final    Baso # 02/16/2022 0.07  0.00 - 0.20 K/uL Final    nRBC 02/16/2022 0  0 /100 WBC Final    Gran % 02/16/2022 61.4  38.0 - 73.0 % Final    Lymph % 02/16/2022 23.9  18.0 - 48.0 % Final    Mono % 02/16/2022 8.9  4.0 - 15.0 % Final    Eosinophil % 02/16/2022 4.9  0.0 - 8.0 % Final    Basophil % 02/16/2022 0.7  0.0 - 1.9 % Final    Differential Method 02/16/2022 Automated   Final    Sodium 02/16/2022 138  136 - 145 mmol/L Final    Potassium 02/16/2022 4.9  3.5 - 5.1 mmol/L Final    Chloride 02/16/2022 99  95 - 110 mmol/L Final    CO2 02/16/2022 28  23 - 29 mmol/L Final    Glucose 02/16/2022 93  70 - 110 mg/dL Final    BUN 02/16/2022 5 (A) 8 - 23 mg/dL Final    Creatinine 02/16/2022 0.6  0.5 - 1.4 mg/dL Final    Calcium 02/16/2022 9.6  8.7 - 10.5 mg/dL Final    Total Protein 02/16/2022 7.6  6.0 - 8.4 g/dL Final    Albumin 02/16/2022 3.5  3.5 - 5.2 g/dL Final    Total Bilirubin 02/16/2022 0.5  0.1 - 1.0 mg/dL Final    Comment: For infants and newborns, interpretation of results should be based  on gestational age, weight and in agreement with clinical  observations.    Premature Infant recommended reference ranges:  Up to 24 hours.............<8.0 mg/dL  Up to 48 hours............<12.0 mg/dL  3-5 days..................<15.0 mg/dL  6-29 days.................<15.0 mg/dL      Alkaline Phosphatase 02/16/2022 88  55 - 135 U/L Final    AST 02/16/2022 18  10 - 40 U/L Final    ALT 02/16/2022 10  10 - 44 U/L Final    Anion Gap 02/16/2022 11  8 - 16 mmol/L Final    eGFR if African American 02/16/2022 >60.0   >60 mL/min/1.73 m^2 Final    eGFR if non African American 02/16/2022 >60.0  >60 mL/min/1.73 m^2 Final    Comment: Calculation used to obtain the estimated glomerular filtration  rate (eGFR) is the CKD-EPI equation.       Cholesterol 02/16/2022 167  120 - 199 mg/dL Final    Comment: The National Cholesterol Education Program (NCEP) has set the  following guidelines (reference ranges) for Cholesterol:  Optimal.....................<200 mg/dL  Borderline High.............200-239 mg/dL  High........................> or = 240 mg/dL      Triglycerides 02/16/2022 84  30 - 150 mg/dL Final    Comment: The National Cholesterol Education Program (NCEP) has set the  following guidelines (reference values) for triglycerides:  Normal......................<150 mg/dL  Borderline High.............150-199 mg/dL  High........................200-499 mg/dL      HDL 02/16/2022 61  40 - 75 mg/dL Final    Comment: The National Cholesterol Education Program (NCEP) has set the  following guidelines (reference values) for HDL Cholesterol:  Low...............<40 mg/dL  Optimal...........>60 mg/dL      LDL Cholesterol 02/16/2022 89.2  63.0 - 159.0 mg/dL Final    Comment: The National Cholesterol Education Program (NCEP) has set the  following guidelines (reference values) for LDL Cholesterol:  Optimal.......................<130 mg/dL  Borderline High...............130-159 mg/dL  High..........................160-189 mg/dL  Very High.....................>190 mg/dL      HDL/Cholesterol Ratio 02/16/2022 36.5  20.0 - 50.0 % Final    Total Cholesterol/HDL Ratio 02/16/2022 2.7  2.0 - 5.0 Final    Non-HDL Cholesterol 02/16/2022 106  mg/dL Final    Comment: Risk category and Non-HDL cholesterol goals:  Coronary heart disease (CHD)or equivalent (10-year risk of CHD >20%):  Non-HDL cholesterol goal     <130 mg/dL  Two or more CHD risk factors and 10-year risk of CHD <= 20%:  Non-HDL cholesterol goal     <160 mg/dL  0 to 1 CHD risk factor:  Non-HDL  cholesterol goal     <190 mg/dL      TSH 02/16/2022 0.895  0.400 - 4.000 uIU/mL Final    Free T4 02/16/2022 0.69 (A) 0.71 - 1.51 ng/dL Final   Lab Visit on 10/04/2021   Component Date Value Ref Range Status    SARS-CoV2 (COVID-19) Qualitative P* 10/04/2021 Not Detected  Not Detected Final    Comment: This test utilizes a real-time reverse transcription  polymerase chain reaction procedure to amplify and   detect the SARS-CoV-2 RdRp and N genes.    The analytical sensitivity (limit of detection) of   this assay is 100 copies/mL.     A Detected result is considered positive for COVID-19.  This patient is considered infected with the   SARS-CoV-2 virus and is presumed to be contagious.    A Not Detected result means that SARS-CoV-2 RNA is not  present above the limit of detection. It does not rule  out the possibility of COVID-19 and should not be the  sole basis for treatment decisions.  If COVID-19 is   strongly suspected based on clinical and exposure   history,re-testing should be considered.      This test is only for use under Food and Drug   Administration s Emergency Use Authorization (EUA).   Commercial reagents are provided by [employer].  Performance characteristics of the EUA have been   independently verified by [facility]   Department of Pathology a                           nd Laboratory Medicine.        SARS-COV-2- Cycle Number 10/04/2021 N/A   Final    Comment: CT (Cycle Threshold) values are surrogate markers of   nucleic acid concentration in a sample. They are non-standard  measurements and should only be interpreted by those familiar   with both PCR technology and the patient's clinical presentation.     Lab Visit on 09/28/2021   Component Date Value Ref Range Status    POC Rapid COVID 09/28/2021 Negative  Negative Final     Acceptable 09/28/2021 Yes   Final   Hospital Outpatient Visit on 09/17/2021   Component Date Value Ref Range Status    Final  Pathologic Diagnosis 09/17/2021    Final                    Value:LEFT LOWER THYROID NODULE, FINE NEEDLE ASPIRATE:       Diagnostic category ([place] [facility]):  Benign; cytologic pattern  consistent with benign follicular nodule (see comment).  COMMENT:  The specimen demonstrates borderline cellularity with occasional  macrofollicular groups of follicular cells with no cytologic atypia.  Within  the background there are macrophages, blood and focal colloid.      Interp By Isabel Alegria MD, Signed on 09/21/2021 at 09:37    Disclaimer 09/17/2021    Final                    Value:Screening was performed at [facility], [address], [place] [address].        1. Hyperthyroidism  methIMAzole (TAPAZOLE) 5 MG Tab   2. Multiple thyroid nodules  TSH    US Soft Tissue Head Neck Thyroid   3. Osteopenia, unspecified location     4. Tobacco use     5. Hypertension, unspecified type     6. Chronic obstructive pulmonary disease, unspecified COPD type     7. Hyperlipidemia, unspecified hyperlipidemia type        Hyperthyroidism-continue atenolol. FT4 is slightly low. Recheck in two months. If low will decrease dose to 5 mg daily. Pt is not symptomatic. Abs for Graves' Disease were negative. Discussed treatment options for hyperthyroidism including methimazole, [place] or an elective thyroidectomy. The pt elects to continue methimazole.    Nodular thyroid disease-repeat thyroid u/s 6/22.  Osteopenia with a high fracture risk-continue calcium and vitamin D OTC supplements and will repeat DEXA ~ 3/22. Continue alendronate 70 mg weekly.  Tobacco use-encouraged to quit smoking  Hypertension-stable-continue meds  COPD-referral to pulmonology. Continue Xopenex.   [id number] statin-recheck    TSH, T4 in two months  F/u in 6 mths w/ tsh and thyroid u/s

## 2022-03-11 ENCOUNTER — HOSPITAL ENCOUNTER (OUTPATIENT)
Dept: RADIOLOGY | Facility: HOSPITAL | Age: 72
Discharge: HOME | End: 2022-03-11
Attending: INTERNAL MEDICINE
Payer: MEDICARE

## 2022-03-11 VITALS — BODY MASS INDEX: 17.3 KG/M2 | WEIGHT: 94 LBS | HEIGHT: 62 IN

## 2022-03-11 DIAGNOSIS — R91.1 LUNG NODULE: ICD-10-CM

## 2022-03-11 LAB — GLUCOSE SERPL-MCNC: 120 MG/DL (ref 70–110)

## 2022-03-11 PROCEDURE — A9552 F18 FDG: HCPCS | Mod: PO

## 2022-03-11 PROCEDURE — 78815 PET IMAGE W/CT SKULL-THIGH: CPT | Mod: TC,PO

## 2022-03-14 ENCOUNTER — TELEPHONE (OUTPATIENT)
Dept: PULMONOLOGY | Facility: CLINIC | Age: 72
End: 2022-03-14
Payer: MEDICARE

## 2022-03-14 NOTE — TELEPHONE ENCOUNTER
----- Message from Debbie Siegel MA sent at 3/14/2022  7:50 AM CDT -----  Please see Doc note attached. Thanks   ----- Message -----  From: Jonathan Jeffries MD  Sent: 3/12/2022   5:24 AM CDT  To: Debbie Siegel MA    PET scan shows activity and we need to hae her come in this week to discuss options

## 2022-03-17 ENCOUNTER — OFFICE VISIT (OUTPATIENT)
Dept: PULMONOLOGY | Facility: CLINIC | Age: 72
End: 2022-03-17
Payer: MEDICARE

## 2022-03-17 VITALS
HEART RATE: 96 BPM | BODY MASS INDEX: 17.38 KG/M2 | OXYGEN SATURATION: 97 % | SYSTOLIC BLOOD PRESSURE: 148 MMHG | DIASTOLIC BLOOD PRESSURE: 92 MMHG | WEIGHT: 95 LBS

## 2022-03-17 DIAGNOSIS — R91.8 MASS OF LUNG: ICD-10-CM

## 2022-03-17 DIAGNOSIS — Z72.0 TOBACCO USE: ICD-10-CM

## 2022-03-17 DIAGNOSIS — J44.9 CHRONIC OBSTRUCTIVE PULMONARY DISEASE, UNSPECIFIED COPD TYPE: ICD-10-CM

## 2022-03-17 DIAGNOSIS — J45.909 UNCOMPLICATED ASTHMA, UNSPECIFIED ASTHMA SEVERITY, UNSPECIFIED WHETHER PERSISTENT: ICD-10-CM

## 2022-03-17 PROCEDURE — 99214 OFFICE O/P EST MOD 30 MIN: CPT | Mod: S$GLB,,, | Performed by: INTERNAL MEDICINE

## 2022-03-17 PROCEDURE — 99214 PR OFFICE/OUTPT VISIT, EST, LEVL IV, 30-39 MIN: ICD-10-PCS | Mod: S$GLB,,, | Performed by: INTERNAL MEDICINE

## 2022-03-17 NOTE — PROGRESS NOTES
Office Visit Note *    Patient Name: Anuja Cool  MRN: [id number]  : 1950      Reason for visit: COPD    HPI:     2022 - Here to establish care,  Diagnosed with COPD (severity unknown).  Recently changed to TRELEGY and feels that she is doing better on that.  Currently smoking about 3/4 PPD  (1-1.5 PPD x 50 years).  Also carries diagnosed of asthma, has h/o allergies (+ skin test, never desensitized).  HAs never had a screening CT chest and does not remember her last CXR.  ROS as below.  We discussed cigarette cessation at length.    3/17/2022 - Here for review.  CT scan showed subpleural masses and PET scan shows activity in those areas.  Reviewed images with pt and we will plan t proceed with CT guided biopsy (left lesion first).  No evidence of other + areas.  PFT c/w   GOLD II A (FEV1 - 52%, DLCO 23 %).  CT scan does show fairly extensive emphysema changes.    Past Medical History    Past Medical History:   Diagnosis Date    Allergy     Arthritis     Asthma     Bursitis     COPD (chronic obstructive pulmonary disease)     Hypertension     Low sodium     Thyroid disease     nodules    Tinea pedis        Past Surgical History    Past Surgical History:   Procedure Laterality Date    COLONOSCOPY N/A 2018    Procedure: COLONOSCOPY;  Surgeon: Grey Roberts MD;  Location: [facility];  Service: Endoscopy;  Laterality: N/A;    HYSTERECTOMY      OOPHORECTOMY         Medications      Current Outpatient Medications:     alendronate (FOSAMAX) 70 MG tablet, Take 1 tablet by mouth once a week, Disp: 12 tablet, Rfl: 4    aspirin (ECOTRIN) 81 MG EC tablet, Take 81 mg by mouth once daily., Disp: , Rfl:     atenoloL (TENORMIN) 100 MG tablet, Take 1 tablet by mouth once daily, Disp: 90 tablet, Rfl: 0    cholecalciferol, vitamin D3, 3,000 unit Tab, Take by mouth., Disp: , Rfl:     fluticasone-umeclidin-vilanter (TRELEGY ELLIPTA) 200-62.5-25 mcg inhaler, Inhale 1 puff into the lungs once  daily., Disp: 90 each, Rfl: 3    ibuprofen (ADVIL,MOTRIN) 200 MG tablet, Take 200 mg by mouth every 6 (six) hours as needed for Pain., Disp: , Rfl:     levalbuterol (XOPENEX HFA) 45 mcg/actuation inhaler, INHALE 1 TO 2 PUFFS INTO LUNGS EVERY 4 HOURS AS NEEDED FOR WHEEZING AND FOR SHORTNESS OF BREATH, Disp: 45 g, Rfl: 1    levocetirizine (XYZAL) 5 MG tablet, Take 1 tablet (5 mg total) by mouth every evening., Disp: 30 tablet, Rfl: 2    lisinopriL 10 MG tablet, Take 1 tablet (10 mg total) by mouth once daily., Disp: 90 tablet, Rfl: 3    methIMAzole (TAPAZOLE) 5 MG Tab, Take one tablet Monday-Friday and two tablets on Saturday and Sunday., Disp: 180 tablet, Rfl: 3    multivit with minerals/lutein (MULTIVITAMIN 50 PLUS ORAL), Take by mouth., Disp: , Rfl:     rosuvastatin (CRESTOR) 20 MG tablet, Take 1 tablet by mouth once daily, Disp: 90 tablet, Rfl: 3    Allergies    Review of patient's allergies indicates:   Allergen Reactions    Tinactin [tolnaftate] Dermatitis    Advair diskus [fluticasone propion-salmeterol]      shakes    Albuterol      tremors    Sulfa (sulfonamide antibiotics)      unknown       SocHx    Social History     Tobacco Use   Smoking Status Current Every Day Smoker    Packs/day: 0.50   Smokeless Tobacco Never Used       Social History     Substance and Sexual Activity   Alcohol Use Yes    Alcohol/week: 4.0 standard drinks    Types: 4 Glasses of wine per week    Comment: occ. glass of wine       Drug Use - no  Occupation - retired, customer service  Asbestos exposure - no  Pets - no    FMHx    Family History   Problem Relation Age of Onset    Heart disease Mother     Cancer Mother         lung    Heart disease Father     Cancer Father         lymphoma    Diabetes Neg Hx          Review of Systems  Review of Systems   Constitutional: Negative for chills, diaphoresis, fever, malaise/fatigue and weight loss.        Weight loss   HENT: Negative for congestion.    Eyes: Negative for pain.    Respiratory: Positive for shortness of breath (ELLISON). Negative for cough, hemoptysis, sputum production, wheezing and stridor.    Cardiovascular: Negative for chest pain, palpitations, orthopnea, claudication, leg swelling and PND.   Gastrointestinal: Negative for abdominal pain, constipation, diarrhea, heartburn, nausea and vomiting.   Genitourinary: Negative for dysuria, frequency and urgency.   Musculoskeletal: Negative for falls and myalgias.   Neurological: Negative for sensory change, focal weakness and weakness.   Endo/Heme/Allergies:        + over active thyroid   Psychiatric/Behavioral: Negative for depression, substance abuse and suicidal ideas. The patient is not nervous/anxious.        Physical Exam    Vitals:    03/17/22 1108   BP: (!) 148/92   BP Location: Left arm   Patient Position: Sitting   BP Method: X-Large (Manual)   Pulse: 96   SpO2: 97%   Weight: 43.1 kg (95 lb)       Physical Exam  Vitals and nursing note reviewed.   Constitutional:       General: She is not in acute distress.     Appearance: She is well-developed. She is not ill-appearing, toxic-appearing or diaphoretic.      Comments: Thin female   HENT:      Head: Normocephalic and atraumatic.      Right Ear: External ear normal.      Left Ear: External ear normal.      Nose: Nose normal.   Eyes:      General: No scleral icterus.        Right eye: No discharge.         Left eye: No discharge.      Extraocular Movements: Extraocular movements intact.      Conjunctiva/sclera: Conjunctivae normal.      Pupils: Pupils are equal, round, and reactive to light.   Neck:      Thyroid: No thyromegaly.      Vascular: No JVD.      Trachea: No tracheal deviation.   Cardiovascular:      Rate and Rhythm: Normal rate and regular rhythm.      Heart sounds: Normal heart sounds. No murmur heard.    No friction rub. No gallop.      Comments: + bilateral carotid bruits (known issue)  Pulmonary:      Effort: Pulmonary effort is normal. No respiratory distress.       Breath sounds: No stridor. No wheezing, rhonchi or rales.      Comments: Decreased BS  No acc m use  Chest:      Chest wall: No tenderness.   Abdominal:      General: Bowel sounds are normal. There is no distension.      Palpations: Abdomen is soft.      Tenderness: There is no abdominal tenderness. There is no guarding.   Musculoskeletal:         General: No tenderness. Normal range of motion.      Cervical back: Normal range of motion and neck supple.      Right lower leg: No edema.      Left lower leg: No edema.   Lymphadenopathy:      Cervical: No cervical adenopathy.   Skin:     General: Skin is warm and dry.   Neurological:      General: No focal deficit present.      Mental Status: She is alert and oriented to person, place, and time. Mental status is at baseline.      Cranial Nerves: No cranial nerve deficit.      Motor: No weakness.      Gait: Gait normal.   Psychiatric:         Mood and Affect: Mood normal.         Behavior: Behavior normal.         Thought Content: Thought content normal.         Judgment: Judgment normal.         Labs    Lab Results   Component Value Date    WBC 9.76 02/16/2022    WBC 9.76 02/16/2022    HGB 14.2 02/16/2022    HGB 14.2 02/16/2022    HCT 43.4 02/16/2022    HCT 43.4 02/16/2022     02/16/2022     02/16/2022       Sodium   Date Value Ref Range Status   02/16/2022 138 136 - 145 mmol/L Final     Potassium   Date Value Ref Range Status   02/16/2022 4.9 3.5 - 5.1 mmol/L Final     Chloride   Date Value Ref Range Status   02/16/2022 99 95 - 110 mmol/L Final     CO2   Date Value Ref Range Status   02/16/2022 28 23 - 29 mmol/L Final     Glucose   Date Value Ref Range Status   02/16/2022 93 70 - 110 mg/dL Final     BUN   Date Value Ref Range Status   02/16/2022 5 (L) 8 - 23 mg/dL Final     Creatinine   Date Value Ref Range Status   02/16/2022 0.6 0.5 - 1.4 mg/dL Final     Calcium   Date Value Ref Range Status   02/16/2022 9.6 8.7 - 10.5 mg/dL Final     Total Protein    Date Value Ref Range Status   02/16/2022 7.6 6.0 - 8.4 g/dL Final     Albumin   Date Value Ref Range Status   02/16/2022 3.5 3.5 - 5.2 g/dL Final     Total Bilirubin   Date Value Ref Range Status   02/16/2022 0.5 0.1 - 1.0 mg/dL Final     Comment:     For infants and newborns, interpretation of results should be based  on gestational age, weight and in agreement with clinical  observations.    Premature Infant recommended reference ranges:  Up to 24 hours.............<8.0 mg/dL  Up to 48 hours............<12.0 mg/dL  3-5 days..................<15.0 mg/dL  6-29 days.................<15.0 mg/dL       Alkaline Phosphatase   Date Value Ref Range Status   02/16/2022 88 55 - 135 U/L Final     AST   Date Value Ref Range Status   02/16/2022 18 10 - 40 U/L Final     ALT   Date Value Ref Range Status   02/16/2022 10 10 - 44 U/L Final     Anion Gap   Date Value Ref Range Status   02/16/2022 11 8 - 16 mmol/L Final       Xrays        Impression/Plan    Problem List Items Addressed This Visit        Pulmonary    Asthma     · Clinically stable           Chronic obstructive pulmonary disease     · GOLD II B  · Continue present treatments           Mass of lung     · Will proceed with biopsy           Relevant Orders    CT Biopsy Lung w/ guidance       Other    Tobacco use     · Needs to stop smoking                     Jonathan Jeffries MD

## 2022-03-18 ENCOUNTER — TELEPHONE (OUTPATIENT)
Dept: RADIOLOGY | Facility: HOSPITAL | Age: 72
End: 2022-03-18
Payer: MEDICARE

## 2022-03-18 NOTE — NURSING
Pt scheduled for ct guided lung bx.  Given arrival date and time of 3/31/22 at 7am.   Instructed to have nothing to eat or drink after midnight day of procedure.  Ok to have sips of water to take morning bp meds.  Instructed to hold aspirin beginning 3/24/22  Instructed to hold ibuprofen beginning 3/29/22  Pt must have a  day of procedure.   Pt verbalized understanding of above instructions.

## 2022-03-31 ENCOUNTER — HOSPITAL ENCOUNTER (OUTPATIENT)
Dept: RADIOLOGY | Facility: HOSPITAL | Age: 72
Discharge: HOME | End: 2022-03-31
Attending: RADIOLOGY
Payer: MEDICARE

## 2022-03-31 ENCOUNTER — HOSPITAL ENCOUNTER (OUTPATIENT)
Dept: RADIOLOGY | Facility: HOSPITAL | Age: 72
Discharge: HOME | End: 2022-03-31
Attending: INTERNAL MEDICINE
Payer: MEDICARE

## 2022-03-31 VITALS
BODY MASS INDEX: 17.48 KG/M2 | HEIGHT: 62 IN | SYSTOLIC BLOOD PRESSURE: 146 MMHG | DIASTOLIC BLOOD PRESSURE: 61 MMHG | WEIGHT: 95 LBS | TEMPERATURE: 99 F | RESPIRATION RATE: 16 BRPM | HEART RATE: 77 BPM | OXYGEN SATURATION: 93 %

## 2022-03-31 LAB
APTT PPP: 30 SEC (ref 23.3–35.1)
BASOPHILS # BLD AUTO: 0.07 K/UL (ref 0–0.2)
BASOPHILS NFR BLD: 0.7 % (ref 0–1.9)
DIFFERENTIAL METHOD: ABNORMAL
EOSINOPHIL # BLD AUTO: 0.4 K/UL (ref 0–0.5)
EOSINOPHIL NFR BLD: 4.5 % (ref 0–8)
ERYTHROCYTE [DISTWIDTH] IN BLOOD BY AUTOMATED COUNT: 15.6 % (ref 11.5–14.5)
HCT VFR BLD AUTO: 39.5 % (ref 37–48.5)
HGB BLD-MCNC: 12.6 G/DL (ref 12–16)
IMM GRANULOCYTES # BLD AUTO: 0.04 K/UL (ref 0–0.04)
IMM GRANULOCYTES NFR BLD AUTO: 0.4 % (ref 0–0.5)
INR PPP: 1.1
LYMPHOCYTES # BLD AUTO: 2 K/UL (ref 1–4.8)
LYMPHOCYTES NFR BLD: 21.3 % (ref 18–48)
MCH RBC QN AUTO: 30.8 PG (ref 27–31)
MCHC RBC AUTO-ENTMCNC: 31.9 G/DL (ref 32–36)
MCV RBC AUTO: 97 FL (ref 82–98)
MONOCYTES # BLD AUTO: 0.7 K/UL (ref 0.3–1)
MONOCYTES NFR BLD: 7 % (ref 4–15)
NEUTROPHILS # BLD AUTO: 6.2 K/UL (ref 1.8–7.7)
NEUTROPHILS NFR BLD: 66.1 % (ref 38–73)
NRBC BLD-RTO: 0 /100 WBC
PLATELET # BLD AUTO: 374 K/UL (ref 150–450)
PMV BLD AUTO: 9.7 FL (ref 9.2–12.9)
PROTHROMBIN TIME: 13.6 SEC (ref 11.4–13.7)
RBC # BLD AUTO: 4.09 M/UL (ref 4–5.4)
WBC # BLD AUTO: 9.35 K/UL (ref 3.9–12.7)

## 2022-03-31 PROCEDURE — 85610 PROTHROMBIN TIME: CPT | Performed by: RADIOLOGY

## 2022-03-31 PROCEDURE — 32408 CORE NDL BX LNG/MED PERQ: CPT

## 2022-03-31 PROCEDURE — 71045 X-RAY EXAM CHEST 1 VIEW: CPT | Mod: TC

## 2022-03-31 PROCEDURE — 88305 TISSUE EXAM BY PATHOLOGIST: CPT | Mod: TC

## 2022-03-31 PROCEDURE — 99152 MOD SED SAME PHYS/QHP 5/>YRS: CPT

## 2022-03-31 PROCEDURE — 63600175 PHARM REV CODE 636 W HCPCS: Performed by: RADIOLOGY

## 2022-03-31 PROCEDURE — 85025 COMPLETE CBC W/AUTO DIFF WBC: CPT | Performed by: RADIOLOGY

## 2022-03-31 PROCEDURE — 85730 THROMBOPLASTIN TIME PARTIAL: CPT | Performed by: RADIOLOGY

## 2022-03-31 PROCEDURE — 88341 IMHCHEM/IMCYTCHM EA ADD ANTB: CPT | Mod: TC,59

## 2022-03-31 PROCEDURE — 25000003 PHARM REV CODE 250: Performed by: RADIOLOGY

## 2022-03-31 RX ORDER — SODIUM CHLORIDE 9 MG/ML
INJECTION, SOLUTION INTRAVENOUS
Status: COMPLETED | OUTPATIENT
Start: 2022-03-31 | End: 2022-03-31

## 2022-03-31 RX ORDER — FENTANYL CITRATE 50 UG/ML
INJECTION, SOLUTION INTRAMUSCULAR; INTRAVENOUS CODE/TRAUMA/SEDATION MEDICATION
Status: COMPLETED | OUTPATIENT
Start: 2022-03-31 | End: 2022-03-31

## 2022-03-31 RX ORDER — MIDAZOLAM HYDROCHLORIDE 1 MG/ML
INJECTION INTRAMUSCULAR; INTRAVENOUS CODE/TRAUMA/SEDATION MEDICATION
Status: COMPLETED | OUTPATIENT
Start: 2022-03-31 | End: 2022-03-31

## 2022-03-31 RX ADMIN — MIDAZOLAM HYDROCHLORIDE 1 MG: 1 INJECTION, SOLUTION INTRAMUSCULAR; INTRAVENOUS at 09:03

## 2022-03-31 RX ADMIN — FENTANYL CITRATE 50 MCG: 50 INJECTION INTRAMUSCULAR; INTRAVENOUS at 09:03

## 2022-03-31 RX ADMIN — SODIUM CHLORIDE 250 ML/HR: 9 INJECTION, SOLUTION INTRAVENOUS at 09:03

## 2022-04-07 ENCOUNTER — OFFICE VISIT (OUTPATIENT)
Dept: PULMONOLOGY | Facility: CLINIC | Age: 72
End: 2022-04-07
Payer: MEDICARE

## 2022-04-07 VITALS
BODY MASS INDEX: 17.56 KG/M2 | HEART RATE: 96 BPM | DIASTOLIC BLOOD PRESSURE: 84 MMHG | WEIGHT: 96 LBS | SYSTOLIC BLOOD PRESSURE: 144 MMHG | OXYGEN SATURATION: 96 %

## 2022-04-07 DIAGNOSIS — J44.9 CHRONIC OBSTRUCTIVE PULMONARY DISEASE, UNSPECIFIED COPD TYPE: ICD-10-CM

## 2022-04-07 DIAGNOSIS — Z72.0 TOBACCO USE: ICD-10-CM

## 2022-04-07 DIAGNOSIS — C34.90 SMALL CELL LUNG CANCER: ICD-10-CM

## 2022-04-07 DIAGNOSIS — C34.90 MALIGNANT NEOPLASM OF UNSPECIFIED PART OF UNSPECIFIED BRONCHUS OR LUNG: ICD-10-CM

## 2022-04-07 PROCEDURE — 99214 PR OFFICE/OUTPT VISIT, EST, LEVL IV, 30-39 MIN: ICD-10-PCS | Mod: S$GLB,,, | Performed by: INTERNAL MEDICINE

## 2022-04-07 PROCEDURE — 99214 OFFICE O/P EST MOD 30 MIN: CPT | Mod: S$GLB,,, | Performed by: INTERNAL MEDICINE

## 2022-04-07 NOTE — PROGRESS NOTES
Office Visit Note *    Patient Name: Anuja Cool  MRN: [id number]  : 1950      Reason for visit: COPD    HPI:     2022 - Here to establish care,  Diagnosed with COPD (severity unknown).  Recently changed to TRELEGY and feels that she is doing better on that.  Currently smoking about 3/4 PPD  (1-1.5 PPD x 50 years).  Also carries diagnosed of asthma, has h/o allergies (+ skin test, never desensitized).  HAs never had a screening CT chest and does not remember her last CXR.  ROS as below.  We discussed cigarette cessation at length.    3/17/2022 - Here for review.  CT scan showed subpleural masses and PET scan shows activity in those areas.  Reviewed images with pt and we will plan t proceed with CT guided biopsy (left lesion first).  No evidence of other + areas.  PFT c/w   GOLD II A (FEV1 - 52%, DLCO 23 %).  CT scan does show fairly extensive emphysema changes.    2022 - Here for review of biopsy results - + small cell cancer.  We discussed moving forward with MRI brain and referral to Radiation Oncology and Oncology.  All questions have been answered.    Past Medical History    Past Medical History:   Diagnosis Date    Allergy     Arthritis     Asthma     Bursitis     COPD (chronic obstructive pulmonary disease)     Hypertension     Low sodium     Lung nodules 2022    Thyroid disease     nodules    Tinea pedis        Past Surgical History    Past Surgical History:   Procedure Laterality Date    COLONOSCOPY N/A 2018    Procedure: COLONOSCOPY;  Surgeon: Grey Roberts MD;  Location: [facility];  Service: Endoscopy;  Laterality: N/A;    HYSTERECTOMY      OOPHORECTOMY         Medications      Current Outpatient Medications:     alendronate (FOSAMAX) 70 MG tablet, Take 1 tablet by mouth once a week, Disp: 12 tablet, Rfl: 4    aspirin (ECOTRIN) 81 MG EC tablet, Take 81 mg by mouth once daily., Disp: , Rfl:     atenoloL (TENORMIN) 100 MG tablet, Take 1 tablet by mouth once  daily, Disp: 90 tablet, Rfl: 0    cholecalciferol, vitamin D3, 3,000 unit Tab, Take by mouth., Disp: , Rfl:     fluticasone-umeclidin-vilanter (TRELEGY ELLIPTA) 200-62.5-25 mcg inhaler, Inhale 1 puff into the lungs once daily., Disp: 90 each, Rfl: 3    ibuprofen (ADVIL,MOTRIN) 200 MG tablet, Take 200 mg by mouth every 6 (six) hours as needed for Pain., Disp: , Rfl:     levalbuterol (XOPENEX HFA) 45 mcg/actuation inhaler, INHALE 1 TO 2 PUFFS INTO LUNGS EVERY 4 HOURS AS NEEDED FOR WHEEZING AND FOR SHORTNESS OF BREATH, Disp: 45 g, Rfl: 1    levocetirizine (XYZAL) 5 MG tablet, Take 1 tablet (5 mg total) by mouth every evening., Disp: 30 tablet, Rfl: 2    lisinopriL 10 MG tablet, Take 1 tablet (10 mg total) by mouth once daily., Disp: 90 tablet, Rfl: 3    methIMAzole (TAPAZOLE) 5 MG Tab, Take one tablet Monday-Friday and two tablets on Saturday and Sunday., Disp: 180 tablet, Rfl: 3    multivit with minerals/lutein (MULTIVITAMIN 50 PLUS ORAL), Take by mouth., Disp: , Rfl:     rosuvastatin (CRESTOR) 20 MG tablet, Take 1 tablet by mouth once daily, Disp: 90 tablet, Rfl: 3    Allergies    Review of patient's allergies indicates:   Allergen Reactions    Tinactin [tolnaftate] Dermatitis    Advair diskus [fluticasone propion-salmeterol]      shakes    Albuterol      tremors    Sulfa (sulfonamide antibiotics)      unknown       SocHx    Social History     Tobacco Use   Smoking Status Current Every Day Smoker    Packs/day: 0.50   Smokeless Tobacco Never Used       Social History     Substance and Sexual Activity   Alcohol Use Yes    Alcohol/week: 4.0 standard drinks    Types: 4 Glasses of wine per week    Comment: occ. glass of wine       Drug Use - no  Occupation - retired, customer service  Asbestos exposure - no  Pets - no    FMHx    Family History   Problem Relation Age of Onset    Heart disease Mother     Cancer Mother         lung    Heart disease Father     Cancer Father         lymphoma    Diabetes  Neg Hx          Review of Systems  Review of Systems   Constitutional: Negative for chills, diaphoresis, fever, malaise/fatigue and weight loss.        Weight loss   HENT: Negative for congestion.    Eyes: Negative for pain.   Respiratory: Positive for shortness of breath (ELLISON). Negative for cough, hemoptysis, sputum production, wheezing and stridor.    Cardiovascular: Negative for chest pain, palpitations, orthopnea, claudication, leg swelling and PND.   Gastrointestinal: Negative for abdominal pain, constipation, diarrhea, heartburn, nausea and vomiting.   Genitourinary: Negative for dysuria, frequency and urgency.   Musculoskeletal: Negative for falls and myalgias.   Neurological: Negative for sensory change, focal weakness and weakness.   Endo/Heme/Allergies:        + over active thyroid   Psychiatric/Behavioral: Negative for depression, substance abuse and suicidal ideas. The patient is not nervous/anxious.        Physical Exam    Vitals:    04/07/22 1009   BP: (!) 144/84   BP Location: Left arm   Patient Position: Sitting   BP Method: X-Large (Manual)   Pulse: 96   SpO2: 96%   Weight: 43.5 kg (96 lb)       Physical Exam  Vitals and nursing note reviewed.   Constitutional:       General: She is not in acute distress.     Appearance: She is well-developed. She is not ill-appearing, toxic-appearing or diaphoretic.      Comments: Thin female   HENT:      Head: Normocephalic and atraumatic.      Right Ear: External ear normal.      Left Ear: External ear normal.      Nose: Nose normal.   Eyes:      General: No scleral icterus.        Right eye: No discharge.         Left eye: No discharge.      Extraocular Movements: Extraocular movements intact.      Conjunctiva/sclera: Conjunctivae normal.      Pupils: Pupils are equal, round, and reactive to light.   Neck:      Thyroid: No thyromegaly.      Vascular: No JVD.      Trachea: No tracheal deviation.   Cardiovascular:      Rate and Rhythm: Normal rate and regular  rhythm.      Heart sounds: Normal heart sounds. No murmur heard.    No friction rub. No gallop.      Comments: + bilateral carotid bruits (known issue)  Pulmonary:      Effort: Pulmonary effort is normal. No respiratory distress.      Breath sounds: No stridor. No wheezing, rhonchi or rales.      Comments: Decreased BS  No acc m use  Chest:      Chest wall: No tenderness.   Abdominal:      General: Bowel sounds are normal. There is no distension.      Palpations: Abdomen is soft.      Tenderness: There is no abdominal tenderness. There is no guarding.   Musculoskeletal:         General: No tenderness. Normal range of motion.      Cervical back: Normal range of motion and neck supple.      Right lower leg: No edema.      Left lower leg: No edema.   Lymphadenopathy:      Cervical: No cervical adenopathy.   Skin:     General: Skin is warm and dry.   Neurological:      General: No focal deficit present.      Mental Status: She is alert and oriented to person, place, and time. Mental status is at baseline.      Cranial Nerves: No cranial nerve deficit.      Motor: No weakness.      Gait: Gait normal.   Psychiatric:         Mood and Affect: Mood normal.         Behavior: Behavior normal.         Thought Content: Thought content normal.         Judgment: Judgment normal.         Labs    Lab Results   Component Value Date    WBC 9.35 03/31/2022    HGB 12.6 03/31/2022    HCT 39.5 03/31/2022     03/31/2022       Sodium   Date Value Ref Range Status   02/16/2022 138 136 - 145 mmol/L Final     Potassium   Date Value Ref Range Status   02/16/2022 4.9 3.5 - 5.1 mmol/L Final     Chloride   Date Value Ref Range Status   02/16/2022 99 95 - 110 mmol/L Final     CO2   Date Value Ref Range Status   02/16/2022 28 23 - 29 mmol/L Final     Glucose   Date Value Ref Range Status   02/16/2022 93 70 - 110 mg/dL Final     BUN   Date Value Ref Range Status   02/16/2022 5 (L) 8 - 23 mg/dL Final     Creatinine   Date Value Ref Range Status    02/16/2022 0.6 0.5 - 1.4 mg/dL Final     Calcium   Date Value Ref Range Status   02/16/2022 9.6 8.7 - 10.5 mg/dL Final     Total Protein   Date Value Ref Range Status   02/16/2022 7.6 6.0 - 8.4 g/dL Final     Albumin   Date Value Ref Range Status   02/16/2022 3.5 3.5 - 5.2 g/dL Final     Total Bilirubin   Date Value Ref Range Status   02/16/2022 0.5 0.1 - 1.0 mg/dL Final     Comment:     For infants and newborns, interpretation of results should be based  on gestational age, weight and in agreement with clinical  observations.    Premature Infant recommended reference ranges:  Up to 24 hours.............<8.0 mg/dL  Up to 48 hours............<12.0 mg/dL  3-5 days..................<15.0 mg/dL  6-29 days.................<15.0 mg/dL       Alkaline Phosphatase   Date Value Ref Range Status   02/16/2022 88 55 - 135 U/L Final     AST   Date Value Ref Range Status   02/16/2022 18 10 - 40 U/L Final     ALT   Date Value Ref Range Status   02/16/2022 10 10 - 44 U/L Final     Anion Gap   Date Value Ref Range Status   02/16/2022 11 8 - 16 mmol/L Final       Xrays        Impression/Plan    Problem List Items Addressed This Visit        Pulmonary    Chronic obstructive pulmonary disease      Continue present medications.   Will refill medications as needed.   Instructed patient to contact us with any issues concerning their medications (cost, reactions, etc.).   Have discussed with patient about inciting conditions which may exacerbate their disease.   We did discuss possible new therapies or de-escalation of therapy (if appropriate).   Asked patient if they were interested in pursuing pulmonary rehabilitation.   All questions answered   Patient instructed that they are to call if symptoms change or new issues develop prior to their next visit.                Oncology    Small cell lung cancer     · Will check MRI brain  · Will refer to Dr Tinoco and Dr Martell for evaluation  · RTC 1 month           Relevant Orders     Ambulatory referral/consult to Radiation Oncology    Ambulatory referral/consult to Oncology    Creatinine, serum       Other    Tobacco use     · Needs to stop smoking             Other Visit Diagnoses     Malignant neoplasm of unspecified part of unspecified bronchus or lung        Relevant Orders    MRI Brain W WO Contrast              Jonathan Jeffries MD

## 2022-04-07 NOTE — ASSESSMENT & PLAN NOTE
 Continue present medications.   Will refill medications as needed.   Instructed patient to contact us with any issues concerning their medications (cost, reactions, etc.).   Have discussed with patient about inciting conditions which may exacerbate their disease.   We did discuss possible new therapies or de-escalation of therapy (if appropriate).   Asked patient if they were interested in pursuing pulmonary rehabilitation.   All questions answered   Patient instructed that they are to call if symptoms change or new issues develop prior to their next visit.

## 2022-04-14 ENCOUNTER — OFFICE VISIT (OUTPATIENT)
Dept: HEMATOLOGY/ONCOLOGY | Facility: CLINIC | Age: 72
End: 2022-04-14
Payer: MEDICARE

## 2022-04-14 VITALS
TEMPERATURE: 98 F | SYSTOLIC BLOOD PRESSURE: 120 MMHG | BODY MASS INDEX: 17.56 KG/M2 | HEIGHT: 62 IN | RESPIRATION RATE: 18 BRPM | HEART RATE: 108 BPM | DIASTOLIC BLOOD PRESSURE: 78 MMHG

## 2022-04-14 DIAGNOSIS — C34.90 SMALL CELL LUNG CANCER: ICD-10-CM

## 2022-04-14 DIAGNOSIS — C34.12 MALIGNANT NEOPLASM OF UPPER LOBE OF LEFT LUNG: ICD-10-CM

## 2022-04-14 PROCEDURE — 99205 OFFICE O/P NEW HI 60 MIN: CPT | Mod: S$GLB,,, | Performed by: INTERNAL MEDICINE

## 2022-04-14 PROCEDURE — 99205 PR OFFICE/OUTPT VISIT, NEW, LEVL V, 60-74 MIN: ICD-10-PCS | Mod: S$GLB,,, | Performed by: INTERNAL MEDICINE

## 2022-04-14 NOTE — ASSESSMENT & PLAN NOTE
I had a long discussion with Ms. Cool today about this very concerning, life threatening disease.  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  Both lesions are peripheral which is not typical for small cell.  I feel a biopsy of the RLL lesion is reasonable to appropriately stage and treat her and will arrange for this to be done.      I discussed however, that chemotherapy and likely radiation are in her future.  I reviewed carbo/etop and will begin the process of having port placed, chemo education etc.  She has an MRI of the brain ordered for full staging as well.  Will also have her see rad/onc for their recommendations moving forward.  Spent > 75 min in pre visit review, scan review, visit and post visit treatment planning and documentation.

## 2022-04-14 NOTE — PROGRESS NOTES
INITIAL [facility] HEM/ONC CONSULTATION      Subjective:       Patient ID: Anuja Crisostomo is a 71 y.o. female.    2/25/2022:  CT lung Screen:  3.4cm FRANCIS mass and 2.0cm RLL    3/11/2022:  PET scan:  3.9cm hypermetabolic mass in FRANCIS  2.3cm hypermetabolic mass in RLL    3/31/2022:  FRANCIS CT biopsy:  Small Cell carcinoma    Chief Complaint: No chief complaint on file.  Small Cell Lung Cancer    Ms. Crisostomo is a 70yo female who was found to have 2 lesions in the lung on CT chest screening study outlined above.  She was seeing pulmonary for COPD issues with Dr. Jeffries who ordered this study.   These masses were hypermetabolic on PET scan and CT biopsy was done confirming small cell lung cancer.  She presents today for recommendations on therapy.        Past Medical History:   Diagnosis Date    Allergy     Arthritis     Asthma     Bursitis     COPD (chronic obstructive pulmonary disease)     Hypertension     Low sodium     Lung nodules 03/2022    Thyroid disease     nodules    Tinea pedis        Past Surgical History:   Procedure Laterality Date    COLONOSCOPY N/A 5/8/2018    Procedure: COLONOSCOPY;  Surgeon: Grey Roberts MD;  Location: [facility];  Service: Endoscopy;  Laterality: N/A;    HYSTERECTOMY      OOPHORECTOMY         Social History     Socioeconomic History    Marital status:    Tobacco Use    Smoking status: Current Every Day Smoker     Packs/day: 0.50    Smokeless tobacco: Never Used   Substance and Sexual Activity    Alcohol use: Yes     Alcohol/week: 4.0 standard drinks     Types: 4 Glasses of wine per week     Comment: occ. glass of wine    Drug use: No       Family History   Problem Relation Age of Onset    Heart disease Mother     Cancer Mother         lung    Heart disease Father     Cancer Father         lymphoma    Diabetes Neg Hx        Review of patient's allergies indicates:   Allergen Reactions    Tinactin [tolnaftate] Dermatitis    Advair diskus [fluticasone  propion-salmeterol]      shakes    Albuterol      tremors    Sulfa (sulfonamide antibiotics)      unknown       Current Outpatient Medications:     alendronate (FOSAMAX) 70 MG tablet, Take 1 tablet by mouth once a week, Disp: 12 tablet, Rfl: 4    aspirin (ECOTRIN) 81 MG EC tablet, Take 81 mg by mouth once daily., Disp: , Rfl:     atenoloL (TENORMIN) 100 MG tablet, Take 1 tablet by mouth once daily, Disp: 90 tablet, Rfl: 0    cholecalciferol, vitamin D3, 3,000 unit Tab, Take by mouth., Disp: , Rfl:     fluticasone-umeclidin-vilanter (TRELEGY ELLIPTA) 200-62.5-25 mcg inhaler, Inhale 1 puff into the lungs once daily., Disp: 90 each, Rfl: 3    ibuprofen (ADVIL,MOTRIN) 200 MG tablet, Take 200 mg by mouth every 6 (six) hours as needed for Pain., Disp: , Rfl:     levalbuterol (XOPENEX HFA) 45 mcg/actuation inhaler, INHALE 1 TO 2 PUFFS INTO LUNGS EVERY 4 HOURS AS NEEDED FOR WHEEZING AND FOR SHORTNESS OF BREATH, Disp: 45 g, Rfl: 1    lisinopriL 10 MG tablet, Take 1 tablet (10 mg total) by mouth once daily., Disp: 90 tablet, Rfl: 3    methIMAzole (TAPAZOLE) 5 MG Tab, Take one tablet Monday-Friday and two tablets on Saturday and Sunday., Disp: 180 tablet, Rfl: 3    multivit with minerals/lutein (MULTIVITAMIN 50 PLUS ORAL), Take by mouth., Disp: , Rfl:     rosuvastatin (CRESTOR) 20 MG tablet, Take 1 tablet by mouth once daily, Disp: 90 tablet, Rfl: 3    levocetirizine (XYZAL) 5 MG tablet, Take 1 tablet (5 mg total) by mouth every evening. (Patient not taking: Reported on 4/14/2022), Disp: 30 tablet, Rfl: 2    All medications and past history have been reviewed.    Review of Systems   Constitutional: Negative for activity change, appetite change, diaphoresis, fatigue, fever and unexpected weight change.   HENT: Negative for congestion and hearing loss.    Eyes: Negative for visual disturbance.   Respiratory: Negative for cough, chest tightness and shortness of breath.    Cardiovascular: Negative for chest pain and  "leg swelling.   Gastrointestinal: Negative for abdominal pain, blood in stool, diarrhea, nausea and vomiting.   Endocrine: Negative for cold intolerance and heat intolerance.   Genitourinary: Negative for difficulty urinating, dysuria and hematuria.   Neurological: Negative for dizziness and headaches.   Hematological: Negative for adenopathy. Does not bruise/bleed easily.   Psychiatric/Behavioral: Negative for behavioral problems.       Objective:        /78   Pulse 108   Temp 98.1 °F (36.7 °C)   Resp 18   Ht 5' 2" (1.575 m)   BMI 17.56 kg/m²     Physical Exam  Constitutional:       Appearance: She is well-developed.   HENT:      Head: Normocephalic and atraumatic.      Right Ear: External ear normal.      Left Ear: External ear normal.   Eyes:      Conjunctiva/sclera: Conjunctivae normal.      Pupils: Pupils are equal, round, and reactive to light.   Neck:      Thyroid: No thyromegaly.      Trachea: No tracheal deviation.   Cardiovascular:      Rate and Rhythm: Normal rate and regular rhythm.      Heart sounds: Normal heart sounds.   Pulmonary:      Effort: Pulmonary effort is normal.      Breath sounds: Normal breath sounds.   Abdominal:      General: Bowel sounds are normal. There is no distension.      Palpations: Abdomen is soft. There is no mass.      Tenderness: There is no abdominal tenderness.   Skin:     Findings: No rash.   Neurological:      Comments: Neuro intact througout   Psychiatric:         Behavior: Behavior normal.         Thought Content: Thought content normal.         Judgment: Judgment normal.           Lab  No results found for this or any previous visit (from the past 336 hour(s)).  CMP  Sodium   Date Value Ref Range Status   02/16/2022 138 136 - 145 mmol/L Final     Potassium   Date Value Ref Range Status   02/16/2022 4.9 3.5 - 5.1 mmol/L Final     Chloride   Date Value Ref Range Status   02/16/2022 99 95 - 110 mmol/L Final     CO2   Date Value Ref Range Status   02/16/2022 28 " 23 - 29 mmol/L Final     Glucose   Date Value Ref Range Status   02/16/2022 93 70 - 110 mg/dL Final     BUN   Date Value Ref Range Status   02/16/2022 5 (L) 8 - 23 mg/dL Final     Creatinine   Date Value Ref Range Status   02/16/2022 0.6 0.5 - 1.4 mg/dL Final     Calcium   Date Value Ref Range Status   02/16/2022 9.6 8.7 - 10.5 mg/dL Final     Total Protein   Date Value Ref Range Status   02/16/2022 7.6 6.0 - 8.4 g/dL Final     Albumin   Date Value Ref Range Status   02/16/2022 3.5 3.5 - 5.2 g/dL Final     Total Bilirubin   Date Value Ref Range Status   02/16/2022 0.5 0.1 - 1.0 mg/dL Final     Comment:     For infants and newborns, interpretation of results should be based  on gestational age, weight and in agreement with clinical  observations.    Premature Infant recommended reference ranges:  Up to 24 hours.............<8.0 mg/dL  Up to 48 hours............<12.0 mg/dL  3-5 days..................<15.0 mg/dL  6-29 days.................<15.0 mg/dL       Alkaline Phosphatase   Date Value Ref Range Status   02/16/2022 88 55 - 135 U/L Final     AST   Date Value Ref Range Status   02/16/2022 18 10 - 40 U/L Final     ALT   Date Value Ref Range Status   02/16/2022 10 10 - 44 U/L Final     Anion Gap   Date Value Ref Range Status   02/16/2022 11 8 - 16 mmol/L Final     eGFR if    Date Value Ref Range Status   02/16/2022 >60.0 >60 mL/min/1.73 m^2 Final     eGFR if non    Date Value Ref Range Status   02/16/2022 >60.0 >60 mL/min/1.73 m^2 Final     Comment:     Calculation used to obtain the estimated glomerular filtration  rate (eGFR) is the CKD-EPI equation.            Specimen (24h ago, onward)            None                All lab results and imaging results have been reviewed and discussed with the patient.     Assessment:       1. Small cell lung cancer    2. Small Cell Lung Cancer of FRANCIS and RLL of lung      Problem List Items Addressed This Visit     Small Cell Lung Cancer of FRANCIS and  RLL of lung     I had a long discussion with Ms. Cool today about this very concerning, life threatening disease.  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  Both lesions are peripheral which is not typical for small cell.  I feel a biopsy of the RLL lesion is reasonable to appropriately stage and treat her and will arrange for this to be done.      I discussed however, that chemotherapy and likely radiation are in her future.  I reviewed carbo/etop and will begin the process of having port placed, chemo education etc.  She has an MRI of the brain ordered for full staging as well.  Will also have her see rad/onc for their recommendations moving forward.  Spent > 75 min in pre visit review, scan review, visit and post visit treatment planning and documentation.              Other Visit Diagnoses     Small cell lung cancer        Relevant Orders    CT Biopsy Lung w/ guidance    Ambulatory referral/consult to Chemo School    Ambulatory referral/consult to General Surgery        Cancer Staging  Small Cell Lung Cancer of FRANCIS and RLL of lung  Staging form: Lung, AJCC 8th Edition  - Clinical: Stage SUSIE (cT2, cN0, cM1a) - Signed by Jonathan Tinoco MD on 4/14/2022        Plan:         No follow-ups on file.       The plan was discussed with the patient and all questions/concerns have been answered to the patient's satisfaction.

## 2022-04-21 NOTE — PROGRESS NOTES
FOLLOW-UP APPOINTMENT    PATIENT:   Anuja Cool  :    1950  MR#:    [id number]  DATE OF VISIT:  2022      Chief Complaint: [employer]    HPI:   Ms. Anuja Cool presents today for chemotherapy education.  She will be starting treatment with Carboplatin and Etoposide for the above diagnosis. She is scheduled for port placement on 2022 and to begin treatment 2022.    Depression Patient Health Questionnaire 2022 2022 2022 2021 2019 7/10/2018 2017   Over the last two weeks how often have you been bothered by little interest or pleasure in doing things 0 0 0 0 0 0 0   Over the last two weeks how often have you been bothered by feeling down, depressed or hopeless 0 0 0 0 0 0 0   PHQ-2 Total Score 0 0 0 0 0 0 0       Review of Systems   Constitutional: Positive for fatigue. Negative for appetite change and unexpected weight change.   HENT:   Negative for hearing loss.    Respiratory: Negative for cough and shortness of breath.    Cardiovascular: Negative for chest pain, leg swelling and palpitations.   Gastrointestinal: Negative for abdominal pain, constipation and diarrhea.   Genitourinary: Negative for dysuria.    Musculoskeletal: Negative for back pain.   Skin: Negative for rash.   Psychiatric/Behavioral: The patient is not nervous/anxious.        Oncology History   Small Cell Lung Cancer of FRANCIS and RLL of lung   3/9/2022 Initial Diagnosis    Small Cell Lung Cancer of FRANCIS and RLL of lung     2022 Cancer Staged    Staging form: Lung, AJCC 8th Edition  - Clinical: Stage SUSIE (cT2, cN0, cM1a)     2022 -  Chemotherapy    Treatment Summary   Plan Name: OP CARBOPLATIN (AUC) + ETOPOSIDE Q3W  Treatment Goal: Control  Status: Active  Start Date: 2022 (Planned)  End Date: 2022 (Planned)  Provider: Jonathan Tinoco MD  Chemotherapy: CARBOplatin (PARAPLATIN) in sodium chloride 0.9% 250 mL chemo infusion, , Intravenous, Clinic/HOD 1 time, 0 of  6 cycles  etoposide (VEPESID) 100 mg/m2 in sodium chloride 0.9% 500 mL chemo infusion, 100 mg/m2, Intravenous, Clinic/HOD 1 time, 0 of 6 cycles         Patient Active Problem List   Diagnosis    Asthma    Allergy    Essential hypertension    Hyperlipidemia LDL goal <130    Tobacco use    Chronic obstructive pulmonary disease    Bilateral carotid bruits    OAB (overactive bladder)    Osteopenia    BMI 21.0-21.9, adult    Low serum thyroid stimulating hormone (TSH)    Multinodular goiter    Stenosis of carotid artery    Constipation    Postmenopausal    ASCVD (arteriosclerotic cardiovascular disease)    Hyperthyroidism    BMI less than 19,adult    Small Cell Lung Cancer of FRANCIS and RLL of lung       Past Medical History:   Diagnosis Date    Allergy     Arthritis     Asthma     Bursitis     COPD (chronic obstructive pulmonary disease)     Hypertension     Low sodium     Lung nodules 2022    Small cell carcinoma of left lung 2022    Thyroid disease     nodules    Tinea pedis        Past Surgical History:   Procedure Laterality Date    COLONOSCOPY N/A 2018    Procedure: COLONOSCOPY;  Surgeon: Grey Roberts MD;  Location: [facility];  Service: Endoscopy;  Laterality: N/A;    CT BIOPSY LUNG W/ GUIDANCE Left 2022    HYSTERECTOMY      OOPHORECTOMY         Social History     Socioeconomic History    Marital status:    Tobacco Use    Smoking status: Former Smoker     Packs/day: 0.50     Quit date: 4/3/2022     Years since quittin.0    Smokeless tobacco: Never Used   Substance and Sexual Activity    Alcohol use: Yes     Alcohol/week: 4.0 standard drinks     Types: 4 Glasses of wine per week     Comment: occ. glass of wine    Drug use: No       Family History   Problem Relation Age of Onset    Heart disease Mother     Cancer Mother         lung    Heart disease Father     Cancer Father         lymphoma    Diabetes Neg Hx          Current Outpatient  Medications:     alendronate (FOSAMAX) 70 MG tablet, Take 1 tablet by mouth once a week, Disp: 12 tablet, Rfl: 4    aspirin (ECOTRIN) 81 MG EC tablet, Take 81 mg by mouth once daily., Disp: , Rfl:     atenoloL (TENORMIN) 100 MG tablet, Take 1 tablet by mouth once daily, Disp: 90 tablet, Rfl: 0    cholecalciferol, vitamin D3, 3,000 unit Tab, Take by mouth., Disp: , Rfl:     fluticasone-umeclidin-vilanter (TRELEGY ELLIPTA) 200-62.5-25 mcg inhaler, Inhale 1 puff into the lungs once daily., Disp: 90 each, Rfl: 3    ibuprofen (ADVIL,MOTRIN) 200 MG tablet, Take 200 mg by mouth every 6 (six) hours as needed for Pain., Disp: , Rfl:     levalbuterol (XOPENEX HFA) 45 mcg/actuation inhaler, INHALE 1 TO 2 PUFFS INTO LUNGS EVERY 4 HOURS AS NEEDED FOR WHEEZING AND FOR SHORTNESS OF BREATH, Disp: 45 g, Rfl: 1    levocetirizine (XYZAL) 5 MG tablet, Take 1 tablet (5 mg total) by mouth every evening. (Patient not taking: Reported on 4/14/2022), Disp: 30 tablet, Rfl: 2    lisinopriL 10 MG tablet, Take 1 tablet (10 mg total) by mouth once daily., Disp: 90 tablet, Rfl: 3    methIMAzole (TAPAZOLE) 5 MG Tab, Take one tablet Monday-Friday and two tablets on Saturday and Sunday., Disp: 180 tablet, Rfl: 3    multivit with minerals/lutein (MULTIVITAMIN 50 PLUS ORAL), Take by mouth., Disp: , Rfl:     ondansetron (ZOFRAN) 8 MG tablet, Take 1 tablet (8 mg total) by mouth every 8 (eight) hours as needed., Disp: 30 tablet, Rfl: 5    promethazine (PHENERGAN) 25 MG tablet, Take 1 tablet (25 mg total) by mouth every 4 to 6 hours as needed., Disp: 30 tablet, Rfl: 5    rosuvastatin (CRESTOR) 20 MG tablet, Take 1 tablet by mouth once daily, Disp: 90 tablet, Rfl: 3    Review of patient's allergies indicates:   Allergen Reactions    Tinactin [tolnaftate] Dermatitis    Advair diskus [fluticasone propion-salmeterol]      shakes    Albuterol      tremors    Sulfa (sulfonamide antibiotics)      unknown       Physcial Examination  VITAL  SIGNS:    Body surface area is 1.39 meters squared.   Pain Assessment  Vitals:    04/22/22 1015   BP: (!) 170/71   Pulse: 78   Temp: 97.8 °F (36.6 °C)   Weight: 44 kg (97 lb 1.6 oz)   PainSc: 0-No pain        Wt Readings from Last 5 Encounters:   04/22/22 44 kg (97 lb 1.6 oz)   04/22/22 44 kg (97 lb 1.6 oz)   04/07/22 43.5 kg (96 lb)   03/31/22 43.1 kg (95 lb 0.3 oz)   03/17/22 43.1 kg (95 lb)       GENERAL:  Anuja Cool is healthy-appearing 71 y.o. female, in no distress.   EYES:   Pupils equal, round, reactive.  Conjunctivae, sclera and lids normal.  HEENT: Head normocephalic and atraumatic, without alopecia.  Oropharynx is unremarkable.  No icterus, jaundice, stomatitis, mucositis, or ulceration is noted.  Ears are clear and unremarkable.  Nose, nares, and septum are unremarkable.    NECK:   No masses.  Thyroid and trachea are normal.    BREASTS:  Deferred.  RESPIRATORY: Clear to auscultation bilaterally.  Symmetrically effortless expansion.  No wheezing and no stridor.    CV: Heart reveals regular rate and rhythm without murmur, rub, or gallops.  ABDOMEN: Soft, non-tender.  No masses, no hernias, and no rebound or rigidity are noted.  /RECTAL:  Deferred.  LYMPHATICS: No preauricular, submandibular, cervical, supraclavicular, axillary, lymphadenopathy.  MUSCULOSKELETAL:Fair musculature, no atrophy.  No arthritic changes.  No edema or cyanosis. Back is without gross abnormal curvature.   NEUROLOGICAL: Cranial nerves II-XII grossly intact.  Motor and sensory exam intact.  SKIN:   No lesions, bruises, petechiae or rashes.  Good turgor.    PSYCHIATRIC: Patient is alert and oriented to time, place and person.  Mood and affect are appropriate.         Laboratory and Radiology   Lab Results   Component Value Date    WBC 9.79 04/22/2022    RBC 4.11 04/22/2022    HGB 12.8 04/22/2022    HCT 39.7 04/22/2022    MCV 97 04/22/2022    MCH 31.1 (H) 04/22/2022    MCHC 32.2 04/22/2022    RDW 14.7 (H) 04/22/2022      04/22/2022    MPV 9.3 04/22/2022    GRAN 6.2 04/22/2022    GRAN 63.2 04/22/2022    LYMPH 2.3 04/22/2022    LYMPH 23.7 04/22/2022    MONO 0.9 04/22/2022    MONO 8.8 04/22/2022    EOS 0.3 04/22/2022    BASO 0.06 04/22/2022    EOSINOPHIL 3.5 04/22/2022    BASOPHIL 0.6 04/22/2022     BMP  Lab Results   Component Value Date     (L) 04/22/2022    K 4.4 04/22/2022    CL 96 04/22/2022    CO2 27 04/22/2022    BUN 6 (L) 04/22/2022    CREATININE 0.4 (L) 04/22/2022    CALCIUM 8.8 04/22/2022    ANIONGAP 8 04/22/2022    ESTGFRAFRICA >60.0 04/22/2022    EGFRNONAA >60.0 04/22/2022     Lab Results   Component Value Date    ALT 12 04/22/2022    AST 18 04/22/2022    ALKPHOS 70 04/22/2022    BILITOT 0.6 04/22/2022     TITLE: PLAN OF CARE FOR THE CHEMOTHERAPY PATIENT / TEACHING PROTOCOL    PURPOSE: To involve the patient / significant other in the plan of care and to provide teaching to the significant other & patient receiving chemotherapy.    LEVEL: Independent.    CONTENT: The Plan of Care for the chemotherapy patient is individualized and appropriate to the patients needs, strengths, limitations, & goals.  Education includes information regarding chemotherapy side effects, the treatment itself, and self-care  Activities.    GOAL / OUTCOME STANDARDS    PHYSIOLOGIC: The client will remain free or experience minimal side effects or toxicities throughout the chemotherapy treatment period.     PSYCHOLOGIC: The client/significant others will demonstrate positive coping mechanisms in relation to chemotherapy and its side effects.      COGINITIVE: The client/significant others will verbalize understanding of self-care measure to avoid/minimize side effects of the chemotherapy regimen.    EVALUATION / COMMENT KEY:    V = Audiovisual/Video  S = Successfully meets outcome  N = Needs further instruction  NA = Not applicable to the patient  P = Previous knowledge  U = Unable to comprehend  * = See progress notes          PLAN OF CARE   INFORMATION TO BE DELIVERED / NURSING INTERVENTIONS DATE EVALUATION   1. Assessment of client/caregiver,          knowledge of cancer diagnosis,          and chemotherapy as a treatment.  1a. Evaluate patient/caregiver learning ability     b. Plan teaching sessions with patient/caregiver according to needs and present anxiety level/ability to learn.     c. Provide Chemotherapy Education Packet,         Mouth Care Protocol,          Specific Patient Education Sheets. 04/22/2022  S   2. Individual chemotherapy treatment          plan.  2a. Review of Chemotherapy Education handout from [email or web address]              04/22/2022     S   3. Knowledge Deficit & Self-Management of general side effects common to all chemotherapy:  a. Nausea/Vomiting   b.   Diarrhea  c. Mouth Care  d. Dental care  e. Constipation  f. Hair Loss  g. Potential for infection  h. Fatigue   3a. Reinforce that the majority of side effects from chemotherapy are reversible and are  controlled both in the hospital and at home        (blood counts recover, hair grows back).   b.  Refer to the following for reinforcement of         information post-treatment:  1. Mouth Care Protocol.  2. Bowel Protocol for constipation or diarrhea.  3.  Drug Specific Chemotherapy Information Sheets for each medication patient receiving.    04/22/2022     S     PLAN OF CARE  INFORMATION TO BE DELIVERED / NURSING INTERVENTIONS DATE EVALUATION   h. Potential for bleeding         i. Potential anemia/fatigue         j. Potential sunburn         k. Birth control measures  l. Safety measures post treatment 4.  Chemotherapy Home Care Instruction  and Safety Information Sheet.  A. patient/caregivers to thoroughly cook shellfish (shrimp, crab, etc) to decrease the chance of infection.    B.  Use sunscreen and protective clothing while in the sun.   04/22/2022      4. Knowledge deficit & Self Management of Drug Specific  Side Effects.    a. BLADDER EFFECTS         (Hemorrhagic  Cystitis)                     Preventable with adequate hydration; occurs 2-3 days or more post treatment.     1.  Instruct patient to:   a.   Void at least every 2 hours; increase intake.   b.   DO NOT hold urine; go when urge is felt.   c.    Empty bladder at bedtime and on          awakening.   d.   Observe for color changes (red to tea            colored), amount and frequency changes.   e.   Notify oncologist of any abnormalities           in urine or voiding or if you cannot               drink adequate fluids.    04/22/2022     S    b.   CHANGES IN URINE        COLOR:         1.   Instruct patient:   a.   Most evident in first 2-3 voidings after            administration.  b. Lasts less than 24 hours.  c. If urine is discolored 2 or more days post- treatment, notify oncologist.      04/22/2022 S   c.    KIDNEY EFFECTS           (Nephrotoxicity)   1.  Instruct patient to:  a.   Drink 8-16 glasses of fluid/day the day   pre-treatment and 3-4 days post-treatment to maintain hydration; the best way to minimize kidney problems.  b.   Notify oncologist immediately if unable to drink fluids or if changes are noted in urinary elimination.      04/22/2022     S   a. PULMONARY TOXICITY    1. Instruct patient to report symptoms such as shortness of breath, chest pain, shallow breathing, or chest wall discomfort to physician.  2. Reinforce preventative measures used by the health care team.  a. Baseline and periodic PFT and chest x-ray.   04/22/2022   S     PLAN OF CARE INFORMATION TO BE DELIVERED / NURSING INTERVENTIONS DATE EVALUATION   b. NERVE & MUSCLE EFFECTS (neurotoxocity; neuropathy, possible visual/hearing changes)        3. Instruct patient to:    a. Report numbness or tingling of the hands/feet, loss of fine motor movement (buttoning shirt, tying shoelaces), or gait changes to your oncologist.  b. If numbness/tingling are present:   protect feet with shoes at all times.   Use gloves for washing  dishes/gardening & potholders in kitchen.       04/22/2022   S   c. CARDIOTOXICITY  Decreased effectiveness of             cardiac function. Effective are                  cumulative and irreversible.                                    CARDIAC ARRYTHMIAS              4   Instruct:  a. Heart function may be tested before treatment and perdiocally during treatment.  b. Notify oncologist of irregular pulse, palpitations, shortness of breath, or swelling in lower extremities/feet.         Can cause arrhythmias on infusion that resolve once infusion discontinued. Instruct nurse if any irregularity felt.    04/22/2022   S   d. EXTRAVASTION  Occurs when vesicants leak outside of vein and cause damage to the skin and underlying tissues.   1. Reinforce preventive measures used to avoid complications.  a. Fresh IV site or central line monitored continuously with vesicant IVP.  b. Continuous infusion via central line site and blood return monitored periodically around the clock.  2. Instruct to:  a. Notify nurse of any discomfort, burning, stinging, etc. at IV site during chemotherapy administration.  b. Notify oncologist of any redness, pain, or swelling at IV site after discharge from hospital.   04/22/2022   S   e. HYPERSENSITIVITY can happen with any medication.   1. Instruct patient:  a. Nurse is with them during the initial part of treatment and will be close by to monitor.  b. Pre-medication ordered by the oncologist must be taken on time. If doses are missed, treatment will need to be re-scheduled.  c. Skin redness, itching, or hives appearing after discharge should be reported to oncologist. 04/22/2022   S       PLAN OF CARE INFORMATION TO BE DELIVERED / NURSING INTERVENTIONS DATE EVALUATION   f. FLU-LIKE SYNDROME      1. Instruct patient symptoms are hard to prevent and may include fever, shaking chills, muscle and body aches.  a. Taking prescribed medications from physician if needed.  b. Adequate fluids are  important.    2. Reinforce the need to call if temperature is         elevated to 100.4 or more  04/22/2022   S   g. HAND-FOOT SYNDROME  causes painful, symmetric swelling and redness of palms and soles                  5. Instruct patient to report any numbness or tingling in the hands or feet.  6. Explain prevention techniques, such as     a. Use heavy moisturizers to lessen skin dryness and itching, but to avoid if skin is cracked or broken  b. Bathe in tepid water, use non-perfumed soap, and wash gently. Baths with oatmeal or diluted baking soda may be soothing.  c. Avoid tight fitting shoes and repetitive actions, such as rubbing hands or applying pressure to hands/feet.  7. Review measures to take should syndrome occur:  a. Cold compresses and elevation for          edema  b. Pain medications and other measures as ordered by oncologist.   4.   Syndrome resolves few weeks after therapy. 04/22/2022   S   5. DISCHARGE PLANNING /        EDUCATION 1.    Explain importance of compliance with follow- up  tests (CBC, CMP).  2.    Verify patient/caregiver know:  a.    Oncologists office phone number.  b.    Dates of follow-up appointments.  c.    Prescriptions given for nausea  3.   Review side effects to monitor and notify          oncologist about.  4.   Reinforce the need for patient and caregivers to:  a.    Review information given.  b.    Call oncologists office with questions          or symptoms  5.   Provide [facility] Brochure make referrals if needed for financial or .   04/22/2022   S     PROGRESS NOTES: I met with the patient today for chemotherapy education. she will be starting treatment with Carboplatin and Etoposide. We discussed the mechanism of action, potential side effects of this treatment as well as ways she can manage them at home. Some of these side effects include but or not limited to fever, nausea, vomiting, decreased appetite, fatigue, weakness, cytopenias,  myalgia/arthralgia, constipation, diarrhea, bleeding, headache, shortness of breath, nail changes, taste change, hair thinning/loss, mood disturbances, or edema. We also discussed dietary modifications she should make although this will be discussed in more detail with the dietician. she was provided with anti-emetic medication, a copy of all of the information we discussed today as well as our contact information. she will be provided a schedule on his first day of treatment. We will obtain labs on a weekly basis and the patient will follow-up with the physician for toxicity monitoring throughout treatment. All questions were answered and an informed consent was obtained. she was reminded to certainly contact us sooner if needed.  Attached to the patients folder and discussed with the patient the 24 hour/ 7 days a week after hours telephone number for the physician.  Patient notified to call anytime 24/7 because their is a physician on call for any problems that may arise.  Patient also notified to report to [facility] if they can not get in touch with a physician after hours.  Discussed the five wishes booklet with the patient and their family.           Assessment/Plan     ICD-10-CM ICD-9-CM   1. Small cell lung cancer  C34.90 162.9   2. Small Cell Lung Cancer of FRANCIS and RLL of lung  C34.12 162.3          Medications Ordered:  Zofran 8mg 1 tab PO Q8h prn nausea  Phenergan 25mg PO Q4-6h prn nausea      Standing Labs Ordered:  CBC weekly  CMP weekly  Mag weekly    Follow up in about 2 weeks (around 5/6/2022) for with me and 5 weeks with Dr. Tinoco.    Total Face to Face Time: 60 minutes face to face with the patient and their family discussing the chemotherapy side-effects and when to call our office.   Electronically signed by: Martha Granado, MSN, APRN, AGNP-C, OCN

## 2022-04-22 ENCOUNTER — LAB VISIT (OUTPATIENT)
Dept: LAB | Facility: HOSPITAL | Age: 72
End: 2022-04-22
Attending: NURSE PRACTITIONER
Payer: MEDICARE

## 2022-04-22 ENCOUNTER — OFFICE VISIT (OUTPATIENT)
Dept: RADIATION ONCOLOGY | Facility: CLINIC | Age: 72
End: 2022-04-22
Payer: MEDICARE

## 2022-04-22 ENCOUNTER — OFFICE VISIT (OUTPATIENT)
Dept: HEMATOLOGY/ONCOLOGY | Facility: CLINIC | Age: 72
End: 2022-04-22
Payer: MEDICARE

## 2022-04-22 ENCOUNTER — SOCIAL WORK (OUTPATIENT)
Dept: HEMATOLOGY/ONCOLOGY | Facility: CLINIC | Age: 72
End: 2022-04-22

## 2022-04-22 VITALS
BODY MASS INDEX: 17.87 KG/M2 | OXYGEN SATURATION: 92 % | DIASTOLIC BLOOD PRESSURE: 79 MMHG | SYSTOLIC BLOOD PRESSURE: 167 MMHG | TEMPERATURE: 98 F | WEIGHT: 97.13 LBS | HEIGHT: 62 IN | RESPIRATION RATE: 18 BRPM | HEART RATE: 83 BPM

## 2022-04-22 VITALS
SYSTOLIC BLOOD PRESSURE: 170 MMHG | DIASTOLIC BLOOD PRESSURE: 71 MMHG | BODY MASS INDEX: 17.76 KG/M2 | TEMPERATURE: 98 F | WEIGHT: 97.13 LBS | HEART RATE: 78 BPM

## 2022-04-22 DIAGNOSIS — C34.90 SMALL CELL LUNG CANCER: ICD-10-CM

## 2022-04-22 DIAGNOSIS — C34.12 MALIGNANT NEOPLASM OF UPPER LOBE OF LEFT LUNG: Primary | ICD-10-CM

## 2022-04-22 DIAGNOSIS — C34.12 MALIGNANT NEOPLASM OF UPPER LOBE OF LEFT LUNG: ICD-10-CM

## 2022-04-22 DIAGNOSIS — C34.90 SMALL CELL LUNG CANCER: Primary | ICD-10-CM

## 2022-04-22 LAB
ALBUMIN SERPL BCP-MCNC: 4.3 G/DL (ref 3.5–5.2)
ALP SERPL-CCNC: 70 U/L (ref 55–135)
ALT SERPL W/O P-5'-P-CCNC: 12 U/L (ref 10–44)
ANION GAP SERPL CALC-SCNC: 8 MMOL/L (ref 8–16)
AST SERPL-CCNC: 18 U/L (ref 10–40)
BASOPHILS # BLD AUTO: 0.06 K/UL (ref 0–0.2)
BASOPHILS NFR BLD: 0.6 % (ref 0–1.9)
BILIRUB SERPL-MCNC: 0.6 MG/DL (ref 0.1–1)
BUN SERPL-MCNC: 6 MG/DL (ref 8–23)
CALCIUM SERPL-MCNC: 8.8 MG/DL (ref 8.7–10.5)
CHLORIDE SERPL-SCNC: 96 MMOL/L (ref 95–110)
CO2 SERPL-SCNC: 27 MMOL/L (ref 23–29)
CREAT SERPL-MCNC: 0.4 MG/DL (ref 0.5–1.4)
DIFFERENTIAL METHOD: ABNORMAL
EOSINOPHIL # BLD AUTO: 0.3 K/UL (ref 0–0.5)
EOSINOPHIL NFR BLD: 3.5 % (ref 0–8)
ERYTHROCYTE [DISTWIDTH] IN BLOOD BY AUTOMATED COUNT: 14.7 % (ref 11.5–14.5)
EST. GFR  (AFRICAN AMERICAN): >60 ML/MIN/1.73 M^2
EST. GFR  (NON AFRICAN AMERICAN): >60 ML/MIN/1.73 M^2
GLUCOSE SERPL-MCNC: 99 MG/DL (ref 70–110)
HCT VFR BLD AUTO: 39.7 % (ref 37–48.5)
HGB BLD-MCNC: 12.8 G/DL (ref 12–16)
IMM GRANULOCYTES # BLD AUTO: 0.02 K/UL (ref 0–0.04)
IMM GRANULOCYTES NFR BLD AUTO: 0.2 % (ref 0–0.5)
LYMPHOCYTES # BLD AUTO: 2.3 K/UL (ref 1–4.8)
LYMPHOCYTES NFR BLD: 23.7 % (ref 18–48)
MAGNESIUM SERPL-MCNC: 1.7 MG/DL (ref 1.6–2.6)
MCH RBC QN AUTO: 31.1 PG (ref 27–31)
MCHC RBC AUTO-ENTMCNC: 32.2 G/DL (ref 32–36)
MCV RBC AUTO: 97 FL (ref 82–98)
MONOCYTES # BLD AUTO: 0.9 K/UL (ref 0.3–1)
MONOCYTES NFR BLD: 8.8 % (ref 4–15)
NEUTROPHILS # BLD AUTO: 6.2 K/UL (ref 1.8–7.7)
NEUTROPHILS NFR BLD: 63.2 % (ref 38–73)
NRBC BLD-RTO: 0 /100 WBC
PLATELET # BLD AUTO: 353 K/UL (ref 150–450)
PMV BLD AUTO: 9.3 FL (ref 9.2–12.9)
POTASSIUM SERPL-SCNC: 4.4 MMOL/L (ref 3.5–5.1)
PROT SERPL-MCNC: 7.6 G/DL (ref 6–8.4)
RBC # BLD AUTO: 4.11 M/UL (ref 4–5.4)
SODIUM SERPL-SCNC: 131 MMOL/L (ref 136–145)
WBC # BLD AUTO: 9.79 K/UL (ref 3.9–12.7)

## 2022-04-22 PROCEDURE — 36415 COLL VENOUS BLD VENIPUNCTURE: CPT | Performed by: NURSE PRACTITIONER

## 2022-04-22 PROCEDURE — 99205 OFFICE O/P NEW HI 60 MIN: CPT | Mod: S$GLB,,, | Performed by: RADIOLOGY

## 2022-04-22 PROCEDURE — 85025 COMPLETE CBC W/AUTO DIFF WBC: CPT | Performed by: NURSE PRACTITIONER

## 2022-04-22 PROCEDURE — 99215 OFFICE O/P EST HI 40 MIN: CPT | Mod: S$GLB,,, | Performed by: NURSE PRACTITIONER

## 2022-04-22 PROCEDURE — 83735 ASSAY OF MAGNESIUM: CPT | Performed by: NURSE PRACTITIONER

## 2022-04-22 PROCEDURE — 80053 COMPREHEN METABOLIC PANEL: CPT | Performed by: NURSE PRACTITIONER

## 2022-04-22 PROCEDURE — 99215 PR OFFICE/OUTPT VISIT, EST, LEVL V, 40-54 MIN: ICD-10-PCS | Mod: S$GLB,,, | Performed by: NURSE PRACTITIONER

## 2022-04-22 PROCEDURE — 99417 PROLNG OP E/M EACH 15 MIN: CPT | Mod: S$GLB,,, | Performed by: RADIOLOGY

## 2022-04-22 PROCEDURE — 99417 PR PROLONGED SVC, OUTPT, W/WO DIRECT PT CONTACT,  EA ADDTL 15 MIN: ICD-10-PCS | Mod: S$GLB,,, | Performed by: RADIOLOGY

## 2022-04-22 PROCEDURE — 99205 PR OFFICE/OUTPT VISIT, NEW, LEVL V, 60-74 MIN: ICD-10-PCS | Mod: S$GLB,,, | Performed by: RADIOLOGY

## 2022-04-22 RX ORDER — PROMETHAZINE HYDROCHLORIDE 25 MG/1
25 TABLET ORAL
Qty: 30 TABLET | Refills: 5 | Status: SHIPPED | OUTPATIENT
Start: 2022-04-22 | End: 2023-01-01 | Stop reason: SDUPTHER

## 2022-04-22 RX ORDER — SODIUM CHLORIDE 9 MG/ML
INJECTION, SOLUTION INTRAVENOUS CONTINUOUS
Status: CANCELLED | OUTPATIENT
Start: 2022-04-26

## 2022-04-22 RX ORDER — ONDANSETRON HYDROCHLORIDE 8 MG/1
8 TABLET, FILM COATED ORAL EVERY 8 HOURS PRN
Qty: 30 TABLET | Refills: 5 | Status: SHIPPED | OUTPATIENT
Start: 2022-04-22 | End: 2023-01-01 | Stop reason: SDUPTHER

## 2022-04-22 NOTE — PROGRESS NOTES
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 female with active 75pk-yr smoking history who established care with Dr. Jeffries in February 2022. LD-CT chest demonstrated an anterior left upper lobe subpleural mass measuring 2.6 x 3.4 cm in size with a 2nd spiculated mass in the right lower lobe measuring 2.0 x 1.7 cm in size.  Enlarged left hilar and left mediastinal lymph nodes measuring up to 1.9 x 1.8 cm in size were noted.  PET-CT demonstrated interval enlargement of the left upper lobe mass to 3.9 x 2.9 cm with an SUV of 18.2 as well as enlargement of the right lower lobe mass to 2.3 x 1.7 cm with an SUV of 9.1.  No regional or distant FDG uptake was noted.  CT-guided biopsy from the left upper lobe returned small cell carcinoma.    She was referred to Dr. Wagner who ordered CT-guided biopsy of the right lower lobe (5/3) and is planning to begin carboplatin and etoposide.    MRI brain pending 4/26.    2/22 PFTs   FEV1 0.93L   DLCO 23%     She is referred to discuss radiotherapeutic options.    Patient denies fever, chills, chest pain cough.  She has baseline shortness of breath.  She discontinued smoking and is wearing nicotine patch.  Denies headache or seizure activity.    Review of systems otherwise negative unless indicated in HPI.    Past Medical History:   Diagnosis Date    Allergy     Arthritis     Asthma     Bursitis     COPD (chronic obstructive pulmonary disease)     Hypertension     Low sodium     Lung nodules 03/2022    Small cell carcinoma of left lung 03/31/2022    Thyroid disease     nodules    Tinea pedis      Past Surgical History:   Procedure  Laterality Date    COLONOSCOPY N/A 2018    Procedure: COLONOSCOPY;  Surgeon: Grey Roberts MD;  Location: [facility];  Service: Endoscopy;  Laterality: N/A;    CT BIOPSY LUNG W/ GUIDANCE Left 2022    HYSTERECTOMY      OOPHORECTOMY       Social History     Socioeconomic History    Marital status:    Tobacco Use    Smoking status: Former Smoker     Packs/day: 0.50     Quit date: 4/3/2022     Years since quittin.0    Smokeless tobacco: Never Used   Substance and Sexual Activity    Alcohol use: Yes     Alcohol/week: 4.0 standard drinks     Types: 4 Glasses of wine per week     Comment: occ. glass of wine    Drug use: No     Family History   Problem Relation Age of Onset    Heart disease Mother     Cancer Mother         lung    Heart disease Father     Cancer Father         lymphoma    Diabetes Neg Hx        PRIOR HISTORY OF CHEMOTHERAPY OR RADIOTHERAPY: Please see HPI for patients prior oncologic history.    Medication List with Changes/Refills   Current Medications    ALENDRONATE (FOSAMAX) 70 MG TABLET    Take 1 tablet by mouth once a week    ASPIRIN (ECOTRIN) 81 MG EC TABLET    Take 81 mg by mouth once daily.    ATENOLOL (TENORMIN) 100 MG TABLET    Take 1 tablet by mouth once daily    CHOLECALCIFEROL, VITAMIN D3, 3,000 UNIT TAB    Take by mouth.    FLUTICASONE-UMECLIDIN-VILANTER (TRELEGY ELLIPTA) 200-62.5-25 MCG INHALER    Inhale 1 puff into the lungs once daily.    IBUPROFEN (ADVIL,MOTRIN) 200 MG TABLET    Take 200 mg by mouth every 6 (six) hours as needed for Pain.    LEVALBUTEROL (XOPENEX HFA) 45 MCG/ACTUATION INHALER    INHALE 1 TO 2 PUFFS INTO LUNGS EVERY 4 HOURS AS NEEDED FOR WHEEZING AND FOR SHORTNESS OF BREATH    LEVOCETIRIZINE (XYZAL) 5 MG TABLET    Take 1 tablet (5 mg total) by mouth every evening.    LISINOPRIL 10 MG TABLET    Take 1 tablet (10 mg total) by mouth once daily.    METHIMAZOLE (TAPAZOLE) 5 MG TAB    Take one tablet Monday-Friday and two tablets on  "Saturday and Sunday.    MULTIVIT WITH MINERALS/LUTEIN (MULTIVITAMIN 50 PLUS ORAL)    Take by mouth.    ROSUVASTATIN (CRESTOR) 20 MG TABLET    Take 1 tablet by mouth once daily     Review of patient's allergies indicates:   Allergen Reactions    Tinactin [tolnaftate] Dermatitis    Advair diskus [fluticasone propion-salmeterol]      shakes    Albuterol      tremors    Sulfa (sulfonamide antibiotics)      unknown       QUALITY OF LIFE: 90%- Able to Carry on Normal Activity: Minor Symptoms of Disease    Vitals:    04/22/22 0911   BP: (!) 167/79   Pulse: 83   Resp: 18   Temp: 97.9 °F (36.6 °C)   SpO2: (!) 92%   Weight: 44 kg (97 lb 1.6 oz)   Height: 5' 2" (1.575 m)   PainSc: 0-No pain     Body mass index is 17.76 kg/m².    PHYSICAL EXAM:   GENERAL: alert; in no apparent distress.   HEAD: normocephalic, atraumatic.  EYES: pupils are equal, round, reactive to light and accommodation. Sclera anicteric. Conjunctiva not injected.   NOSE/THROAT: no nasal erythema or rhinorrhea.   NECK: no cervical motion rigidity; supple with no masses.    CHEST: Patient is speaking comfortably on room air with normal work of breathing without using accessory muscles of respiration.  ABDOMEN: soft, nontender, nondistended.   MUSCULOSKELETAL: no tenderness to palpation along the spine or scapulae. Normal range of motion.  NEUROLOGIC: cranial nerves II-XII intact bilaterally. Strength 5/5 in bilateral upper and lower extremities. No sensory deficits appreciated. Normal gait.  EXTREMITIES: no cyanosis, edema.  SKIN: no erythema, rashes, ulcerations noted.     REVIEW OF IMAGING/PATHOLOGY/LABS: Please see HPI. All images reviewed personally by dictating physician.     ASSESSMENT: 71 y.o. female with small cell carcinoma of the left upper lobe, IIIA [id number]; workup pending RLL (M1a vs synchronous IA3, [id number] NSCLC)  PLAN:  Anuja Cool has been diagnosed with small cell carcinoma of the left upper lobe.  Her initial CT demonstrated " suspicious left hilar and mediastinal lymph nodes without gutierrez avidity on PET-CT.  With small cell histology/natural history, I suspect these are involved.  I agree with Dr. Wagner that pattern of spread to the right lower lobe in the absence of bulky mediastinal disease is unusual.  Recommend proceeding with biopsy which is scheduled May 3rd.  Completion staging MRI of the brain has also been ordered.  PFTs revealed FEV1 less than 1L and today I strongly urged patient continue to abstain from smoking, as I suspect her lung function may limit her tolerance of therapy.    Given her biopsy proven small cell histology, agree with proceeding to immediate carboplatin etoposide systemic therapy with port placement and chemotherapy initiation scheduled for next week.  This will be a difficult turnaround for radiotherapy targeting the left upper lobe, mediastinum and right upper lobe all of which may respond to the 1st cycle of systemic therapy, thereby decreasing treatment volumes and in turn, preserving healthy lung tissue and improving tolerance.  For these reasons, I recommend proceeding to systemic therapy and plan to initiate radiotherapy with 2nd cycle.  Recommendation is be 60-66 Gy pending dosimetry, using IMRT to spare the surrounding lung, heart, esophagus, spine.  This was explained to the patient in detail today and she is in agreement and wishes to proceed.    I carefully explained the process of simulation and treatment delivery with weekly physician visits.      We discussed the risks and benefits of the above treatment and have gone over in detail the acute and late toxicities of radiation therapy to the lungs, mediastinum.     Consent deferred until CT simulation which will be 1 week prior to initiation of cycle 2.     The patient has our contact information and understands that they are free to contact us at any time with questions or concerns regarding radiation therapy.     DISPOSITION: RTC FOR CT  SIM AFTER CURRENT THERAPY     I have personally seen and evaluated this patient with a high complexity diagnosis.      Greater than 90 minutes were dedicated to reviewing/interpreting pertinent laboratory/imaging/pathology as well as prior consultations; reviewing and performing history and physical; counseling patient on oncologic recommendations; documentation in the electronic medical record including ordering of additional tests and/or radiation treatment protocol; and coordination of care with physicians with referrals placed as appropriate.     COVID-19 precautions discussed. [facility] policy for COVID-19 testing described.  Patient will be required to wear a mask when in the Cancer Center.    PHYSICIAN: Elder Martell Jr, MD    Thank you for the opportunity to meet and consult with Anuja Cool.   Please feel free to contact me to discuss the above recommendation further.

## 2022-04-22 NOTE — PROGRESS NOTES
Ms. Anuja Cool is a 71 year old diagnosed with small cell lung cancer.  She is here today for a radiation consult with Dr. Martell.  I met with her to complete new patient orientation and the NCCN Distress Screening; she indicated a rating of 3.  She was provided the number to access the [facility] financial assistance application; she stated that she has [facility] FA.  She was also provided the number to [facility]; she is concerned about driving once she begins [place].  She provides transportation for her son who is unable to drive due to a stroke in 2020.  I encouraged her to set up [employer] accounts.  She denied needing psychosocial support for additional supports at this time.  I provided her with my contact information in the event she needs assistance in the future.

## 2022-04-22 NOTE — Clinical Note
Mauro-- Case we previously discussed. Concerned re: lung function. Likely will need FRANCIS, RLL and L hilum/mediastinum RT. Can't turn it around that quick and hate to delay her chemo start Weds.  Plan to join on cycle 2 once we have answer at RLL and maybe get some shrinkage that'll help with her lung volumes. OK?

## 2022-04-22 NOTE — PATIENT INSTRUCTIONS
Instructions given on chest radiation and skin care.  Also given were brochures for Radiation Therapy and Lung Radiation Therapy.  All questions answered.    Retinoid Dermatitis Normal Treatment: I recommended more frequent application of Cetaphil or CeraVe to the areas of dermatitis.

## 2022-04-25 ENCOUNTER — OFFICE VISIT (OUTPATIENT)
Dept: SURGERY | Facility: CLINIC | Age: 72
End: 2022-04-25
Payer: MEDICARE

## 2022-04-25 ENCOUNTER — TELEPHONE (OUTPATIENT)
Dept: SURGERY | Facility: CLINIC | Age: 72
End: 2022-04-25
Payer: MEDICARE

## 2022-04-25 ENCOUNTER — ANESTHESIA EVENT (OUTPATIENT)
Dept: SURGERY | Facility: HOSPITAL | Age: 72
End: 2022-04-25
Payer: MEDICARE

## 2022-04-25 VITALS
HEART RATE: 94 BPM | SYSTOLIC BLOOD PRESSURE: 125 MMHG | BODY MASS INDEX: 17.85 KG/M2 | TEMPERATURE: 98 F | HEIGHT: 62 IN | DIASTOLIC BLOOD PRESSURE: 75 MMHG | WEIGHT: 97 LBS

## 2022-04-25 DIAGNOSIS — C34.90 SMALL CELL LUNG CANCER: Primary | ICD-10-CM

## 2022-04-25 PROCEDURE — 99212 OFFICE O/P EST SF 10 MIN: CPT | Mod: PBBFAC,PN | Performed by: STUDENT IN AN ORGANIZED HEALTH CARE EDUCATION/TRAINING PROGRAM

## 2022-04-25 PROCEDURE — 99203 PR OFFICE/OUTPT VISIT, NEW, LEVL III, 30-44 MIN: ICD-10-PCS | Mod: S$PBB,,, | Performed by: STUDENT IN AN ORGANIZED HEALTH CARE EDUCATION/TRAINING PROGRAM

## 2022-04-25 PROCEDURE — 99999 PR PBB SHADOW E&M-EST. PATIENT-LVL II: CPT | Mod: PBBFAC,,, | Performed by: STUDENT IN AN ORGANIZED HEALTH CARE EDUCATION/TRAINING PROGRAM

## 2022-04-25 PROCEDURE — 99999 PR PBB SHADOW E&M-EST. PATIENT-LVL II: ICD-10-PCS | Mod: PBBFAC,,, | Performed by: STUDENT IN AN ORGANIZED HEALTH CARE EDUCATION/TRAINING PROGRAM

## 2022-04-25 PROCEDURE — 99203 OFFICE O/P NEW LOW 30 MIN: CPT | Mod: S$PBB,,, | Performed by: STUDENT IN AN ORGANIZED HEALTH CARE EDUCATION/TRAINING PROGRAM

## 2022-04-25 NOTE — TELEPHONE ENCOUNTER
Spoke with patient, advised that the referring doctor wants her to get a port before Wednesday if possible.  We added her on tomorrow tentatively to hold a spot.  Will see her this afternoon to discuss the procedure and sign consent.  She voiced understanding and very appreciative.

## 2022-04-25 NOTE — TELEPHONE ENCOUNTER
----- Message from Raina Cardoza sent at 4/23/2022  9:07 AM CDT -----  Contact: patient  Type: Needs Medical Advice  Who Called:  patient  Best Call Back Number: [phone] (home)   Additional Information: patient is requesting a call back to discuss these upcoming appts- she was aware of the one on Monday but not the one on Tuesday. Please advise

## 2022-04-25 NOTE — PRE-PROCEDURE INSTRUCTIONS
PHONE PRE-OP WAS DONE. ICOUGH INSTRUCTIONS GIVEN.  PATIENT VERBALIZED UNDERSTANDING OF ALL INSTRUCTIONS AND EDUCATION.  ALSO SENT THROUGH MY [facility].

## 2022-04-26 ENCOUNTER — HOSPITAL ENCOUNTER (OUTPATIENT)
Facility: HOSPITAL | Age: 72
Discharge: HOME | End: 2022-04-26
Attending: STUDENT IN AN ORGANIZED HEALTH CARE EDUCATION/TRAINING PROGRAM | Admitting: STUDENT IN AN ORGANIZED HEALTH CARE EDUCATION/TRAINING PROGRAM
Payer: MEDICARE

## 2022-04-26 ENCOUNTER — ANESTHESIA (OUTPATIENT)
Dept: SURGERY | Facility: HOSPITAL | Age: 72
End: 2022-04-26
Payer: MEDICARE

## 2022-04-26 VITALS
RESPIRATION RATE: 18 BRPM | OXYGEN SATURATION: 97 % | DIASTOLIC BLOOD PRESSURE: 77 MMHG | HEIGHT: 62 IN | SYSTOLIC BLOOD PRESSURE: 155 MMHG | HEART RATE: 78 BPM | BODY MASS INDEX: 17.85 KG/M2 | WEIGHT: 97 LBS | TEMPERATURE: 98 F

## 2022-04-26 DIAGNOSIS — Z01.818 PRE-OP TESTING: ICD-10-CM

## 2022-04-26 DIAGNOSIS — C34.90 SMALL CELL LUNG CANCER: Primary | ICD-10-CM

## 2022-04-26 PROCEDURE — D9220A PRA ANESTHESIA: ICD-10-PCS | Mod: CRNA,,, | Performed by: NURSE ANESTHETIST, CERTIFIED REGISTERED

## 2022-04-26 PROCEDURE — C1788 PORT, INDWELLING, IMP: HCPCS | Performed by: STUDENT IN AN ORGANIZED HEALTH CARE EDUCATION/TRAINING PROGRAM

## 2022-04-26 PROCEDURE — 71000033 HC RECOVERY, INTIAL HOUR: Performed by: STUDENT IN AN ORGANIZED HEALTH CARE EDUCATION/TRAINING PROGRAM

## 2022-04-26 PROCEDURE — 25000003 PHARM REV CODE 250: Performed by: ANESTHESIOLOGY

## 2022-04-26 PROCEDURE — 36000707: Performed by: STUDENT IN AN ORGANIZED HEALTH CARE EDUCATION/TRAINING PROGRAM

## 2022-04-26 PROCEDURE — 71000015 HC POSTOP RECOV 1ST HR: Performed by: STUDENT IN AN ORGANIZED HEALTH CARE EDUCATION/TRAINING PROGRAM

## 2022-04-26 PROCEDURE — 63600175 PHARM REV CODE 636 W HCPCS: Performed by: NURSE ANESTHETIST, CERTIFIED REGISTERED

## 2022-04-26 PROCEDURE — 36561 INSERT TUNNELED CV CATH: CPT | Mod: RT,,, | Performed by: STUDENT IN AN ORGANIZED HEALTH CARE EDUCATION/TRAINING PROGRAM

## 2022-04-26 PROCEDURE — 93010 ELECTROCARDIOGRAM REPORT: CPT | Mod: ,,, | Performed by: SPECIALIST

## 2022-04-26 PROCEDURE — 71000039 HC RECOVERY, EACH ADD'L HOUR: Performed by: STUDENT IN AN ORGANIZED HEALTH CARE EDUCATION/TRAINING PROGRAM

## 2022-04-26 PROCEDURE — 93010 EKG 12-LEAD: ICD-10-PCS | Mod: ,,, | Performed by: SPECIALIST

## 2022-04-26 PROCEDURE — 99900103 DSU ONLY-NO CHARGE-INITIAL HR (STAT): Performed by: STUDENT IN AN ORGANIZED HEALTH CARE EDUCATION/TRAINING PROGRAM

## 2022-04-26 PROCEDURE — D9220A PRA ANESTHESIA: Mod: ANES,,, | Performed by: ANESTHESIOLOGY

## 2022-04-26 PROCEDURE — 37000009 HC ANESTHESIA EA ADD 15 MINS: Performed by: STUDENT IN AN ORGANIZED HEALTH CARE EDUCATION/TRAINING PROGRAM

## 2022-04-26 PROCEDURE — D9220A PRA ANESTHESIA: Mod: CRNA,,, | Performed by: NURSE ANESTHETIST, CERTIFIED REGISTERED

## 2022-04-26 PROCEDURE — 63600175 PHARM REV CODE 636 W HCPCS: Performed by: STUDENT IN AN ORGANIZED HEALTH CARE EDUCATION/TRAINING PROGRAM

## 2022-04-26 PROCEDURE — 36000706: Performed by: STUDENT IN AN ORGANIZED HEALTH CARE EDUCATION/TRAINING PROGRAM

## 2022-04-26 PROCEDURE — A4216 STERILE WATER/SALINE, 10 ML: HCPCS | Performed by: STUDENT IN AN ORGANIZED HEALTH CARE EDUCATION/TRAINING PROGRAM

## 2022-04-26 PROCEDURE — 77001 CHG FLUOROGUIDE CNTRL VEN ACCESS,PLACE,REPLACE,REMOVE: ICD-10-PCS | Mod: 26,,, | Performed by: STUDENT IN AN ORGANIZED HEALTH CARE EDUCATION/TRAINING PROGRAM

## 2022-04-26 PROCEDURE — 93005 ELECTROCARDIOGRAM TRACING: CPT | Mod: 59

## 2022-04-26 PROCEDURE — 77001 FLUOROGUIDE FOR VEIN DEVICE: CPT | Mod: 26,,, | Performed by: STUDENT IN AN ORGANIZED HEALTH CARE EDUCATION/TRAINING PROGRAM

## 2022-04-26 PROCEDURE — D9220A PRA ANESTHESIA: ICD-10-PCS | Mod: ANES,,, | Performed by: ANESTHESIOLOGY

## 2022-04-26 PROCEDURE — 36561 PR INSERT TUNNELED CV CATH WITH PORT: ICD-10-PCS | Mod: RT,,, | Performed by: STUDENT IN AN ORGANIZED HEALTH CARE EDUCATION/TRAINING PROGRAM

## 2022-04-26 PROCEDURE — 25000003 PHARM REV CODE 250: Performed by: STUDENT IN AN ORGANIZED HEALTH CARE EDUCATION/TRAINING PROGRAM

## 2022-04-26 PROCEDURE — 37000008 HC ANESTHESIA 1ST 15 MINUTES: Performed by: STUDENT IN AN ORGANIZED HEALTH CARE EDUCATION/TRAINING PROGRAM

## 2022-04-26 PROCEDURE — 99900104 DSU ONLY-NO CHARGE-EA ADD'L HR (STAT): Performed by: STUDENT IN AN ORGANIZED HEALTH CARE EDUCATION/TRAINING PROGRAM

## 2022-04-26 PROCEDURE — 99900103 DSU ONLY-NO CHARGE-INITIAL HR (STAT)

## 2022-04-26 DEVICE — KIT POWERPORT SINGLE 8FR: Type: IMPLANTABLE DEVICE | Site: CHEST  WALL | Status: FUNCTIONAL

## 2022-04-26 RX ORDER — HEPARIN SODIUM,PORCINE 10 UNIT/ML
VIAL (ML) INTRAVENOUS
Status: DISCONTINUED | OUTPATIENT
Start: 2022-04-26 | End: 2022-04-26 | Stop reason: HOSPADM

## 2022-04-26 RX ORDER — PROPOFOL 10 MG/ML
VIAL (ML) INTRAVENOUS
Status: DISCONTINUED | OUTPATIENT
Start: 2022-04-26 | End: 2022-04-26

## 2022-04-26 RX ORDER — ONDANSETRON 2 MG/ML
4 INJECTION INTRAMUSCULAR; INTRAVENOUS ONCE AS NEEDED
Status: DISCONTINUED | OUTPATIENT
Start: 2022-04-26 | End: 2022-04-26 | Stop reason: HOSPADM

## 2022-04-26 RX ORDER — MIDAZOLAM HYDROCHLORIDE 1 MG/ML
INJECTION, SOLUTION INTRAMUSCULAR; INTRAVENOUS
Status: DISCONTINUED | OUTPATIENT
Start: 2022-04-26 | End: 2022-04-26

## 2022-04-26 RX ORDER — SODIUM CHLORIDE 9 MG/ML
INJECTION, SOLUTION INTRAMUSCULAR; INTRAVENOUS; SUBCUTANEOUS
Status: DISCONTINUED | OUTPATIENT
Start: 2022-04-26 | End: 2022-04-26 | Stop reason: HOSPADM

## 2022-04-26 RX ORDER — CEFAZOLIN SODIUM 2 G/50ML
2 SOLUTION INTRAVENOUS
Status: COMPLETED | OUTPATIENT
Start: 2022-04-26 | End: 2022-04-26

## 2022-04-26 RX ORDER — OXYCODONE HYDROCHLORIDE 5 MG/1
5 TABLET ORAL ONCE AS NEEDED
Status: DISCONTINUED | OUTPATIENT
Start: 2022-04-26 | End: 2022-04-26 | Stop reason: HOSPADM

## 2022-04-26 RX ORDER — FENTANYL CITRATE 50 UG/ML
25 INJECTION, SOLUTION INTRAMUSCULAR; INTRAVENOUS EVERY 5 MIN PRN
Status: DISCONTINUED | OUTPATIENT
Start: 2022-04-26 | End: 2022-04-26 | Stop reason: HOSPADM

## 2022-04-26 RX ORDER — BUPIVACAINE HYDROCHLORIDE AND EPINEPHRINE 2.5; 5 MG/ML; UG/ML
INJECTION, SOLUTION EPIDURAL; INFILTRATION; INTRACAUDAL; PERINEURAL
Status: DISCONTINUED | OUTPATIENT
Start: 2022-04-26 | End: 2022-04-26 | Stop reason: HOSPADM

## 2022-04-26 RX ORDER — SODIUM CHLORIDE 9 MG/ML
INJECTION, SOLUTION INTRAVENOUS CONTINUOUS
Status: DISCONTINUED | OUTPATIENT
Start: 2022-04-26 | End: 2022-04-26 | Stop reason: HOSPADM

## 2022-04-26 RX ORDER — LIDOCAINE HYDROCHLORIDE 10 MG/ML
1 INJECTION, SOLUTION EPIDURAL; INFILTRATION; INTRACAUDAL; PERINEURAL ONCE
Status: COMPLETED | OUTPATIENT
Start: 2022-04-26 | End: 2022-04-26

## 2022-04-26 RX ORDER — IBUPROFEN 200 MG
1 TABLET ORAL
COMMUNITY

## 2022-04-26 RX ADMIN — PROPOFOL 30 MG: 10 INJECTION, EMULSION INTRAVENOUS at 01:04

## 2022-04-26 RX ADMIN — CEFAZOLIN SODIUM 2 G: 2 SOLUTION INTRAVENOUS at 01:04

## 2022-04-26 RX ADMIN — LIDOCAINE HYDROCHLORIDE 10 MG: 10 INJECTION, SOLUTION EPIDURAL; INFILTRATION; INTRACAUDAL; PERINEURAL at 12:04

## 2022-04-26 RX ADMIN — SODIUM CHLORIDE, SODIUM GLUCONATE, SODIUM ACETATE, POTASSIUM CHLORIDE, MAGNESIUM CHLORIDE, SODIUM PHOSPHATE, DIBASIC, AND POTASSIUM PHOSPHATE: .53; .5; .37; .037; .03; .012; .00082 INJECTION, SOLUTION INTRAVENOUS at 01:04

## 2022-04-26 RX ADMIN — PROPOFOL 40 MG: 10 INJECTION, EMULSION INTRAVENOUS at 01:04

## 2022-04-26 RX ADMIN — PROPOFOL 30 MG: 10 INJECTION, EMULSION INTRAVENOUS at 02:04

## 2022-04-26 RX ADMIN — MIDAZOLAM 1.5 MG: 1 INJECTION INTRAMUSCULAR; INTRAVENOUS at 01:04

## 2022-04-26 RX ADMIN — MIDAZOLAM 0.5 MG: 1 INJECTION INTRAMUSCULAR; INTRAVENOUS at 01:04

## 2022-04-26 NOTE — TRANSFER OF CARE
"Anesthesia Transfer of Care Note    Patient: Anuja Cool    Procedure(s) Performed: Procedure(s) (LRB):  [id number] (N/A)    Patient location: PACU    Anesthesia Type: MAC    Transport from OR: Transported from OR on 2-3 L/min O2 by NC with adequate spontaneous ventilation    Post pain: adequate analgesia    Post assessment: no apparent anesthetic complications and tolerated procedure well    Post vital signs: stable    Level of consciousness: awake    Nausea/Vomiting: no nausea/vomiting    Complications: none    Transfer of care protocol was followed      Last vitals:   Visit Vitals  BP (!) 152/72 (BP Location: Left arm, Patient Position: Lying)   Pulse 80   Temp 36.7 °C (98.1 °F) (Skin)   Resp 18   Ht 5' 2" (1.575 m)   Wt 44 kg (97 lb)   Breastfeeding No   BMI 17.74 kg/m²     "

## 2022-04-26 NOTE — DISCHARGE SUMMARY
[facility]  Brief Operative Note    Surgery Date: 4/26/2022     Surgeon(s) and Role:     * Noel Sadler MD - Primary    Assisting Surgeon: None    Pre-op Diagnosis:  Small cell lung cancer [C34.90]    Post-op Diagnosis:  Post-Op Diagnosis Codes:     * Small cell lung cancer [C34.90]    Procedure(s) (LRB):  [id number] (N/A)    Anesthesia: General    Operative Findings:  Right IJ port    Estimated Blood Loss: * minimal         Specimens:   Specimen (24h ago, onward)            None            Discharge Note    OUTCOME: Patient tolerated treatment/procedure well without complication and is now ready for discharge.    DISPOSITION: Home or Self Care    FINAL DIAGNOSIS:  Small cell lung cancer    FOLLOWUP: In clinic    DISCHARGE INSTRUCTIONS:    Discharge Procedure Orders   Diet Adult Regular     Notify your health care provider if you experience any of the following:  redness, tenderness, or signs of infection (pain, swelling, redness, odor or green/yellow discharge around incision site)     No dressing needed   Order Comments: Okay to shower.  No swimming or soaking for 2-3 weeks.     Activity as tolerated

## 2022-04-26 NOTE — PLAN OF CARE
Pt transferred to phase II. VSS: BP elevated and pt stated she is going to take her BP medication when she gets home, denies pain, dermabond cdi to R chest, chest xray done and resulted, tolerated clear liquids without N/V. Report given to PRECIOUS Ayon.

## 2022-04-26 NOTE — ANESTHESIA PREPROCEDURE EVALUATION
04/26/2022  Anuja Cool is a 71 y.o., female.    Pre-op Assessment    I have reviewed the Patient Summary Reports.    I have reviewed the Nursing Notes.    I have reviewed the Medications.     Review of Systems  Anesthesia Hx:  No problems with previous Anesthesia    Social:  Smoker    Hematology/Oncology:  Hematology Normal   Oncology Normal     Cardiovascular:   Hypertension hyperlipidemia    Pulmonary:   COPD Asthma    Renal/:  Renal/ Normal     Hepatic/GI:   Bowel Prep.    Musculoskeletal:   Arthritis     Neurological:   Neuromuscular Disease,    Endocrine:  Endocrine Normal    Dermatological:  Skin Normal    Psych:  Psychiatric Normal           Physical Exam  General:  Well nourished      Airway/Jaw/Neck:  Airway Findings: Mouth Opening: Normal   Tongue: Normal   General Airway Assessment: Adult Mallampati: II  TM Distance: Normal, at least 6 cm         Eyes/Ears/Nose:  EYES/EARS/NOSE FINDINGS: Normal   Dental:  Dental Findings: Upper Dentures, Lower Dentures     Chest/Lungs:  Chest/Lungs Findings: Normal Respiratory Rate, Expiratory Wheezes, Mild      Heart/Vascular:  Heart Findings: Rate: Normal  Rhythm: Regular Rhythm  Sounds: Normal        Mental Status:  Mental Status Findings:  Cooperative, Alert and Oriented         Anesthesia Plan  Type of Anesthesia, risks & benefits discussed:  Anesthesia Type:  MAC    Patient's Preference:   Plan Factors:          Intra-op Monitoring Plan: standard ASA monitors  Intra-op Monitoring Plan Comments:   Post Op Pain Control Plan: multimodal analgesia and IV/PO Opioids PRN  Post Op Pain Control Plan Comments:     Induction:   IV  Beta Blocker:  Patient is not currently on a Beta-Blocker (No further documentation required).       Informed Consent: Informed consent signed with the Patient and all parties understand the risks and agree with anesthesia plan.   All questions answered.  Anesthesia consent signed with patient.  ASA Score: 3     Day of Surgery Review of History & Physical:  There are no significant changes.  H&P Update referred to the surgeon/provider.          Ready For Surgery From Anesthesia Perspective.           Physical Exam  General: Well nourished    Airway:  Mallampati: II   Mouth Opening: Normal  TM Distance: Normal, at least 6 cm  Tongue: Normal    Dental:  Upper Dentures, Lower Dentures    Chest/Lungs:  Normal Respiratory Rate, Expiratory Wheezes, Mild    Heart:  Rate: Normal  Rhythm: Regular Rhythm  Sounds: Normal          Anesthesia Plan  Type of Anesthesia, risks & benefits discussed:    Anesthesia Type: MAC  Intra-op Monitoring Plan: standard ASA monitors  Post Op Pain Control Plan: multimodal analgesia and IV/PO Opioids PRN  Induction:  IV  Informed Consent: Informed consent signed with the Patient and all parties understand the risks and agree with anesthesia plan.  All questions answered.   ASA Score: 3  Day of Surgery Review of History & Physical: H&P Update referred to the surgeon/provider.    Ready For Surgery From Anesthesia Perspective.       .

## 2022-04-26 NOTE — ANESTHESIA POSTPROCEDURE EVALUATION
Anesthesia Post Evaluation    Patient: Anuja Cool    Procedure(s) Performed: Procedure(s) (LRB):  [id number]-CATH (N/A)    Final Anesthesia Type: MAC      Patient location during evaluation: PACU  Patient participation: Yes- Able to Participate  Level of consciousness: awake and alert, oriented and awake  Post-procedure vital signs: reviewed and stable  Pain management: adequate  Airway patency: patent    PONV status at discharge: No PONV  Anesthetic complications: no      Cardiovascular status: blood pressure returned to baseline and hemodynamically stable  Respiratory status: unassisted, spontaneous ventilation and room air  Hydration status: euvolemic  Follow-up not needed.          Vitals Value Taken Time   /59 04/26/22 1444   Temp  04/26/22 1445   Pulse 80 04/26/22 1445   Resp 24 04/26/22 1445   SpO2 98 % 04/26/22 1445   Vitals shown include unvalidated device data.      No case tracking events are documented in the log.      Pain/Miky Score: No data recorded

## 2022-04-26 NOTE — DISCHARGE INSTRUCTIONS
"Discharge Instructions: After Your Surgery/Procedure  Youve just had surgery. During surgery you were given medicine called anesthesia to keep you relaxed and free of pain. After surgery you may have some pain or nausea. This is common. Here are some tips for feeling better and getting well after surgery.     Stay on schedule with your medication.   Going home  Your doctor or nurse will show you how to take care of yourself when you go home. He or she will also answer your questions. Have an adult family member or friend drive you home.      For your safety we recommend these precaution for the first 24 hours after your procedure:  Do not drive or use heavy equipment.  Do not make important decisions or sign legal papers.  Do not drink alcohol.  Have someone stay with you, if needed. He or she can watch for problems and help keep you safe.  Your concentration, balance, coordination, and judgement may be impaired for many hours after anesthesia.  Use caution when ambulating or standing up.     You may feel weak and "washed out" after anesthesia and surgery.      Subtle residual effects of general anesthesia or sedation with regional / local anesthesia can last more than 24 hours.  Rest for the remainder of the day or longer if your Doctor/Surgeon has advised you to do so.  Although you may feel normal within the first 24 hours, your reflexes and mental ability may be impaired without you realizing it.  You may feel dizzy, lightheaded or sleepy for 24 hours or longer.      Be sure to go to all follow-up visits with your doctor. And rest after your surgery for as long as your doctor tells you to.  Coping with pain  If you have pain after surgery, pain medicine will help you feel better. Take it as told, before pain becomes severe. Also, ask your doctor or pharmacist about other ways to control pain. This might be with heat, ice, or relaxation. And follow any other instructions your surgeon or nurse gives you.  Tips " for taking pain medicine  To get the best relief possible, remember these points:  Pain medicines can upset your stomach. Taking them with a little food may help.  Most pain relievers taken by mouth need at least 20 to 30 minutes to start to work.  Taking medicine on a schedule can help you remember to take it. Try to time your medicine so that you can take it before starting an activity. This might be before you get dressed, go for a walk, or sit down for dinner.  Constipation is a common side effect of pain medicines. Call your doctor before taking any medicines such as laxatives or stool softeners to help ease constipation. Also ask if you should skip any foods. Drinking lots of fluids and eating foods such as fruits and vegetables that are high in fiber can also help. Remember, do not take laxatives unless your surgeon has prescribed them.  Drinking alcohol and taking pain medicine can cause dizziness and slow your breathing. It can even be deadly. Do not drink alcohol while taking pain medicine.  Pain medicine can make you react more slowly to things. Do not drive or run machinery while taking pain medicine.  Your health care provider may tell you to take acetaminophen to help ease your pain. Ask him or her how much you are supposed to take each day. Acetaminophen or other pain relievers may interact with your prescription medicines or other over-the-counter (OTC) drugs. Some prescription medicines have acetaminophen and other ingredients. Using both prescription and OTC acetaminophen for pain can cause you to overdose. Read the labels on your OTC medicines with care. This will help you to clearly know the list of ingredients, how much to take, and any warnings. It may also help you not take too much acetaminophen. If you have questions or do not understand the information, ask your pharmacist or health care provider to explain it to you before you take the OTC medicine.  Managing nausea  Some people have an  upset stomach after surgery. This is often because of anesthesia, pain, or pain medicine, or the stress of surgery. These tips will help you handle nausea and eat healthy foods as you get better. If you were on a special food plan before surgery, ask your doctor if you should follow it while you get better. These tips may help:  Do not push yourself to eat. Your body will tell you when to eat and how much.  Start off with clear liquids and soup. They are easier to digest.  Next try semi-solid foods, such as mashed potatoes, applesauce, and gelatin, as you feel ready.  Slowly move to solid foods. Dont eat fatty, rich, or spicy foods at first.  Do not force yourself to have 3 large meals a day. Instead eat smaller amounts more often.  Take pain medicines with a small amount of solid food, such as crackers or toast, to avoid nausea.     Call your surgeon if  You still have pain an hour after taking medicine. The medicine may not be strong enough.  You feel too sleepy, dizzy, or groggy. The medicine may be too strong.  You have side effects like nausea, vomiting, or skin changes, such as rash, itching, or hives.       If you have obstructive sleep apnea  You were given anesthesia medicine during surgery to keep you comfortable and free of pain. After surgery, you may have more apnea spells because of this medicine and other medicines you were given. The spells may last longer than usual.   At home:  Keep using the continuous positive airway pressure (CPAP) device when you sleep. Unless your health care provider tells you not to, use it when you sleep, day or night. CPAP is a common device used to treat obstructive sleep apnea.  Talk with your provider before taking any pain medicine, muscle relaxants, or sedatives. Your provider will tell you about the possible dangers of taking these medicines.  © 8083-7818 [employer]. [address] [place] [address], [place] [address]. All rights reserved. This information is  not intended as a substitute for professional medical care. Always follow your healthcare professional's instructions.     We hope your stay was comfortable as you heal now, mend and rest.    For we have enjoyed taking care of you by giving your our best.    And as you get better, by regaining your health and strength;   We count it as a privilege to have served you and hope your time at [facility] was well spent.      Thank  You!!!

## 2022-04-26 NOTE — PROGRESS NOTES
History & Physical    SUBJECTIVE:     History of Present Illness:  Patient is a 71 y.o. female presents with left-sided small cell lung cancer.  It was recommended that she have a port placed for chemotherapy by her oncologist.    Chief Complaint   Patient presents with    Consult     Port placement       Review of patient's allergies indicates:   Allergen Reactions    Tinactin [tolnaftate] Dermatitis    Advair diskus [fluticasone propion-salmeterol]      shakes    Albuterol      tremors    Sulfa (sulfonamide antibiotics)      Unknown    Years ago       Current Outpatient Medications   Medication Sig Dispense Refill    alendronate (FOSAMAX) 70 MG tablet Take 1 tablet by mouth once a week 12 tablet 4    aspirin (ECOTRIN) 81 MG EC tablet Take 81 mg by mouth once daily.      atenoloL (TENORMIN) 100 MG tablet Take 1 tablet by mouth once daily 90 tablet 0    cholecalciferol, vitamin D3, 3,000 unit Tab Take by mouth.      fluticasone-umeclidin-vilanter (TRELEGY ELLIPTA) 200-62.5-25 mcg inhaler Inhale 1 puff into the lungs once daily. 90 each 3    ibuprofen (ADVIL,MOTRIN) 200 MG tablet Take 200 mg by mouth every 6 (six) hours as needed for Pain.      levalbuterol (XOPENEX HFA) 45 mcg/actuation inhaler INHALE 1 TO 2 PUFFS INTO LUNGS EVERY 4 HOURS AS NEEDED FOR WHEEZING AND FOR SHORTNESS OF BREATH 45 g 1    lisinopriL 10 MG tablet Take 1 tablet (10 mg total) by mouth once daily. 90 tablet 3    methIMAzole (TAPAZOLE) 5 MG Tab Take one tablet Monday-Friday and two tablets on Saturday and Sunday. 180 tablet 3    multivit with minerals/lutein (MULTIVITAMIN 50 PLUS ORAL) Take by mouth.      ondansetron (ZOFRAN) 8 MG tablet Take 1 tablet (8 mg total) by mouth every 8 (eight) hours as needed. 30 tablet 5    promethazine (PHENERGAN) 25 MG tablet Take 1 tablet (25 mg total) by mouth every 4 to 6 hours as needed. 30 tablet 5    rosuvastatin (CRESTOR) 20 MG tablet Take 1 tablet by mouth once daily 90 tablet 3     "levocetirizine (XYZAL) 5 MG tablet Take 1 tablet (5 mg total) by mouth every evening. (Patient not taking: No sig reported) 30 tablet 2     No current facility-administered medications for this visit.       Past Medical History:   Diagnosis Date    Allergy     Arthritis     Asthma     Bursitis     COPD (chronic obstructive pulmonary disease)     Hypertension     Low sodium     Lung nodules 2022    Small cell carcinoma of left lung 2022    Thyroid disease     nodules    Tinea pedis      Past Surgical History:   Procedure Laterality Date    COLONOSCOPY N/A 2018    Procedure: COLONOSCOPY;  Surgeon: Grey Roberts MD;  Location: [facility];  Service: Endoscopy;  Laterality: N/A;    CT BIOPSY LUNG W/ GUIDANCE Left 2022    HYSTERECTOMY      OOPHORECTOMY       Family History   Problem Relation Age of Onset    Heart disease Mother     Cancer Mother         lung    Heart disease Father     Cancer Father         lymphoma    Diabetes Neg Hx      Social History     Tobacco Use    Smoking status: Former Smoker     Packs/day: 0.50     Quit date: 4/3/2022     Years since quittin.0    Smokeless tobacco: Never Used   Substance Use Topics    Alcohol use: Yes     Alcohol/week: 4.0 standard drinks     Types: 4 Glasses of wine per week     Comment: occ. glass of wine    Drug use: No        Review of Systems:  Review of Systems   Constitutional: Negative for fever.   HENT: Negative.    Eyes: Negative.    Respiratory: Negative.    Cardiovascular: Negative.    Gastrointestinal: Negative.    Endocrine: Negative.    Genitourinary: Negative.    Musculoskeletal: Negative.    Skin: Negative.    Allergic/Immunologic: Negative.    Neurological: Negative.    Hematological: Negative.    Psychiatric/Behavioral: Negative.        OBJECTIVE:     Vital Signs (Most Recent)  Temp: 98 °F (36.7 °C) (22 1540)  Pulse: 94 (22 1540)  BP: 125/75 (22 1540)  5' 2" (1.575 m)  44 kg (97 lb) "     Physical Exam:  Physical Exam  Constitutional:       General: She is not in acute distress.     Appearance: Normal appearance. She is not ill-appearing, toxic-appearing or diaphoretic.   HENT:      Head: Normocephalic.      Nose: Nose normal.   Eyes:      Conjunctiva/sclera: Conjunctivae normal.   Cardiovascular:      Rate and Rhythm: Normal rate and regular rhythm.   Pulmonary:      Effort: Pulmonary effort is normal.   Abdominal:      Palpations: Abdomen is soft.   Musculoskeletal:         General: Normal range of motion.      Cervical back: Normal range of motion.   Skin:     General: Skin is warm.   Neurological:      General: No focal deficit present.      Mental Status: She is alert.   Psychiatric:         Mood and Affect: Mood normal.           ASSESSMENT/PLAN:     71-year-old female referred to me for port placement for small cell lung cancer for neoadjuvant therapy    PLAN:  Risks benefits of surgery were discussed.  Patient did consent to the planned procedure.  Plan for port placement in the OR tomorrow.

## 2022-04-26 NOTE — PLAN OF CARE
Pt ambulated to bathroom with steady gait and voided qs without difficulty.  Denies pain or nausea.  incisiion bruised but intact.  States that she is ready for discharge

## 2022-04-26 NOTE — OP NOTE
[facility]  Surgery Department  Operative Note    SUMMARY     Date of Procedure: 4/26/2022     Procedure: Procedure(s) (LRB):  [id number] (N/A)     Surgeon(s) and Role:     * Noel Sadler MD - Primary    Assisting Surgeon: JUDAH Caceres MD Res    Pre-Operative Diagnosis: Small cell lung cancer [C34.90]    Post-Operative Diagnosis: Post-Op Diagnosis Codes:     * Small cell lung cancer [C34.90]    Anesthesia: General    Operative Findings (including complications, if any):  Right IJ port tunneled to the right chest wall    Description of Technical Procedures:  This is a 71-year-old female with small cell lung cancer.  She did consent to port placement for neoadjuvant therapy.  She was taken to the OR, placed supine, sedated by Anesthesia, the neck and chest were prepped and draped in the usual fashion, a time-out was completed.  Using ultrasound, the right internal jugular vein was cannulated with a hollow bore needle.  A wire was advanced and confirmed to be in appropriate location using ultrasound and fluoroscopy.  A location on the right chest wall was chosen for the port pocket.  A 3 cm incision was made sharply.  The pocket was developed using electrocautery.  The port was secured within the pocket using 3-0 Vicryl sutures.  The catheter was then tunneled from the chest wall to the wire exit site in the neck.  Using Seldinger technique and under fluoroscopic guidance, the wire was exchanged for a split sheath dilator without apparent complication.  The catheter was then advanced down the sheath and the sheath was then removed.  Fluoroscopy confirmed appropriate location of the catheter without kinking.  The port was accessed, aspirated, and then flushed with heparinized saline with ease.  The port pocket was then closed in layers with 3-0 Vicryl and a running 4-0 Monocryl.  The wire exit site in the neck was closed with a single buried 4-0 Monocryl suture.  Dermabond was applied  as dressing.  Patient tolerated the entire procedure without apparent complication, was extubated in the OR, brought to recovery in stable condition.  All counts were correct.    Significant Surgical Tasks Conducted by the Assistant(s), if Applicable: Assist    Estimated Blood Loss (EBL): min           Implants:   Implant Name Type Inv. Item Serial No.  Lot No. LRB No. Used Action   KIT POWERPORT SINGLE 8FR - [id number] Port KIT POWERPORT SINGLE 8FR  [employer] [id number] Right 1 Implanted       Specimens:   Specimen (24h ago, onward)            None                  Condition: Good    Disposition: PACU - hemodynamically stable.    Attestation: I was present and scrubbed for the entire procedure.

## 2022-04-26 NOTE — H&P (VIEW-ONLY)
History & Physical    SUBJECTIVE:     History of Present Illness:  Patient is a 71 y.o. female presents with left-sided small cell lung cancer.  It was recommended that she have a port placed for chemotherapy by her oncologist.    Chief Complaint   Patient presents with    Consult     Port placement       Review of patient's allergies indicates:   Allergen Reactions    Tinactin [tolnaftate] Dermatitis    Advair diskus [fluticasone propion-salmeterol]      shakes    Albuterol      tremors    Sulfa (sulfonamide antibiotics)      Unknown    Years ago       Current Outpatient Medications   Medication Sig Dispense Refill    alendronate (FOSAMAX) 70 MG tablet Take 1 tablet by mouth once a week 12 tablet 4    aspirin (ECOTRIN) 81 MG EC tablet Take 81 mg by mouth once daily.      atenoloL (TENORMIN) 100 MG tablet Take 1 tablet by mouth once daily 90 tablet 0    cholecalciferol, vitamin D3, 3,000 unit Tab Take by mouth.      fluticasone-umeclidin-vilanter (TRELEGY ELLIPTA) 200-62.5-25 mcg inhaler Inhale 1 puff into the lungs once daily. 90 each 3    ibuprofen (ADVIL,MOTRIN) 200 MG tablet Take 200 mg by mouth every 6 (six) hours as needed for Pain.      levalbuterol (XOPENEX HFA) 45 mcg/actuation inhaler INHALE 1 TO 2 PUFFS INTO LUNGS EVERY 4 HOURS AS NEEDED FOR WHEEZING AND FOR SHORTNESS OF BREATH 45 g 1    lisinopriL 10 MG tablet Take 1 tablet (10 mg total) by mouth once daily. 90 tablet 3    methIMAzole (TAPAZOLE) 5 MG Tab Take one tablet Monday-Friday and two tablets on Saturday and Sunday. 180 tablet 3    multivit with minerals/lutein (MULTIVITAMIN 50 PLUS ORAL) Take by mouth.      ondansetron (ZOFRAN) 8 MG tablet Take 1 tablet (8 mg total) by mouth every 8 (eight) hours as needed. 30 tablet 5    promethazine (PHENERGAN) 25 MG tablet Take 1 tablet (25 mg total) by mouth every 4 to 6 hours as needed. 30 tablet 5    rosuvastatin (CRESTOR) 20 MG tablet Take 1 tablet by mouth once daily 90 tablet 3     "levocetirizine (XYZAL) 5 MG tablet Take 1 tablet (5 mg total) by mouth every evening. (Patient not taking: No sig reported) 30 tablet 2     No current facility-administered medications for this visit.       Past Medical History:   Diagnosis Date    Allergy     Arthritis     Asthma     Bursitis     COPD (chronic obstructive pulmonary disease)     Hypertension     Low sodium     Lung nodules 2022    Small cell carcinoma of left lung 2022    Thyroid disease     nodules    Tinea pedis      Past Surgical History:   Procedure Laterality Date    COLONOSCOPY N/A 2018    Procedure: COLONOSCOPY;  Surgeon: Grey Roberts MD;  Location: [facility];  Service: Endoscopy;  Laterality: N/A;    CT BIOPSY LUNG W/ GUIDANCE Left 2022    HYSTERECTOMY      OOPHORECTOMY       Family History   Problem Relation Age of Onset    Heart disease Mother     Cancer Mother         lung    Heart disease Father     Cancer Father         lymphoma    Diabetes Neg Hx      Social History     Tobacco Use    Smoking status: Former Smoker     Packs/day: 0.50     Quit date: 4/3/2022     Years since quittin.0    Smokeless tobacco: Never Used   Substance Use Topics    Alcohol use: Yes     Alcohol/week: 4.0 standard drinks     Types: 4 Glasses of wine per week     Comment: occ. glass of wine    Drug use: No        Review of Systems:  Review of Systems   Constitutional: Negative for fever.   HENT: Negative.    Eyes: Negative.    Respiratory: Negative.    Cardiovascular: Negative.    Gastrointestinal: Negative.    Endocrine: Negative.    Genitourinary: Negative.    Musculoskeletal: Negative.    Skin: Negative.    Allergic/Immunologic: Negative.    Neurological: Negative.    Hematological: Negative.    Psychiatric/Behavioral: Negative.        OBJECTIVE:     Vital Signs (Most Recent)  Temp: 98 °F (36.7 °C) (22 1540)  Pulse: 94 (22 1540)  BP: 125/75 (22 1540)  5' 2" (1.575 m)  44 kg (97 lb) "     Physical Exam:  Physical Exam  Constitutional:       General: She is not in acute distress.     Appearance: Normal appearance. She is not ill-appearing, toxic-appearing or diaphoretic.   HENT:      Head: Normocephalic.      Nose: Nose normal.   Eyes:      Conjunctiva/sclera: Conjunctivae normal.   Cardiovascular:      Rate and Rhythm: Normal rate and regular rhythm.   Pulmonary:      Effort: Pulmonary effort is normal.   Abdominal:      Palpations: Abdomen is soft.   Musculoskeletal:         General: Normal range of motion.      Cervical back: Normal range of motion.   Skin:     General: Skin is warm.   Neurological:      General: No focal deficit present.      Mental Status: She is alert.   Psychiatric:         Mood and Affect: Mood normal.           ASSESSMENT/PLAN:     71-year-old female referred to me for port placement for small cell lung cancer for neoadjuvant therapy    PLAN:  Risks benefits of surgery were discussed.  Patient did consent to the planned procedure.  Plan for port placement in the OR tomorrow.

## 2022-04-27 ENCOUNTER — DOCUMENTATION ONLY (OUTPATIENT)
Dept: HEMATOLOGY/ONCOLOGY | Facility: CLINIC | Age: 72
End: 2022-04-27

## 2022-04-27 ENCOUNTER — INFUSION (OUTPATIENT)
Dept: INFUSION THERAPY | Facility: HOSPITAL | Age: 72
End: 2022-04-27
Attending: INTERNAL MEDICINE
Payer: MEDICARE

## 2022-04-27 VITALS
SYSTOLIC BLOOD PRESSURE: 148 MMHG | BODY MASS INDEX: 17.83 KG/M2 | RESPIRATION RATE: 18 BRPM | TEMPERATURE: 97 F | HEIGHT: 62 IN | OXYGEN SATURATION: 94 % | HEART RATE: 84 BPM | WEIGHT: 96.88 LBS | DIASTOLIC BLOOD PRESSURE: 84 MMHG

## 2022-04-27 DIAGNOSIS — C34.12 MALIGNANT NEOPLASM OF UPPER LOBE OF LEFT LUNG: Primary | ICD-10-CM

## 2022-04-27 PROCEDURE — A4216 STERILE WATER/SALINE, 10 ML: HCPCS | Performed by: NURSE PRACTITIONER

## 2022-04-27 PROCEDURE — 96375 TX/PRO/DX INJ NEW DRUG ADDON: CPT

## 2022-04-27 PROCEDURE — 96413 CHEMO IV INFUSION 1 HR: CPT

## 2022-04-27 PROCEDURE — 96367 TX/PROPH/DG ADDL SEQ IV INF: CPT

## 2022-04-27 PROCEDURE — 25000003 PHARM REV CODE 250: Performed by: NURSE PRACTITIONER

## 2022-04-27 PROCEDURE — 96417 CHEMO IV INFUS EACH ADDL SEQ: CPT

## 2022-04-27 PROCEDURE — 63600175 PHARM REV CODE 636 W HCPCS: Performed by: NURSE PRACTITIONER

## 2022-04-27 RX ORDER — SODIUM CHLORIDE 0.9 % (FLUSH) 0.9 %
10 SYRINGE (ML) INJECTION
Status: CANCELLED | OUTPATIENT
Start: 2022-04-29

## 2022-04-27 RX ORDER — HEPARIN 100 UNIT/ML
500 SYRINGE INTRAVENOUS
Status: CANCELLED | OUTPATIENT
Start: 2022-04-27

## 2022-04-27 RX ORDER — SODIUM CHLORIDE 0.9 % (FLUSH) 0.9 %
10 SYRINGE (ML) INJECTION
Status: CANCELLED | OUTPATIENT
Start: 2022-04-28

## 2022-04-27 RX ORDER — SODIUM CHLORIDE 0.9 % (FLUSH) 0.9 %
10 SYRINGE (ML) INJECTION
Status: DISCONTINUED | OUTPATIENT
Start: 2022-04-27 | End: 2022-04-27 | Stop reason: HOSPADM

## 2022-04-27 RX ORDER — HEPARIN 100 UNIT/ML
500 SYRINGE INTRAVENOUS
Status: CANCELLED | OUTPATIENT
Start: 2022-04-29

## 2022-04-27 RX ORDER — SODIUM CHLORIDE 0.9 % (FLUSH) 0.9 %
10 SYRINGE (ML) INJECTION
Status: CANCELLED | OUTPATIENT
Start: 2022-04-27

## 2022-04-27 RX ORDER — HEPARIN 100 UNIT/ML
500 SYRINGE INTRAVENOUS
Status: CANCELLED | OUTPATIENT
Start: 2022-04-28

## 2022-04-27 RX ORDER — HEPARIN 100 UNIT/ML
500 SYRINGE INTRAVENOUS
Status: DISCONTINUED | OUTPATIENT
Start: 2022-04-27 | End: 2022-04-27 | Stop reason: HOSPADM

## 2022-04-27 RX ADMIN — SODIUM CHLORIDE: 9 INJECTION, SOLUTION INTRAVENOUS at 10:04

## 2022-04-27 RX ADMIN — Medication 500 UNITS: at 12:04

## 2022-04-27 RX ADMIN — SODIUM CHLORIDE, PRESERVATIVE FREE 10 ML: 5 INJECTION INTRAVENOUS at 12:04

## 2022-04-27 RX ADMIN — APREPITANT 130 MG: 130 INJECTION, EMULSION INTRAVENOUS at 10:04

## 2022-04-27 RX ADMIN — DEXAMETHASONE SODIUM PHOSPHATE 0.25 MG: 4 INJECTION, SOLUTION INTRA-ARTICULAR; INTRALESIONAL; INTRAMUSCULAR; INTRAVENOUS; SOFT TISSUE at 10:04

## 2022-04-27 RX ADMIN — ETOPOSIDE 140 MG: 20 INJECTION INTRAVENOUS at 11:04

## 2022-04-27 RX ADMIN — CARBOPLATIN 580 MG: 10 INJECTION INTRAVENOUS at 10:04

## 2022-04-27 NOTE — PLAN OF CARE
Problem: Fatigue  Goal: Improved Activity Tolerance  Outcome: Ongoing, Progressing  Intervention: Promote Improved Energy  Flowsheets (Taken 4/27/2022 0949)  Fatigue Management: frequent rest breaks encouraged  Sleep/Rest Enhancement:   regular sleep/rest pattern promoted   relaxation techniques promoted  Activity Management: Ambulated -L4

## 2022-04-27 NOTE — PLAN OF CARE
Problem: Fatigue  Goal: Improved Activity Tolerance  4/27/2022 0957 by Yaritza Kuo RN  Outcome: Ongoing, Progressing  4/27/2022 0954 by Yaritza Kuo RN  Outcome: Ongoing, Progressing  Intervention: Promote Improved Energy  4/27/2022 0957 by Yaritza Kuo RN  Flowsheets (Taken 4/27/2022 0957)  Fatigue Management: frequent rest breaks encouraged  Sleep/Rest Enhancement:   relaxation techniques promoted   regular sleep/rest pattern promoted  Activity Management: Ambulated -L4  4/27/2022 0954 by Yaritza Kuo RN  Flowsheets (Taken 4/27/2022 0954)  Fatigue Management: frequent rest breaks encouraged  Sleep/Rest Enhancement:   regular sleep/rest pattern promoted   relaxation techniques promoted  Activity Management: Ambulated -L4

## 2022-04-27 NOTE — PROGRESS NOTES
CHEMO SCHOOL- NUTRITION    Anuja Cool is a 71 y.o. female with lung cancer. Pt will be receiving carboplatin and etoposide. I met with Ms. Cool for chemo school today. Pt reports good appetite and intake. Denies any recent unintentional weight loss. She is not currently taking any herbal or antioxidant supplements. She had questions regarding foods she can and can't have during treatment. Denies N/V/D/C. BMs normal.     CW: 96#.     Plan:   1. Reviewed chemo school packet & provided copy to pt.   2. Discussed importance of maintaining wt & staying hydrated.   3. Reviewed food safety guidelines recommended during treatment.   4. Answered pt to questions to the best of my ability and to the patient's satisfaction  5. Provided RD contact info & encouraged pt to call with any questions/concerns.   6. Will f/u as needed.       Electronically signed by: Radha Mcfadden MBA, CAMACHON, LDN

## 2022-04-27 NOTE — NURSING
1020 Giulia, dietician, at chairside to speak with patient and provide education.     1040 Genna with the appearance center at chairside.     1240 Infusion complete, pt tolerated well.

## 2022-04-28 ENCOUNTER — INFUSION (OUTPATIENT)
Dept: INFUSION THERAPY | Facility: HOSPITAL | Age: 72
End: 2022-04-28
Attending: INTERNAL MEDICINE
Payer: MEDICARE

## 2022-04-28 VITALS
OXYGEN SATURATION: 96 % | HEIGHT: 62 IN | BODY MASS INDEX: 18.29 KG/M2 | RESPIRATION RATE: 18 BRPM | WEIGHT: 99.38 LBS | DIASTOLIC BLOOD PRESSURE: 62 MMHG | SYSTOLIC BLOOD PRESSURE: 91 MMHG | TEMPERATURE: 98 F | HEART RATE: 87 BPM

## 2022-04-28 DIAGNOSIS — C34.12 MALIGNANT NEOPLASM OF UPPER LOBE OF LEFT LUNG: Primary | ICD-10-CM

## 2022-04-28 PROCEDURE — A4216 STERILE WATER/SALINE, 10 ML: HCPCS | Performed by: NURSE PRACTITIONER

## 2022-04-28 PROCEDURE — 25000003 PHARM REV CODE 250: Performed by: NURSE PRACTITIONER

## 2022-04-28 PROCEDURE — 63600175 PHARM REV CODE 636 W HCPCS: Performed by: NURSE PRACTITIONER

## 2022-04-28 PROCEDURE — 96367 TX/PROPH/DG ADDL SEQ IV INF: CPT

## 2022-04-28 PROCEDURE — 96413 CHEMO IV INFUSION 1 HR: CPT

## 2022-04-28 RX ORDER — SODIUM CHLORIDE 0.9 % (FLUSH) 0.9 %
10 SYRINGE (ML) INJECTION
Status: DISCONTINUED | OUTPATIENT
Start: 2022-04-28 | End: 2022-04-28 | Stop reason: HOSPADM

## 2022-04-28 RX ORDER — HEPARIN 100 UNIT/ML
500 SYRINGE INTRAVENOUS
Status: DISCONTINUED | OUTPATIENT
Start: 2022-04-28 | End: 2022-04-28 | Stop reason: HOSPADM

## 2022-04-28 RX ADMIN — DEXAMETHASONE SODIUM PHOSPHATE 12 MG: 4 INJECTION, SOLUTION INTRA-ARTICULAR; INTRALESIONAL; INTRAMUSCULAR; INTRAVENOUS; SOFT TISSUE at 09:04

## 2022-04-28 RX ADMIN — SODIUM CHLORIDE: 9 INJECTION, SOLUTION INTRAVENOUS at 09:04

## 2022-04-28 RX ADMIN — SODIUM CHLORIDE, PRESERVATIVE FREE 10 ML: 5 INJECTION INTRAVENOUS at 10:04

## 2022-04-28 RX ADMIN — Medication 500 UNITS: at 10:04

## 2022-04-28 RX ADMIN — ETOPOSIDE 140 MG: 20 INJECTION INTRAVENOUS at 09:04

## 2022-04-28 NOTE — PLAN OF CARE
Problem: Fatigue  Goal: Improved Activity Tolerance  Outcome: Ongoing, Progressing  Intervention: Promote Improved Energy  Flowsheets (Taken 4/28/2022 0903)  Fatigue Management: frequent rest breaks encouraged  Sleep/Rest Enhancement:   regular sleep/rest pattern promoted   relaxation techniques promoted  Activity Management: Ambulated -L4

## 2022-04-29 ENCOUNTER — TELEPHONE (OUTPATIENT)
Dept: SURGERY | Facility: CLINIC | Age: 72
End: 2022-04-29
Payer: MEDICARE

## 2022-04-29 ENCOUNTER — INFUSION (OUTPATIENT)
Dept: INFUSION THERAPY | Facility: HOSPITAL | Age: 72
End: 2022-04-29
Attending: INTERNAL MEDICINE
Payer: MEDICARE

## 2022-04-29 VITALS
BODY MASS INDEX: 18.37 KG/M2 | DIASTOLIC BLOOD PRESSURE: 64 MMHG | TEMPERATURE: 97 F | RESPIRATION RATE: 18 BRPM | OXYGEN SATURATION: 97 % | SYSTOLIC BLOOD PRESSURE: 104 MMHG | HEART RATE: 66 BPM | HEIGHT: 62 IN | WEIGHT: 99.81 LBS

## 2022-04-29 DIAGNOSIS — C34.12 MALIGNANT NEOPLASM OF UPPER LOBE OF LEFT LUNG: Primary | ICD-10-CM

## 2022-04-29 PROCEDURE — 63600175 PHARM REV CODE 636 W HCPCS: Performed by: NURSE PRACTITIONER

## 2022-04-29 PROCEDURE — 96413 CHEMO IV INFUSION 1 HR: CPT

## 2022-04-29 PROCEDURE — 96367 TX/PROPH/DG ADDL SEQ IV INF: CPT

## 2022-04-29 PROCEDURE — 25000003 PHARM REV CODE 250: Performed by: NURSE PRACTITIONER

## 2022-04-29 PROCEDURE — A4216 STERILE WATER/SALINE, 10 ML: HCPCS | Performed by: NURSE PRACTITIONER

## 2022-04-29 RX ORDER — HEPARIN 100 UNIT/ML
500 SYRINGE INTRAVENOUS
Status: DISCONTINUED | OUTPATIENT
Start: 2022-04-29 | End: 2022-04-29 | Stop reason: HOSPADM

## 2022-04-29 RX ORDER — SODIUM CHLORIDE 0.9 % (FLUSH) 0.9 %
10 SYRINGE (ML) INJECTION
Status: DISCONTINUED | OUTPATIENT
Start: 2022-04-29 | End: 2022-04-29 | Stop reason: HOSPADM

## 2022-04-29 RX ADMIN — SODIUM CHLORIDE, PRESERVATIVE FREE 10 ML: 5 INJECTION INTRAVENOUS at 10:04

## 2022-04-29 RX ADMIN — DEXAMETHASONE SODIUM PHOSPHATE 12 MG: 4 INJECTION, SOLUTION INTRA-ARTICULAR; INTRALESIONAL; INTRAMUSCULAR; INTRAVENOUS; SOFT TISSUE at 09:04

## 2022-04-29 RX ADMIN — SODIUM CHLORIDE: 9 INJECTION, SOLUTION INTRAVENOUS at 09:04

## 2022-04-29 RX ADMIN — ETOPOSIDE 142 MG: 20 INJECTION INTRAVENOUS at 09:04

## 2022-04-29 RX ADMIN — Medication 500 UNITS: at 10:04

## 2022-04-29 NOTE — PLAN OF CARE
Problem: Fatigue  Goal: Improved Activity Tolerance  Outcome: Ongoing, Progressing  Intervention: Promote Improved Energy  Flowsheets (Taken 4/29/2022 0917)  Fatigue Management: frequent rest breaks encouraged  Sleep/Rest Enhancement:   regular sleep/rest pattern promoted   relaxation techniques promoted  Activity Management: Ambulated -L4

## 2022-04-29 NOTE — TELEPHONE ENCOUNTER
----- Message from Carla Delarosa sent at 4/29/2022  7:34 AM CDT -----  Regarding: appointment  Contact: self  Type:  Sooner Appointment Request    Caller is requesting a sooner appointment.  Caller declined first available appointment listed below.  Caller will not accept being placed on the waitlist and is requesting a message be sent to doctor.    Name of Caller:  self  When is the first available appointment?  05/12/2022  Symptoms:    Best Call Back Number:  [phone]  Additional Information:  Patient had a port placed, pt was advised to schedule two week follow up. Patient can not schedule on 05/12/2022 due to an appointment with a different provider.

## 2022-04-29 NOTE — TELEPHONE ENCOUNTER
Patient does not need a post op appointment for her port placement.  She is getting chemo and the infusion center actually uses the port and they are really good at letting us know if there is a problem

## 2022-05-02 ENCOUNTER — LAB VISIT (OUTPATIENT)
Dept: LAB | Facility: HOSPITAL | Age: 72
End: 2022-05-02
Attending: NURSE PRACTITIONER
Payer: MEDICARE

## 2022-05-02 DIAGNOSIS — C34.90 SMALL CELL LUNG CANCER: ICD-10-CM

## 2022-05-02 LAB
ALBUMIN SERPL BCP-MCNC: 3.7 G/DL (ref 3.5–5.2)
ALP SERPL-CCNC: 56 U/L (ref 55–135)
ALT SERPL W/O P-5'-P-CCNC: 14 U/L (ref 10–44)
ANION GAP SERPL CALC-SCNC: 11 MMOL/L (ref 8–16)
AST SERPL-CCNC: 21 U/L (ref 10–40)
BASOPHILS # BLD AUTO: 0.01 K/UL (ref 0–0.2)
BASOPHILS NFR BLD: 0.1 % (ref 0–1.9)
BILIRUB SERPL-MCNC: 0.5 MG/DL (ref 0.1–1)
BUN SERPL-MCNC: 10 MG/DL (ref 8–23)
CALCIUM SERPL-MCNC: 8.9 MG/DL (ref 8.7–10.5)
CHLORIDE SERPL-SCNC: 98 MMOL/L (ref 95–110)
CO2 SERPL-SCNC: 24 MMOL/L (ref 23–29)
CREAT SERPL-MCNC: 0.4 MG/DL (ref 0.5–1.4)
DIFFERENTIAL METHOD: ABNORMAL
EOSINOPHIL # BLD AUTO: 0.2 K/UL (ref 0–0.5)
EOSINOPHIL NFR BLD: 1.9 % (ref 0–8)
ERYTHROCYTE [DISTWIDTH] IN BLOOD BY AUTOMATED COUNT: 14.1 % (ref 11.5–14.5)
EST. GFR  (AFRICAN AMERICAN): >60 ML/MIN/1.73 M^2
EST. GFR  (NON AFRICAN AMERICAN): >60 ML/MIN/1.73 M^2
GLUCOSE SERPL-MCNC: 77 MG/DL (ref 70–110)
HCT VFR BLD AUTO: 38.4 % (ref 37–48.5)
HGB BLD-MCNC: 12.6 G/DL (ref 12–16)
IMM GRANULOCYTES # BLD AUTO: 0.03 K/UL (ref 0–0.04)
IMM GRANULOCYTES NFR BLD AUTO: 0.3 % (ref 0–0.5)
LYMPHOCYTES # BLD AUTO: 2.2 K/UL (ref 1–4.8)
LYMPHOCYTES NFR BLD: 25.1 % (ref 18–48)
MAGNESIUM SERPL-MCNC: 1.8 MG/DL (ref 1.6–2.6)
MCH RBC QN AUTO: 31.6 PG (ref 27–31)
MCHC RBC AUTO-ENTMCNC: 32.8 G/DL (ref 32–36)
MCV RBC AUTO: 96 FL (ref 82–98)
MONOCYTES # BLD AUTO: 0.1 K/UL (ref 0.3–1)
MONOCYTES NFR BLD: 1 % (ref 4–15)
NEUTROPHILS # BLD AUTO: 6.3 K/UL (ref 1.8–7.7)
NEUTROPHILS NFR BLD: 71.6 % (ref 38–73)
NRBC BLD-RTO: 0 /100 WBC
PLATELET # BLD AUTO: 291 K/UL (ref 150–450)
PMV BLD AUTO: 9.7 FL (ref 9.2–12.9)
POTASSIUM SERPL-SCNC: 4.1 MMOL/L (ref 3.5–5.1)
PROT SERPL-MCNC: 6.9 G/DL (ref 6–8.4)
RBC # BLD AUTO: 3.99 M/UL (ref 4–5.4)
SODIUM SERPL-SCNC: 133 MMOL/L (ref 136–145)
WBC # BLD AUTO: 8.8 K/UL (ref 3.9–12.7)

## 2022-05-02 PROCEDURE — 36415 COLL VENOUS BLD VENIPUNCTURE: CPT | Performed by: NURSE PRACTITIONER

## 2022-05-02 PROCEDURE — 80053 COMPREHEN METABOLIC PANEL: CPT | Performed by: NURSE PRACTITIONER

## 2022-05-02 PROCEDURE — 85025 COMPLETE CBC W/AUTO DIFF WBC: CPT | Performed by: NURSE PRACTITIONER

## 2022-05-02 PROCEDURE — 83735 ASSAY OF MAGNESIUM: CPT | Performed by: NURSE PRACTITIONER

## 2022-05-03 ENCOUNTER — HOSPITAL ENCOUNTER (OUTPATIENT)
Dept: RADIOLOGY | Facility: HOSPITAL | Age: 72
Discharge: HOME | End: 2022-05-03
Attending: RADIOLOGY
Payer: MEDICARE

## 2022-05-03 ENCOUNTER — HOSPITAL ENCOUNTER (OUTPATIENT)
Dept: RADIOLOGY | Facility: HOSPITAL | Age: 72
Discharge: HOME | End: 2022-05-03
Attending: INTERNAL MEDICINE
Payer: MEDICARE

## 2022-05-03 VITALS
RESPIRATION RATE: 16 BRPM | OXYGEN SATURATION: 99 % | TEMPERATURE: 99 F | WEIGHT: 97 LBS | DIASTOLIC BLOOD PRESSURE: 72 MMHG | BODY MASS INDEX: 17.85 KG/M2 | HEIGHT: 62 IN | HEART RATE: 64 BPM | SYSTOLIC BLOOD PRESSURE: 131 MMHG

## 2022-05-03 DIAGNOSIS — C34.90 SMALL CELL LUNG CANCER: ICD-10-CM

## 2022-05-03 LAB
APTT PPP: 29 SEC (ref 23.3–35.1)
INR PPP: 1.1
PROTHROMBIN TIME: 13.3 SEC (ref 11.4–13.7)

## 2022-05-03 PROCEDURE — 71045 X-RAY EXAM CHEST 1 VIEW: CPT | Mod: TC

## 2022-05-03 PROCEDURE — 88305 TISSUE EXAM BY PATHOLOGIST: CPT | Mod: TC

## 2022-05-03 PROCEDURE — 32408 CORE NDL BX LNG/MED PERQ: CPT

## 2022-05-03 PROCEDURE — 63600175 PHARM REV CODE 636 W HCPCS: Performed by: RADIOLOGY

## 2022-05-03 PROCEDURE — 99153 MOD SED SAME PHYS/QHP EA: CPT

## 2022-05-03 PROCEDURE — 25000003 PHARM REV CODE 250: Performed by: RADIOLOGY

## 2022-05-03 PROCEDURE — 27000545 CT BIOPSY LUNG W/ GUIDANCE

## 2022-05-03 PROCEDURE — 85730 THROMBOPLASTIN TIME PARTIAL: CPT | Performed by: RADIOLOGY

## 2022-05-03 PROCEDURE — 99152 MOD SED SAME PHYS/QHP 5/>YRS: CPT

## 2022-05-03 PROCEDURE — 85610 PROTHROMBIN TIME: CPT | Performed by: RADIOLOGY

## 2022-05-03 RX ORDER — SODIUM CHLORIDE 9 MG/ML
INJECTION, SOLUTION INTRAVENOUS
Status: COMPLETED | OUTPATIENT
Start: 2022-05-03 | End: 2022-05-03

## 2022-05-03 RX ORDER — FENTANYL CITRATE 50 UG/ML
INJECTION, SOLUTION INTRAMUSCULAR; INTRAVENOUS CODE/TRAUMA/SEDATION MEDICATION
Status: COMPLETED | OUTPATIENT
Start: 2022-05-03 | End: 2022-05-03

## 2022-05-03 RX ORDER — MIDAZOLAM HYDROCHLORIDE 1 MG/ML
INJECTION INTRAMUSCULAR; INTRAVENOUS CODE/TRAUMA/SEDATION MEDICATION
Status: COMPLETED | OUTPATIENT
Start: 2022-05-03 | End: 2022-05-03

## 2022-05-03 RX ADMIN — FENTANYL CITRATE 50 MCG: 50 INJECTION INTRAMUSCULAR; INTRAVENOUS at 09:05

## 2022-05-03 RX ADMIN — MIDAZOLAM HYDROCHLORIDE 1 MG: 1 INJECTION, SOLUTION INTRAMUSCULAR; INTRAVENOUS at 09:05

## 2022-05-03 RX ADMIN — SODIUM CHLORIDE 250 ML/HR: 9 INJECTION, SOLUTION INTRAVENOUS at 09:05

## 2022-05-09 ENCOUNTER — TELEPHONE (OUTPATIENT)
Dept: HEMATOLOGY/ONCOLOGY | Facility: CLINIC | Age: 72
End: 2022-05-09

## 2022-05-09 ENCOUNTER — INFUSION (OUTPATIENT)
Dept: INFUSION THERAPY | Facility: HOSPITAL | Age: 72
End: 2022-05-09
Attending: INTERNAL MEDICINE
Payer: MEDICARE

## 2022-05-09 VITALS
WEIGHT: 98.31 LBS | HEART RATE: 100 BPM | TEMPERATURE: 98 F | RESPIRATION RATE: 17 BRPM | OXYGEN SATURATION: 95 % | SYSTOLIC BLOOD PRESSURE: 132 MMHG | BODY MASS INDEX: 17.98 KG/M2 | DIASTOLIC BLOOD PRESSURE: 69 MMHG

## 2022-05-09 DIAGNOSIS — T45.1X5A CHEMOTHERAPY-INDUCED NEUTROPENIA: Primary | ICD-10-CM

## 2022-05-09 DIAGNOSIS — D70.1 CHEMOTHERAPY-INDUCED NEUTROPENIA: Primary | ICD-10-CM

## 2022-05-09 PROCEDURE — 63600175 PHARM REV CODE 636 W HCPCS: Mod: JG | Performed by: NURSE PRACTITIONER

## 2022-05-09 PROCEDURE — 96372 THER/PROPH/DIAG INJ SC/IM: CPT

## 2022-05-09 RX ADMIN — FILGRASTIM 300 MCG: 300 INJECTION, SOLUTION INTRAVENOUS; SUBCUTANEOUS at 03:05

## 2022-05-09 NOTE — TELEPHONE ENCOUNTER
Critical labs  WBC 1.58    Per Martha, pt is to receive Neupogen x 3. Orders placed. Scheduling will call once we receive auth. Left  for pt to return my call regarding this.

## 2022-05-10 ENCOUNTER — HOSPITAL ENCOUNTER (OUTPATIENT)
Dept: RADIOLOGY | Facility: HOSPITAL | Age: 72
Discharge: HOME | End: 2022-05-10
Attending: INTERNAL MEDICINE
Payer: MEDICARE

## 2022-05-10 ENCOUNTER — INFUSION (OUTPATIENT)
Dept: INFUSION THERAPY | Facility: HOSPITAL | Age: 72
End: 2022-05-10
Attending: INTERNAL MEDICINE
Payer: MEDICARE

## 2022-05-10 ENCOUNTER — OFFICE VISIT (OUTPATIENT)
Dept: HEMATOLOGY/ONCOLOGY | Facility: CLINIC | Age: 72
End: 2022-05-10
Payer: MEDICARE

## 2022-05-10 VITALS
DIASTOLIC BLOOD PRESSURE: 51 MMHG | SYSTOLIC BLOOD PRESSURE: 77 MMHG | WEIGHT: 96.44 LBS | RESPIRATION RATE: 17 BRPM | HEIGHT: 62 IN | TEMPERATURE: 97 F | HEART RATE: 84 BPM | OXYGEN SATURATION: 95 % | BODY MASS INDEX: 17.75 KG/M2

## 2022-05-10 DIAGNOSIS — C34.12 MALIGNANT NEOPLASM OF UPPER LOBE OF LEFT LUNG: ICD-10-CM

## 2022-05-10 DIAGNOSIS — I95.9 HYPOTENSION, UNSPECIFIED HYPOTENSION TYPE: ICD-10-CM

## 2022-05-10 DIAGNOSIS — C34.90 NON-SMALL CELL LUNG CANCER, UNSPECIFIED LATERALITY: ICD-10-CM

## 2022-05-10 DIAGNOSIS — T45.1X5A CHEMOTHERAPY-INDUCED NEUTROPENIA: Primary | ICD-10-CM

## 2022-05-10 DIAGNOSIS — C34.90 MALIGNANT NEOPLASM OF UNSPECIFIED PART OF UNSPECIFIED BRONCHUS OR LUNG: ICD-10-CM

## 2022-05-10 DIAGNOSIS — I95.9 HYPOTENSION: ICD-10-CM

## 2022-05-10 DIAGNOSIS — C34.90 SMALL CELL LUNG CANCER: Primary | ICD-10-CM

## 2022-05-10 DIAGNOSIS — E86.0 DEHYDRATION: ICD-10-CM

## 2022-05-10 DIAGNOSIS — D70.1 CHEMOTHERAPY-INDUCED NEUTROPENIA: Primary | ICD-10-CM

## 2022-05-10 PROCEDURE — 99214 PR OFFICE/OUTPT VISIT, EST, LEVL IV, 30-39 MIN: ICD-10-PCS | Mod: S$GLB,,, | Performed by: NURSE PRACTITIONER

## 2022-05-10 PROCEDURE — 25500020 PHARM REV CODE 255: Mod: PO | Performed by: INTERNAL MEDICINE

## 2022-05-10 PROCEDURE — A9585 GADOBUTROL INJECTION: HCPCS | Mod: PO | Performed by: INTERNAL MEDICINE

## 2022-05-10 PROCEDURE — 99214 OFFICE O/P EST MOD 30 MIN: CPT | Mod: S$GLB,,, | Performed by: NURSE PRACTITIONER

## 2022-05-10 PROCEDURE — 96372 THER/PROPH/DIAG INJ SC/IM: CPT | Mod: 59

## 2022-05-10 PROCEDURE — 96361 HYDRATE IV INFUSION ADD-ON: CPT

## 2022-05-10 PROCEDURE — 96360 HYDRATION IV INFUSION INIT: CPT

## 2022-05-10 PROCEDURE — 63600175 PHARM REV CODE 636 W HCPCS: Performed by: NURSE PRACTITIONER

## 2022-05-10 PROCEDURE — 25000003 PHARM REV CODE 250: Performed by: NURSE PRACTITIONER

## 2022-05-10 PROCEDURE — 70553 MRI BRAIN STEM W/O & W/DYE: CPT | Mod: TC,PO

## 2022-05-10 RX ORDER — SODIUM CHLORIDE 0.9 % (FLUSH) 0.9 %
10 SYRINGE (ML) INJECTION
Status: CANCELLED | OUTPATIENT
Start: 2022-05-10

## 2022-05-10 RX ORDER — HEPARIN 100 UNIT/ML
500 SYRINGE INTRAVENOUS
Status: DISCONTINUED | OUTPATIENT
Start: 2022-05-10 | End: 2022-05-10 | Stop reason: HOSPADM

## 2022-05-10 RX ORDER — HEPARIN 100 UNIT/ML
500 SYRINGE INTRAVENOUS
Status: CANCELLED | OUTPATIENT
Start: 2022-05-10

## 2022-05-10 RX ORDER — SODIUM CHLORIDE 0.9 % (FLUSH) 0.9 %
10 SYRINGE (ML) INJECTION
Status: DISCONTINUED | OUTPATIENT
Start: 2022-05-10 | End: 2022-05-10 | Stop reason: HOSPADM

## 2022-05-10 RX ORDER — GADOBUTROL 604.72 MG/ML
4.5 INJECTION INTRAVENOUS
Status: COMPLETED | OUTPATIENT
Start: 2022-05-10 | End: 2022-05-10

## 2022-05-10 RX ADMIN — SODIUM CHLORIDE 1000 ML: 0.9 INJECTION, SOLUTION INTRAVENOUS at 11:05

## 2022-05-10 RX ADMIN — GADOBUTROL 4.5 ML: 604.72 INJECTION INTRAVENOUS at 08:05

## 2022-05-10 RX ADMIN — FILGRASTIM 300 MCG: 300 INJECTION, SOLUTION INTRAVENOUS; SUBCUTANEOUS at 11:05

## 2022-05-10 RX ADMIN — Medication 500 UNITS: at 01:05

## 2022-05-10 NOTE — PROGRESS NOTES
INITIAL [facility] HEM/ONC CONSULTATION      Subjective:       Patient ID: Anuja Crisostomo is a 71 y.o. female.    2/25/2022:  CT lung Screen:  3.4cm FRANCIS mass and 2.0cm RLL    3/11/2022:  PET scan:  3.9cm hypermetabolic mass in FRANCIS  2.3cm hypermetabolic mass in RLL    3/31/2022:  FRANCIS CT biopsy:  Small Cell carcinoma        Chief Complaint: Small Cell Lung Cancer    Patient is s/p cycle 1 Carboplatin and Etoposide. She is in infusion today for Neupogen dose #2. She dose have low BP and is taking Atenolol for HTN. She denies any lightheaded or dizziness.     Path report from 5/3/2022 shows NSCLC on right lung. Per rad/onc will get SBRT next week and hold chemo 1 week. Patient aware.    Ms. Crisostomo is a 70yo female who was found to have 2 lesions in the lung on CT chest screening study outlined above.  She was seeing pulmonary for COPD issues with Dr. Jeffries who ordered this study.   These masses were hypermetabolic on PET scan and CT biopsy was done confirming small cell lung cancer.  She presents today for recommendations on therapy.        Past Medical History:   Diagnosis Date    Allergy     Arthritis     Asthma     Bursitis     COPD (chronic obstructive pulmonary disease)     Hypertension     Low sodium     Lung nodules 03/2022    Small cell carcinoma of left lung 03/31/2022    Thyroid disease     nodules    Tinea pedis        Past Surgical History:   Procedure Laterality Date    COLONOSCOPY N/A 05/08/2018    Procedure: COLONOSCOPY;  Surgeon: Grey Roberts MD;  Location: [facility] ENDO;  Service: Endoscopy;  Laterality: N/A;    CT BIOPSY LUNG W/ GUIDANCE Left 03/31/2022    HYSTERECTOMY      INSERTION OF TUNNELED CENTRAL VENOUS CATHETER (CVC) WITH SUBCUTANEOUS PORT N/A 4/26/2022    Procedure: [id number];  Surgeon: Noel Sadler MD;  Location: [facility] OR;  Service: General;  Laterality: N/A;    OOPHORECTOMY         Social History     Socioeconomic History    Marital status:    Tobacco  Use    Smoking status: Former Smoker     Packs/day: 0.50     Quit date: 4/3/2022     Years since quittin.1    Smokeless tobacco: Never Used   Substance and Sexual Activity    Alcohol use: Yes     Alcohol/week: 4.0 standard drinks     Types: 4 Glasses of wine per week     Comment: occ. glass of wine    Drug use: No       Family History   Problem Relation Age of Onset    Heart disease Mother     Cancer Mother         lung    Heart disease Father     Cancer Father         lymphoma    Diabetes Neg Hx        Review of patient's allergies indicates:   Allergen Reactions    Tinactin [tolnaftate] Dermatitis    Advair diskus [fluticasone propion-salmeterol]      shakes    Albuterol      tremors    Sulfa (sulfonamide antibiotics)      Unknown    Years ago       Current Outpatient Medications:     alendronate (FOSAMAX) 70 MG tablet, Take 1 tablet by mouth once a week, Disp: 12 tablet, Rfl: 4    aspirin (ECOTRIN) 81 MG EC tablet, Take 81 mg by mouth once daily., Disp: , Rfl:     atenoloL (TENORMIN) 100 MG tablet, Take 1 tablet by mouth once daily, Disp: 90 tablet, Rfl: 0    cholecalciferol, vitamin D3, 3,000 unit Tab, Take by mouth., Disp: , Rfl:     fluticasone-umeclidin-vilanter (TRELEGY ELLIPTA) 200-62.5-25 mcg inhaler, Inhale 1 puff into the lungs once daily., Disp: 90 each, Rfl: 3    ibuprofen (ADVIL,MOTRIN) 200 MG tablet, Take 200 mg by mouth every 6 (six) hours as needed for Pain., Disp: , Rfl:     levalbuterol (XOPENEX HFA) 45 mcg/actuation inhaler, INHALE 1 TO 2 PUFFS INTO LUNGS EVERY 4 HOURS AS NEEDED FOR WHEEZING AND FOR SHORTNESS OF BREATH, Disp: 45 g, Rfl: 1    levocetirizine (XYZAL) 5 MG tablet, Take 1 tablet (5 mg total) by mouth every evening., Disp: 30 tablet, Rfl: 2    lisinopriL 10 MG tablet, Take 1 tablet (10 mg total) by mouth once daily., Disp: 90 tablet, Rfl: 3    methIMAzole (TAPAZOLE) 5 MG Tab, Take one tablet Monday-Friday and two tablets on Saturday and ., Disp: 180  tablet, Rfl: 3    multivit with minerals/lutein (MULTIVITAMIN 50 PLUS ORAL), Take by mouth., Disp: , Rfl:     nicotine (NICODERM CQ) 14 mg/24 hr, Place 1 patch onto the skin every 24 hours., Disp: , Rfl:     ondansetron (ZOFRAN) 8 MG tablet, Take 1 tablet (8 mg total) by mouth every 8 (eight) hours as needed., Disp: 30 tablet, Rfl: 5    promethazine (PHENERGAN) 25 MG tablet, Take 1 tablet (25 mg total) by mouth every 4 to 6 hours as needed., Disp: 30 tablet, Rfl: 5    rosuvastatin (CRESTOR) 20 MG tablet, Take 1 tablet by mouth once daily, Disp: 90 tablet, Rfl: 3  No current facility-administered medications for this visit.    All medications and past history have been reviewed.    Review of Systems   Constitutional: Negative for activity change, appetite change, diaphoresis, fatigue, fever and unexpected weight change.   HENT: Negative for congestion and hearing loss.    Eyes: Negative for visual disturbance.   Respiratory: Negative for cough, chest tightness and shortness of breath.    Cardiovascular: Negative for chest pain and leg swelling.   Gastrointestinal: Negative for abdominal pain, blood in stool, diarrhea, nausea and vomiting.   Endocrine: Negative for cold intolerance and heat intolerance.   Genitourinary: Negative for difficulty urinating, dysuria and hematuria.   Neurological: Negative for dizziness and headaches.   Hematological: Negative for adenopathy. Does not bruise/bleed easily.   Psychiatric/Behavioral: Negative for behavioral problems.       Objective:        There were no vitals taken for this visit.    Physical Exam  Constitutional:       Appearance: Normal appearance. She is well-developed.   HENT:      Head: Normocephalic and atraumatic.      Right Ear: External ear normal.      Left Ear: External ear normal.   Eyes:      Conjunctiva/sclera: Conjunctivae normal.      Pupils: Pupils are equal, round, and reactive to light.   Neck:      Thyroid: No thyromegaly.      Trachea: No tracheal  deviation.   Cardiovascular:      Rate and Rhythm: Normal rate and regular rhythm.      Heart sounds: Normal heart sounds.   Pulmonary:      Effort: Pulmonary effort is normal.      Breath sounds: Normal breath sounds.   Abdominal:      General: Bowel sounds are normal. There is no distension.      Palpations: Abdomen is soft. There is no mass.      Tenderness: There is no abdominal tenderness.   Skin:     General: Skin is warm and dry.      Findings: No rash.   Neurological:      Mental Status: She is alert and oriented to person, place, and time.      Comments: Neuro intact througout   Psychiatric:         Mood and Affect: Mood normal.         Behavior: Behavior normal.         Thought Content: Thought content normal.         Judgment: Judgment normal.           Lab  Recent Results (from the past 336 hour(s))   CBC Auto Differential    Collection Time: 05/09/22  8:48 AM   Result Value Ref Range    WBC 1.58 (LL) 3.90 - 12.70 K/uL    Hemoglobin 10.6 (L) 12.0 - 16.0 g/dL    Hematocrit 31.2 (L) 37.0 - 48.5 %    Platelets 72 (L) 150 - 450 K/uL   CBC Auto Differential    Collection Time: 05/02/22  8:18 AM   Result Value Ref Range    WBC 8.80 3.90 - 12.70 K/uL    Hemoglobin 12.6 12.0 - 16.0 g/dL    Hematocrit 38.4 37.0 - 48.5 %    Platelets 291 150 - 450 K/uL     CMP  Sodium   Date Value Ref Range Status   05/09/2022 129 (L) 136 - 145 mmol/L Final     Potassium   Date Value Ref Range Status   05/09/2022 4.4 3.5 - 5.1 mmol/L Final     Chloride   Date Value Ref Range Status   05/09/2022 95 95 - 110 mmol/L Final     CO2   Date Value Ref Range Status   05/09/2022 27 23 - 29 mmol/L Final     Glucose   Date Value Ref Range Status   05/09/2022 97 70 - 110 mg/dL Final     BUN   Date Value Ref Range Status   05/09/2022 9 8 - 23 mg/dL Final     Creatinine   Date Value Ref Range Status   05/09/2022 0.5 0.5 - 1.4 mg/dL Final     Calcium   Date Value Ref Range Status   05/09/2022 9.1 8.7 - 10.5 mg/dL Final     Total Protein   Date  Value Ref Range Status   05/09/2022 7.2 6.0 - 8.4 g/dL Final     Albumin   Date Value Ref Range Status   05/09/2022 3.6 3.5 - 5.2 g/dL Final     Total Bilirubin   Date Value Ref Range Status   05/09/2022 0.3 0.1 - 1.0 mg/dL Final     Comment:     For infants and newborns, interpretation of results should be based  on gestational age, weight and in agreement with clinical  observations.    Premature Infant recommended reference ranges:  Up to 24 hours.............<8.0 mg/dL  Up to 48 hours............<12.0 mg/dL  3-5 days..................<15.0 mg/dL  6-29 days.................<15.0 mg/dL       Alkaline Phosphatase   Date Value Ref Range Status   05/09/2022 58 55 - 135 U/L Final     AST   Date Value Ref Range Status   05/09/2022 18 10 - 40 U/L Final     ALT   Date Value Ref Range Status   05/09/2022 12 10 - 44 U/L Final     Anion Gap   Date Value Ref Range Status   05/09/2022 7 (L) 8 - 16 mmol/L Final     eGFR if    Date Value Ref Range Status   05/09/2022 >60.0 >60 mL/min/1.73 m^2 Final     eGFR if non    Date Value Ref Range Status   05/09/2022 >60.0 >60 mL/min/1.73 m^2 Final     Comment:     Calculation used to obtain the estimated glomerular filtration  rate (eGFR) is the CKD-EPI equation.            Specimen (24h ago, onward)            None                All lab results and imaging results have been reviewed and discussed with the patient.     Assessment:       1. Small cell lung cancer    2. Non-small cell lung cancer, unspecified laterality    3. Hypotension, unspecified hypotension type    4. Dehydration      Problem List Items Addressed This Visit        Cardiac/Vascular    Hypotension       Renal/    Dehydration       Oncology    Small cell lung cancer - Primary      Other Visit Diagnoses     Non-small cell lung cancer, unspecified laterality            Cancer Staging  Small Cell Lung Cancer of FRACNIS and RLL of lung  Staging form: Lung, AJCC 8th Edition  - Clinical: Stage  SUSIE (cT2, cN0, cM1a) - Signed by Jonathan Tinoco MD on 4/14/2022    1. SCLC- Hold treatment 1 week Carboplatin and Etoposide  2. NSCLC- CT sim and rads per rad onc  3. Hypotension- Hold BP meds and give 1 L NS today  4. Dehydration- 1L NS IV today  5. Chemo induced neuttropenia- Continue Neupogen and add Neulasta on pro for Day 3 treatment.    Plan:         Follow up in about 2 weeks (around 5/24/2022) for with Dr. Tinoco and 5 weeks with me.       The plan was discussed with the patient and all questions/concerns have been answered to the patient's satisfaction.

## 2022-05-10 NOTE — PLAN OF CARE
Problem: Fatigue  Goal: Improved Activity Tolerance  Intervention: Promote Improved Energy  Flowsheets (Taken 5/10/2022 1052)  Fatigue Management:   fatigue-related activity identified   frequent rest breaks encouraged

## 2022-05-11 ENCOUNTER — INFUSION (OUTPATIENT)
Dept: INFUSION THERAPY | Facility: HOSPITAL | Age: 72
End: 2022-05-11
Attending: INTERNAL MEDICINE
Payer: MEDICARE

## 2022-05-11 ENCOUNTER — OFFICE VISIT (OUTPATIENT)
Dept: PULMONOLOGY | Facility: CLINIC | Age: 72
End: 2022-05-11
Payer: MEDICARE

## 2022-05-11 VITALS
DIASTOLIC BLOOD PRESSURE: 88 MMHG | WEIGHT: 99 LBS | BODY MASS INDEX: 18.11 KG/M2 | HEART RATE: 88 BPM | SYSTOLIC BLOOD PRESSURE: 164 MMHG | OXYGEN SATURATION: 97 %

## 2022-05-11 VITALS
SYSTOLIC BLOOD PRESSURE: 157 MMHG | HEIGHT: 62 IN | TEMPERATURE: 97 F | HEART RATE: 89 BPM | WEIGHT: 98.81 LBS | RESPIRATION RATE: 18 BRPM | DIASTOLIC BLOOD PRESSURE: 68 MMHG | BODY MASS INDEX: 18.18 KG/M2

## 2022-05-11 DIAGNOSIS — D70.1 CHEMOTHERAPY-INDUCED NEUTROPENIA: Primary | ICD-10-CM

## 2022-05-11 DIAGNOSIS — T45.1X5A CHEMOTHERAPY-INDUCED NEUTROPENIA: Primary | ICD-10-CM

## 2022-05-11 DIAGNOSIS — E78.5 HYPERLIPIDEMIA LDL GOAL <130: ICD-10-CM

## 2022-05-11 DIAGNOSIS — C34.12 MALIGNANT NEOPLASM OF UPPER LOBE OF LEFT LUNG: ICD-10-CM

## 2022-05-11 DIAGNOSIS — J44.9 CHRONIC OBSTRUCTIVE PULMONARY DISEASE, UNSPECIFIED COPD TYPE: ICD-10-CM

## 2022-05-11 PROCEDURE — 63600175 PHARM REV CODE 636 W HCPCS: Mod: JG | Performed by: NURSE PRACTITIONER

## 2022-05-11 PROCEDURE — 99214 PR OFFICE/OUTPT VISIT, EST, LEVL IV, 30-39 MIN: ICD-10-PCS | Mod: S$GLB,,, | Performed by: INTERNAL MEDICINE

## 2022-05-11 PROCEDURE — 96372 THER/PROPH/DIAG INJ SC/IM: CPT

## 2022-05-11 PROCEDURE — 99214 OFFICE O/P EST MOD 30 MIN: CPT | Mod: S$GLB,,, | Performed by: INTERNAL MEDICINE

## 2022-05-11 RX ADMIN — FILGRASTIM 300 MCG: 300 INJECTION, SOLUTION INTRAVENOUS; SUBCUTANEOUS at 02:05

## 2022-05-11 NOTE — TELEPHONE ENCOUNTER
Refill Routing Note   Medication(s) are not appropriate for processing by [facility] for the following reason(s):      - Patient has been seen in the ED/Hospital since the last PCP visit    [facility] action(s):  Defer          Medication reconciliation completed: No     Appointments  past 12m or future 3m with PCP    Date Provider   Last Visit   3/9/2022 Isis Lew MD   Next Visit   8/10/2022 Isis Lew MD   ED visits in past 90 days: 0        Note composed:1:12 PM 05/11/2022

## 2022-05-11 NOTE — PLAN OF CARE
Problem: Fatigue  Goal: Improved Activity Tolerance  Intervention: Promote Improved Energy  Flowsheets (Taken 5/11/2022 1437)  Fatigue Management:   fatigue-related activity identified   frequent rest breaks encouraged

## 2022-05-11 NOTE — ASSESSMENT & PLAN NOTE
Continue present medications.  Will refill medications as needed.  Instructed patient to contact us with any issues concerning their medications (cost, reactions, etc.).  Have discussed with patient about inciting conditions which may exacerbate their disease.  We did discuss possible new therapies or de-escalation of therapy (if appropriate).  Asked patient if they were interested in pursuing pulmonary rehabilitation.  All questions answered  RTC 3 months  Patient instructed that they are to call if symptoms change or new issues develop prior to their next visit.  ·

## 2022-05-11 NOTE — TELEPHONE ENCOUNTER
No new care gaps identified.  [facility] Embedded Care Gaps. Reference number: [id number]. 5/11/2022   10:07:33 AM YANDEL

## 2022-05-11 NOTE — PROGRESS NOTES
Office Visit Note *    Patient Name: Anuja Cool  MRN: [id number]  : 1950      Reason for visit: COPD    HPI:     2022 - Here to establish care,  Diagnosed with COPD (severity unknown).  Recently changed to TRELEGY and feels that she is doing better on that.  Currently smoking about 3/4 PPD  (1-1.5 PPD x 50 years).  Also carries diagnosed of asthma, has h/o allergies (+ skin test, never desensitized).  HAs never had a screening CT chest and does not remember her last CXR.  ROS as below.  We discussed cigarette cessation at length.    3/17/2022 - Here for review.  CT scan showed subpleural masses and PET scan shows activity in those areas.  Reviewed images with pt and we will plan t proceed with CT guided biopsy (left lesion first).  No evidence of other + areas.  PFT c/w   GOLD II A (FEV1 - 52%, DLCO 23 %).  CT scan does show fairly extensive emphysema changes.    2022 - Here for review of biopsy results - + small cell cancer.  We discussed moving forward with MRI brain and referral to Radiation Oncology and Oncology.  All questions have been answered.    2022 - Here for follow up, has had first chemotherapy (carboplatin and etoposide) and she tolerated well, WBC is down some (being addressed).  To see radiation therapy today.  No new symptoms. Did have biopsy of right nodule which was + for adenocarcinoma.    Past Medical History    Past Medical History:   Diagnosis Date    Allergy     Arthritis     Asthma     Bursitis     COPD (chronic obstructive pulmonary disease)     Hypertension     Low sodium     Lung nodules 2022    Small cell carcinoma of left lung 2022    Thyroid disease     nodules    Tinea pedis        Past Surgical History    Past Surgical History:   Procedure Laterality Date    COLONOSCOPY N/A 2018    Procedure: COLONOSCOPY;  Surgeon: Grey Roberts MD;  Location: [facility];  Service: Endoscopy;  Laterality: N/A;    CT BIOPSY LUNG W/ GUIDANCE Left  03/31/2022    HYSTERECTOMY      INSERTION OF TUNNELED CENTRAL VENOUS CATHETER (CVC) WITH SUBCUTANEOUS PORT N/A 4/26/2022    Procedure: [id number];  Surgeon: Noel Sadler MD;  Location: [facility];  Service: General;  Laterality: N/A;    OOPHORECTOMY         Medications      Current Outpatient Medications:     alendronate (FOSAMAX) 70 MG tablet, Take 1 tablet by mouth once a week, Disp: 12 tablet, Rfl: 4    aspirin (ECOTRIN) 81 MG EC tablet, Take 81 mg by mouth once daily., Disp: , Rfl:     atenoloL (TENORMIN) 100 MG tablet, Take 1 tablet by mouth once daily, Disp: 90 tablet, Rfl: 0    cholecalciferol, vitamin D3, 3,000 unit Tab, Take by mouth., Disp: , Rfl:     fluticasone-umeclidin-vilanter (TRELEGY ELLIPTA) 200-62.5-25 mcg inhaler, Inhale 1 puff into the lungs once daily., Disp: 90 each, Rfl: 3    ibuprofen (ADVIL,MOTRIN) 200 MG tablet, Take 200 mg by mouth every 6 (six) hours as needed for Pain., Disp: , Rfl:     levalbuterol (XOPENEX HFA) 45 mcg/actuation inhaler, INHALE 1 TO 2 PUFFS INTO LUNGS EVERY 4 HOURS AS NEEDED FOR WHEEZING AND FOR SHORTNESS OF BREATH, Disp: 45 g, Rfl: 1    levocetirizine (XYZAL) 5 MG tablet, Take 1 tablet (5 mg total) by mouth every evening., Disp: 30 tablet, Rfl: 2    lisinopriL 10 MG tablet, Take 1 tablet (10 mg total) by mouth once daily., Disp: 90 tablet, Rfl: 3    methIMAzole (TAPAZOLE) 5 MG Tab, Take one tablet Monday-Friday and two tablets on Saturday and Sunday., Disp: 180 tablet, Rfl: 3    multivit with minerals/lutein (MULTIVITAMIN 50 PLUS ORAL), Take by mouth., Disp: , Rfl:     nicotine (NICODERM CQ) 14 mg/24 hr, Place 1 patch onto the skin every 24 hours., Disp: , Rfl:     ondansetron (ZOFRAN) 8 MG tablet, Take 1 tablet (8 mg total) by mouth every 8 (eight) hours as needed., Disp: 30 tablet, Rfl: 5    promethazine (PHENERGAN) 25 MG tablet, Take 1 tablet (25 mg total) by mouth every 4 to 6 hours as needed., Disp: 30 tablet, Rfl: 5    rosuvastatin  (CRESTOR) 20 MG tablet, Take 1 tablet by mouth once daily, Disp: 90 tablet, Rfl: 3    Allergies    Review of patient's allergies indicates:   Allergen Reactions    Tinactin [tolnaftate] Dermatitis    Advair diskus [fluticasone propion-salmeterol]      shakes    Albuterol      tremors    Sulfa (sulfonamide antibiotics)      Unknown    Years ago       SocHx    Social History     Tobacco Use   Smoking Status Former Smoker    Packs/day: 0.50    Quit date: 4/3/2022    Years since quittin.1   Smokeless Tobacco Never Used       Social History     Substance and Sexual Activity   Alcohol Use Yes    Alcohol/week: 4.0 standard drinks    Types: 4 Glasses of wine per week    Comment: occ. glass of wine       Drug Use - no  Occupation - retired, customer service  Asbestos exposure - no  Pets - no    FMHx    Family History   Problem Relation Age of Onset    Heart disease Mother     Cancer Mother         lung    Heart disease Father     Cancer Father         lymphoma    Diabetes Neg Hx          Review of Systems  Review of Systems   Constitutional: Negative for chills, diaphoresis, fever, malaise/fatigue and weight loss.        Weight loss   HENT: Negative for congestion.    Eyes: Negative for pain.   Respiratory: Positive for shortness of breath (ELLISON). Negative for cough, hemoptysis, sputum production, wheezing and stridor.    Cardiovascular: Negative for chest pain, palpitations, orthopnea, claudication, leg swelling and PND.   Gastrointestinal: Negative for abdominal pain, constipation, diarrhea, heartburn, nausea and vomiting.   Genitourinary: Negative for dysuria, frequency and urgency.   Musculoskeletal: Negative for falls and myalgias.   Neurological: Negative for sensory change, focal weakness and weakness.   Endo/Heme/Allergies:        + over active thyroid   Psychiatric/Behavioral: Negative for depression, substance abuse and suicidal ideas. The patient is not nervous/anxious.        Physical  Exam    Vitals:    05/11/22 1302   BP: (!) 164/88   BP Location: Left arm   Patient Position: Sitting   BP Method: X-Large (Manual)   Pulse: 88   SpO2: 97%   Weight: 44.9 kg (99 lb)       Physical Exam  Vitals and nursing note reviewed.   Constitutional:       General: She is not in acute distress.     Appearance: She is well-developed. She is not ill-appearing, toxic-appearing or diaphoretic.      Comments: Thin female   HENT:      Head: Normocephalic and atraumatic.      Right Ear: External ear normal.      Left Ear: External ear normal.      Nose: Nose normal.   Eyes:      General: No scleral icterus.        Right eye: No discharge.         Left eye: No discharge.      Extraocular Movements: Extraocular movements intact.      Conjunctiva/sclera: Conjunctivae normal.      Pupils: Pupils are equal, round, and reactive to light.   Neck:      Thyroid: No thyromegaly.      Vascular: No JVD.      Trachea: No tracheal deviation.   Cardiovascular:      Rate and Rhythm: Normal rate and regular rhythm.      Heart sounds: Normal heart sounds. No murmur heard.    No friction rub. No gallop.      Comments: + bilateral carotid bruits (known issue)  Pulmonary:      Effort: Pulmonary effort is normal. No respiratory distress.      Breath sounds: No stridor. No wheezing, rhonchi or rales.      Comments: Decreased BS  No acc m use  Chest:      Chest wall: No tenderness.   Abdominal:      General: Bowel sounds are normal. There is no distension.      Palpations: Abdomen is soft.      Tenderness: There is no abdominal tenderness. There is no guarding.   Musculoskeletal:         General: No tenderness. Normal range of motion.      Cervical back: Normal range of motion and neck supple.      Right lower leg: No edema.      Left lower leg: No edema.   Lymphadenopathy:      Cervical: No cervical adenopathy.   Skin:     General: Skin is warm and dry.   Neurological:      General: No focal deficit present.      Mental Status: She is alert  and oriented to person, place, and time. Mental status is at baseline.      Cranial Nerves: No cranial nerve deficit.      Motor: No weakness.      Gait: Gait normal.   Psychiatric:         Mood and Affect: Mood normal.         Behavior: Behavior normal.         Thought Content: Thought content normal.         Judgment: Judgment normal.         Labs    Lab Results   Component Value Date    WBC 1.58 (LL) 05/09/2022    HGB 10.6 (L) 05/09/2022    HCT 31.2 (L) 05/09/2022    PLT 72 (L) 05/09/2022       Sodium   Date Value Ref Range Status   05/09/2022 129 (L) 136 - 145 mmol/L Final     Potassium   Date Value Ref Range Status   05/09/2022 4.4 3.5 - 5.1 mmol/L Final     Chloride   Date Value Ref Range Status   05/09/2022 95 95 - 110 mmol/L Final     CO2   Date Value Ref Range Status   05/09/2022 27 23 - 29 mmol/L Final     Glucose   Date Value Ref Range Status   05/09/2022 97 70 - 110 mg/dL Final     BUN   Date Value Ref Range Status   05/09/2022 9 8 - 23 mg/dL Final     Creatinine   Date Value Ref Range Status   05/09/2022 0.5 0.5 - 1.4 mg/dL Final     Calcium   Date Value Ref Range Status   05/09/2022 9.1 8.7 - 10.5 mg/dL Final     Total Protein   Date Value Ref Range Status   05/09/2022 7.2 6.0 - 8.4 g/dL Final     Albumin   Date Value Ref Range Status   05/09/2022 3.6 3.5 - 5.2 g/dL Final     Total Bilirubin   Date Value Ref Range Status   05/09/2022 0.3 0.1 - 1.0 mg/dL Final     Comment:     For infants and newborns, interpretation of results should be based  on gestational age, weight and in agreement with clinical  observations.    Premature Infant recommended reference ranges:  Up to 24 hours.............<8.0 mg/dL  Up to 48 hours............<12.0 mg/dL  3-5 days..................<15.0 mg/dL  6-29 days.................<15.0 mg/dL       Alkaline Phosphatase   Date Value Ref Range Status   05/09/2022 58 55 - 135 U/L Final     AST   Date Value Ref Range Status   05/09/2022 18 10 - 40 U/L Final     ALT   Date Value Ref  Range Status   05/09/2022 12 10 - 44 U/L Final     Anion Gap   Date Value Ref Range Status   05/09/2022 7 (L) 8 - 16 mmol/L Final       Xrays        Impression/Plan    Problem List Items Addressed This Visit        Pulmonary    Chronic obstructive pulmonary disease     Continue present medications.  Will refill medications as needed.  Instructed patient to contact us with any issues concerning their medications (cost, reactions, etc.).  Have discussed with patient about inciting conditions which may exacerbate their disease.  We did discuss possible new therapies or de-escalation of therapy (if appropriate).  Asked patient if they were interested in pursuing pulmonary rehabilitation.  All questions answered  RTC 3 months  Patient instructed that they are to call if symptoms change or new issues develop prior to their next visit.  ·               Oncology    Small Cell Lung Cancer of FRANCIS and RLL of lung     · Has started therapy and is doing well at this time  · RTC 3 months                     Jonathan Jeffries MD

## 2022-05-15 RX ORDER — ROSUVASTATIN CALCIUM 20 MG/1
TABLET, COATED ORAL
Qty: 90 TABLET | Refills: 3 | Status: SHIPPED | OUTPATIENT
Start: 2022-05-15 | End: 2023-01-01

## 2022-05-16 ENCOUNTER — LAB VISIT (OUTPATIENT)
Dept: LAB | Facility: HOSPITAL | Age: 72
End: 2022-05-16
Attending: NURSE PRACTITIONER
Payer: MEDICARE

## 2022-05-16 DIAGNOSIS — C34.90 SMALL CELL LUNG CANCER: ICD-10-CM

## 2022-05-16 LAB
ALBUMIN SERPL BCP-MCNC: 3.8 G/DL (ref 3.5–5.2)
ALP SERPL-CCNC: 77 U/L (ref 55–135)
ALT SERPL W/O P-5'-P-CCNC: 14 U/L (ref 10–44)
ANION GAP SERPL CALC-SCNC: 11 MMOL/L (ref 8–16)
AST SERPL-CCNC: 17 U/L (ref 10–40)
BASOPHILS # BLD AUTO: 0.03 K/UL (ref 0–0.2)
BASOPHILS NFR BLD: 0.3 % (ref 0–1.9)
BILIRUB SERPL-MCNC: 0.4 MG/DL (ref 0.1–1)
BUN SERPL-MCNC: 9 MG/DL (ref 8–23)
CALCIUM SERPL-MCNC: 9.5 MG/DL (ref 8.7–10.5)
CHLORIDE SERPL-SCNC: 95 MMOL/L (ref 95–110)
CO2 SERPL-SCNC: 28 MMOL/L (ref 23–29)
CREAT SERPL-MCNC: 0.4 MG/DL (ref 0.5–1.4)
DIFFERENTIAL METHOD: ABNORMAL
EOSINOPHIL # BLD AUTO: 0 K/UL (ref 0–0.5)
EOSINOPHIL NFR BLD: 0.3 % (ref 0–8)
ERYTHROCYTE [DISTWIDTH] IN BLOOD BY AUTOMATED COUNT: 13.4 % (ref 11.5–14.5)
EST. GFR  (AFRICAN AMERICAN): >60 ML/MIN/1.73 M^2
EST. GFR  (NON AFRICAN AMERICAN): >60 ML/MIN/1.73 M^2
GLUCOSE SERPL-MCNC: 93 MG/DL (ref 70–110)
HCT VFR BLD AUTO: 34.6 % (ref 37–48.5)
HGB BLD-MCNC: 11.7 G/DL (ref 12–16)
IMM GRANULOCYTES # BLD AUTO: 0.24 K/UL (ref 0–0.04)
IMM GRANULOCYTES NFR BLD AUTO: 2.6 % (ref 0–0.5)
LYMPHOCYTES # BLD AUTO: 2.2 K/UL (ref 1–4.8)
LYMPHOCYTES NFR BLD: 24 % (ref 18–48)
MAGNESIUM SERPL-MCNC: 1.6 MG/DL (ref 1.6–2.6)
MCH RBC QN AUTO: 31.9 PG (ref 27–31)
MCHC RBC AUTO-ENTMCNC: 33.8 G/DL (ref 32–36)
MCV RBC AUTO: 94 FL (ref 82–98)
MONOCYTES # BLD AUTO: 1.4 K/UL (ref 0.3–1)
MONOCYTES NFR BLD: 15.1 % (ref 4–15)
NEUTROPHILS # BLD AUTO: 5.3 K/UL (ref 1.8–7.7)
NEUTROPHILS NFR BLD: 57.7 % (ref 38–73)
NRBC BLD-RTO: 0 /100 WBC
PLATELET # BLD AUTO: 317 K/UL (ref 150–450)
PLATELET BLD QL SMEAR: ABNORMAL
PMV BLD AUTO: 9.5 FL (ref 9.2–12.9)
POTASSIUM SERPL-SCNC: 4.8 MMOL/L (ref 3.5–5.1)
PROT SERPL-MCNC: 7.4 G/DL (ref 6–8.4)
RBC # BLD AUTO: 3.67 M/UL (ref 4–5.4)
SODIUM SERPL-SCNC: 134 MMOL/L (ref 136–145)
WBC # BLD AUTO: 9.21 K/UL (ref 3.9–12.7)

## 2022-05-16 PROCEDURE — 83735 ASSAY OF MAGNESIUM: CPT | Performed by: NURSE PRACTITIONER

## 2022-05-16 PROCEDURE — 36415 COLL VENOUS BLD VENIPUNCTURE: CPT | Performed by: NURSE PRACTITIONER

## 2022-05-16 PROCEDURE — 85025 COMPLETE CBC W/AUTO DIFF WBC: CPT | Performed by: NURSE PRACTITIONER

## 2022-05-16 PROCEDURE — 80053 COMPREHEN METABOLIC PANEL: CPT | Performed by: NURSE PRACTITIONER

## 2022-05-17 ENCOUNTER — LAB VISIT (OUTPATIENT)
Dept: LAB | Facility: HOSPITAL | Age: 72
End: 2022-05-17
Attending: FAMILY MEDICINE
Payer: MEDICARE

## 2022-05-17 DIAGNOSIS — E05.90 HYPERTHYROIDISM: ICD-10-CM

## 2022-05-17 LAB
T4 FREE SERPL-MCNC: 0.69 NG/DL (ref 0.71–1.51)
TSH SERPL DL<=0.005 MIU/L-ACNC: 2.2 UIU/ML (ref 0.4–4)

## 2022-05-17 PROCEDURE — 84443 ASSAY THYROID STIM HORMONE: CPT | Performed by: PHYSICIAN ASSISTANT

## 2022-05-17 PROCEDURE — 84439 ASSAY OF FREE THYROXINE: CPT | Performed by: PHYSICIAN ASSISTANT

## 2022-05-17 PROCEDURE — 36415 COLL VENOUS BLD VENIPUNCTURE: CPT | Mod: PO | Performed by: PHYSICIAN ASSISTANT

## 2022-05-23 ENCOUNTER — LAB VISIT (OUTPATIENT)
Dept: LAB | Facility: HOSPITAL | Age: 72
End: 2022-05-23
Attending: NURSE PRACTITIONER
Payer: MEDICARE

## 2022-05-23 DIAGNOSIS — C34.90 SMALL CELL LUNG CANCER: ICD-10-CM

## 2022-05-23 LAB
ALBUMIN SERPL BCP-MCNC: 3.9 G/DL (ref 3.5–5.2)
ALP SERPL-CCNC: 83 U/L (ref 55–135)
ALT SERPL W/O P-5'-P-CCNC: 12 U/L (ref 10–44)
ANION GAP SERPL CALC-SCNC: 10 MMOL/L (ref 8–16)
AST SERPL-CCNC: 17 U/L (ref 10–40)
BASOPHILS # BLD AUTO: 0.03 K/UL (ref 0–0.2)
BASOPHILS NFR BLD: 0.4 % (ref 0–1.9)
BILIRUB SERPL-MCNC: 0.5 MG/DL (ref 0.1–1)
BUN SERPL-MCNC: 12 MG/DL (ref 8–23)
CALCIUM SERPL-MCNC: 9.5 MG/DL (ref 8.7–10.5)
CHLORIDE SERPL-SCNC: 93 MMOL/L (ref 95–110)
CO2 SERPL-SCNC: 28 MMOL/L (ref 23–29)
CREAT SERPL-MCNC: 0.4 MG/DL (ref 0.5–1.4)
DIFFERENTIAL METHOD: ABNORMAL
EOSINOPHIL # BLD AUTO: 0 K/UL (ref 0–0.5)
EOSINOPHIL NFR BLD: 0.6 % (ref 0–8)
ERYTHROCYTE [DISTWIDTH] IN BLOOD BY AUTOMATED COUNT: 14.7 % (ref 11.5–14.5)
EST. GFR  (AFRICAN AMERICAN): >60 ML/MIN/1.73 M^2
EST. GFR  (NON AFRICAN AMERICAN): >60 ML/MIN/1.73 M^2
GLUCOSE SERPL-MCNC: 104 MG/DL (ref 70–110)
HCT VFR BLD AUTO: 35.9 % (ref 37–48.5)
HGB BLD-MCNC: 11.8 G/DL (ref 12–16)
IMM GRANULOCYTES # BLD AUTO: 0.02 K/UL (ref 0–0.04)
IMM GRANULOCYTES NFR BLD AUTO: 0.3 % (ref 0–0.5)
LYMPHOCYTES # BLD AUTO: 1.1 K/UL (ref 1–4.8)
LYMPHOCYTES NFR BLD: 15.6 % (ref 18–48)
MAGNESIUM SERPL-MCNC: 1.9 MG/DL (ref 1.6–2.6)
MCH RBC QN AUTO: 31.7 PG (ref 27–31)
MCHC RBC AUTO-ENTMCNC: 32.9 G/DL (ref 32–36)
MCV RBC AUTO: 97 FL (ref 82–98)
MONOCYTES # BLD AUTO: 0.7 K/UL (ref 0.3–1)
MONOCYTES NFR BLD: 9.4 % (ref 4–15)
NEUTROPHILS # BLD AUTO: 5.1 K/UL (ref 1.8–7.7)
NEUTROPHILS NFR BLD: 73.7 % (ref 38–73)
NRBC BLD-RTO: 0 /100 WBC
PLATELET # BLD AUTO: 599 K/UL (ref 150–450)
PMV BLD AUTO: 9.3 FL (ref 9.2–12.9)
POTASSIUM SERPL-SCNC: 5.2 MMOL/L (ref 3.5–5.1)
PROT SERPL-MCNC: 7.6 G/DL (ref 6–8.4)
RBC # BLD AUTO: 3.72 M/UL (ref 4–5.4)
SODIUM SERPL-SCNC: 131 MMOL/L (ref 136–145)
WBC # BLD AUTO: 6.93 K/UL (ref 3.9–12.7)

## 2022-05-23 PROCEDURE — 83735 ASSAY OF MAGNESIUM: CPT | Performed by: NURSE PRACTITIONER

## 2022-05-23 PROCEDURE — 80053 COMPREHEN METABOLIC PANEL: CPT | Performed by: NURSE PRACTITIONER

## 2022-05-23 PROCEDURE — 85025 COMPLETE CBC W/AUTO DIFF WBC: CPT | Performed by: NURSE PRACTITIONER

## 2022-05-23 PROCEDURE — 36415 COLL VENOUS BLD VENIPUNCTURE: CPT | Performed by: NURSE PRACTITIONER

## 2022-05-25 ENCOUNTER — INFUSION (OUTPATIENT)
Dept: INFUSION THERAPY | Facility: HOSPITAL | Age: 72
End: 2022-05-25
Attending: INTERNAL MEDICINE
Payer: MEDICARE

## 2022-05-25 VITALS
DIASTOLIC BLOOD PRESSURE: 80 MMHG | HEIGHT: 62 IN | HEART RATE: 97 BPM | OXYGEN SATURATION: 95 % | WEIGHT: 98 LBS | RESPIRATION RATE: 18 BRPM | SYSTOLIC BLOOD PRESSURE: 142 MMHG | TEMPERATURE: 98 F | BODY MASS INDEX: 18.03 KG/M2

## 2022-05-25 DIAGNOSIS — D70.1 CHEMOTHERAPY-INDUCED NEUTROPENIA: Primary | ICD-10-CM

## 2022-05-25 DIAGNOSIS — T45.1X5A CHEMOTHERAPY-INDUCED NEUTROPENIA: Primary | ICD-10-CM

## 2022-05-25 DIAGNOSIS — C34.12 MALIGNANT NEOPLASM OF UPPER LOBE OF LEFT LUNG: ICD-10-CM

## 2022-05-25 PROCEDURE — 96367 TX/PROPH/DG ADDL SEQ IV INF: CPT

## 2022-05-25 PROCEDURE — 25000003 PHARM REV CODE 250: Performed by: NURSE PRACTITIONER

## 2022-05-25 PROCEDURE — 96413 CHEMO IV INFUSION 1 HR: CPT

## 2022-05-25 PROCEDURE — 63600175 PHARM REV CODE 636 W HCPCS: Performed by: NURSE PRACTITIONER

## 2022-05-25 PROCEDURE — A4216 STERILE WATER/SALINE, 10 ML: HCPCS | Performed by: NURSE PRACTITIONER

## 2022-05-25 PROCEDURE — 96375 TX/PRO/DX INJ NEW DRUG ADDON: CPT

## 2022-05-25 PROCEDURE — 96417 CHEMO IV INFUS EACH ADDL SEQ: CPT

## 2022-05-25 RX ORDER — SODIUM CHLORIDE 0.9 % (FLUSH) 0.9 %
10 SYRINGE (ML) INJECTION
Status: DISCONTINUED | OUTPATIENT
Start: 2022-05-25 | End: 2022-05-25 | Stop reason: HOSPADM

## 2022-05-25 RX ORDER — SODIUM CHLORIDE 0.9 % (FLUSH) 0.9 %
10 SYRINGE (ML) INJECTION
Status: CANCELLED | OUTPATIENT
Start: 2022-05-26

## 2022-05-25 RX ORDER — HEPARIN 100 UNIT/ML
500 SYRINGE INTRAVENOUS
Status: CANCELLED | OUTPATIENT
Start: 2022-05-26

## 2022-05-25 RX ORDER — HEPARIN 100 UNIT/ML
500 SYRINGE INTRAVENOUS
Status: CANCELLED | OUTPATIENT
Start: 2022-05-27

## 2022-05-25 RX ORDER — SODIUM CHLORIDE 0.9 % (FLUSH) 0.9 %
10 SYRINGE (ML) INJECTION
Status: CANCELLED | OUTPATIENT
Start: 2022-05-25

## 2022-05-25 RX ORDER — HEPARIN 100 UNIT/ML
500 SYRINGE INTRAVENOUS
Status: DISCONTINUED | OUTPATIENT
Start: 2022-05-25 | End: 2022-05-25 | Stop reason: HOSPADM

## 2022-05-25 RX ORDER — HEPARIN 100 UNIT/ML
500 SYRINGE INTRAVENOUS
Status: CANCELLED | OUTPATIENT
Start: 2022-05-25

## 2022-05-25 RX ORDER — SODIUM CHLORIDE 0.9 % (FLUSH) 0.9 %
10 SYRINGE (ML) INJECTION
Status: CANCELLED | OUTPATIENT
Start: 2022-05-27

## 2022-05-25 RX ADMIN — ETOPOSIDE 140 MG: 20 INJECTION INTRAVENOUS at 10:05

## 2022-05-25 RX ADMIN — SODIUM CHLORIDE, PRESERVATIVE FREE 10 ML: 5 INJECTION INTRAVENOUS at 11:05

## 2022-05-25 RX ADMIN — APREPITANT 130 MG: 130 INJECTION, EMULSION INTRAVENOUS at 09:05

## 2022-05-25 RX ADMIN — CARBOPLATIN 590 MG: 10 INJECTION, SOLUTION INTRAVENOUS at 09:05

## 2022-05-25 RX ADMIN — Medication 500 UNITS: at 11:05

## 2022-05-25 RX ADMIN — SODIUM CHLORIDE: 9 INJECTION, SOLUTION INTRAVENOUS at 09:05

## 2022-05-25 RX ADMIN — DEXAMETHASONE SODIUM PHOSPHATE 0.25 MG: 4 INJECTION, SOLUTION INTRA-ARTICULAR; INTRALESIONAL; INTRAMUSCULAR; INTRAVENOUS; SOFT TISSUE at 09:05

## 2022-05-25 NOTE — NURSING
0900. Pt told that her K+ level is slightly elevated. Pt states she has been drinking mostly gatoraid for hydration. Instructed pt to add more water daily to help with hydration since gatoraids do contain electrolytes. Pt states understanding.

## 2022-05-26 ENCOUNTER — OFFICE VISIT (OUTPATIENT)
Dept: HEMATOLOGY/ONCOLOGY | Facility: CLINIC | Age: 72
End: 2022-05-26
Payer: MEDICARE

## 2022-05-26 ENCOUNTER — INFUSION (OUTPATIENT)
Dept: INFUSION THERAPY | Facility: HOSPITAL | Age: 72
End: 2022-05-26
Attending: INTERNAL MEDICINE
Payer: MEDICARE

## 2022-05-26 VITALS
WEIGHT: 99.63 LBS | HEART RATE: 96 BPM | SYSTOLIC BLOOD PRESSURE: 137 MMHG | RESPIRATION RATE: 18 BRPM | DIASTOLIC BLOOD PRESSURE: 66 MMHG | BODY MASS INDEX: 18.58 KG/M2 | TEMPERATURE: 98 F | TEMPERATURE: 98 F | HEIGHT: 62 IN | OXYGEN SATURATION: 93 % | HEART RATE: 91 BPM | SYSTOLIC BLOOD PRESSURE: 137 MMHG | BODY MASS INDEX: 18.33 KG/M2 | WEIGHT: 101 LBS | RESPIRATION RATE: 18 BRPM | HEIGHT: 62 IN | DIASTOLIC BLOOD PRESSURE: 73 MMHG

## 2022-05-26 DIAGNOSIS — C34.12 MALIGNANT NEOPLASM OF UPPER LOBE OF LEFT LUNG: ICD-10-CM

## 2022-05-26 DIAGNOSIS — C34.31 MALIGNANT NEOPLASM OF LOWER LOBE OF RIGHT LUNG: ICD-10-CM

## 2022-05-26 DIAGNOSIS — D70.1 CHEMOTHERAPY-INDUCED NEUTROPENIA: Primary | ICD-10-CM

## 2022-05-26 DIAGNOSIS — C34.90 SMALL CELL LUNG CANCER: ICD-10-CM

## 2022-05-26 DIAGNOSIS — T45.1X5A CHEMOTHERAPY-INDUCED NEUTROPENIA: Primary | ICD-10-CM

## 2022-05-26 PROCEDURE — 96367 TX/PROPH/DG ADDL SEQ IV INF: CPT

## 2022-05-26 PROCEDURE — 63600175 PHARM REV CODE 636 W HCPCS: Performed by: NURSE PRACTITIONER

## 2022-05-26 PROCEDURE — 99214 PR OFFICE/OUTPT VISIT, EST, LEVL IV, 30-39 MIN: ICD-10-PCS | Mod: S$GLB,,, | Performed by: INTERNAL MEDICINE

## 2022-05-26 PROCEDURE — 25000003 PHARM REV CODE 250: Performed by: NURSE PRACTITIONER

## 2022-05-26 PROCEDURE — 99214 OFFICE O/P EST MOD 30 MIN: CPT | Mod: S$GLB,,, | Performed by: INTERNAL MEDICINE

## 2022-05-26 PROCEDURE — A4216 STERILE WATER/SALINE, 10 ML: HCPCS | Performed by: NURSE PRACTITIONER

## 2022-05-26 PROCEDURE — 96413 CHEMO IV INFUSION 1 HR: CPT

## 2022-05-26 RX ORDER — HEPARIN 100 UNIT/ML
500 SYRINGE INTRAVENOUS
Status: DISCONTINUED | OUTPATIENT
Start: 2022-05-26 | End: 2022-05-26 | Stop reason: HOSPADM

## 2022-05-26 RX ORDER — SODIUM CHLORIDE 0.9 % (FLUSH) 0.9 %
10 SYRINGE (ML) INJECTION
Status: DISCONTINUED | OUTPATIENT
Start: 2022-05-26 | End: 2022-05-26 | Stop reason: HOSPADM

## 2022-05-26 RX ADMIN — Medication 500 UNITS: at 10:05

## 2022-05-26 RX ADMIN — SODIUM CHLORIDE, PRESERVATIVE FREE 10 ML: 5 INJECTION INTRAVENOUS at 10:05

## 2022-05-26 RX ADMIN — ETOPOSIDE 142 MG: 20 INJECTION INTRAVENOUS at 08:05

## 2022-05-26 RX ADMIN — DEXAMETHASONE SODIUM PHOSPHATE 12 MG: 4 INJECTION, SOLUTION INTRA-ARTICULAR; INTRALESIONAL; INTRAMUSCULAR; INTRAVENOUS; SOFT TISSUE at 08:05

## 2022-05-26 RX ADMIN — SODIUM CHLORIDE: 9 INJECTION, SOLUTION INTRAVENOUS at 08:05

## 2022-05-26 NOTE — ASSESSMENT & PLAN NOTE
Patient is doing well and is on carbo/etop and doing well with this.  She is on cycle 2 and radiation for the small cell cancer has started. Will continue to monitor this closely and will have her see NP in 3 weeks and me again in 6.

## 2022-05-26 NOTE — ASSESSMENT & PLAN NOTE
This is a new Dx and she is s/p SBRT.  She has done well with this and will continue to surveillance with her existing small tulio cancer.

## 2022-05-26 NOTE — PROGRESS NOTES
PROGRESS NOTE    Subjective:       Patient ID: Anuja Cool is a 71 y.o. female.    2/25/2022:  CT lung Screen:  3.4cm FRANCIS mass and 2.0cm RLL    3/11/2022:  PET scan:  3.9cm hypermetabolic mass in FRANCIS  2.3cm hypermetabolic mass in RLL    3/31/2022:  FR[facility] CT biopsy:  Small Cell carcinoma    Carbo/Etop:  Cycle 1: 4/27/2022  Cycle 2: 5/25/2022  Cycle 3: 6/15/2022-due    5/3/2022:  RLL CT Biopsy:  Adenocarcinoma    5/15/2022-5/22/2022:  SBRT to Right LL Adenocarcinoma    5/25/2022:  Left lung XRT started.       Chief Complaint:  No chief complaint on file.  synchronous primary lung cancer-SCLC and Adenocarcinoma Follow up    History of Present Illness:   Anuja Cool is a 71 y.o. female who presents for follow up of above.       Family and Social history reviewed and is unchanged from 4/14/2022      ROS:  Review of Systems   Constitutional: Negative for fever.   Respiratory: Negative for shortness of breath.    Cardiovascular: Negative for chest pain and leg swelling.   Gastrointestinal: Negative for abdominal pain and blood in stool.   Genitourinary: Negative for hematuria.   Skin: Negative for rash.          Current Outpatient Medications:     alendronate (FOSAMAX) 70 MG tablet, Take 1 tablet by mouth once a week, Disp: 12 tablet, Rfl: 4    aspirin (ECOTRIN) 81 MG EC tablet, Take 81 mg by mouth once daily., Disp: , Rfl:     atenoloL (TENORMIN) 100 MG tablet, Take 1 tablet by mouth once daily, Disp: 90 tablet, Rfl: 0    cholecalciferol, vitamin D3, 3,000 unit Tab, Take by mouth., Disp: , Rfl:     fluticasone-umeclidin-vilanter (TRELEGY ELLIPTA) 200-62.5-25 mcg inhaler, Inhale 1 puff into the lungs once daily., Disp: 90 each, Rfl: 3    ibuprofen (ADVIL,MOTRIN) 200 MG tablet, Take 200 mg by mouth every 6 (six) hours as needed for Pain., Disp: , Rfl:     levalbuterol (XOPENEX HFA) 45 mcg/actuation inhaler, INHALE 1 TO 2 PUFFS INTO LUNGS EVERY 4 HOURS  "AS NEEDED FOR WHEEZING AND FOR SHORTNESS OF BREATH, Disp: 45 g, Rfl: 1    levocetirizine (XYZAL) 5 MG tablet, Take 1 tablet (5 mg total) by mouth every evening., Disp: 30 tablet, Rfl: 2    lisinopriL 10 MG tablet, Take 1 tablet (10 mg total) by mouth once daily., Disp: 90 tablet, Rfl: 3    methIMAzole (TAPAZOLE) 5 MG Tab, Take one tablet Monday-Friday and two tablets on Saturday and Sunday., Disp: 180 tablet, Rfl: 3    multivit with minerals/lutein (MULTIVITAMIN 50 PLUS ORAL), Take by mouth., Disp: , Rfl:     nicotine (NICODERM CQ) 14 mg/24 hr, Place 1 patch onto the skin every 24 hours., Disp: , Rfl:     ondansetron (ZOFRAN) 8 MG tablet, Take 1 tablet (8 mg total) by mouth every 8 (eight) hours as needed., Disp: 30 tablet, Rfl: 5    promethazine (PHENERGAN) 25 MG tablet, Take 1 tablet (25 mg total) by mouth every 4 to 6 hours as needed., Disp: 30 tablet, Rfl: 5    rosuvastatin (CRESTOR) 20 MG tablet, Take 1 tablet by mouth once daily, Disp: 90 tablet, Rfl: 3  No current facility-administered medications for this visit.    Facility-Administered Medications Ordered in Other Visits:     heparin, porcine (PF) 100 unit/mL injection flush 500 Units, 500 Units, Intravenous, PRN, Martha Loy, NP-C, 500 Units at 05/26/22 1003    sodium chloride 0.9% flush 10 mL, 10 mL, Intravenous, PRN, Martha Brownon, NP-C, 10 mL at 05/26/22 1003        Objective:       Physical Examination:     /73   Pulse 91   Temp 97.6 °F (36.4 °C)   Resp 18   Ht 5' 2" (1.575 m)   Wt 45.8 kg (101 lb)   BMI 18.47 kg/m²     Physical Exam  Constitutional:       Appearance: She is well-developed.   HENT:      Head: Normocephalic and atraumatic.      Right Ear: External ear normal.      Left Ear: External ear normal.   Eyes:      Conjunctiva/sclera: Conjunctivae normal.      Pupils: Pupils are equal, round, and reactive to light.   Neck:      Thyroid: No thyromegaly.      Trachea: No tracheal deviation.   Cardiovascular:      Rate " and Rhythm: Normal rate and regular rhythm.      Heart sounds: Normal heart sounds.   Pulmonary:      Effort: Pulmonary effort is normal.      Breath sounds: Normal breath sounds.   Abdominal:      General: Bowel sounds are normal. There is no distension.      Palpations: Abdomen is soft. There is no mass.      Tenderness: There is no abdominal tenderness.   Skin:     Findings: No rash.   Neurological:      Comments: Neuro intact througout   Psychiatric:         Behavior: Behavior normal.         Thought Content: Thought content normal.         Judgment: Judgment normal.         Labs:   Recent Results (from the past 336 hour(s))   CBC Auto Differential    Collection Time: 05/23/22  8:20 AM   Result Value Ref Range    WBC 6.93 3.90 - 12.70 K/uL    Hemoglobin 11.8 (L) 12.0 - 16.0 g/dL    Hematocrit 35.9 (L) 37.0 - 48.5 %    Platelets 599 (H) 150 - 450 K/uL   CBC Auto Differential    Collection Time: 05/16/22  8:18 AM   Result Value Ref Range    WBC 9.21 3.90 - 12.70 K/uL    Hemoglobin 11.7 (L) 12.0 - 16.0 g/dL    Hematocrit 34.6 (L) 37.0 - 48.5 %    Platelets 317 150 - 450 K/uL     CMP  Sodium   Date Value Ref Range Status   05/23/2022 131 (L) 136 - 145 mmol/L Final     Potassium   Date Value Ref Range Status   05/23/2022 5.2 (H) 3.5 - 5.1 mmol/L Final     Chloride   Date Value Ref Range Status   05/23/2022 93 (L) 95 - 110 mmol/L Final     CO2   Date Value Ref Range Status   05/23/2022 28 23 - 29 mmol/L Final     Glucose   Date Value Ref Range Status   05/23/2022 104 70 - 110 mg/dL Final     BUN   Date Value Ref Range Status   05/23/2022 12 8 - 23 mg/dL Final     Creatinine   Date Value Ref Range Status   05/23/2022 0.4 (L) 0.5 - 1.4 mg/dL Final     Calcium   Date Value Ref Range Status   05/23/2022 9.5 8.7 - 10.5 mg/dL Final     Total Protein   Date Value Ref Range Status   05/23/2022 7.6 6.0 - 8.4 g/dL Final     Albumin   Date Value Ref Range Status   05/23/2022 3.9 3.5 - 5.2 g/dL Final     Total Bilirubin   Date  Value Ref Range Status   05/23/2022 0.5 0.1 - 1.0 mg/dL Final     Comment:     For infants and newborns, interpretation of results should be based  on gestational age, weight and in agreement with clinical  observations.    Premature Infant recommended reference ranges:  Up to 24 hours.............<8.0 mg/dL  Up to 48 hours............<12.0 mg/dL  3-5 days..................<15.0 mg/dL  6-29 days.................<15.0 mg/dL       Alkaline Phosphatase   Date Value Ref Range Status   05/23/2022 83 55 - 135 U/L Final     AST   Date Value Ref Range Status   05/23/2022 17 10 - 40 U/L Final     ALT   Date Value Ref Range Status   05/23/2022 12 10 - 44 U/L Final     Anion Gap   Date Value Ref Range Status   05/23/2022 10 8 - 16 mmol/L Final     eGFR if    Date Value Ref Range Status   05/23/2022 >60.0 >60 mL/min/1.73 m^2 Final     eGFR if non    Date Value Ref Range Status   05/23/2022 >60.0 >60 mL/min/1.73 m^2 Final     Comment:     Calculation used to obtain the estimated glomerular filtration  rate (eGFR) is the CKD-EPI equation.        No results found for: CEA  No results found for: PSA        Assessment/Plan:     Problem List Items Addressed This Visit     Small cell lung cancer     Patient is doing well and is on carbo/etop and doing well with this.  She is on cycle 2 and radiation for the small cell cancer has started. Will continue to monitor this closely and will have her see NP in 3 weeks and me again in 6.             Adenocarcinoma Lung Cancer RLL     This is a new Dx and she is s/p SBRT.  She has done well with this and will continue to surveillance with her existing small tulio cancer.                   Discussion:     Follow up in about 3 weeks (around 6/16/2022).      Electronically signed by Jonathan Wagner

## 2022-05-27 ENCOUNTER — INFUSION (OUTPATIENT)
Dept: INFUSION THERAPY | Facility: HOSPITAL | Age: 72
End: 2022-05-27
Attending: INTERNAL MEDICINE
Payer: MEDICARE

## 2022-05-27 VITALS
HEART RATE: 102 BPM | BODY MASS INDEX: 18.43 KG/M2 | RESPIRATION RATE: 18 BRPM | HEIGHT: 62 IN | TEMPERATURE: 98 F | SYSTOLIC BLOOD PRESSURE: 154 MMHG | OXYGEN SATURATION: 97 % | DIASTOLIC BLOOD PRESSURE: 82 MMHG | WEIGHT: 100.13 LBS

## 2022-05-27 DIAGNOSIS — D70.1 CHEMOTHERAPY-INDUCED NEUTROPENIA: Primary | ICD-10-CM

## 2022-05-27 DIAGNOSIS — T45.1X5A CHEMOTHERAPY-INDUCED NEUTROPENIA: Primary | ICD-10-CM

## 2022-05-27 DIAGNOSIS — C34.12 MALIGNANT NEOPLASM OF UPPER LOBE OF LEFT LUNG: ICD-10-CM

## 2022-05-27 PROCEDURE — A4216 STERILE WATER/SALINE, 10 ML: HCPCS | Performed by: NURSE PRACTITIONER

## 2022-05-27 PROCEDURE — 96367 TX/PROPH/DG ADDL SEQ IV INF: CPT

## 2022-05-27 PROCEDURE — 25000003 PHARM REV CODE 250: Performed by: NURSE PRACTITIONER

## 2022-05-27 PROCEDURE — 96413 CHEMO IV INFUSION 1 HR: CPT

## 2022-05-27 PROCEDURE — 63600175 PHARM REV CODE 636 W HCPCS: Mod: JG | Performed by: NURSE PRACTITIONER

## 2022-05-27 PROCEDURE — 96377 APPLICATON ON-BODY INJECTOR: CPT | Mod: 59

## 2022-05-27 RX ORDER — SODIUM CHLORIDE 0.9 % (FLUSH) 0.9 %
10 SYRINGE (ML) INJECTION
Status: DISCONTINUED | OUTPATIENT
Start: 2022-05-27 | End: 2022-05-27 | Stop reason: HOSPADM

## 2022-05-27 RX ORDER — HEPARIN 100 UNIT/ML
500 SYRINGE INTRAVENOUS
Status: DISCONTINUED | OUTPATIENT
Start: 2022-05-27 | End: 2022-05-27 | Stop reason: HOSPADM

## 2022-05-27 RX ADMIN — SODIUM CHLORIDE: 9 INJECTION, SOLUTION INTRAVENOUS at 10:05

## 2022-05-27 RX ADMIN — SODIUM CHLORIDE, PRESERVATIVE FREE 10 ML: 5 INJECTION INTRAVENOUS at 12:05

## 2022-05-27 RX ADMIN — PEGFILGRASTIM 6 MG: KIT SUBCUTANEOUS at 12:05

## 2022-05-27 RX ADMIN — Medication 500 UNITS: at 12:05

## 2022-05-27 RX ADMIN — DEXAMETHASONE SODIUM PHOSPHATE 12 MG: 4 INJECTION, SOLUTION INTRA-ARTICULAR; INTRALESIONAL; INTRAMUSCULAR; INTRAVENOUS; SOFT TISSUE at 10:05

## 2022-05-27 RX ADMIN — ETOPOSIDE 142 MG: 20 INJECTION INTRAVENOUS at 11:05

## 2022-05-27 NOTE — PLAN OF CARE
Problem: Fatigue  Goal: Improved Activity Tolerance  Outcome: Ongoing, Progressing  Intervention: Promote Improved Energy  Flowsheets (Taken 5/27/2022 1029)  Fatigue Management: frequent rest breaks encouraged

## 2022-05-30 ENCOUNTER — LAB VISIT (OUTPATIENT)
Dept: LAB | Facility: HOSPITAL | Age: 72
End: 2022-05-30
Attending: NURSE PRACTITIONER
Payer: MEDICARE

## 2022-05-30 DIAGNOSIS — C34.90 SMALL CELL LUNG CANCER: ICD-10-CM

## 2022-05-30 LAB
ALBUMIN SERPL BCP-MCNC: 3.8 G/DL (ref 3.5–5.2)
ALP SERPL-CCNC: 73 U/L (ref 55–135)
ALT SERPL W/O P-5'-P-CCNC: 14 U/L (ref 10–44)
ANION GAP SERPL CALC-SCNC: 9 MMOL/L (ref 8–16)
ANISOCYTOSIS BLD QL SMEAR: ABNORMAL
AST SERPL-CCNC: 19 U/L (ref 10–40)
BASOPHILS NFR BLD: 0 % (ref 0–1.9)
BILIRUB SERPL-MCNC: 0.4 MG/DL (ref 0.1–1)
BUN SERPL-MCNC: 19 MG/DL (ref 8–23)
CALCIUM SERPL-MCNC: 9.3 MG/DL (ref 8.7–10.5)
CHLORIDE SERPL-SCNC: 92 MMOL/L (ref 95–110)
CO2 SERPL-SCNC: 27 MMOL/L (ref 23–29)
CREAT SERPL-MCNC: 0.4 MG/DL (ref 0.5–1.4)
DIFFERENTIAL METHOD: ABNORMAL
EOSINOPHIL NFR BLD: 0 % (ref 0–8)
ERYTHROCYTE [DISTWIDTH] IN BLOOD BY AUTOMATED COUNT: 15.2 % (ref 11.5–14.5)
EST. GFR  (AFRICAN AMERICAN): >60 ML/MIN/1.73 M^2
EST. GFR  (NON AFRICAN AMERICAN): >60 ML/MIN/1.73 M^2
GLUCOSE SERPL-MCNC: 96 MG/DL (ref 70–110)
HCT VFR BLD AUTO: 30.5 % (ref 37–48.5)
HGB BLD-MCNC: 10.4 G/DL (ref 12–16)
IMM GRANULOCYTES # BLD AUTO: ABNORMAL K/UL (ref 0–0.04)
IMM GRANULOCYTES NFR BLD AUTO: ABNORMAL % (ref 0–0.5)
LYMPHOCYTES NFR BLD: 7 % (ref 18–48)
MAGNESIUM SERPL-MCNC: 1.8 MG/DL (ref 1.6–2.6)
MCH RBC QN AUTO: 32.9 PG (ref 27–31)
MCHC RBC AUTO-ENTMCNC: 34.1 G/DL (ref 32–36)
MCV RBC AUTO: 97 FL (ref 82–98)
MONOCYTES NFR BLD: 4 % (ref 4–15)
NEUTROPHILS NFR BLD: 89 % (ref 38–73)
NRBC BLD-RTO: 0 /100 WBC
PLATELET # BLD AUTO: 379 K/UL (ref 150–450)
PLATELET BLD QL SMEAR: ABNORMAL
PMV BLD AUTO: 9.5 FL (ref 9.2–12.9)
POIKILOCYTOSIS BLD QL SMEAR: SLIGHT
POTASSIUM SERPL-SCNC: 4.2 MMOL/L (ref 3.5–5.1)
PROT SERPL-MCNC: 6.9 G/DL (ref 6–8.4)
RBC # BLD AUTO: 3.16 M/UL (ref 4–5.4)
SODIUM SERPL-SCNC: 128 MMOL/L (ref 136–145)
WBC # BLD AUTO: 27.3 K/UL (ref 3.9–12.7)

## 2022-05-30 PROCEDURE — 85007 BL SMEAR W/DIFF WBC COUNT: CPT | Performed by: NURSE PRACTITIONER

## 2022-05-30 PROCEDURE — 85027 COMPLETE CBC AUTOMATED: CPT | Performed by: NURSE PRACTITIONER

## 2022-05-30 PROCEDURE — 80053 COMPREHEN METABOLIC PANEL: CPT | Performed by: NURSE PRACTITIONER

## 2022-05-30 PROCEDURE — 36415 COLL VENOUS BLD VENIPUNCTURE: CPT | Performed by: NURSE PRACTITIONER

## 2022-05-30 PROCEDURE — 83735 ASSAY OF MAGNESIUM: CPT | Performed by: NURSE PRACTITIONER

## 2022-06-06 ENCOUNTER — LAB VISIT (OUTPATIENT)
Dept: LAB | Facility: HOSPITAL | Age: 72
End: 2022-06-06
Attending: NURSE PRACTITIONER
Payer: MEDICARE

## 2022-06-06 DIAGNOSIS — C34.90 SMALL CELL LUNG CANCER: ICD-10-CM

## 2022-06-06 LAB
ALBUMIN SERPL BCP-MCNC: 3.9 G/DL (ref 3.5–5.2)
ALP SERPL-CCNC: 104 U/L (ref 55–135)
ALT SERPL W/O P-5'-P-CCNC: 14 U/L (ref 10–44)
ANION GAP SERPL CALC-SCNC: 8 MMOL/L (ref 8–16)
AST SERPL-CCNC: 18 U/L (ref 10–40)
BASOPHILS # BLD AUTO: 0.05 K/UL (ref 0–0.2)
BASOPHILS NFR BLD: 0.4 % (ref 0–1.9)
BILIRUB SERPL-MCNC: 0.2 MG/DL (ref 0.1–1)
BUN SERPL-MCNC: 10 MG/DL (ref 8–23)
CALCIUM SERPL-MCNC: 9.3 MG/DL (ref 8.7–10.5)
CHLORIDE SERPL-SCNC: 96 MMOL/L (ref 95–110)
CO2 SERPL-SCNC: 27 MMOL/L (ref 23–29)
CREAT SERPL-MCNC: 0.4 MG/DL (ref 0.5–1.4)
DIFFERENTIAL METHOD: ABNORMAL
EOSINOPHIL # BLD AUTO: 0 K/UL (ref 0–0.5)
EOSINOPHIL NFR BLD: 0.3 % (ref 0–8)
ERYTHROCYTE [DISTWIDTH] IN BLOOD BY AUTOMATED COUNT: 14.6 % (ref 11.5–14.5)
EST. GFR  (AFRICAN AMERICAN): >60 ML/MIN/1.73 M^2
EST. GFR  (NON AFRICAN AMERICAN): >60 ML/MIN/1.73 M^2
GLUCOSE SERPL-MCNC: 84 MG/DL (ref 70–110)
HCT VFR BLD AUTO: 28.8 % (ref 37–48.5)
HGB BLD-MCNC: 9.8 G/DL (ref 12–16)
IMM GRANULOCYTES # BLD AUTO: 0.17 K/UL (ref 0–0.04)
IMM GRANULOCYTES NFR BLD AUTO: 1.3 % (ref 0–0.5)
LYMPHOCYTES # BLD AUTO: 1.6 K/UL (ref 1–4.8)
LYMPHOCYTES NFR BLD: 11.9 % (ref 18–48)
MAGNESIUM SERPL-MCNC: 1.6 MG/DL (ref 1.6–2.6)
MCH RBC QN AUTO: 32.7 PG (ref 27–31)
MCHC RBC AUTO-ENTMCNC: 34 G/DL (ref 32–36)
MCV RBC AUTO: 96 FL (ref 82–98)
MONOCYTES # BLD AUTO: 1.2 K/UL (ref 0.3–1)
MONOCYTES NFR BLD: 8.9 % (ref 4–15)
NEUTROPHILS # BLD AUTO: 10.4 K/UL (ref 1.8–7.7)
NEUTROPHILS NFR BLD: 77.2 % (ref 38–73)
NRBC BLD-RTO: 0 /100 WBC
PLATELET # BLD AUTO: 71 K/UL (ref 150–450)
PMV BLD AUTO: 9.4 FL (ref 9.2–12.9)
POTASSIUM SERPL-SCNC: 4.4 MMOL/L (ref 3.5–5.1)
PROT SERPL-MCNC: 7.2 G/DL (ref 6–8.4)
RBC # BLD AUTO: 3 M/UL (ref 4–5.4)
SODIUM SERPL-SCNC: 131 MMOL/L (ref 136–145)
WBC # BLD AUTO: 13.42 K/UL (ref 3.9–12.7)

## 2022-06-06 PROCEDURE — 85025 COMPLETE CBC W/AUTO DIFF WBC: CPT | Performed by: NURSE PRACTITIONER

## 2022-06-06 PROCEDURE — 36415 COLL VENOUS BLD VENIPUNCTURE: CPT | Performed by: NURSE PRACTITIONER

## 2022-06-06 PROCEDURE — 83735 ASSAY OF MAGNESIUM: CPT | Performed by: NURSE PRACTITIONER

## 2022-06-06 PROCEDURE — 80053 COMPREHEN METABOLIC PANEL: CPT | Performed by: NURSE PRACTITIONER

## 2022-06-13 ENCOUNTER — OFFICE VISIT (OUTPATIENT)
Dept: HEMATOLOGY/ONCOLOGY | Facility: CLINIC | Age: 72
End: 2022-06-13
Payer: MEDICARE

## 2022-06-13 ENCOUNTER — LAB VISIT (OUTPATIENT)
Dept: LAB | Facility: HOSPITAL | Age: 72
End: 2022-06-13
Attending: NURSE PRACTITIONER
Payer: MEDICARE

## 2022-06-13 VITALS
TEMPERATURE: 97 F | SYSTOLIC BLOOD PRESSURE: 88 MMHG | RESPIRATION RATE: 18 BRPM | WEIGHT: 97.63 LBS | DIASTOLIC BLOOD PRESSURE: 58 MMHG | HEIGHT: 62 IN | HEART RATE: 96 BPM | BODY MASS INDEX: 17.96 KG/M2

## 2022-06-13 DIAGNOSIS — I95.9 HYPOTENSION, UNSPECIFIED HYPOTENSION TYPE: Primary | ICD-10-CM

## 2022-06-13 DIAGNOSIS — C34.90 SMALL CELL LUNG CANCER: ICD-10-CM

## 2022-06-13 LAB
ALBUMIN SERPL BCP-MCNC: 4.2 G/DL (ref 3.5–5.2)
ALP SERPL-CCNC: 85 U/L (ref 55–135)
ALT SERPL W/O P-5'-P-CCNC: 15 U/L (ref 10–44)
ANION GAP SERPL CALC-SCNC: 7 MMOL/L (ref 8–16)
AST SERPL-CCNC: 21 U/L (ref 10–40)
BASOPHILS # BLD AUTO: 0.03 K/UL (ref 0–0.2)
BASOPHILS NFR BLD: 0.3 % (ref 0–1.9)
BILIRUB SERPL-MCNC: 0.2 MG/DL (ref 0.1–1)
BUN SERPL-MCNC: 13 MG/DL (ref 8–23)
CALCIUM SERPL-MCNC: 9.5 MG/DL (ref 8.7–10.5)
CHLORIDE SERPL-SCNC: 93 MMOL/L (ref 95–110)
CO2 SERPL-SCNC: 29 MMOL/L (ref 23–29)
CREAT SERPL-MCNC: 0.4 MG/DL (ref 0.5–1.4)
DIFFERENTIAL METHOD: ABNORMAL
EOSINOPHIL # BLD AUTO: 0 K/UL (ref 0–0.5)
EOSINOPHIL NFR BLD: 0.3 % (ref 0–8)
ERYTHROCYTE [DISTWIDTH] IN BLOOD BY AUTOMATED COUNT: 15 % (ref 11.5–14.5)
EST. GFR  (AFRICAN AMERICAN): >60 ML/MIN/1.73 M^2
EST. GFR  (NON AFRICAN AMERICAN): >60 ML/MIN/1.73 M^2
GLUCOSE SERPL-MCNC: 106 MG/DL (ref 70–110)
HCT VFR BLD AUTO: 31.1 % (ref 37–48.5)
HGB BLD-MCNC: 10.4 G/DL (ref 12–16)
IMM GRANULOCYTES # BLD AUTO: 0.08 K/UL (ref 0–0.04)
IMM GRANULOCYTES NFR BLD AUTO: 0.7 % (ref 0–0.5)
LYMPHOCYTES # BLD AUTO: 1.5 K/UL (ref 1–4.8)
LYMPHOCYTES NFR BLD: 13.4 % (ref 18–48)
MAGNESIUM SERPL-MCNC: 1.8 MG/DL (ref 1.6–2.6)
MCH RBC QN AUTO: 32 PG (ref 27–31)
MCHC RBC AUTO-ENTMCNC: 33.4 G/DL (ref 32–36)
MCV RBC AUTO: 96 FL (ref 82–98)
MONOCYTES # BLD AUTO: 1 K/UL (ref 0.3–1)
MONOCYTES NFR BLD: 9 % (ref 4–15)
NEUTROPHILS # BLD AUTO: 8.4 K/UL (ref 1.8–7.7)
NEUTROPHILS NFR BLD: 76.3 % (ref 38–73)
NRBC BLD-RTO: 0 /100 WBC
PLATELET # BLD AUTO: 178 K/UL (ref 150–450)
PLATELET BLD QL SMEAR: ABNORMAL
PMV BLD AUTO: 9.9 FL (ref 9.2–12.9)
POTASSIUM SERPL-SCNC: 4.3 MMOL/L (ref 3.5–5.1)
PROT SERPL-MCNC: 7.4 G/DL (ref 6–8.4)
RBC # BLD AUTO: 3.25 M/UL (ref 4–5.4)
SODIUM SERPL-SCNC: 129 MMOL/L (ref 136–145)
WBC # BLD AUTO: 11.01 K/UL (ref 3.9–12.7)

## 2022-06-13 PROCEDURE — 99214 PR OFFICE/OUTPT VISIT, EST, LEVL IV, 30-39 MIN: ICD-10-PCS | Mod: S$GLB,,, | Performed by: NURSE PRACTITIONER

## 2022-06-13 PROCEDURE — 99214 OFFICE O/P EST MOD 30 MIN: CPT | Mod: S$GLB,,, | Performed by: NURSE PRACTITIONER

## 2022-06-13 PROCEDURE — 83735 ASSAY OF MAGNESIUM: CPT | Performed by: NURSE PRACTITIONER

## 2022-06-13 PROCEDURE — 36415 COLL VENOUS BLD VENIPUNCTURE: CPT | Performed by: NURSE PRACTITIONER

## 2022-06-13 PROCEDURE — 80053 COMPREHEN METABOLIC PANEL: CPT | Performed by: NURSE PRACTITIONER

## 2022-06-13 PROCEDURE — 85025 COMPLETE CBC W/AUTO DIFF WBC: CPT | Performed by: NURSE PRACTITIONER

## 2022-06-13 RX ORDER — SODIUM CHLORIDE 0.9 % (FLUSH) 0.9 %
10 SYRINGE (ML) INJECTION
Status: CANCELLED | OUTPATIENT
Start: 2022-06-16

## 2022-06-13 RX ORDER — HEPARIN 100 UNIT/ML
500 SYRINGE INTRAVENOUS
Status: CANCELLED | OUTPATIENT
Start: 2022-06-15

## 2022-06-13 RX ORDER — SODIUM CHLORIDE 0.9 % (FLUSH) 0.9 %
10 SYRINGE (ML) INJECTION
Status: CANCELLED | OUTPATIENT
Start: 2022-06-17

## 2022-06-13 RX ORDER — HEPARIN 100 UNIT/ML
500 SYRINGE INTRAVENOUS
Status: CANCELLED | OUTPATIENT
Start: 2022-06-16

## 2022-06-13 RX ORDER — SODIUM CHLORIDE 0.9 % (FLUSH) 0.9 %
10 SYRINGE (ML) INJECTION
Status: CANCELLED | OUTPATIENT
Start: 2022-06-15

## 2022-06-13 RX ORDER — HEPARIN 100 UNIT/ML
500 SYRINGE INTRAVENOUS
Status: CANCELLED | OUTPATIENT
Start: 2022-06-17

## 2022-06-13 NOTE — PROGRESS NOTES
PROGRESS NOTE    Subjective:       Patient ID: Anuja Cool is a 71 y.o. female.    2/25/2022:  CT lung Screen:  3.4cm FRANCIS mass and 2.0cm RLL    3/11/2022:  PET scan:  3.9cm hypermetabolic mass in FRANCIS  2.3cm hypermetabolic mass in RLL    3/31/2022:  [facility] CT biopsy:  Small Cell carcinoma    Carbo/Etop:  Cycle 1: 4/27/2022  Cycle 2: 5/25/2022  Cycle 3: 6/15/2022-due    5/3/2022:  [facility] CT Biopsy:  Adenocarcinoma    5/15/2022-5/22/2022:  SBRT to Right LL Adenocarcinoma    5/25/2022:  Left lung XRT started.       Chief Complaint:  synchronous primary lung cancer-SCLC and Adenocarcinoma Follow up    History of Present Illness:   Anuja Cool is a 71 y.o. female who presents for follow up of above.   She states she is feeling well. She is tolerating chemo and radiation well. She does have periodic constipation, but is able to control with otc laxatives.    Family and Social history reviewed and is unchanged from 4/14/2022    ROS:  Review of Systems   Constitutional: Positive for fatigue. Negative for fever.   Respiratory: Negative for shortness of breath.    Cardiovascular: Negative for chest pain and leg swelling.   Gastrointestinal: Positive for constipation. Negative for abdominal pain and blood in stool.   Genitourinary: Negative for hematuria.   Skin: Negative for rash.          Current Outpatient Medications:     alendronate (FOSAMAX) 70 MG tablet, Take 1 tablet by mouth once a week, Disp: 12 tablet, Rfl: 4    aspirin (ECOTRIN) 81 MG EC tablet, Take 81 mg by mouth once daily., Disp: , Rfl:     atenoloL (TENORMIN) 100 MG tablet, Take 1 tablet by mouth once daily, Disp: 90 tablet, Rfl: 0    cholecalciferol, vitamin D3, 3,000 unit Tab, Take by mouth., Disp: , Rfl:     fluticasone-umeclidin-vilanter (TRELEGY ELLIPTA) 200-62.5-25 mcg inhaler, Inhale 1 puff into the lungs once daily., Disp: 90 each, Rfl: 3    ibuprofen (ADVIL,MOTRIN) 200 MG tablet, Take  "200 mg by mouth every 6 (six) hours as needed for Pain., Disp: , Rfl:     levalbuterol (XOPENEX HFA) 45 mcg/actuation inhaler, INHALE 1 TO 2 PUFFS INTO LUNGS EVERY 4 HOURS AS NEEDED FOR WHEEZING AND FOR SHORTNESS OF BREATH, Disp: 45 g, Rfl: 1    levocetirizine (XYZAL) 5 MG tablet, Take 1 tablet (5 mg total) by mouth every evening., Disp: 30 tablet, Rfl: 2    methIMAzole (TAPAZOLE) 5 MG Tab, Take one tablet Monday-Friday and two tablets on Saturday and Sunday., Disp: 180 tablet, Rfl: 3    multivit with minerals/lutein (MULTIVITAMIN 50 PLUS ORAL), Take by mouth., Disp: , Rfl:     nicotine (NICODERM CQ) 14 mg/24 hr, Place 1 patch onto the skin every 24 hours., Disp: , Rfl:     ondansetron (ZOFRAN) 8 MG tablet, Take 1 tablet (8 mg total) by mouth every 8 (eight) hours as needed., Disp: 30 tablet, Rfl: 5    promethazine (PHENERGAN) 25 MG tablet, Take 1 tablet (25 mg total) by mouth every 4 to 6 hours as needed., Disp: 30 tablet, Rfl: 5    rosuvastatin (CRESTOR) 20 MG tablet, Take 1 tablet by mouth once daily, Disp: 90 tablet, Rfl: 3    lisinopriL 10 MG tablet, Take 1 tablet (10 mg total) by mouth once daily. (Patient not taking: Reported on 6/13/2022), Disp: 90 tablet, Rfl: 3        Objective:       Physical Examination:     BP (!) 88/58 Comment: right arm  Pulse 96   Temp 97.3 °F (36.3 °C)   Resp 18   Ht 5' 2" (1.575 m)   Wt 44.3 kg (97 lb 9.6 oz)   BMI 17.85 kg/m²     Physical Exam  Constitutional:       Appearance: Normal appearance. She is well-developed.   HENT:      Head: Normocephalic and atraumatic.      Comments: Alopecia     Right Ear: External ear normal.      Left Ear: External ear normal.   Eyes:      Conjunctiva/sclera: Conjunctivae normal.      Pupils: Pupils are equal, round, and reactive to light.   Neck:      Thyroid: No thyromegaly.      Trachea: No tracheal deviation.   Cardiovascular:      Rate and Rhythm: Normal rate and regular rhythm.      Heart sounds: Normal heart sounds. "   Pulmonary:      Effort: Pulmonary effort is normal.      Breath sounds: Normal breath sounds.   Abdominal:      General: Bowel sounds are normal. There is no distension.      Palpations: Abdomen is soft. There is no mass.      Tenderness: There is no abdominal tenderness.   Musculoskeletal:         General: Normal range of motion.      Cervical back: Normal range of motion.   Skin:     General: Skin is warm and dry.      Findings: No rash.   Neurological:      General: No focal deficit present.      Mental Status: She is alert and oriented to person, place, and time.      Comments: Neuro intact througout   Psychiatric:         Mood and Affect: Mood normal.         Behavior: Behavior normal.         Thought Content: Thought content normal.         Judgment: Judgment normal.         Labs:   Recent Results (from the past 336 hour(s))   CBC Auto Differential    Collection Time: 06/13/22 11:30 AM   Result Value Ref Range    WBC 11.01 3.90 - 12.70 K/uL    Hemoglobin 10.4 (L) 12.0 - 16.0 g/dL    Hematocrit 31.1 (L) 37.0 - 48.5 %    Platelets 178 150 - 450 K/uL   CBC Auto Differential    Collection Time: 06/06/22 11:47 AM   Result Value Ref Range    WBC 13.42 (H) 3.90 - 12.70 K/uL    Hemoglobin 9.8 (L) 12.0 - 16.0 g/dL    Hematocrit 28.8 (L) 37.0 - 48.5 %    Platelets 71 (L) 150 - 450 K/uL     CMP  Sodium   Date Value Ref Range Status   06/13/2022 129 (L) 136 - 145 mmol/L Final     Potassium   Date Value Ref Range Status   06/13/2022 4.3 3.5 - 5.1 mmol/L Final     Chloride   Date Value Ref Range Status   06/13/2022 93 (L) 95 - 110 mmol/L Final     CO2   Date Value Ref Range Status   06/13/2022 29 23 - 29 mmol/L Final     Glucose   Date Value Ref Range Status   06/13/2022 106 70 - 110 mg/dL Final     BUN   Date Value Ref Range Status   06/13/2022 13 8 - 23 mg/dL Final     Creatinine   Date Value Ref Range Status   06/13/2022 0.4 (L) 0.5 - 1.4 mg/dL Final     Calcium   Date Value Ref Range Status   06/13/2022 9.5 8.7 - 10.5  mg/dL Final     Total Protein   Date Value Ref Range Status   06/13/2022 7.4 6.0 - 8.4 g/dL Final     Albumin   Date Value Ref Range Status   06/13/2022 4.2 3.5 - 5.2 g/dL Final     Total Bilirubin   Date Value Ref Range Status   06/13/2022 0.2 0.1 - 1.0 mg/dL Final     Comment:     For infants and newborns, interpretation of results should be based  on gestational age, weight and in agreement with clinical  observations.    Premature Infant recommended reference ranges:  Up to 24 hours.............<8.0 mg/dL  Up to 48 hours............<12.0 mg/dL  3-5 days..................<15.0 mg/dL  6-29 days.................<15.0 mg/dL       Alkaline Phosphatase   Date Value Ref Range Status   06/13/2022 85 55 - 135 U/L Final     AST   Date Value Ref Range Status   06/13/2022 21 10 - 40 U/L Final     ALT   Date Value Ref Range Status   06/13/2022 15 10 - 44 U/L Final     Anion Gap   Date Value Ref Range Status   06/13/2022 7 (L) 8 - 16 mmol/L Final     eGFR if    Date Value Ref Range Status   06/13/2022 >60.0 >60 mL/min/1.73 m^2 Final     eGFR if non    Date Value Ref Range Status   06/13/2022 >60.0 >60 mL/min/1.73 m^2 Final     Comment:     Calculation used to obtain the estimated glomerular filtration  rate (eGFR) is the CKD-EPI equation.        No results found for: CEA  No results found for: PSA        Assessment/Plan:     Problem List Items Addressed This Visit        Cardiac/Vascular    Hypotension - Primary        1. SCLC- Continue with Cycle 3 Carboplatin and Etoposide- 6/15/2022  2. NSCLC- RT completed   3. Constipation- Miralax and Stool softeners PRN    Discussion:     Follow up in about 3 weeks (around 7/4/2022) for with Dr. Tinoco.      Electronically signed by Martha Granado, MSN, APRN, AGNP-C, OCN

## 2022-06-15 ENCOUNTER — INFUSION (OUTPATIENT)
Dept: INFUSION THERAPY | Facility: HOSPITAL | Age: 72
End: 2022-06-15
Attending: INTERNAL MEDICINE
Payer: MEDICARE

## 2022-06-15 VITALS
TEMPERATURE: 98 F | OXYGEN SATURATION: 99 % | BODY MASS INDEX: 18.1 KG/M2 | RESPIRATION RATE: 18 BRPM | DIASTOLIC BLOOD PRESSURE: 58 MMHG | HEART RATE: 100 BPM | WEIGHT: 98.38 LBS | HEIGHT: 62 IN | SYSTOLIC BLOOD PRESSURE: 93 MMHG

## 2022-06-15 DIAGNOSIS — C34.12 MALIGNANT NEOPLASM OF UPPER LOBE OF LEFT LUNG: ICD-10-CM

## 2022-06-15 DIAGNOSIS — D70.1 CHEMOTHERAPY-INDUCED NEUTROPENIA: Primary | ICD-10-CM

## 2022-06-15 DIAGNOSIS — T45.1X5A CHEMOTHERAPY-INDUCED NEUTROPENIA: Primary | ICD-10-CM

## 2022-06-15 PROCEDURE — 63600175 PHARM REV CODE 636 W HCPCS: Mod: JG | Performed by: NURSE PRACTITIONER

## 2022-06-15 PROCEDURE — 96417 CHEMO IV INFUS EACH ADDL SEQ: CPT

## 2022-06-15 PROCEDURE — 96367 TX/PROPH/DG ADDL SEQ IV INF: CPT

## 2022-06-15 PROCEDURE — 96375 TX/PRO/DX INJ NEW DRUG ADDON: CPT

## 2022-06-15 PROCEDURE — A4216 STERILE WATER/SALINE, 10 ML: HCPCS | Performed by: NURSE PRACTITIONER

## 2022-06-15 PROCEDURE — 96413 CHEMO IV INFUSION 1 HR: CPT

## 2022-06-15 PROCEDURE — 25000003 PHARM REV CODE 250: Performed by: NURSE PRACTITIONER

## 2022-06-15 RX ORDER — HEPARIN 100 UNIT/ML
500 SYRINGE INTRAVENOUS
Status: DISCONTINUED | OUTPATIENT
Start: 2022-06-15 | End: 2022-06-15 | Stop reason: HOSPADM

## 2022-06-15 RX ORDER — SODIUM CHLORIDE 0.9 % (FLUSH) 0.9 %
10 SYRINGE (ML) INJECTION
Status: DISCONTINUED | OUTPATIENT
Start: 2022-06-15 | End: 2022-06-15 | Stop reason: HOSPADM

## 2022-06-15 RX ADMIN — SODIUM CHLORIDE, PRESERVATIVE FREE 10 ML: 5 INJECTION INTRAVENOUS at 11:06

## 2022-06-15 RX ADMIN — APREPITANT 130 MG: 130 INJECTION, EMULSION INTRAVENOUS at 09:06

## 2022-06-15 RX ADMIN — SODIUM CHLORIDE: 9 INJECTION, SOLUTION INTRAVENOUS at 09:06

## 2022-06-15 RX ADMIN — ETOPOSIDE 140 MG: 20 INJECTION INTRAVENOUS at 10:06

## 2022-06-15 RX ADMIN — Medication 500 UNITS: at 11:06

## 2022-06-15 RX ADMIN — PALONOSETRON 0.25 MG: 0.25 INJECTION, SOLUTION INTRAVENOUS at 09:06

## 2022-06-15 RX ADMIN — CARBOPLATIN 585 MG: 10 INJECTION, SOLUTION INTRAVENOUS at 09:06

## 2022-06-15 NOTE — PLAN OF CARE
Problem: Fall Injury Risk  Goal: Absence of Fall and Fall-Related Injury  Outcome: Ongoing, Progressing  Intervention: Identify and Manage Contributors  Flowsheets (Taken 6/15/2022 0854)  Self-Care Promotion: independence encouraged  Medication Review/Management: medications reviewed  Intervention: Promote Injury-Free Environment  Flowsheets (Taken 6/15/2022 0854)  Safety Promotion/Fall Prevention: medications reviewed

## 2022-06-16 ENCOUNTER — INFUSION (OUTPATIENT)
Dept: INFUSION THERAPY | Facility: HOSPITAL | Age: 72
End: 2022-06-16
Attending: INTERNAL MEDICINE
Payer: MEDICARE

## 2022-06-16 VITALS
HEART RATE: 92 BPM | TEMPERATURE: 98 F | RESPIRATION RATE: 16 BRPM | DIASTOLIC BLOOD PRESSURE: 57 MMHG | BODY MASS INDEX: 18.4 KG/M2 | SYSTOLIC BLOOD PRESSURE: 83 MMHG | WEIGHT: 100 LBS | HEIGHT: 62 IN

## 2022-06-16 DIAGNOSIS — T45.1X5A CHEMOTHERAPY-INDUCED NEUTROPENIA: ICD-10-CM

## 2022-06-16 DIAGNOSIS — E86.0 DEHYDRATION: ICD-10-CM

## 2022-06-16 DIAGNOSIS — D70.1 CHEMOTHERAPY-INDUCED NEUTROPENIA: Primary | ICD-10-CM

## 2022-06-16 DIAGNOSIS — C34.12 MALIGNANT NEOPLASM OF UPPER LOBE OF LEFT LUNG: ICD-10-CM

## 2022-06-16 DIAGNOSIS — T45.1X5A CHEMOTHERAPY-INDUCED NEUTROPENIA: Primary | ICD-10-CM

## 2022-06-16 DIAGNOSIS — D70.1 CHEMOTHERAPY-INDUCED NEUTROPENIA: ICD-10-CM

## 2022-06-16 PROCEDURE — 63600175 PHARM REV CODE 636 W HCPCS: Performed by: NURSE PRACTITIONER

## 2022-06-16 PROCEDURE — 96367 TX/PROPH/DG ADDL SEQ IV INF: CPT

## 2022-06-16 PROCEDURE — 96361 HYDRATE IV INFUSION ADD-ON: CPT

## 2022-06-16 PROCEDURE — 25000003 PHARM REV CODE 250: Performed by: NURSE PRACTITIONER

## 2022-06-16 PROCEDURE — 96413 CHEMO IV INFUSION 1 HR: CPT

## 2022-06-16 PROCEDURE — A4216 STERILE WATER/SALINE, 10 ML: HCPCS | Performed by: NURSE PRACTITIONER

## 2022-06-16 RX ORDER — SODIUM CHLORIDE 9 MG/ML
INJECTION, SOLUTION INTRAVENOUS CONTINUOUS
Status: DISCONTINUED | OUTPATIENT
Start: 2022-06-16 | End: 2022-06-16 | Stop reason: HOSPADM

## 2022-06-16 RX ORDER — HEPARIN 100 UNIT/ML
500 SYRINGE INTRAVENOUS
Status: DISCONTINUED | OUTPATIENT
Start: 2022-06-16 | End: 2022-06-16 | Stop reason: HOSPADM

## 2022-06-16 RX ORDER — SODIUM CHLORIDE 0.9 % (FLUSH) 0.9 %
10 SYRINGE (ML) INJECTION
Status: DISCONTINUED | OUTPATIENT
Start: 2022-06-16 | End: 2022-06-16 | Stop reason: HOSPADM

## 2022-06-16 RX ORDER — SODIUM CHLORIDE 9 MG/ML
INJECTION, SOLUTION INTRAVENOUS CONTINUOUS
Status: CANCELLED
Start: 2022-06-16

## 2022-06-16 RX ADMIN — HEPARIN 500 UNITS: 100 SYRINGE at 11:06

## 2022-06-16 RX ADMIN — ETOPOSIDE 142 MG: 20 INJECTION INTRAVENOUS at 10:06

## 2022-06-16 RX ADMIN — SODIUM CHLORIDE, PRESERVATIVE FREE 10 ML: 5 INJECTION INTRAVENOUS at 11:06

## 2022-06-16 RX ADMIN — DEXAMETHASONE SODIUM PHOSPHATE 12 MG: 4 INJECTION, SOLUTION INTRA-ARTICULAR; INTRALESIONAL; INTRAMUSCULAR; INTRAVENOUS; SOFT TISSUE at 09:06

## 2022-06-16 RX ADMIN — SODIUM CHLORIDE: 0.9 INJECTION, SOLUTION INTRAVENOUS at 09:06

## 2022-06-16 NOTE — PLAN OF CARE
Problem: Fall Injury Risk  Goal: Absence of Fall and Fall-Related Injury  Outcome: Ongoing, Progressing  Intervention: Identify and Manage Contributors  Flowsheets (Taken 6/16/2022 0850)  Self-Care Promotion: independence encouraged  Medication Review/Management: medications reviewed  Intervention: Promote Injury-Free Environment  Flowsheets (Taken 6/16/2022 0850)  Safety Promotion/Fall Prevention: medications reviewed

## 2022-06-17 ENCOUNTER — INFUSION (OUTPATIENT)
Dept: INFUSION THERAPY | Facility: HOSPITAL | Age: 72
End: 2022-06-17
Attending: INTERNAL MEDICINE
Payer: MEDICARE

## 2022-06-17 VITALS
SYSTOLIC BLOOD PRESSURE: 120 MMHG | WEIGHT: 102.13 LBS | RESPIRATION RATE: 18 BRPM | OXYGEN SATURATION: 96 % | HEART RATE: 77 BPM | HEIGHT: 62 IN | BODY MASS INDEX: 18.8 KG/M2 | DIASTOLIC BLOOD PRESSURE: 50 MMHG

## 2022-06-17 DIAGNOSIS — T45.1X5A CHEMOTHERAPY-INDUCED NEUTROPENIA: ICD-10-CM

## 2022-06-17 DIAGNOSIS — D70.1 CHEMOTHERAPY-INDUCED NEUTROPENIA: ICD-10-CM

## 2022-06-17 DIAGNOSIS — E86.0 DEHYDRATION: Primary | ICD-10-CM

## 2022-06-17 DIAGNOSIS — C34.12 MALIGNANT NEOPLASM OF UPPER LOBE OF LEFT LUNG: ICD-10-CM

## 2022-06-17 PROCEDURE — 25000003 PHARM REV CODE 250: Performed by: NURSE PRACTITIONER

## 2022-06-17 PROCEDURE — 96377 APPLICATON ON-BODY INJECTOR: CPT | Mod: 59

## 2022-06-17 PROCEDURE — 96367 TX/PROPH/DG ADDL SEQ IV INF: CPT

## 2022-06-17 PROCEDURE — 96413 CHEMO IV INFUSION 1 HR: CPT

## 2022-06-17 PROCEDURE — A4216 STERILE WATER/SALINE, 10 ML: HCPCS | Performed by: NURSE PRACTITIONER

## 2022-06-17 PROCEDURE — 63600175 PHARM REV CODE 636 W HCPCS: Performed by: NURSE PRACTITIONER

## 2022-06-17 RX ORDER — HEPARIN 100 UNIT/ML
500 SYRINGE INTRAVENOUS
Status: DISCONTINUED | OUTPATIENT
Start: 2022-06-17 | End: 2022-06-17 | Stop reason: HOSPADM

## 2022-06-17 RX ORDER — SODIUM CHLORIDE 0.9 % (FLUSH) 0.9 %
10 SYRINGE (ML) INJECTION
Status: DISCONTINUED | OUTPATIENT
Start: 2022-06-17 | End: 2022-06-17 | Stop reason: HOSPADM

## 2022-06-17 RX ADMIN — ETOPOSIDE 142 MG: 20 INJECTION INTRAVENOUS at 09:06

## 2022-06-17 RX ADMIN — PEGFILGRASTIM 6 MG: KIT SUBCUTANEOUS at 10:06

## 2022-06-17 RX ADMIN — HEPARIN 500 UNITS: 100 SYRINGE at 10:06

## 2022-06-17 RX ADMIN — SODIUM CHLORIDE: 0.9 INJECTION, SOLUTION INTRAVENOUS at 09:06

## 2022-06-17 RX ADMIN — SODIUM CHLORIDE, PRESERVATIVE FREE 10 ML: 5 INJECTION INTRAVENOUS at 10:06

## 2022-06-17 RX ADMIN — DEXAMETHASONE SODIUM PHOSPHATE 12 MG: 4 INJECTION, SOLUTION INTRA-ARTICULAR; INTRALESIONAL; INTRAMUSCULAR; INTRAVENOUS; SOFT TISSUE at 09:06

## 2022-06-17 NOTE — PLAN OF CARE
Problem: Fatigue  Goal: Improved Activity Tolerance  Outcome: Ongoing, Progressing  Intervention: Promote Improved Energy  Flowsheets (Taken 6/17/2022 5890)  Fatigue Management: frequent rest breaks encouraged  Sleep/Rest Enhancement: regular sleep/rest pattern promoted  Activity Management:   Ambulated -L4   Up in chair - L3

## 2022-06-20 ENCOUNTER — LAB VISIT (OUTPATIENT)
Dept: LAB | Facility: HOSPITAL | Age: 72
End: 2022-06-20
Attending: NURSE PRACTITIONER
Payer: MEDICARE

## 2022-06-20 DIAGNOSIS — C34.90 SMALL CELL LUNG CANCER: ICD-10-CM

## 2022-06-20 LAB
ALBUMIN SERPL BCP-MCNC: 3.7 G/DL (ref 3.5–5.2)
ALP SERPL-CCNC: 94 U/L (ref 55–135)
ALT SERPL W/O P-5'-P-CCNC: 13 U/L (ref 10–44)
ANION GAP SERPL CALC-SCNC: 6 MMOL/L (ref 8–16)
ANISOCYTOSIS BLD QL SMEAR: ABNORMAL
AST SERPL-CCNC: 18 U/L (ref 10–40)
BASOPHILS NFR BLD: 0 % (ref 0–1.9)
BILIRUB SERPL-MCNC: 0.6 MG/DL (ref 0.1–1)
BUN SERPL-MCNC: 12 MG/DL (ref 8–23)
CALCIUM SERPL-MCNC: 8.7 MG/DL (ref 8.7–10.5)
CHLORIDE SERPL-SCNC: 93 MMOL/L (ref 95–110)
CO2 SERPL-SCNC: 28 MMOL/L (ref 23–29)
CREAT SERPL-MCNC: 0.4 MG/DL (ref 0.5–1.4)
DIFFERENTIAL METHOD: ABNORMAL
EOSINOPHIL NFR BLD: 0 % (ref 0–8)
ERYTHROCYTE [DISTWIDTH] IN BLOOD BY AUTOMATED COUNT: 16.5 % (ref 11.5–14.5)
EST. GFR  (AFRICAN AMERICAN): >60 ML/MIN/1.73 M^2
EST. GFR  (NON AFRICAN AMERICAN): >60 ML/MIN/1.73 M^2
GLUCOSE SERPL-MCNC: 86 MG/DL (ref 70–110)
HCT VFR BLD AUTO: 25.7 % (ref 37–48.5)
HGB BLD-MCNC: 8.7 G/DL (ref 12–16)
IMM GRANULOCYTES # BLD AUTO: ABNORMAL K/UL (ref 0–0.04)
IMM GRANULOCYTES NFR BLD AUTO: ABNORMAL % (ref 0–0.5)
LYMPHOCYTES NFR BLD: 2 % (ref 18–48)
MAGNESIUM SERPL-MCNC: 1.7 MG/DL (ref 1.6–2.6)
MCH RBC QN AUTO: 33.1 PG (ref 27–31)
MCHC RBC AUTO-ENTMCNC: 33.9 G/DL (ref 32–36)
MCV RBC AUTO: 98 FL (ref 82–98)
MONOCYTES NFR BLD: 2 % (ref 4–15)
NEUTROPHILS NFR BLD: 96 % (ref 38–73)
NRBC BLD-RTO: 0 /100 WBC
PLATELET # BLD AUTO: 300 K/UL (ref 150–450)
PLATELET BLD QL SMEAR: ABNORMAL
PMV BLD AUTO: 9.9 FL (ref 9.2–12.9)
POIKILOCYTOSIS BLD QL SMEAR: SLIGHT
POTASSIUM SERPL-SCNC: 4 MMOL/L (ref 3.5–5.1)
PROT SERPL-MCNC: 6.5 G/DL (ref 6–8.4)
RBC # BLD AUTO: 2.63 M/UL (ref 4–5.4)
SODIUM SERPL-SCNC: 127 MMOL/L (ref 136–145)
WBC # BLD AUTO: 57.67 K/UL (ref 3.9–12.7)

## 2022-06-20 PROCEDURE — 85027 COMPLETE CBC AUTOMATED: CPT | Performed by: NURSE PRACTITIONER

## 2022-06-20 PROCEDURE — 85007 BL SMEAR W/DIFF WBC COUNT: CPT | Performed by: NURSE PRACTITIONER

## 2022-06-20 PROCEDURE — 80053 COMPREHEN METABOLIC PANEL: CPT | Performed by: NURSE PRACTITIONER

## 2022-06-20 PROCEDURE — 83735 ASSAY OF MAGNESIUM: CPT | Performed by: NURSE PRACTITIONER

## 2022-06-20 PROCEDURE — 36415 COLL VENOUS BLD VENIPUNCTURE: CPT | Performed by: NURSE PRACTITIONER

## 2022-06-27 ENCOUNTER — LAB VISIT (OUTPATIENT)
Dept: LAB | Facility: HOSPITAL | Age: 72
End: 2022-06-27
Attending: NURSE PRACTITIONER
Payer: MEDICARE

## 2022-06-27 ENCOUNTER — TELEPHONE (OUTPATIENT)
Dept: HEMATOLOGY/ONCOLOGY | Facility: CLINIC | Age: 72
End: 2022-06-27

## 2022-06-27 DIAGNOSIS — C34.90 SMALL CELL LUNG CANCER: ICD-10-CM

## 2022-06-27 DIAGNOSIS — C34.90 SMALL CELL LUNG CANCER: Primary | ICD-10-CM

## 2022-06-27 LAB
ALBUMIN SERPL BCP-MCNC: 3.8 G/DL (ref 3.5–5.2)
ALP SERPL-CCNC: 98 U/L (ref 55–135)
ALT SERPL W/O P-5'-P-CCNC: 13 U/L (ref 10–44)
ANION GAP SERPL CALC-SCNC: 8 MMOL/L (ref 8–16)
AST SERPL-CCNC: 17 U/L (ref 10–40)
BASOPHILS # BLD AUTO: 0.03 K/UL (ref 0–0.2)
BASOPHILS NFR BLD: 0.2 % (ref 0–1.9)
BILIRUB SERPL-MCNC: 0.3 MG/DL (ref 0.1–1)
BUN SERPL-MCNC: 10 MG/DL (ref 8–23)
CALCIUM SERPL-MCNC: 9 MG/DL (ref 8.7–10.5)
CHLORIDE SERPL-SCNC: 96 MMOL/L (ref 95–110)
CO2 SERPL-SCNC: 27 MMOL/L (ref 23–29)
CREAT SERPL-MCNC: 0.3 MG/DL (ref 0.5–1.4)
DIFFERENTIAL METHOD: ABNORMAL
EOSINOPHIL # BLD AUTO: 0 K/UL (ref 0–0.5)
EOSINOPHIL NFR BLD: 0.3 % (ref 0–8)
ERYTHROCYTE [DISTWIDTH] IN BLOOD BY AUTOMATED COUNT: 15.9 % (ref 11.5–14.5)
EST. GFR  (AFRICAN AMERICAN): >60 ML/MIN/1.73 M^2
EST. GFR  (NON AFRICAN AMERICAN): >60 ML/MIN/1.73 M^2
GLUCOSE SERPL-MCNC: 104 MG/DL (ref 70–110)
HCT VFR BLD AUTO: 23.6 % (ref 37–48.5)
HGB BLD-MCNC: 7.9 G/DL (ref 12–16)
IMM GRANULOCYTES # BLD AUTO: 0.12 K/UL (ref 0–0.04)
IMM GRANULOCYTES NFR BLD AUTO: 1 % (ref 0–0.5)
LYMPHOCYTES # BLD AUTO: 1.2 K/UL (ref 1–4.8)
LYMPHOCYTES NFR BLD: 9.4 % (ref 18–48)
MAGNESIUM SERPL-MCNC: 1.6 MG/DL (ref 1.6–2.6)
MCH RBC QN AUTO: 32.8 PG (ref 27–31)
MCHC RBC AUTO-ENTMCNC: 33.5 G/DL (ref 32–36)
MCV RBC AUTO: 98 FL (ref 82–98)
MONOCYTES # BLD AUTO: 1.3 K/UL (ref 0.3–1)
MONOCYTES NFR BLD: 10.7 % (ref 4–15)
NEUTROPHILS # BLD AUTO: 9.6 K/UL (ref 1.8–7.7)
NEUTROPHILS NFR BLD: 78.4 % (ref 38–73)
NRBC BLD-RTO: 0 /100 WBC
PLATELET # BLD AUTO: 50 K/UL (ref 150–450)
PLATELET BLD QL SMEAR: ABNORMAL
PMV BLD AUTO: 10.5 FL (ref 9.2–12.9)
POTASSIUM SERPL-SCNC: 4.2 MMOL/L (ref 3.5–5.1)
PROT SERPL-MCNC: 6.9 G/DL (ref 6–8.4)
RBC # BLD AUTO: 2.41 M/UL (ref 4–5.4)
SODIUM SERPL-SCNC: 131 MMOL/L (ref 136–145)
WBC # BLD AUTO: 12.23 K/UL (ref 3.9–12.7)

## 2022-06-27 PROCEDURE — 85025 COMPLETE CBC W/AUTO DIFF WBC: CPT | Performed by: NURSE PRACTITIONER

## 2022-06-27 PROCEDURE — 83735 ASSAY OF MAGNESIUM: CPT | Performed by: NURSE PRACTITIONER

## 2022-06-27 PROCEDURE — 80053 COMPREHEN METABOLIC PANEL: CPT | Performed by: NURSE PRACTITIONER

## 2022-06-27 PROCEDURE — 36415 COLL VENOUS BLD VENIPUNCTURE: CPT | Performed by: NURSE PRACTITIONER

## 2022-06-27 RX ORDER — HYDROCODONE BITARTRATE AND ACETAMINOPHEN 500; 5 MG/1; MG/1
TABLET ORAL ONCE
Status: CANCELLED | OUTPATIENT
Start: 2022-06-27 | End: 2022-06-27

## 2022-06-27 RX ORDER — DIPHENHYDRAMINE HYDROCHLORIDE 50 MG/ML
25 INJECTION INTRAMUSCULAR; INTRAVENOUS ONCE
Status: CANCELLED | OUTPATIENT
Start: 2022-06-27

## 2022-06-27 RX ORDER — ACETAMINOPHEN 325 MG/1
650 TABLET ORAL ONCE
Status: CANCELLED | OUTPATIENT
Start: 2022-06-27

## 2022-06-27 RX ORDER — FUROSEMIDE 10 MG/ML
20 INJECTION INTRAMUSCULAR; INTRAVENOUS ONCE
Status: CANCELLED | OUTPATIENT
Start: 2022-06-27

## 2022-06-27 NOTE — TELEPHONE ENCOUNTER
Hgb 7.9. Per Martha, 2 u of PRBC ordered. Spoke to scheduling. They will call pt with a time to come in on Wednesday. Pt notified and verbalized understanding.

## 2022-06-27 NOTE — TELEPHONE ENCOUNTER
----- Message from SCOOTER Aggarwal sent at 6/27/2022 12:48 PM CDT -----  See about setting her up for 2 units of blood. Hgb is low at 7.9. Also watch for s/s of bleeding plt low at 50k.    Martha

## 2022-06-28 ENCOUNTER — LAB VISIT (OUTPATIENT)
Dept: LAB | Facility: HOSPITAL | Age: 72
End: 2022-06-28
Attending: NURSE PRACTITIONER
Payer: MEDICARE

## 2022-06-28 DIAGNOSIS — C34.90 SMALL CELL LUNG CANCER: ICD-10-CM

## 2022-06-28 LAB
ABO + RH BLD: NORMAL
BLD GP AB SCN CELLS X3 SERPL QL: NORMAL

## 2022-06-28 PROCEDURE — 86920 COMPATIBILITY TEST SPIN: CPT | Mod: 59 | Performed by: NURSE PRACTITIONER

## 2022-06-28 PROCEDURE — 36415 COLL VENOUS BLD VENIPUNCTURE: CPT | Performed by: NURSE PRACTITIONER

## 2022-06-28 PROCEDURE — 86850 RBC ANTIBODY SCREEN: CPT | Performed by: NURSE PRACTITIONER

## 2022-06-29 ENCOUNTER — INFUSION (OUTPATIENT)
Dept: INFUSION THERAPY | Facility: HOSPITAL | Age: 72
End: 2022-06-29
Attending: INTERNAL MEDICINE
Payer: MEDICARE

## 2022-06-29 VITALS
TEMPERATURE: 98 F | DIASTOLIC BLOOD PRESSURE: 66 MMHG | RESPIRATION RATE: 18 BRPM | OXYGEN SATURATION: 95 % | HEART RATE: 88 BPM | SYSTOLIC BLOOD PRESSURE: 136 MMHG

## 2022-06-29 DIAGNOSIS — D70.1 CHEMOTHERAPY-INDUCED NEUTROPENIA: ICD-10-CM

## 2022-06-29 DIAGNOSIS — C34.12 MALIGNANT NEOPLASM OF UPPER LOBE OF LEFT LUNG: ICD-10-CM

## 2022-06-29 DIAGNOSIS — C34.90 SMALL CELL LUNG CANCER: Primary | ICD-10-CM

## 2022-06-29 DIAGNOSIS — T45.1X5A CHEMOTHERAPY-INDUCED NEUTROPENIA: ICD-10-CM

## 2022-06-29 DIAGNOSIS — E86.0 DEHYDRATION: ICD-10-CM

## 2022-06-29 LAB
BLD PROD TYP BPU: NORMAL
BLD PROD TYP BPU: NORMAL
BLOOD UNIT EXPIRATION DATE: NORMAL
BLOOD UNIT EXPIRATION DATE: NORMAL
BLOOD UNIT TYPE CODE: 5100
BLOOD UNIT TYPE CODE: 5100
BLOOD UNIT TYPE: NORMAL
BLOOD UNIT TYPE: NORMAL
CODING SYSTEM: NORMAL
CODING SYSTEM: NORMAL
DISPENSE STATUS: NORMAL
DISPENSE STATUS: NORMAL
NUM UNITS TRANS PACKED RBC: NORMAL
NUM UNITS TRANS PACKED RBC: NORMAL

## 2022-06-29 PROCEDURE — 25000003 PHARM REV CODE 250: Performed by: NURSE PRACTITIONER

## 2022-06-29 PROCEDURE — 36591 DRAW BLOOD OFF VENOUS DEVICE: CPT

## 2022-06-29 PROCEDURE — 36430 TRANSFUSION BLD/BLD COMPNT: CPT

## 2022-06-29 PROCEDURE — 96376 TX/PRO/DX INJ SAME DRUG ADON: CPT

## 2022-06-29 PROCEDURE — 96375 TX/PRO/DX INJ NEW DRUG ADDON: CPT | Mod: 59

## 2022-06-29 PROCEDURE — 96374 THER/PROPH/DIAG INJ IV PUSH: CPT

## 2022-06-29 PROCEDURE — 36571 INSERT PICVAD CATH: CPT

## 2022-06-29 PROCEDURE — 63600175 PHARM REV CODE 636 W HCPCS: Performed by: INTERNAL MEDICINE

## 2022-06-29 PROCEDURE — P9016 RBC LEUKOCYTES REDUCED: HCPCS | Performed by: NURSE PRACTITIONER

## 2022-06-29 PROCEDURE — 63600175 PHARM REV CODE 636 W HCPCS: Performed by: NURSE PRACTITIONER

## 2022-06-29 RX ORDER — DIPHENHYDRAMINE HYDROCHLORIDE 50 MG/ML
25 INJECTION INTRAMUSCULAR; INTRAVENOUS ONCE
Status: COMPLETED | OUTPATIENT
Start: 2022-06-29 | End: 2022-06-29

## 2022-06-29 RX ORDER — HEPARIN 100 UNIT/ML
500 SYRINGE INTRAVENOUS
Status: DISCONTINUED | OUTPATIENT
Start: 2022-06-29 | End: 2022-06-29 | Stop reason: HOSPADM

## 2022-06-29 RX ORDER — HYDROCODONE BITARTRATE AND ACETAMINOPHEN 500; 5 MG/1; MG/1
TABLET ORAL ONCE
Status: COMPLETED | OUTPATIENT
Start: 2022-06-29 | End: 2022-06-29

## 2022-06-29 RX ORDER — ACETAMINOPHEN 325 MG/1
650 TABLET ORAL ONCE
Status: COMPLETED | OUTPATIENT
Start: 2022-06-29 | End: 2022-06-29

## 2022-06-29 RX ORDER — FUROSEMIDE 10 MG/ML
20 INJECTION INTRAMUSCULAR; INTRAVENOUS ONCE
Status: COMPLETED | OUTPATIENT
Start: 2022-06-29 | End: 2022-06-29

## 2022-06-29 RX ADMIN — FUROSEMIDE 20 MG: 10 INJECTION, SOLUTION INTRAMUSCULAR; INTRAVENOUS at 12:06

## 2022-06-29 RX ADMIN — DIPHENHYDRAMINE HYDROCHLORIDE 25 MG: 50 INJECTION INTRAMUSCULAR; INTRAVENOUS at 09:06

## 2022-06-29 RX ADMIN — SODIUM CHLORIDE: 0.9 INJECTION, SOLUTION INTRAVENOUS at 09:06

## 2022-06-29 RX ADMIN — ACETAMINOPHEN 650 MG: 325 TABLET ORAL at 09:06

## 2022-06-29 RX ADMIN — Medication 500 UNITS: at 03:06

## 2022-07-05 ENCOUNTER — LAB VISIT (OUTPATIENT)
Dept: LAB | Facility: HOSPITAL | Age: 72
End: 2022-07-05
Attending: NURSE PRACTITIONER
Payer: MEDICARE

## 2022-07-05 ENCOUNTER — OFFICE VISIT (OUTPATIENT)
Dept: HEMATOLOGY/ONCOLOGY | Facility: CLINIC | Age: 72
End: 2022-07-05
Payer: MEDICARE

## 2022-07-05 VITALS
SYSTOLIC BLOOD PRESSURE: 123 MMHG | RESPIRATION RATE: 18 BRPM | HEIGHT: 62 IN | HEART RATE: 90 BPM | BODY MASS INDEX: 18.22 KG/M2 | WEIGHT: 99 LBS | DIASTOLIC BLOOD PRESSURE: 63 MMHG | TEMPERATURE: 97 F

## 2022-07-05 DIAGNOSIS — C34.90 SMALL CELL LUNG CANCER: ICD-10-CM

## 2022-07-05 DIAGNOSIS — C34.31 MALIGNANT NEOPLASM OF LOWER LOBE OF RIGHT LUNG: ICD-10-CM

## 2022-07-05 LAB
ALBUMIN SERPL BCP-MCNC: 4.1 G/DL (ref 3.5–5.2)
ALP SERPL-CCNC: 83 U/L (ref 55–135)
ALT SERPL W/O P-5'-P-CCNC: 14 U/L (ref 10–44)
ANION GAP SERPL CALC-SCNC: 5 MMOL/L (ref 8–16)
AST SERPL-CCNC: 18 U/L (ref 10–40)
BASOPHILS # BLD AUTO: 0.02 K/UL (ref 0–0.2)
BASOPHILS NFR BLD: 0.2 % (ref 0–1.9)
BILIRUB SERPL-MCNC: 0.4 MG/DL (ref 0.1–1)
BUN SERPL-MCNC: 17 MG/DL (ref 8–23)
CALCIUM SERPL-MCNC: 9.3 MG/DL (ref 8.7–10.5)
CHLORIDE SERPL-SCNC: 94 MMOL/L (ref 95–110)
CO2 SERPL-SCNC: 30 MMOL/L (ref 23–29)
CREAT SERPL-MCNC: 0.5 MG/DL (ref 0.5–1.4)
DIFFERENTIAL METHOD: ABNORMAL
EOSINOPHIL # BLD AUTO: 0.1 K/UL (ref 0–0.5)
EOSINOPHIL NFR BLD: 0.6 % (ref 0–8)
ERYTHROCYTE [DISTWIDTH] IN BLOOD BY AUTOMATED COUNT: 17.1 % (ref 11.5–14.5)
EST. GFR  (AFRICAN AMERICAN): >60 ML/MIN/1.73 M^2
EST. GFR  (NON AFRICAN AMERICAN): >60 ML/MIN/1.73 M^2
GLUCOSE SERPL-MCNC: 99 MG/DL (ref 70–110)
HCT VFR BLD AUTO: 33.5 % (ref 37–48.5)
HGB BLD-MCNC: 11.4 G/DL (ref 12–16)
IMM GRANULOCYTES # BLD AUTO: 0.05 K/UL (ref 0–0.04)
IMM GRANULOCYTES NFR BLD AUTO: 0.5 % (ref 0–0.5)
LYMPHOCYTES # BLD AUTO: 1.1 K/UL (ref 1–4.8)
LYMPHOCYTES NFR BLD: 10.8 % (ref 18–48)
MAGNESIUM SERPL-MCNC: 1.8 MG/DL (ref 1.6–2.6)
MCH RBC QN AUTO: 32.6 PG (ref 27–31)
MCHC RBC AUTO-ENTMCNC: 34 G/DL (ref 32–36)
MCV RBC AUTO: 96 FL (ref 82–98)
MONOCYTES # BLD AUTO: 1 K/UL (ref 0.3–1)
MONOCYTES NFR BLD: 9.4 % (ref 4–15)
NEUTROPHILS # BLD AUTO: 8 K/UL (ref 1.8–7.7)
NEUTROPHILS NFR BLD: 78.5 % (ref 38–73)
NRBC BLD-RTO: 0 /100 WBC
PLATELET # BLD AUTO: 102 K/UL (ref 150–450)
PMV BLD AUTO: 9.8 FL (ref 9.2–12.9)
POTASSIUM SERPL-SCNC: 4.5 MMOL/L (ref 3.5–5.1)
PROT SERPL-MCNC: 7.4 G/DL (ref 6–8.4)
RBC # BLD AUTO: 3.5 M/UL (ref 4–5.4)
SODIUM SERPL-SCNC: 129 MMOL/L (ref 136–145)
WBC # BLD AUTO: 10.22 K/UL (ref 3.9–12.7)

## 2022-07-05 PROCEDURE — 36415 COLL VENOUS BLD VENIPUNCTURE: CPT | Performed by: NURSE PRACTITIONER

## 2022-07-05 PROCEDURE — 83735 ASSAY OF MAGNESIUM: CPT | Performed by: NURSE PRACTITIONER

## 2022-07-05 PROCEDURE — 85025 COMPLETE CBC W/AUTO DIFF WBC: CPT | Performed by: NURSE PRACTITIONER

## 2022-07-05 PROCEDURE — 99214 OFFICE O/P EST MOD 30 MIN: CPT | Mod: S$GLB,,, | Performed by: INTERNAL MEDICINE

## 2022-07-05 PROCEDURE — 99214 PR OFFICE/OUTPT VISIT, EST, LEVL IV, 30-39 MIN: ICD-10-PCS | Mod: S$GLB,,, | Performed by: INTERNAL MEDICINE

## 2022-07-05 PROCEDURE — 80053 COMPREHEN METABOLIC PANEL: CPT | Performed by: NURSE PRACTITIONER

## 2022-07-05 RX ORDER — SODIUM CHLORIDE 0.9 % (FLUSH) 0.9 %
10 SYRINGE (ML) INJECTION
Status: CANCELLED | OUTPATIENT
Start: 2022-07-13

## 2022-07-05 RX ORDER — SODIUM CHLORIDE 0.9 % (FLUSH) 0.9 %
10 SYRINGE (ML) INJECTION
Status: CANCELLED | OUTPATIENT
Start: 2022-07-07

## 2022-07-05 RX ORDER — SODIUM CHLORIDE 0.9 % (FLUSH) 0.9 %
10 SYRINGE (ML) INJECTION
Status: CANCELLED | OUTPATIENT
Start: 2022-07-05

## 2022-07-05 RX ORDER — HEPARIN 100 UNIT/ML
500 SYRINGE INTRAVENOUS
Status: CANCELLED | OUTPATIENT
Start: 2022-07-07

## 2022-07-05 RX ORDER — HEPARIN 100 UNIT/ML
500 SYRINGE INTRAVENOUS
Status: CANCELLED | OUTPATIENT
Start: 2022-07-13

## 2022-07-05 NOTE — PROGRESS NOTES
PROGRESS NOTE    Subjective:       Patient ID: Anuja Cool is a 72 y.o. female.    2/25/2022:  CT lung Screen:  3.4cm FRANCIS mass and 2.0cm RLL    3/11/2022:  PET scan:  3.9cm hypermetabolic mass in FRANCIS  2.3cm hypermetabolic mass in RLL    3/31/2022:  FRANCIS CT biopsy:  Small Cell carcinoma    Carbo/Etop:  Cycle 1: 4/27/2022  Cycle 2: 5/25/2022  Cycle 3: 6/15/2022  Cycle 4: 7/6/2022??    5/3/2022:  RLL CT Biopsy:  Adenocarcinoma    5/15/2022-5/22/2022:  SBRT to Right LL Adenocarcinoma    7/11/2022:  xrt to complete to left lung      Chief Complaint:  No chief complaint on file.  synchronous primary lung cancer-SCLC and Adenocarcinoma Follow up    History of Present Illness:   Anuja Cool is a 72 y.o. female who presents for follow up of above.       Family and Social history reviewed and is unchanged from 4/14/2022      ROS:  Review of Systems   Constitutional: Negative for fever.   Respiratory: Negative for shortness of breath.    Cardiovascular: Negative for chest pain and leg swelling.   Gastrointestinal: Negative for abdominal pain and blood in stool.   Genitourinary: Negative for hematuria.   Skin: Negative for rash.          Current Outpatient Medications:     alendronate (FOSAMAX) 70 MG tablet, Take 1 tablet by mouth once a week, Disp: 12 tablet, Rfl: 4    aspirin (ECOTRIN) 81 MG EC tablet, Take 81 mg by mouth once daily., Disp: , Rfl:     atenoloL (TENORMIN) 100 MG tablet, Take 1 tablet by mouth once daily, Disp: 90 tablet, Rfl: 0    cholecalciferol, vitamin D3, 3,000 unit Tab, Take by mouth., Disp: , Rfl:     fluticasone-umeclidin-vilanter (TRELEGY ELLIPTA) 200-62.5-25 mcg inhaler, Inhale 1 puff into the lungs once daily., Disp: 90 each, Rfl: 3    ibuprofen (ADVIL,MOTRIN) 200 MG tablet, Take 200 mg by mouth every 6 (six) hours as needed for Pain., Disp: , Rfl:     levalbuterol (XOPENEX HFA) 45 mcg/actuation inhaler, INHALE 1 TO 2 PUFFS  "INTO LUNGS EVERY 4 HOURS AS NEEDED FOR WHEEZING AND FOR SHORTNESS OF BREATH, Disp: 45 g, Rfl: 1    levocetirizine (XYZAL) 5 MG tablet, Take 1 tablet (5 mg total) by mouth every evening., Disp: 30 tablet, Rfl: 2    methIMAzole (TAPAZOLE) 5 MG Tab, Take one tablet Monday-Friday and two tablets on Saturday and Sunday., Disp: 180 tablet, Rfl: 3    multivit with minerals/lutein (MULTIVITAMIN 50 PLUS ORAL), Take by mouth., Disp: , Rfl:     nicotine (NICODERM CQ) 14 mg/24 hr, Place 1 patch onto the skin every 24 hours., Disp: , Rfl:     ondansetron (ZOFRAN) 8 MG tablet, Take 1 tablet (8 mg total) by mouth every 8 (eight) hours as needed., Disp: 30 tablet, Rfl: 5    promethazine (PHENERGAN) 25 MG tablet, Take 1 tablet (25 mg total) by mouth every 4 to 6 hours as needed., Disp: 30 tablet, Rfl: 5    rosuvastatin (CRESTOR) 20 MG tablet, Take 1 tablet by mouth once daily, Disp: 90 tablet, Rfl: 3    lisinopriL 10 MG tablet, Take 1 tablet (10 mg total) by mouth once daily. (Patient not taking: No sig reported), Disp: 90 tablet, Rfl: 3        Objective:       Physical Examination:     /63   Pulse 90   Temp 97.2 °F (36.2 °C)   Resp 18   Ht 5' 2" (1.575 m)   Wt 44.9 kg (99 lb)   BMI 18.11 kg/m²     Physical Exam  Constitutional:       Appearance: She is well-developed.   HENT:      Head: Normocephalic and atraumatic.      Right Ear: External ear normal.      Left Ear: External ear normal.   Eyes:      Conjunctiva/sclera: Conjunctivae normal.      Pupils: Pupils are equal, round, and reactive to light.   Neck:      Thyroid: No thyromegaly.      Trachea: No tracheal deviation.   Cardiovascular:      Rate and Rhythm: Normal rate and regular rhythm.      Heart sounds: Normal heart sounds.   Pulmonary:      Effort: Pulmonary effort is normal.      Breath sounds: Normal breath sounds.   Abdominal:      General: Bowel sounds are normal. There is no distension.      Palpations: Abdomen is soft. There is no mass.      " Tenderness: There is no abdominal tenderness.   Skin:     Findings: No rash.   Neurological:      Comments: Neuro intact througout   Psychiatric:         Behavior: Behavior normal.         Thought Content: Thought content normal.         Judgment: Judgment normal.         Labs:   Recent Results (from the past 336 hour(s))   CBC Auto Differential    Collection Time: 07/05/22 10:55 AM   Result Value Ref Range    WBC 10.22 3.90 - 12.70 K/uL    Hemoglobin 11.4 (L) 12.0 - 16.0 g/dL    Hematocrit 33.5 (L) 37.0 - 48.5 %    Platelets 102 (L) 150 - 450 K/uL   CBC Auto Differential    Collection Time: 06/27/22 11:07 AM   Result Value Ref Range    WBC 12.23 3.90 - 12.70 K/uL    Hemoglobin 7.9 (L) 12.0 - 16.0 g/dL    Hematocrit 23.6 (L) 37.0 - 48.5 %    Platelets 50 (L) 150 - 450 K/uL     CMP  Sodium   Date Value Ref Range Status   06/27/2022 131 (L) 136 - 145 mmol/L Final     Potassium   Date Value Ref Range Status   06/27/2022 4.2 3.5 - 5.1 mmol/L Final     Chloride   Date Value Ref Range Status   06/27/2022 96 95 - 110 mmol/L Final     CO2   Date Value Ref Range Status   06/27/2022 27 23 - 29 mmol/L Final     Glucose   Date Value Ref Range Status   06/27/2022 104 70 - 110 mg/dL Final     BUN   Date Value Ref Range Status   06/27/2022 10 8 - 23 mg/dL Final     Creatinine   Date Value Ref Range Status   06/27/2022 0.3 (L) 0.5 - 1.4 mg/dL Final     Calcium   Date Value Ref Range Status   06/27/2022 9.0 8.7 - 10.5 mg/dL Final     Total Protein   Date Value Ref Range Status   06/27/2022 6.9 6.0 - 8.4 g/dL Final     Albumin   Date Value Ref Range Status   06/27/2022 3.8 3.5 - 5.2 g/dL Final     Total Bilirubin   Date Value Ref Range Status   06/27/2022 0.3 0.1 - 1.0 mg/dL Final     Comment:     For infants and newborns, interpretation of results should be based  on gestational age, weight and in agreement with clinical  observations.    Premature Infant recommended reference ranges:  Up to 24 hours.............<8.0 mg/dL  Up to 48  hours............<12.0 mg/dL  3-5 days..................<15.0 mg/dL  6-29 days.................<15.0 mg/dL       Alkaline Phosphatase   Date Value Ref Range Status   06/27/2022 98 55 - 135 U/L Final     AST   Date Value Ref Range Status   06/27/2022 17 10 - 40 U/L Final     ALT   Date Value Ref Range Status   06/27/2022 13 10 - 44 U/L Final     Anion Gap   Date Value Ref Range Status   06/27/2022 8 8 - 16 mmol/L Final     eGFR if    Date Value Ref Range Status   06/27/2022 >60.0 >60 mL/min/1.73 m^2 Final     eGFR if non    Date Value Ref Range Status   06/27/2022 >60.0 >60 mL/min/1.73 m^2 Final     Comment:     Calculation used to obtain the estimated glomerular filtration  rate (eGFR) is the CKD-EPI equation.        No results found for: CEA  No results found for: PSA        Assessment/Plan:     Problem List Items Addressed This Visit     Small cell lung cancer     Patient will complete radiation in one more week and will have her fourth and final chemotherapy this week.  Has done well with this.  Will have her back with NP in four weeks to see how she is doing and will have her plan for scans to be done near the end of August.             Adenocarcinoma Lung Cancer RLL     Patient is s/p therapy for this and is doing well.  Continue to monitor.                  Discussion:     Follow up in about 4 weeks (around 8/2/2022).      Electronically signed by Jonathan Wagner

## 2022-07-05 NOTE — ASSESSMENT & PLAN NOTE
Patient will complete radiation in one more week and will have her fourth and final chemotherapy this week.  Has done well with this.  Will have her back with NP in four weeks to see how she is doing and will have her plan for scans to be done near the end of August.

## 2022-07-07 RX ORDER — HEPARIN 100 UNIT/ML
500 SYRINGE INTRAVENOUS
Status: CANCELLED | OUTPATIENT
Start: 2022-07-07

## 2022-07-07 RX ORDER — SODIUM CHLORIDE 0.9 % (FLUSH) 0.9 %
10 SYRINGE (ML) INJECTION
Status: CANCELLED | OUTPATIENT
Start: 2022-07-07

## 2022-07-08 RX ORDER — HEPARIN 100 UNIT/ML
500 SYRINGE INTRAVENOUS
Status: CANCELLED | OUTPATIENT
Start: 2022-07-08

## 2022-07-11 ENCOUNTER — INFUSION (OUTPATIENT)
Dept: INFUSION THERAPY | Facility: HOSPITAL | Age: 72
End: 2022-07-11
Attending: INTERNAL MEDICINE
Payer: MEDICARE

## 2022-07-11 ENCOUNTER — TREATMENT (OUTPATIENT)
Dept: RADIATION ONCOLOGY | Facility: CLINIC | Age: 72
End: 2022-07-11
Payer: MEDICARE

## 2022-07-11 VITALS
OXYGEN SATURATION: 95 % | TEMPERATURE: 98 F | WEIGHT: 97.19 LBS | RESPIRATION RATE: 18 BRPM | BODY MASS INDEX: 17.88 KG/M2 | HEIGHT: 62 IN | DIASTOLIC BLOOD PRESSURE: 53 MMHG | HEART RATE: 83 BPM | SYSTOLIC BLOOD PRESSURE: 117 MMHG

## 2022-07-11 DIAGNOSIS — T45.1X5A CHEMOTHERAPY-INDUCED NEUTROPENIA: ICD-10-CM

## 2022-07-11 DIAGNOSIS — D70.1 CHEMOTHERAPY-INDUCED NEUTROPENIA: ICD-10-CM

## 2022-07-11 DIAGNOSIS — E86.0 DEHYDRATION: Primary | ICD-10-CM

## 2022-07-11 DIAGNOSIS — C34.12 MALIGNANT NEOPLASM OF UPPER LOBE OF LEFT LUNG: ICD-10-CM

## 2022-07-11 PROCEDURE — G6015 RADIATION TX DELIVERY IMRT: HCPCS | Mod: S$GLB,,, | Performed by: RADIOLOGY

## 2022-07-11 PROCEDURE — 96375 TX/PRO/DX INJ NEW DRUG ADDON: CPT

## 2022-07-11 PROCEDURE — 63600175 PHARM REV CODE 636 W HCPCS

## 2022-07-11 PROCEDURE — 96417 CHEMO IV INFUS EACH ADDL SEQ: CPT

## 2022-07-11 PROCEDURE — 63600175 PHARM REV CODE 636 W HCPCS: Performed by: INTERNAL MEDICINE

## 2022-07-11 PROCEDURE — 96376 TX/PRO/DX INJ SAME DRUG ADON: CPT

## 2022-07-11 PROCEDURE — 96413 CHEMO IV INFUSION 1 HR: CPT

## 2022-07-11 PROCEDURE — 25000003 PHARM REV CODE 250: Performed by: INTERNAL MEDICINE

## 2022-07-11 PROCEDURE — 77014 PR  CT GUIDANCE PLACEMENT RAD THERAPY FIELDS: ICD-10-PCS | Mod: S$GLB,,, | Performed by: RADIOLOGY

## 2022-07-11 PROCEDURE — 96367 TX/PROPH/DG ADDL SEQ IV INF: CPT

## 2022-07-11 PROCEDURE — 77014 PR  CT GUIDANCE PLACEMENT RAD THERAPY FIELDS: CPT | Mod: S$GLB,,, | Performed by: RADIOLOGY

## 2022-07-11 PROCEDURE — G6015 PR RADN TX DELIVERY,  INTENS MOD, 1+ FIELDS PER TX: ICD-10-PCS | Mod: S$GLB,,, | Performed by: RADIOLOGY

## 2022-07-11 RX ORDER — HEPARIN 100 UNIT/ML
500 SYRINGE INTRAVENOUS
Status: DISCONTINUED | OUTPATIENT
Start: 2022-07-11 | End: 2022-07-11 | Stop reason: HOSPADM

## 2022-07-11 RX ORDER — SODIUM CHLORIDE 0.9 % (FLUSH) 0.9 %
10 SYRINGE (ML) INJECTION
Status: DISCONTINUED | OUTPATIENT
Start: 2022-07-11 | End: 2022-07-11 | Stop reason: HOSPADM

## 2022-07-11 RX ADMIN — HEPARIN 500 UNITS: 100 SYRINGE at 11:07

## 2022-07-11 RX ADMIN — PALONOSETRON 0.25 MG: 0.25 INJECTION, SOLUTION INTRAVENOUS at 08:07

## 2022-07-11 RX ADMIN — APREPITANT 130 MG: 130 INJECTION, EMULSION INTRAVENOUS at 08:07

## 2022-07-11 RX ADMIN — CARBOPLATIN 585 MG: 10 INJECTION, SOLUTION INTRAVENOUS at 09:07

## 2022-07-11 RX ADMIN — ETOPOSIDE 140 MG: 20 INJECTION INTRAVENOUS at 10:07

## 2022-07-11 RX ADMIN — SODIUM CHLORIDE: 0.9 INJECTION, SOLUTION INTRAVENOUS at 08:07

## 2022-07-11 NOTE — PLAN OF CARE
Problem: Fatigue  Goal: Improved Activity Tolerance  Outcome: Ongoing, Progressing  Intervention: Promote Improved Energy  Flowsheets (Taken 7/11/2022 1124)  Fatigue Management: frequent rest breaks encouraged  Sleep/Rest Enhancement:   regular sleep/rest pattern promoted   relaxation techniques promoted  Activity Management: Ambulated -L4

## 2022-07-12 ENCOUNTER — INFUSION (OUTPATIENT)
Dept: INFUSION THERAPY | Facility: HOSPITAL | Age: 72
End: 2022-07-12
Attending: INTERNAL MEDICINE
Payer: MEDICARE

## 2022-07-12 VITALS
SYSTOLIC BLOOD PRESSURE: 104 MMHG | WEIGHT: 100.13 LBS | TEMPERATURE: 99 F | RESPIRATION RATE: 18 BRPM | HEIGHT: 62 IN | OXYGEN SATURATION: 95 % | DIASTOLIC BLOOD PRESSURE: 74 MMHG | HEART RATE: 81 BPM | BODY MASS INDEX: 18.43 KG/M2

## 2022-07-12 DIAGNOSIS — C34.12 MALIGNANT NEOPLASM OF UPPER LOBE OF LEFT LUNG: ICD-10-CM

## 2022-07-12 DIAGNOSIS — E86.0 DEHYDRATION: Primary | ICD-10-CM

## 2022-07-12 DIAGNOSIS — T45.1X5A CHEMOTHERAPY-INDUCED NEUTROPENIA: ICD-10-CM

## 2022-07-12 DIAGNOSIS — D70.1 CHEMOTHERAPY-INDUCED NEUTROPENIA: ICD-10-CM

## 2022-07-12 PROCEDURE — 25000003 PHARM REV CODE 250: Performed by: INTERNAL MEDICINE

## 2022-07-12 PROCEDURE — A4216 STERILE WATER/SALINE, 10 ML: HCPCS | Performed by: INTERNAL MEDICINE

## 2022-07-12 PROCEDURE — 77336 PR  RADN PHYSICS CONSULT CONTINUING: ICD-10-PCS | Mod: S$GLB,,, | Performed by: RADIOLOGY

## 2022-07-12 PROCEDURE — 77336 RADIATION PHYSICS CONSULT: CPT | Mod: S$GLB,,, | Performed by: RADIOLOGY

## 2022-07-12 PROCEDURE — 63600175 PHARM REV CODE 636 W HCPCS: Performed by: INTERNAL MEDICINE

## 2022-07-12 PROCEDURE — 96367 TX/PROPH/DG ADDL SEQ IV INF: CPT

## 2022-07-12 PROCEDURE — 96413 CHEMO IV INFUSION 1 HR: CPT

## 2022-07-12 RX ORDER — HEPARIN 100 UNIT/ML
500 SYRINGE INTRAVENOUS
Status: DISCONTINUED | OUTPATIENT
Start: 2022-07-12 | End: 2022-07-12 | Stop reason: HOSPADM

## 2022-07-12 RX ORDER — SODIUM CHLORIDE 0.9 % (FLUSH) 0.9 %
10 SYRINGE (ML) INJECTION
Status: DISCONTINUED | OUTPATIENT
Start: 2022-07-12 | End: 2022-07-12 | Stop reason: HOSPADM

## 2022-07-12 RX ADMIN — HEPARIN 500 UNITS: 100 SYRINGE at 10:07

## 2022-07-12 RX ADMIN — DEXAMETHASONE SODIUM PHOSPHATE 12 MG: 4 INJECTION, SOLUTION INTRA-ARTICULAR; INTRALESIONAL; INTRAMUSCULAR; INTRAVENOUS; SOFT TISSUE at 08:07

## 2022-07-12 RX ADMIN — SODIUM CHLORIDE: 0.9 INJECTION, SOLUTION INTRAVENOUS at 08:07

## 2022-07-12 RX ADMIN — ETOPOSIDE 142 MG: 20 INJECTION INTRAVENOUS at 09:07

## 2022-07-12 RX ADMIN — SODIUM CHLORIDE, PRESERVATIVE FREE 10 ML: 5 INJECTION INTRAVENOUS at 10:07

## 2022-07-12 NOTE — TELEPHONE ENCOUNTER
----- Message from Pedro Santacruz sent at 7/12/2022 12:50 PM CDT -----  Regarding: refill  Contact: patient  Type:  RX Refill Request    Who Called: Patient   Refill or New Rx:  Refill  RX Name and Strength:  TRELEGY ELLIPTA  How is the patient currently taking it? (ex. 1XDay):  as needed  Is this a 30 day or 90 day RX:  90day  Preferred Pharmacy with phone number:    [facility] [address] [address] [employer] [address] [address]  Phone: [phone] Fax: [phone]     Local or Mail Order:  Local  Ordering Provider:  Dr. Lew  [facility] Call Back Number:  [phone] (home)     Case number [id number]  Patient is out

## 2022-07-12 NOTE — PLAN OF CARE
Problem: Fatigue  Goal: Improved Activity Tolerance  Outcome: Ongoing, Progressing  Intervention: Promote Improved Energy  Flowsheets (Taken 7/12/2022 0811)  Fatigue Management: frequent rest breaks encouraged  Sleep/Rest Enhancement:   regular sleep/rest pattern promoted   relaxation techniques promoted  Activity Management: Ambulated -L4   C4D2 VP16 administered per MD order. Pt tolerated well.

## 2022-07-12 NOTE — TELEPHONE ENCOUNTER
No new care gaps identified.  [facility] Embedded Care Gaps. Reference number: [id number]. 7/12/2022   2:22:31 PM CDT

## 2022-07-13 ENCOUNTER — INFUSION (OUTPATIENT)
Dept: INFUSION THERAPY | Facility: HOSPITAL | Age: 72
End: 2022-07-13
Attending: INTERNAL MEDICINE
Payer: MEDICARE

## 2022-07-13 VITALS
DIASTOLIC BLOOD PRESSURE: 57 MMHG | HEIGHT: 62 IN | BODY MASS INDEX: 18.55 KG/M2 | RESPIRATION RATE: 18 BRPM | SYSTOLIC BLOOD PRESSURE: 126 MMHG | WEIGHT: 100.81 LBS | OXYGEN SATURATION: 97 % | TEMPERATURE: 99 F | HEART RATE: 78 BPM

## 2022-07-13 DIAGNOSIS — T45.1X5A CHEMOTHERAPY-INDUCED NEUTROPENIA: ICD-10-CM

## 2022-07-13 DIAGNOSIS — C34.12 MALIGNANT NEOPLASM OF UPPER LOBE OF LEFT LUNG: ICD-10-CM

## 2022-07-13 DIAGNOSIS — D70.1 CHEMOTHERAPY-INDUCED NEUTROPENIA: ICD-10-CM

## 2022-07-13 DIAGNOSIS — E86.0 DEHYDRATION: Primary | ICD-10-CM

## 2022-07-13 PROCEDURE — 25000003 PHARM REV CODE 250: Performed by: INTERNAL MEDICINE

## 2022-07-13 PROCEDURE — 96377 APPLICATON ON-BODY INJECTOR: CPT

## 2022-07-13 PROCEDURE — 96413 CHEMO IV INFUSION 1 HR: CPT

## 2022-07-13 PROCEDURE — 63600175 PHARM REV CODE 636 W HCPCS: Performed by: INTERNAL MEDICINE

## 2022-07-13 PROCEDURE — 96367 TX/PROPH/DG ADDL SEQ IV INF: CPT

## 2022-07-13 PROCEDURE — A4216 STERILE WATER/SALINE, 10 ML: HCPCS | Performed by: INTERNAL MEDICINE

## 2022-07-13 RX ORDER — SODIUM CHLORIDE 0.9 % (FLUSH) 0.9 %
10 SYRINGE (ML) INJECTION
Status: DISCONTINUED | OUTPATIENT
Start: 2022-07-13 | End: 2022-07-13 | Stop reason: HOSPADM

## 2022-07-13 RX ORDER — HEPARIN 100 UNIT/ML
500 SYRINGE INTRAVENOUS
Status: DISCONTINUED | OUTPATIENT
Start: 2022-07-13 | End: 2022-07-13 | Stop reason: HOSPADM

## 2022-07-13 RX ADMIN — SODIUM CHLORIDE, PRESERVATIVE FREE 10 ML: 5 INJECTION INTRAVENOUS at 11:07

## 2022-07-13 RX ADMIN — DEXAMETHASONE SODIUM PHOSPHATE 12 MG: 4 INJECTION, SOLUTION INTRA-ARTICULAR; INTRALESIONAL; INTRAMUSCULAR; INTRAVENOUS; SOFT TISSUE at 10:07

## 2022-07-13 RX ADMIN — PEGFILGRASTIM 6 MG: KIT SUBCUTANEOUS at 12:07

## 2022-07-13 RX ADMIN — HEPARIN 500 UNITS: 100 SYRINGE at 11:07

## 2022-07-13 RX ADMIN — ETOPOSIDE 142 MG: 20 INJECTION INTRAVENOUS at 10:07

## 2022-07-13 NOTE — PLAN OF CARE
Problem: Fatigue  Goal: Improved Activity Tolerance  Outcome: Ongoing, Progressing  Intervention: Promote Improved Energy  Flowsheets (Taken 7/13/2022 1006)  Fatigue Management: frequent rest breaks encouraged  Sleep/Rest Enhancement:   regular sleep/rest pattern promoted   relaxation techniques promoted  Activity Management: Ambulated -L4   C4D3 VP16  administered per MD order. Pt tolerated well.

## 2022-07-14 ENCOUNTER — TELEPHONE (OUTPATIENT)
Dept: FAMILY MEDICINE | Facility: CLINIC | Age: 72
End: 2022-07-14
Payer: MEDICARE

## 2022-07-14 NOTE — TELEPHONE ENCOUNTER
Called patient and advised her that her medication refill request has been sent to Dr Lew we are just waiting on her response. verbalized understanding.

## 2022-07-14 NOTE — TELEPHONE ENCOUNTER
----- Message from Rainafei BaezCardoza sent at 7/14/2022  9:17 AM CDT -----  Contact: patient  Type:  RX Refill Request    Who Called:  patient  Refill or New Rx:  refill  RX Name and Strength:  fluticasone-umeclidin-vilanter (TRELEGY ELLIPTA) 200-62.5-25 mcg inhaler  How is the patient currently taking it? (ex. 1XDay):  as directed  Is this a 30 day or 90 day RX:  90  [facility] Pharm[facility] with phone number[facility]    [facility] [address] [place], [place]  [address] [place], [place] [address] [address] [address]  Phone: [phone] Fax: [phone]  Local or Mail Order:  local  Ordering Provider:  Louann Munoz Call Back Number:  [phone] (home)   Additional Information:

## 2022-07-15 ENCOUNTER — TELEPHONE (OUTPATIENT)
Dept: FAMILY MEDICINE | Facility: CLINIC | Age: 72
End: 2022-07-15
Payer: MEDICARE

## 2022-07-15 RX ORDER — FLUTICASONE FUROATE, UMECLIDINIUM BROMIDE AND VILANTEROL TRIFENATATE 200; 62.5; 25 UG/1; UG/1; UG/1
1 POWDER RESPIRATORY (INHALATION) DAILY
Qty: 90 EACH | Refills: 3 | Status: CANCELLED | OUTPATIENT
Start: 2022-07-15

## 2022-07-15 NOTE — TELEPHONE ENCOUNTER
----- Message from Gilles Ma sent at 7/15/2022 12:38 PM CDT -----  Contact: pt  Who Called: PT  Regarding: The pt is calling to request that her fluticasone-umeclidin-vilanter (TRELEGY ELLIPTA) 200-62.5-25 mcg inhaler be called into the pharmacy and is stating that she is having trouble breathing. She is requesting a callback.   Would the patient rather a call back or a response via [facility]? Call back  Best Call Back Number: [phone]  Additional Information:

## 2022-07-15 NOTE — TELEPHONE ENCOUNTER
"Called pt regarding below message. Pt having trouble breathing , needs her Trelegy and has been trying to get it since the beginning of this week and states " this is ridiculous" . Informed pt provider is out for the day and the message will be routed to the another provider.  "

## 2022-07-18 ENCOUNTER — LAB VISIT (OUTPATIENT)
Dept: LAB | Facility: HOSPITAL | Age: 72
End: 2022-07-18
Attending: NURSE PRACTITIONER
Payer: MEDICARE

## 2022-07-18 ENCOUNTER — OFFICE VISIT (OUTPATIENT)
Dept: HEMATOLOGY/ONCOLOGY | Facility: CLINIC | Age: 72
End: 2022-07-18
Payer: MEDICARE

## 2022-07-18 VITALS
HEIGHT: 62 IN | HEART RATE: 93 BPM | WEIGHT: 98 LBS | DIASTOLIC BLOOD PRESSURE: 88 MMHG | RESPIRATION RATE: 16 BRPM | BODY MASS INDEX: 18.03 KG/M2 | TEMPERATURE: 97 F | SYSTOLIC BLOOD PRESSURE: 125 MMHG

## 2022-07-18 DIAGNOSIS — C34.90 SMALL CELL LUNG CANCER: ICD-10-CM

## 2022-07-18 DIAGNOSIS — J44.9 CHRONIC OBSTRUCTIVE PULMONARY DISEASE, UNSPECIFIED COPD TYPE: ICD-10-CM

## 2022-07-18 DIAGNOSIS — C34.31 MALIGNANT NEOPLASM OF LOWER LOBE OF RIGHT LUNG: ICD-10-CM

## 2022-07-18 DIAGNOSIS — C34.12 MALIGNANT NEOPLASM OF UPPER LOBE OF LEFT LUNG: Primary | ICD-10-CM

## 2022-07-18 LAB
ALBUMIN SERPL BCP-MCNC: 4.3 G/DL (ref 3.5–5.2)
ALP SERPL-CCNC: 104 U/L (ref 55–135)
ALT SERPL W/O P-5'-P-CCNC: 14 U/L (ref 10–44)
ANION GAP SERPL CALC-SCNC: 6 MMOL/L (ref 8–16)
AST SERPL-CCNC: 19 U/L (ref 10–40)
BASOPHILS # BLD AUTO: 0.1 K/UL (ref 0–0.2)
BASOPHILS NFR BLD: 0.5 % (ref 0–1.9)
BILIRUB SERPL-MCNC: 0.9 MG/DL (ref 0.1–1)
BUN SERPL-MCNC: 20 MG/DL (ref 8–23)
CALCIUM SERPL-MCNC: 9.4 MG/DL (ref 8.7–10.5)
CHLORIDE SERPL-SCNC: 96 MMOL/L (ref 95–110)
CO2 SERPL-SCNC: 31 MMOL/L (ref 23–29)
CREAT SERPL-MCNC: <0.3 MG/DL (ref 0.5–1.4)
DIFFERENTIAL METHOD: ABNORMAL
EOSINOPHIL # BLD AUTO: 0 K/UL (ref 0–0.5)
EOSINOPHIL NFR BLD: 0.1 % (ref 0–8)
ERYTHROCYTE [DISTWIDTH] IN BLOOD BY AUTOMATED COUNT: 18.2 % (ref 11.5–14.5)
EST. GFR  (AFRICAN AMERICAN): >60 ML/MIN/1.73 M^2
EST. GFR  (NON AFRICAN AMERICAN): >60 ML/MIN/1.73 M^2
GLUCOSE SERPL-MCNC: 91 MG/DL (ref 70–110)
HCT VFR BLD AUTO: 28.3 % (ref 37–48.5)
HGB BLD-MCNC: 9.7 G/DL (ref 12–16)
IMM GRANULOCYTES # BLD AUTO: 0.69 K/UL (ref 0–0.04)
IMM GRANULOCYTES NFR BLD AUTO: 3.2 % (ref 0–0.5)
LYMPHOCYTES # BLD AUTO: 0.7 K/UL (ref 1–4.8)
LYMPHOCYTES NFR BLD: 3 % (ref 18–48)
MAGNESIUM SERPL-MCNC: 1.5 MG/DL (ref 1.6–2.6)
MCH RBC QN AUTO: 34 PG (ref 27–31)
MCHC RBC AUTO-ENTMCNC: 34.3 G/DL (ref 32–36)
MCV RBC AUTO: 99 FL (ref 82–98)
MONOCYTES # BLD AUTO: 1 K/UL (ref 0.3–1)
MONOCYTES NFR BLD: 4.4 % (ref 4–15)
NEUTROPHILS # BLD AUTO: 19.3 K/UL (ref 1.8–7.7)
NEUTROPHILS NFR BLD: 88.8 % (ref 38–73)
NRBC BLD-RTO: 0 /100 WBC
PLATELET # BLD AUTO: 136 K/UL (ref 150–450)
PMV BLD AUTO: 9.7 FL (ref 9.2–12.9)
POTASSIUM SERPL-SCNC: 4.4 MMOL/L (ref 3.5–5.1)
PROT SERPL-MCNC: 7.3 G/DL (ref 6–8.4)
RBC # BLD AUTO: 2.85 M/UL (ref 4–5.4)
SODIUM SERPL-SCNC: 133 MMOL/L (ref 136–145)
WBC # BLD AUTO: 21.75 K/UL (ref 3.9–12.7)

## 2022-07-18 PROCEDURE — 85025 COMPLETE CBC W/AUTO DIFF WBC: CPT | Performed by: NURSE PRACTITIONER

## 2022-07-18 PROCEDURE — 80053 COMPREHEN METABOLIC PANEL: CPT | Performed by: NURSE PRACTITIONER

## 2022-07-18 PROCEDURE — 83735 ASSAY OF MAGNESIUM: CPT | Performed by: NURSE PRACTITIONER

## 2022-07-18 PROCEDURE — 36415 COLL VENOUS BLD VENIPUNCTURE: CPT | Performed by: NURSE PRACTITIONER

## 2022-07-18 PROCEDURE — 99213 OFFICE O/P EST LOW 20 MIN: CPT | Mod: S$GLB,,, | Performed by: NURSE PRACTITIONER

## 2022-07-18 PROCEDURE — 99213 PR OFFICE/OUTPT VISIT, EST, LEVL III, 20-29 MIN: ICD-10-PCS | Mod: S$GLB,,, | Performed by: NURSE PRACTITIONER

## 2022-07-18 RX ORDER — FLUTICASONE FUROATE, UMECLIDINIUM BROMIDE AND VILANTEROL TRIFENATATE 200; 62.5; 25 UG/1; UG/1; UG/1
1 POWDER RESPIRATORY (INHALATION) DAILY
Qty: 90 EACH | Refills: 3 | Status: SHIPPED | OUTPATIENT
Start: 2022-07-18 | End: 2022-08-10

## 2022-07-18 NOTE — PROGRESS NOTES
PROGRESS NOTE    Subjective:       Patient ID: Anuja Cool is a 72 y.o. female.    2/25/2022:  CT lung Screen:  3.4cm FRANCIS mass and 2.0cm RLL    3/11/2022:  PET scan:  3.9cm hypermetabolic mass in FRANCIS  2.3cm hypermetabolic mass in RLL    3/31/2022:  FRANCIS CT biopsy:  Small Cell carcinoma    Carbo/Etop:  Cycle 1: 4/27/2022  Cycle 2: 5/25/2022  Cycle 3: 6/15/2022  Cycle 4: 7/6/2022    5/3/2022:  RLL CT Biopsy:  Adenocarcinoma    5/15/2022-5/22/2022:  SBRT to Right LL Adenocarcinoma    7/11/2022:  xrt to complete to left lung      Chief Complaint:  synchronous primary lung cancer-SCLC and Adenocarcinoma Follow up    History of Present Illness:   Anuja Cool is a 72 y.o. female who presents for follow up of above.     Patient has completed 4 cycles of Carboplatin and Etoposide. She is feeling well. She denies nay nausea, but does have numbness periodically in her right first 3 fingers.     Family and Social history reviewed and is unchanged from 4/14/2022      ROS:  Review of Systems   Constitutional: Negative for fever.   Respiratory: Negative for shortness of breath.    Cardiovascular: Negative for chest pain and leg swelling.   Gastrointestinal: Negative for abdominal pain and blood in stool.   Genitourinary: Negative for hematuria.   Skin: Negative for rash.          Current Outpatient Medications:     alendronate (FOSAMAX) 70 MG tablet, Take 1 tablet by mouth once a week, Disp: 12 tablet, Rfl: 3    aspirin (ECOTRIN) 81 MG EC tablet, Take 81 mg by mouth once daily., Disp: , Rfl:     atenoloL (TENORMIN) 100 MG tablet, Take 1 tablet by mouth once daily, Disp: 90 tablet, Rfl: 0    cholecalciferol, vitamin D3, 3,000 unit Tab, Take by mouth., Disp: , Rfl:     fluticasone-umeclidin-vilanter (TRELEGY ELLIPTA) 200-62.5-25 mcg inhaler, Inhale 1 puff into the lungs once daily., Disp: 90 each, Rfl: 3    ibuprofen (ADVIL,MOTRIN) 200 MG tablet, Take 200 mg  "by mouth every 6 (six) hours as needed for Pain., Disp: , Rfl:     levalbuterol (XOPENEX HFA) 45 mcg/actuation inhaler, INHALE 1 TO 2 PUFFS INTO LUNGS EVERY 4 HOURS AS NEEDED FOR WHEEZING AND FOR SHORTNESS OF BREATH, Disp: 45 g, Rfl: 1    levocetirizine (XYZAL) 5 MG tablet, Take 1 tablet (5 mg total) by mouth every evening., Disp: 30 tablet, Rfl: 2    lisinopriL 10 MG tablet, Take 1 tablet (10 mg total) by mouth once daily. (Patient not taking: No sig reported), Disp: 90 tablet, Rfl: 3    methIMAzole (TAPAZOLE) 5 MG Tab, Take one tablet Monday-Friday and two tablets on Saturday and Sunday., Disp: 180 tablet, Rfl: 3    multivit with minerals/lutein (MULTIVITAMIN 50 PLUS ORAL), Take by mouth., Disp: , Rfl:     nicotine (NICODERM CQ) 14 mg/24 hr, Place 1 patch onto the skin every 24 hours., Disp: , Rfl:     ondansetron (ZOFRAN) 8 MG tablet, Take 1 tablet (8 mg total) by mouth every 8 (eight) hours as needed., Disp: 30 tablet, Rfl: 5    promethazine (PHENERGAN) 25 MG tablet, Take 1 tablet (25 mg total) by mouth every 4 to 6 hours as needed., Disp: 30 tablet, Rfl: 5    rosuvastatin (CRESTOR) 20 MG tablet, Take 1 tablet by mouth once daily, Disp: 90 tablet, Rfl: 3        Objective:       Physical Examination:     /88   Pulse 93   Temp 97.4 °F (36.3 °C)   Resp 16   Ht 5' 2" (1.575 m)   Wt 44.5 kg (98 lb)   BMI 17.92 kg/m²     Physical Exam  Constitutional:       Appearance: She is well-developed.   HENT:      Head: Normocephalic and atraumatic.      Right Ear: External ear normal.      Left Ear: External ear normal.   Eyes:      Conjunctiva/sclera: Conjunctivae normal.      Pupils: Pupils are equal, round, and reactive to light.   Neck:      Thyroid: No thyromegaly.      Trachea: No tracheal deviation.   Cardiovascular:      Rate and Rhythm: Normal rate and regular rhythm.      Heart sounds: Normal heart sounds.   Pulmonary:      Effort: Pulmonary effort is normal.      Breath sounds: Normal breath " sounds.   Abdominal:      General: Bowel sounds are normal. There is no distension.      Palpations: Abdomen is soft. There is no mass.      Tenderness: There is no abdominal tenderness.   Skin:     Findings: No rash.   Neurological:      Comments: Neuro intact througout   Psychiatric:         Behavior: Behavior normal.         Thought Content: Thought content normal.         Judgment: Judgment normal.         Labs:   Recent Results (from the past 336 hour(s))   CBC Auto Differential    Collection Time: 07/18/22  8:13 AM   Result Value Ref Range    WBC 21.75 (H) 3.90 - 12.70 K/uL    Hemoglobin 9.7 (L) 12.0 - 16.0 g/dL    Hematocrit 28.3 (L) 37.0 - 48.5 %    Platelets 136 (L) 150 - 450 K/uL   CBC Auto Differential    Collection Time: 07/11/22  7:50 AM   Result Value Ref Range    WBC 6.89 3.90 - 12.70 K/uL    Hemoglobin 11.9 (L) 12.0 - 16.0 g/dL    Hematocrit 34.7 (L) 37.0 - 48.5 %    Platelets 313 150 - 450 K/uL   CBC Auto Differential    Collection Time: 07/05/22 10:55 AM   Result Value Ref Range    WBC 10.22 3.90 - 12.70 K/uL    Hemoglobin 11.4 (L) 12.0 - 16.0 g/dL    Hematocrit 33.5 (L) 37.0 - 48.5 %    Platelets 102 (L) 150 - 450 K/uL     CMP  Sodium   Date Value Ref Range Status   07/18/2022 133 (L) 136 - 145 mmol/L Final     Potassium   Date Value Ref Range Status   07/18/2022 4.4 3.5 - 5.1 mmol/L Final     Chloride   Date Value Ref Range Status   07/18/2022 96 95 - 110 mmol/L Final     CO2   Date Value Ref Range Status   07/18/2022 31 (H) 23 - 29 mmol/L Final     Glucose   Date Value Ref Range Status   07/18/2022 91 70 - 110 mg/dL Final     BUN   Date Value Ref Range Status   07/18/2022 20 8 - 23 mg/dL Final     Creatinine   Date Value Ref Range Status   07/18/2022 <0.3 (L) 0.5 - 1.4 mg/dL Final     Calcium   Date Value Ref Range Status   07/18/2022 9.4 8.7 - 10.5 mg/dL Final     Total Protein   Date Value Ref Range Status   07/18/2022 7.3 6.0 - 8.4 g/dL Final     Albumin   Date Value Ref Range Status    07/18/2022 4.3 3.5 - 5.2 g/dL Final     Total Bilirubin   Date Value Ref Range Status   07/18/2022 0.9 0.1 - 1.0 mg/dL Final     Comment:     For infants and newborns, interpretation of results should be based  on gestational age, weight and in agreement with clinical  observations.    Premature Infant recommended reference ranges:  Up to 24 hours.............<8.0 mg/dL  Up to 48 hours............<12.0 mg/dL  3-5 days..................<15.0 mg/dL  6-29 days.................<15.0 mg/dL       Alkaline Phosphatase   Date Value Ref Range Status   07/18/2022 104 55 - 135 U/L Final     AST   Date Value Ref Range Status   07/18/2022 19 10 - 40 U/L Final     ALT   Date Value Ref Range Status   07/18/2022 14 10 - 44 U/L Final     Anion Gap   Date Value Ref Range Status   07/18/2022 6 (L) 8 - 16 mmol/L Final     eGFR if    Date Value Ref Range Status   07/18/2022 >60.0 >60 mL/min/1.73 m^2 Final     eGFR if non    Date Value Ref Range Status   07/18/2022 >60.0 >60 mL/min/1.73 m^2 Final     Comment:     Calculation used to obtain the estimated glomerular filtration  rate (eGFR) is the CKD-EPI equation.        No results found for: CEA      Assessment/Plan:     Problem List Items Addressed This Visit        Pulmonary    Chronic obstructive pulmonary disease       Oncology    Small Cell Lung Cancer FRANCIS - Primary    Relevant Orders    NM PET CT Routine Skull to Mid Thigh    Adenocarcinoma Lung Cancer RLL    Relevant Orders    NM PET CT Routine Skull to Mid Thigh        1. SCLC- Pet scan  2. Adenocarcinoma RLL- Pet scan  2. COPD- Cont Trelegy  Discussion:     Follow up in about 6 weeks (around 8/29/2022) for with Dr. Tinoco after PET scan.      Electronically signed by Martha Granado, MSN, APRN, AGNP-C, OCN

## 2022-07-25 ENCOUNTER — TELEPHONE (OUTPATIENT)
Dept: HEMATOLOGY/ONCOLOGY | Facility: CLINIC | Age: 72
End: 2022-07-25

## 2022-07-25 ENCOUNTER — LAB VISIT (OUTPATIENT)
Dept: LAB | Facility: HOSPITAL | Age: 72
End: 2022-07-25
Attending: NURSE PRACTITIONER
Payer: MEDICARE

## 2022-07-25 DIAGNOSIS — C34.12 MALIGNANT NEOPLASM OF UPPER LOBE OF LEFT LUNG: Primary | ICD-10-CM

## 2022-07-25 DIAGNOSIS — C34.90 SMALL CELL LUNG CANCER: ICD-10-CM

## 2022-07-25 LAB
ALBUMIN SERPL BCP-MCNC: 4.1 G/DL (ref 3.5–5.2)
ALP SERPL-CCNC: 96 U/L (ref 55–135)
ALT SERPL W/O P-5'-P-CCNC: 12 U/L (ref 10–44)
ANION GAP SERPL CALC-SCNC: 3 MMOL/L (ref 8–16)
AST SERPL-CCNC: 17 U/L (ref 10–40)
BASOPHILS # BLD AUTO: 0.03 K/UL (ref 0–0.2)
BASOPHILS NFR BLD: 0.4 % (ref 0–1.9)
BILIRUB SERPL-MCNC: 0.4 MG/DL (ref 0.1–1)
BUN SERPL-MCNC: 9 MG/DL (ref 8–23)
CALCIUM SERPL-MCNC: 9.1 MG/DL (ref 8.7–10.5)
CHLORIDE SERPL-SCNC: 102 MMOL/L (ref 95–110)
CO2 SERPL-SCNC: 29 MMOL/L (ref 23–29)
CREAT SERPL-MCNC: 0.4 MG/DL (ref 0.5–1.4)
DIFFERENTIAL METHOD: ABNORMAL
EOSINOPHIL # BLD AUTO: 0.1 K/UL (ref 0–0.5)
EOSINOPHIL NFR BLD: 0.8 % (ref 0–8)
ERYTHROCYTE [DISTWIDTH] IN BLOOD BY AUTOMATED COUNT: 17.5 % (ref 11.5–14.5)
EST. GFR  (AFRICAN AMERICAN): >60 ML/MIN/1.73 M^2
EST. GFR  (NON AFRICAN AMERICAN): >60 ML/MIN/1.73 M^2
GLUCOSE SERPL-MCNC: 104 MG/DL (ref 70–110)
HCT VFR BLD AUTO: 25.5 % (ref 37–48.5)
HGB BLD-MCNC: 8.6 G/DL (ref 12–16)
IMM GRANULOCYTES # BLD AUTO: 0.05 K/UL (ref 0–0.04)
IMM GRANULOCYTES NFR BLD AUTO: 0.6 % (ref 0–0.5)
LYMPHOCYTES # BLD AUTO: 1 K/UL (ref 1–4.8)
LYMPHOCYTES NFR BLD: 11.5 % (ref 18–48)
MAGNESIUM SERPL-MCNC: 1.6 MG/DL (ref 1.6–2.6)
MCH RBC QN AUTO: 33.1 PG (ref 27–31)
MCHC RBC AUTO-ENTMCNC: 33.7 G/DL (ref 32–36)
MCV RBC AUTO: 98 FL (ref 82–98)
MONOCYTES # BLD AUTO: 0.9 K/UL (ref 0.3–1)
MONOCYTES NFR BLD: 10.4 % (ref 4–15)
NEUTROPHILS # BLD AUTO: 6.5 K/UL (ref 1.8–7.7)
NEUTROPHILS NFR BLD: 76.3 % (ref 38–73)
NRBC BLD-RTO: 0 /100 WBC
PLATELET # BLD AUTO: 16 K/UL (ref 150–450)
PLATELET BLD QL SMEAR: ABNORMAL
PMV BLD AUTO: 11.8 FL (ref 9.2–12.9)
POTASSIUM SERPL-SCNC: 4.4 MMOL/L (ref 3.5–5.1)
PROT SERPL-MCNC: 7.2 G/DL (ref 6–8.4)
RBC # BLD AUTO: 2.6 M/UL (ref 4–5.4)
SODIUM SERPL-SCNC: 134 MMOL/L (ref 136–145)
WBC # BLD AUTO: 8.52 K/UL (ref 3.9–12.7)

## 2022-07-25 PROCEDURE — 85025 COMPLETE CBC W/AUTO DIFF WBC: CPT | Performed by: NURSE PRACTITIONER

## 2022-07-25 PROCEDURE — 36415 COLL VENOUS BLD VENIPUNCTURE: CPT | Performed by: NURSE PRACTITIONER

## 2022-07-25 PROCEDURE — 80053 COMPREHEN METABOLIC PANEL: CPT | Performed by: NURSE PRACTITIONER

## 2022-07-25 PROCEDURE — 83735 ASSAY OF MAGNESIUM: CPT | Performed by: NURSE PRACTITIONER

## 2022-07-25 RX ORDER — HYDROCODONE BITARTRATE AND ACETAMINOPHEN 500; 5 MG/1; MG/1
TABLET ORAL ONCE
Status: CANCELLED | OUTPATIENT
Start: 2022-07-25 | End: 2022-07-25

## 2022-07-25 NOTE — TELEPHONE ENCOUNTER
Critical labs  Platelets 16    Per Martha, 1 u of platelets ordered to be transfused tomorrow. Scheduling notified, will call pt with a time. Re-check labs on Wednesday. Pt notified and verbalized understanding.

## 2022-07-26 ENCOUNTER — INFUSION (OUTPATIENT)
Dept: INFUSION THERAPY | Facility: HOSPITAL | Age: 72
End: 2022-07-26
Attending: INTERNAL MEDICINE
Payer: MEDICARE

## 2022-07-26 VITALS
SYSTOLIC BLOOD PRESSURE: 117 MMHG | DIASTOLIC BLOOD PRESSURE: 55 MMHG | RESPIRATION RATE: 14 BRPM | HEART RATE: 95 BPM | OXYGEN SATURATION: 95 % | TEMPERATURE: 98 F

## 2022-07-26 DIAGNOSIS — C34.31 MALIGNANT NEOPLASM OF LOWER LOBE OF RIGHT LUNG: ICD-10-CM

## 2022-07-26 DIAGNOSIS — C34.12 MALIGNANT NEOPLASM OF UPPER LOBE OF LEFT LUNG: Primary | ICD-10-CM

## 2022-07-26 LAB
BLD PROD TYP BPU: NORMAL
BLOOD UNIT EXPIRATION DATE: NORMAL
BLOOD UNIT TYPE CODE: 5100
BLOOD UNIT TYPE: NORMAL
CODING SYSTEM: NORMAL
DISPENSE STATUS: NORMAL
UNIT NUMBER: NORMAL

## 2022-07-26 PROCEDURE — P9035 PLATELET PHERES LEUKOREDUCED: HCPCS | Performed by: NURSE PRACTITIONER

## 2022-07-26 PROCEDURE — 63600175 PHARM REV CODE 636 W HCPCS: Performed by: INTERNAL MEDICINE

## 2022-07-26 PROCEDURE — 36430 TRANSFUSION BLD/BLD COMPNT: CPT

## 2022-07-26 RX ORDER — SODIUM CHLORIDE 0.9 % (FLUSH) 0.9 %
10 SYRINGE (ML) INJECTION
Status: DISCONTINUED | OUTPATIENT
Start: 2022-07-26 | End: 2022-07-26 | Stop reason: HOSPADM

## 2022-07-26 RX ORDER — HEPARIN 100 UNIT/ML
500 SYRINGE INTRAVENOUS
Status: CANCELLED | OUTPATIENT
Start: 2022-07-26

## 2022-07-26 RX ORDER — HEPARIN 100 UNIT/ML
500 SYRINGE INTRAVENOUS
Status: DISCONTINUED | OUTPATIENT
Start: 2022-07-26 | End: 2022-07-26 | Stop reason: HOSPADM

## 2022-07-26 RX ORDER — SODIUM CHLORIDE 0.9 % (FLUSH) 0.9 %
10 SYRINGE (ML) INJECTION
Status: CANCELLED | OUTPATIENT
Start: 2022-07-26

## 2022-07-26 RX ORDER — HYDROCODONE BITARTRATE AND ACETAMINOPHEN 500; 5 MG/1; MG/1
TABLET ORAL ONCE
Status: DISCONTINUED | OUTPATIENT
Start: 2022-07-26 | End: 2022-08-10

## 2022-07-26 RX ADMIN — Medication 500 UNITS: at 12:07

## 2022-07-27 ENCOUNTER — LAB VISIT (OUTPATIENT)
Dept: LAB | Facility: HOSPITAL | Age: 72
End: 2022-07-27
Attending: NURSE PRACTITIONER
Payer: MEDICARE

## 2022-07-27 DIAGNOSIS — C34.12 MALIGNANT NEOPLASM OF UPPER LOBE OF LEFT LUNG: ICD-10-CM

## 2022-07-27 LAB
BASOPHILS # BLD AUTO: 0.02 K/UL (ref 0–0.2)
BASOPHILS NFR BLD: 0.2 % (ref 0–1.9)
DIFFERENTIAL METHOD: ABNORMAL
EOSINOPHIL # BLD AUTO: 0.1 K/UL (ref 0–0.5)
EOSINOPHIL NFR BLD: 0.6 % (ref 0–8)
ERYTHROCYTE [DISTWIDTH] IN BLOOD BY AUTOMATED COUNT: 17.2 % (ref 11.5–14.5)
HCT VFR BLD AUTO: 25.5 % (ref 37–48.5)
HGB BLD-MCNC: 8.7 G/DL (ref 12–16)
IMM GRANULOCYTES # BLD AUTO: 0.07 K/UL (ref 0–0.04)
IMM GRANULOCYTES NFR BLD AUTO: 0.6 % (ref 0–0.5)
LYMPHOCYTES # BLD AUTO: 1 K/UL (ref 1–4.8)
LYMPHOCYTES NFR BLD: 9.1 % (ref 18–48)
MCH RBC QN AUTO: 33.6 PG (ref 27–31)
MCHC RBC AUTO-ENTMCNC: 34.1 G/DL (ref 32–36)
MCV RBC AUTO: 99 FL (ref 82–98)
MONOCYTES # BLD AUTO: 0.9 K/UL (ref 0.3–1)
MONOCYTES NFR BLD: 7.9 % (ref 4–15)
NEUTROPHILS # BLD AUTO: 8.8 K/UL (ref 1.8–7.7)
NEUTROPHILS NFR BLD: 81.6 % (ref 38–73)
NRBC BLD-RTO: 0 /100 WBC
PLATELET # BLD AUTO: 50 K/UL (ref 150–450)
PLATELET BLD QL SMEAR: ABNORMAL
PMV BLD AUTO: 9.7 FL (ref 9.2–12.9)
RBC # BLD AUTO: 2.59 M/UL (ref 4–5.4)
WBC # BLD AUTO: 10.82 K/UL (ref 3.9–12.7)

## 2022-07-27 PROCEDURE — 36415 COLL VENOUS BLD VENIPUNCTURE: CPT | Performed by: NURSE PRACTITIONER

## 2022-07-27 PROCEDURE — 85025 COMPLETE CBC W/AUTO DIFF WBC: CPT | Performed by: NURSE PRACTITIONER

## 2022-08-01 ENCOUNTER — HOSPITAL ENCOUNTER (OUTPATIENT)
Dept: RADIOLOGY | Facility: HOSPITAL | Age: 72
Discharge: HOME | End: 2022-08-01
Attending: NURSE PRACTITIONER
Payer: MEDICARE

## 2022-08-01 VITALS — WEIGHT: 98 LBS | BODY MASS INDEX: 18.03 KG/M2 | HEIGHT: 62 IN

## 2022-08-01 DIAGNOSIS — C34.12 MALIGNANT NEOPLASM OF UPPER LOBE OF LEFT LUNG: ICD-10-CM

## 2022-08-01 DIAGNOSIS — C34.31 MALIGNANT NEOPLASM OF LOWER LOBE OF RIGHT LUNG: ICD-10-CM

## 2022-08-01 LAB — GLUCOSE SERPL-MCNC: 106 MG/DL (ref 70–110)

## 2022-08-01 PROCEDURE — 78815 PET IMAGE W/CT SKULL-THIGH: CPT | Mod: TC,PO

## 2022-08-08 ENCOUNTER — OFFICE VISIT (OUTPATIENT)
Dept: PULMONOLOGY | Facility: CLINIC | Age: 72
End: 2022-08-08
Payer: MEDICARE

## 2022-08-08 ENCOUNTER — LAB VISIT (OUTPATIENT)
Dept: LAB | Facility: HOSPITAL | Age: 72
End: 2022-08-08
Attending: NURSE PRACTITIONER
Payer: MEDICARE

## 2022-08-08 VITALS
SYSTOLIC BLOOD PRESSURE: 120 MMHG | OXYGEN SATURATION: 98 % | BODY MASS INDEX: 17.76 KG/M2 | HEART RATE: 96 BPM | WEIGHT: 97.13 LBS | DIASTOLIC BLOOD PRESSURE: 78 MMHG

## 2022-08-08 DIAGNOSIS — J44.9 CHRONIC OBSTRUCTIVE PULMONARY DISEASE, UNSPECIFIED COPD TYPE: ICD-10-CM

## 2022-08-08 DIAGNOSIS — Z72.0 TOBACCO USE: ICD-10-CM

## 2022-08-08 DIAGNOSIS — C34.31 MALIGNANT NEOPLASM OF LOWER LOBE OF RIGHT LUNG: ICD-10-CM

## 2022-08-08 DIAGNOSIS — C34.90 SMALL CELL LUNG CANCER: ICD-10-CM

## 2022-08-08 DIAGNOSIS — C34.12 MALIGNANT NEOPLASM OF UPPER LOBE OF LEFT LUNG: ICD-10-CM

## 2022-08-08 LAB
ALBUMIN SERPL BCP-MCNC: 4.5 G/DL (ref 3.5–5.2)
ALP SERPL-CCNC: 78 U/L (ref 55–135)
ALT SERPL W/O P-5'-P-CCNC: 15 U/L (ref 10–44)
ANION GAP SERPL CALC-SCNC: 10 MMOL/L (ref 8–16)
ANISOCYTOSIS BLD QL SMEAR: ABNORMAL
AST SERPL-CCNC: 21 U/L (ref 10–40)
BASOPHILS # BLD AUTO: 0.03 K/UL (ref 0–0.2)
BASOPHILS NFR BLD: 0.3 % (ref 0–1.9)
BILIRUB SERPL-MCNC: 0.8 MG/DL (ref 0.1–1)
BUN SERPL-MCNC: 17 MG/DL (ref 8–23)
CALCIUM SERPL-MCNC: 9.4 MG/DL (ref 8.7–10.5)
CHLORIDE SERPL-SCNC: 94 MMOL/L (ref 95–110)
CO2 SERPL-SCNC: 25 MMOL/L (ref 23–29)
CREAT SERPL-MCNC: 0.4 MG/DL (ref 0.5–1.4)
DIFFERENTIAL METHOD: ABNORMAL
EOSINOPHIL # BLD AUTO: 0.1 K/UL (ref 0–0.5)
EOSINOPHIL NFR BLD: 0.7 % (ref 0–8)
ERYTHROCYTE [DISTWIDTH] IN BLOOD BY AUTOMATED COUNT: 22.1 % (ref 11.5–14.5)
EST. GFR  (NO RACE VARIABLE): >60 ML/MIN/1.73 M^2
GLUCOSE SERPL-MCNC: 107 MG/DL (ref 70–110)
HCT VFR BLD AUTO: 29.1 % (ref 37–48.5)
HGB BLD-MCNC: 9.6 G/DL (ref 12–16)
IMM GRANULOCYTES # BLD AUTO: 0.04 K/UL (ref 0–0.04)
IMM GRANULOCYTES NFR BLD AUTO: 0.4 % (ref 0–0.5)
LYMPHOCYTES # BLD AUTO: 1.1 K/UL (ref 1–4.8)
LYMPHOCYTES NFR BLD: 12 % (ref 18–48)
MAGNESIUM SERPL-MCNC: 1.6 MG/DL (ref 1.6–2.6)
MCH RBC QN AUTO: 34.7 PG (ref 27–31)
MCHC RBC AUTO-ENTMCNC: 33 G/DL (ref 32–36)
MCV RBC AUTO: 105 FL (ref 82–98)
MONOCYTES # BLD AUTO: 1.2 K/UL (ref 0.3–1)
MONOCYTES NFR BLD: 12.1 % (ref 4–15)
NEUTROPHILS # BLD AUTO: 7.1 K/UL (ref 1.8–7.7)
NEUTROPHILS NFR BLD: 74.5 % (ref 38–73)
NRBC BLD-RTO: 0 /100 WBC
PLATELET # BLD AUTO: 334 K/UL (ref 150–450)
PLATELET BLD QL SMEAR: ABNORMAL
PMV BLD AUTO: 8.4 FL (ref 9.2–12.9)
POIKILOCYTOSIS BLD QL SMEAR: ABNORMAL
POTASSIUM SERPL-SCNC: 4.7 MMOL/L (ref 3.5–5.1)
PROT SERPL-MCNC: 7.6 G/DL (ref 6–8.4)
RBC # BLD AUTO: 2.77 M/UL (ref 4–5.4)
SODIUM SERPL-SCNC: 129 MMOL/L (ref 136–145)
WBC # BLD AUTO: 9.49 K/UL (ref 3.9–12.7)

## 2022-08-08 PROCEDURE — 99406 BEHAV CHNG SMOKING 3-10 MIN: CPT | Mod: S$GLB,,, | Performed by: INTERNAL MEDICINE

## 2022-08-08 PROCEDURE — 99406 PR TOBACCO USE CESSATION INTERMEDIATE 3-10 MINUTES: ICD-10-PCS | Mod: S$GLB,,, | Performed by: INTERNAL MEDICINE

## 2022-08-08 PROCEDURE — 36415 COLL VENOUS BLD VENIPUNCTURE: CPT | Performed by: NURSE PRACTITIONER

## 2022-08-08 PROCEDURE — 80053 COMPREHEN METABOLIC PANEL: CPT | Performed by: NURSE PRACTITIONER

## 2022-08-08 PROCEDURE — 83735 ASSAY OF MAGNESIUM: CPT | Performed by: NURSE PRACTITIONER

## 2022-08-08 PROCEDURE — 99214 OFFICE O/P EST MOD 30 MIN: CPT | Mod: 25,S$GLB,, | Performed by: INTERNAL MEDICINE

## 2022-08-08 PROCEDURE — 99214 PR OFFICE/OUTPT VISIT, EST, LEVL IV, 30-39 MIN: ICD-10-PCS | Mod: 25,S$GLB,, | Performed by: INTERNAL MEDICINE

## 2022-08-08 PROCEDURE — 85025 COMPLETE CBC W/AUTO DIFF WBC: CPT | Performed by: NURSE PRACTITIONER

## 2022-08-08 NOTE — PROGRESS NOTES
Office Visit Note *    Patient Name: Anuja Cool  MRN: [id number]  : 1950      Reason for visit: COPD    HPI:     2022 - Here to establish care,  Diagnosed with COPD (severity unknown).  Recently changed to TRELEGY and feels that she is doing better on that.  Currently smoking about 3/4 PPD  (1-1.5 PPD x 50 years).  Also carries diagnosed of asthma, has h/o allergies (+ skin test, never desensitized).  HAs never had a screening CT chest and does not remember her last CXR.  ROS as below.  We discussed cigarette cessation at length.    3/17/2022 - Here for review.  CT scan showed subpleural masses and PET scan shows activity in those areas.  Reviewed images with pt and we will plan t proceed with CT guided biopsy (left lesion first).  No evidence of other + areas.  PFT c/w   GOLD II A (FEV1 - 52%, DLCO 23 %).  CT scan does show fairly extensive emphysema changes.    2022 - Here for review of biopsy results - + small cell cancer.  We discussed moving forward with MRI brain and referral to Radiation Oncology and Oncology.  All questions have been answered.    2022 - Here for follow up, has had first chemotherapy (carboplatin and etoposide) and she tolerated well, WBC is down some (being addressed).  To see radiation therapy today.  No new symptoms. Did have biopsy of right nodule which was + for adenocarcinoma.    2022 - Here for follow up, recent PET scan reviewed (see below) and doing well.  She is to see Dr Tinoco next month.  No other new issues.  We reviewed the PET scan images and compared the 2.  Reports that she tolerated the chemo well.    Past Medical History    Past Medical History:   Diagnosis Date    Allergy     Arthritis     Asthma     Bursitis     COPD (chronic obstructive pulmonary disease)     Hypertension     Low sodium     Lung nodules 2022    Small cell carcinoma of left lung 2022    Thyroid disease     nodules    Tinea pedis        Past Surgical  History    Past Surgical History:   Procedure Laterality Date    COLONOSCOPY N/A 05/08/2018    Procedure: COLONOSCOPY;  Surgeon: Grey Roberts MD;  [facility];  Service: Endoscopy;  Laterality: N/A;    CT BIOPSY LUNG W/ GUIDANCE Left 03/31/2022    HYSTERECTOMY      INSERTION OF TUNNELED CENTRAL VENOUS CATHETER (CVC) WITH SUBCUTANEOUS PORT N/A 4/26/2022    Procedure: [id number];  Surgeon: Noel Sadler MD;  Location: [facility] OR;  Service: General;  Laterality: N/A;    OOPHORECTOMY         Medications      Current Outpatient Medications:     alendronate (FOSAMAX) 70 MG tablet, Take 1 tablet by mouth once a week, Disp: 12 tablet, Rfl: 3    aspirin (ECOTRIN) 81 MG EC tablet, Take 81 mg by mouth once daily., Disp: , Rfl:     atenoloL (TENORMIN) 100 MG tablet, Take 1 tablet by mouth once daily, Disp: 90 tablet, Rfl: 0    cholecalciferol, vitamin D3, 3,000 unit Tab, Take by mouth., Disp: , Rfl:     fluticasone-umeclidin-vilanter (TRELEGY ELLIPTA) 200-62.5-25 mcg inhaler, Inhale 1 puff into the lungs once daily., Disp: 90 each, Rfl: 3    fluticasone-umeclidin-vilanter (TRELEGY ELLIPTA) 200-62.5-25 mcg inhaler, Inhale 1 puff into the lungs once daily., Disp: 90 each, Rfl: 3    ibuprofen (ADVIL,MOTRIN) 200 MG tablet, Take 200 mg by mouth every 6 (six) hours as needed for Pain., Disp: , Rfl:     levalbuterol (XOPENEX HFA) 45 mcg/actuation inhaler, INHALE 1 TO 2 PUFFS INTO LUNGS EVERY 4 HOURS AS NEEDED FOR WHEEZING AND FOR SHORTNESS OF BREATH, Disp: 45 g, Rfl: 1    levocetirizine (XYZAL) 5 MG tablet, Take 1 tablet (5 mg total) by mouth every evening., Disp: 30 tablet, Rfl: 2    methIMAzole (TAPAZOLE) 5 MG Tab, Take one tablet Monday-Friday and two tablets on Saturday and Sunday., Disp: 180 tablet, Rfl: 3    multivit with minerals/lutein (MULTIVITAMIN 50 PLUS ORAL), Take by mouth., Disp: , Rfl:     nicotine (NICODERM CQ) 14 mg/24 hr, Place 1 patch onto the skin every 24 hours., Disp: ,  Rfl:     ondansetron (ZOFRAN) 8 MG tablet, Take 1 tablet (8 mg total) by mouth every 8 (eight) hours as needed., Disp: 30 tablet, Rfl: 5    promethazine (PHENERGAN) 25 MG tablet, Take 1 tablet (25 mg total) by mouth every 4 to 6 hours as needed., Disp: 30 tablet, Rfl: 5    rosuvastatin (CRESTOR) 20 MG tablet, Take 1 tablet by mouth once daily, Disp: 90 tablet, Rfl: 3    TRELEGY ELLIPTA 200-62.5-25 mcg inhaler, INHALE 1 PUFF INTO LUNGS ONCE DAILY, Disp: 90 each, Rfl: 2    lisinopriL 10 MG tablet, Take 1 tablet (10 mg total) by mouth once daily. (Patient not taking: No sig reported), Disp: 90 tablet, Rfl: 3    Current Facility-Administered Medications:     0.9%  NaCl infusion (for blood administration), , Intravenous, Once, Martha Granado NP-C    Allergies    Review of patient's allergies indicates:   Allergen Reactions    Tinactin [tolnaftate] Dermatitis    Advair diskus [fluticasone propion-salmeterol]      shakes    Albuterol      tremors    Sulfa (sulfonamide antibiotics)      Unknown    Years ago       SocHx    Social History     Tobacco Use   Smoking Status Former Smoker    Packs/day: 0.50    Quit date: 4/3/2022    Years since quittin.3   Smokeless Tobacco Never Used       Social History     Substance and Sexual Activity   Alcohol Use Yes    Alcohol/week: 4.0 standard drinks    Types: 4 Glasses of wine per week    Comment: occ. glass of wine       Drug Use - no  Occupation - retired, customer service  Asbestos exposure - no  Pets - no    FMHx    Family History   Problem Relation Age of Onset    Heart disease Mother     Cancer Mother         lung    Heart disease Father     Cancer Father         lymphoma    Diabetes Neg Hx          Review of Systems  Review of Systems   Constitutional: Negative for chills, diaphoresis, fever, malaise/fatigue and weight loss.        Weight loss   HENT: Negative for congestion.    Eyes: Negative for pain.   Respiratory: Positive for shortness of breath  (ELLISON). Negative for cough, hemoptysis, sputum production, wheezing and stridor.    Cardiovascular: Negative for chest pain, palpitations, orthopnea, claudication, leg swelling and PND.   Gastrointestinal: Negative for abdominal pain, constipation, diarrhea, heartburn, nausea and vomiting.   Genitourinary: Negative for dysuria, frequency and urgency.   Musculoskeletal: Negative for falls and myalgias.   Neurological: Negative for sensory change, focal weakness and weakness.   Endo/Heme/Allergies:        + over active thyroid   Psychiatric/Behavioral: Negative for depression, substance abuse and suicidal ideas. The patient is not nervous/anxious.        Physical Exam    Vitals:    08/08/22 1026   BP: 120/78   BP Location: Left arm   Patient Position: Sitting   Pulse: 96   SpO2: 98%   Weight: 44 kg (97 lb 1.6 oz)       Physical Exam  Vitals and nursing note reviewed.   Constitutional:       General: She is not in acute distress.     Appearance: She is well-developed. She is not ill-appearing, toxic-appearing or diaphoretic.      Comments: Thin female   HENT:      Head: Normocephalic and atraumatic.      Right Ear: External ear normal.      Left Ear: External ear normal.      Nose: Nose normal.   Eyes:      General: No scleral icterus.        Right eye: No discharge.         Left eye: No discharge.      Extraocular Movements: Extraocular movements intact.      Conjunctiva/sclera: Conjunctivae normal.      Pupils: Pupils are equal, round, and reactive to light.   Neck:      Thyroid: No thyromegaly.      Vascular: No JVD.      Trachea: No tracheal deviation.   Cardiovascular:      Rate and Rhythm: Normal rate and regular rhythm.      Heart sounds: Normal heart sounds. No murmur heard.    No friction rub. No gallop.      Comments: + bilateral carotid bruits (known issue)  Pulmonary:      Effort: Pulmonary effort is normal. No respiratory distress.      Breath sounds: No stridor. No wheezing, rhonchi or rales.       Comments: Decreased BS  No acc m use  Chest:      Chest wall: No tenderness.   Abdominal:      General: Bowel sounds are normal. There is no distension.      Palpations: Abdomen is soft.      Tenderness: There is no abdominal tenderness. There is no guarding.   Musculoskeletal:         General: No tenderness. Normal range of motion.      Cervical back: Normal range of motion and neck supple.      Right lower leg: No edema.      Left lower leg: No edema.   Lymphadenopathy:      Cervical: No cervical adenopathy.   Skin:     General: Skin is warm and dry.   Neurological:      General: No focal deficit present.      Mental Status: She is alert and oriented to person, place, and time. Mental status is at baseline.      Cranial Nerves: No cranial nerve deficit.      Motor: No weakness.      Gait: Gait normal.   Psychiatric:         Mood and Affect: Mood normal.         Behavior: Behavior normal.         Thought Content: Thought content normal.         Judgment: Judgment normal.         Labs    Lab Results   Component Value Date    WBC 9.49 08/08/2022    HGB 9.6 (L) 08/08/2022    HCT 29.1 (L) 08/08/2022     08/08/2022       Sodium   Date Value Ref Range Status   08/01/2022 136 136 - 145 mmol/L Final     Potassium   Date Value Ref Range Status   08/01/2022 4.4 3.5 - 5.1 mmol/L Final     Chloride   Date Value Ref Range Status   08/01/2022 102 95 - 110 mmol/L Final     CO2   Date Value Ref Range Status   08/01/2022 27 23 - 29 mmol/L Final     Glucose   Date Value Ref Range Status   08/01/2022 106 70 - 110 mg/dL Final     BUN   Date Value Ref Range Status   08/01/2022 11 8 - 23 mg/dL Final     Creatinine   Date Value Ref Range Status   08/01/2022 0.4 (L) 0.5 - 1.4 mg/dL Final     Calcium   Date Value Ref Range Status   08/01/2022 9.0 8.7 - 10.5 mg/dL Final     Total Protein   Date Value Ref Range Status   08/01/2022 7.6 6.0 - 8.4 g/dL Final     Albumin   Date Value Ref Range Status   08/01/2022 4.3 3.5 - 5.2 g/dL Final      Total Bilirubin   Date Value Ref Range Status   08/01/2022 0.7 0.1 - 1.0 mg/dL Final     Comment:     For infants and newborns, interpretation of results should be based  on gestational age, weight and in agreement with clinical  observations.    Premature Infant recommended reference ranges:  Up to 24 hours.............<8.0 mg/dL  Up to 48 hours............<12.0 mg/dL  3-5 days..................<15.0 mg/dL  6-29 days.................<15.0 mg/dL       Alkaline Phosphatase   Date Value Ref Range Status   08/01/2022 87 55 - 135 U/L Final     AST   Date Value Ref Range Status   08/01/2022 19 10 - 40 U/L Final     ALT   Date Value Ref Range Status   08/01/2022 13 10 - 44 U/L Final     Anion Gap   Date Value Ref Range Status   08/01/2022 7 (L) 8 - 16 mmol/L Final       Xrays    INTEGRATED PET CT WITH IMAGE FUSION     HISTORY: Restaging, subsequent treatment strategy of small cell lung carcinoma left upper lobe and right lower lobe.     TECHNIQUE: Following the injection of 11.1 mCi of F-18 labeled FDG into a right forearm vein, PET CT was performed from the vertex of the skull through the proximal thighs with an integrated full ring PET CT scanner with image fusion. The patient's serum glucose at the time of the exam was 106 mg/dL.     FINDINGS: Comparison to prior exams including PET CT of 03/11/2022. There has been favorable interval response to therapy, with significant interval decreased size and near resolution of FDG hypermetabolism of bilateral pulmonary masses. The left upper lobe nodule abutting the pleura anteriorly measures 17 mm maximum dimension with max SUV of 1.7. The right lower lobe nodule measures 14 mm with max SUV of 1.4.     There is no abnormal focal FDG hypermetabolism in the mediastinum or bettina, or elsewhere in the head, neck, chest, abdomen, pelvis, or the skeletal system to suggest metastatic disease.     CT images of the brain show no intra-axial mass or abnormal extra-axial fluid. There  are no suspicious sclerotic or lytic osseous abnormalities of the calvarium.     CT images of the chest show severe upper lobe predominant pulmonary emphysema, with no new suspicious pulmonary nodules or masses. There is no pleural or pericardial effusion, with diffuse aortic and coronary arterial calcifications.     CT images of the abdomen and pelvis show diffuse calcified plaque of the abdominal aorta and iliac arteries, with no ascites. Large volume of stool distends the rectum. There are no suspicious sclerotic or lytic osseous abnormalities by CT. There is severe degenerative disc disease and facet arthropathy in the cervical spine, with anterolisthesis of C2 upon C3 and C3 upon C4, and reversal of the normal cervical spinal curvature. There is also grade 1 anterolisthesis of L4 upon L5.     IMPRESSION:  1. Favorable interval response to therapy, with decreased size and FDG uptake of noncalcified pulmonary nodules in both lungs.  2. No findings of regional metastatic disease in the chest, or distant metastatic disease.     Electronically signed by:  Derrick Pat MD  8/1/2022 11:34 AM CDT Workstation: [id number]    Impression/Plan    Problem List Items Addressed This Visit        Pulmonary    Chronic obstructive pulmonary disease     Continue present medications.  Will refill medications as needed.  Instructed patient to contact us with any issues concerning their medications (cost, reactions, etc.).  Have discussed with patient about inciting conditions which may exacerbate their disease.  We did discuss possible new therapies or de-escalation of therapy (if appropriate).  Asked patient if they were interested in pursuing pulmonary rehabilitation.  All questions answered  RTC 3 months  Patient instructed that they are to call if symptoms change or new issues develop prior to their next visit.                Oncology    Small Cell Lung Cancer FRANCIS     · As above  · RTC 3 months           Adenocarcinoma Lung  "Cancer RLL     · S/p treatment and looks good              Other    Tobacco use     · Smoking about 2 cigarette per day   I have counseled pt for 3-5 minutes regarding cigarette cessation.  This has included the need to stop smoking as well as strategies, including but not limited to "cold turkey", CHANTIX (including risks and benefits of the drug), nicotine replacement and WELLBUTRIN.                       Jonathan Jeffries MD            "

## 2022-08-08 NOTE — ASSESSMENT & PLAN NOTE
"· Smoking about 2 cigarette per day   I have counseled pt for 3-5 minutes regarding cigarette cessation.  This has included the need to stop smoking as well as strategies, including but not limited to "cold turkey", CHANTIX (including risks and benefits of the drug), nicotine replacement and WELLBUTRIN.    "

## 2022-08-08 NOTE — ASSESSMENT & PLAN NOTE
Continue present medications.  Will refill medications as needed.  Instructed patient to contact us with any issues concerning their medications (cost, reactions, etc.).  Have discussed with patient about inciting conditions which may exacerbate their disease.  We did discuss possible new therapies or de-escalation of therapy (if appropriate).  Asked patient if they were interested in pursuing pulmonary rehabilitation.  All questions answered  RTC 3 months  Patient instructed that they are to call if symptoms change or new issues develop prior to their next visit.

## 2022-08-10 ENCOUNTER — OFFICE VISIT (OUTPATIENT)
Dept: FAMILY MEDICINE | Facility: CLINIC | Age: 72
End: 2022-08-10
Payer: MEDICARE

## 2022-08-10 VITALS
SYSTOLIC BLOOD PRESSURE: 112 MMHG | WEIGHT: 97 LBS | OXYGEN SATURATION: 95 % | BODY MASS INDEX: 17.85 KG/M2 | TEMPERATURE: 98 F | DIASTOLIC BLOOD PRESSURE: 60 MMHG | HEIGHT: 62 IN | HEART RATE: 83 BPM | RESPIRATION RATE: 14 BRPM

## 2022-08-10 DIAGNOSIS — M85.80 OSTEOPENIA, UNSPECIFIED LOCATION: ICD-10-CM

## 2022-08-10 DIAGNOSIS — E78.5 HYPERLIPIDEMIA LDL GOAL <130: ICD-10-CM

## 2022-08-10 DIAGNOSIS — Z12.31 ENCOUNTER FOR SCREENING MAMMOGRAM FOR MALIGNANT NEOPLASM OF BREAST: ICD-10-CM

## 2022-08-10 DIAGNOSIS — I10 ESSENTIAL HYPERTENSION: ICD-10-CM

## 2022-08-10 DIAGNOSIS — E04.2 MULTINODULAR GOITER: ICD-10-CM

## 2022-08-10 DIAGNOSIS — E05.90 HYPERTHYROIDISM: ICD-10-CM

## 2022-08-10 DIAGNOSIS — J44.9 CHRONIC OBSTRUCTIVE PULMONARY DISEASE, UNSPECIFIED COPD TYPE: ICD-10-CM

## 2022-08-10 DIAGNOSIS — C34.31 MALIGNANT NEOPLASM OF LOWER LOBE OF RIGHT LUNG: Primary | ICD-10-CM

## 2022-08-10 PROCEDURE — 99999 PR PBB SHADOW E&M-EST. PATIENT-LVL III: CPT | Mod: PBBFAC,,, | Performed by: FAMILY MEDICINE

## 2022-08-10 PROCEDURE — 99214 PR OFFICE/OUTPT VISIT, EST, LEVL IV, 30-39 MIN: ICD-10-PCS | Mod: S$PBB,,, | Performed by: FAMILY MEDICINE

## 2022-08-10 PROCEDURE — 99213 OFFICE O/P EST LOW 20 MIN: CPT | Mod: PBBFAC,PO | Performed by: FAMILY MEDICINE

## 2022-08-10 PROCEDURE — 99214 OFFICE O/P EST MOD 30 MIN: CPT | Mod: S$PBB,,, | Performed by: FAMILY MEDICINE

## 2022-08-10 PROCEDURE — 99999 PR PBB SHADOW E&M-EST. PATIENT-LVL III: ICD-10-PCS | Mod: PBBFAC,,, | Performed by: FAMILY MEDICINE

## 2022-08-10 NOTE — PROGRESS NOTES
Subjective:       Patient ID: Anuja Cool is a 72 y.o. female.    Chief Complaint: Follow-up (6mth f/u )    HPI  Review of Systems   Constitutional: Positive for appetite change, fatigue and unexpected weight change.   Respiratory: Negative for chest tightness and shortness of breath.    Cardiovascular: Negative for chest pain, palpitations and leg swelling.   Gastrointestinal: Negative for abdominal pain.   Musculoskeletal: Negative for arthralgias.   Neurological: Negative for dizziness, syncope, light-headedness and headaches.       Patient Active Problem List   Diagnosis    Asthma    Allergy    Essential hypertension    Hyperlipidemia LDL goal <130    Tobacco use    Chronic obstructive pulmonary disease    Bilateral carotid bruits    OAB (overactive bladder)    Osteopenia    BMI 21.0-21.9, adult    Low serum thyroid stimulating hormone (TSH)    Multinodular goiter    Stenosis of carotid artery    Constipation    Postmenopausal    ASCVD (arteriosclerotic cardiovascular disease)    Hyperthyroidism    BMI less than 19,adult    Small Cell Lung Cancer FRANCIS    Small cell lung cancer    Chemotherapy-induced neutropenia    Dehydration    Hypotension    Adenocarcinoma Lung Cancer RLL     Patient is here for a chronic conditions follow up.    Heme/onc Dr. Tinoco treating small cell lung cancer diagnosed 4/2022. Dr. Sadler placed port 5/22. Started chemo 5/22 and completed 7/22. XRT 7/22. PET scan 8/1/22 1. Favorable interval response to therapy, with decreased size and FDG uptake of noncalcified pulmonary nodules in both lungs.  2. No findings of regional metastatic disease in the chest, or distant metastatic disease  She is tired easily but slowly regaining strength. No nausea or pain. Mood good.  Appetite fair. Is supplementing with ensure daily    Reviewed labs 8/2022   dexa 3/2022 osteopenia high frax hip 4%-on fosomax since 2020.  No change since last study 2020     mammo 7/2021  neg     Pulm Dr. Jeffries - CT lung screening 2/022 .  1. Subpleural masses identified in the anterior left upper lobe and superior segment of the right lower lobe. Biopsy recommended.  2.  Associated left hilar and mediastinal lymphadenopathy.  3.  Severe centrilobular emphysematous lung disease  PET scan showed activity in the area of Ct masses.  CT biopsy + small cell CA 4/2022  COPD- trelegy mild sob and torres  Losing weight- can't afford ensure.       Thyroid ultrasound 6/2021 Multinodular thyroid gland.  Slight enlargement of the largest nodules bilaterally has occurred since previous exam s/p FNA 9/2021  Benign; cytologic pattern   consistent with benign follicular nodule (see comment).     H/o carotid stenosis 9/18 last u/s- No evidence of a hemodynamically significant carotid bifurcation stenosis.     Endocrine Dr. Graves H/o hyperthyroidism, goiter. Osteopenia with high frax risk now on fosomax since 2020  Objective:      Physical Exam  Vitals and nursing note reviewed.   Constitutional:       Appearance: She is well-developed.   Cardiovascular:      Rate and Rhythm: Normal rate and regular rhythm.      Heart sounds: Normal heart sounds.   Pulmonary:      Effort: Pulmonary effort is normal.      Breath sounds: Normal breath sounds.   Skin:     General: Skin is warm and dry.   Neurological:      Mental Status: She is alert and oriented to person, place, and time.   Psychiatric:         Mood and Affect: Mood normal.         Thought Content: Thought content normal.         Assessment:       1. Adenocarcinoma Lung Cancer RLL    2. Encounter for screening mammogram for malignant neoplasm of breast    3. BMI less than 19,adult    4. Hyperlipidemia LDL goal <130    5. Multinodular goiter    6. Hyperthyroidism    7. Chronic obstructive pulmonary disease, unspecified COPD type    8. Essential hypertension    9. Osteopenia, unspecified location        Plan:       1. Adenocarcinoma Lung Cancer RLL  Cont heme/onc  monitoring and care    2. Encounter for screening mammogram for malignant neoplasm of breast  Screen and treat as indicated:    - Mammo Digital Screening Bilat; Future    3. BMI less than 19,adult  Cont supplements, high calorie foods and monitor    4. Hyperlipidemia LDL goal <130  Controlled on current medications.  Continue current medications.      5. Multinodular goiter  Cont endocrine monitoring    6. Hyperthyroidism  Cont endocrine mgmt    7. Chronic obstructive pulmonary disease, unspecified COPD type  Cont current regimen    8. Essential hypertension  Hold lisinopril and monitor    9. Osteopenia, unspecified location  Cont current mgmt        Time spent with patient: 20 minutes    Patient with be reevaluated in 6 months or sooner prn    Greater than 50% of this visit was spent counseling as described in above documentation:Yes

## 2022-08-15 ENCOUNTER — LAB VISIT (OUTPATIENT)
Dept: LAB | Facility: HOSPITAL | Age: 72
End: 2022-08-15
Attending: NURSE PRACTITIONER
Payer: MEDICARE

## 2022-08-15 DIAGNOSIS — C34.90 SMALL CELL LUNG CANCER: ICD-10-CM

## 2022-08-15 LAB
ALBUMIN SERPL BCP-MCNC: 4.2 G/DL (ref 3.5–5.2)
ALP SERPL-CCNC: 66 U/L (ref 55–135)
ALT SERPL W/O P-5'-P-CCNC: 15 U/L (ref 10–44)
ANION GAP SERPL CALC-SCNC: 11 MMOL/L (ref 8–16)
AST SERPL-CCNC: 19 U/L (ref 10–40)
BASOPHILS # BLD AUTO: 0.03 K/UL (ref 0–0.2)
BASOPHILS NFR BLD: 0.4 % (ref 0–1.9)
BILIRUB SERPL-MCNC: 0.6 MG/DL (ref 0.1–1)
BUN SERPL-MCNC: 15 MG/DL (ref 8–23)
CALCIUM SERPL-MCNC: 9.2 MG/DL (ref 8.7–10.5)
CHLORIDE SERPL-SCNC: 96 MMOL/L (ref 95–110)
CO2 SERPL-SCNC: 25 MMOL/L (ref 23–29)
CREAT SERPL-MCNC: 0.4 MG/DL (ref 0.5–1.4)
DIFFERENTIAL METHOD: ABNORMAL
EOSINOPHIL # BLD AUTO: 0.1 K/UL (ref 0–0.5)
EOSINOPHIL NFR BLD: 1.7 % (ref 0–8)
ERYTHROCYTE [DISTWIDTH] IN BLOOD BY AUTOMATED COUNT: 19.1 % (ref 11.5–14.5)
EST. GFR  (NO RACE VARIABLE): >60 ML/MIN/1.73 M^2
GLUCOSE SERPL-MCNC: 82 MG/DL (ref 70–110)
HCT VFR BLD AUTO: 31.3 % (ref 37–48.5)
HGB BLD-MCNC: 10.3 G/DL (ref 12–16)
IMM GRANULOCYTES # BLD AUTO: 0.02 K/UL (ref 0–0.04)
IMM GRANULOCYTES NFR BLD AUTO: 0.2 % (ref 0–0.5)
LYMPHOCYTES # BLD AUTO: 0.7 K/UL (ref 1–4.8)
LYMPHOCYTES NFR BLD: 8.8 % (ref 18–48)
MAGNESIUM SERPL-MCNC: 1.6 MG/DL (ref 1.6–2.6)
MCH RBC QN AUTO: 35.8 PG (ref 27–31)
MCHC RBC AUTO-ENTMCNC: 32.9 G/DL (ref 32–36)
MCV RBC AUTO: 109 FL (ref 82–98)
MONOCYTES # BLD AUTO: 0.8 K/UL (ref 0.3–1)
MONOCYTES NFR BLD: 9.5 % (ref 4–15)
NEUTROPHILS # BLD AUTO: 6.7 K/UL (ref 1.8–7.7)
NEUTROPHILS NFR BLD: 79.4 % (ref 38–73)
NRBC BLD-RTO: 0 /100 WBC
PLATELET # BLD AUTO: 305 K/UL (ref 150–450)
PMV BLD AUTO: 8 FL (ref 9.2–12.9)
POTASSIUM SERPL-SCNC: 3.9 MMOL/L (ref 3.5–5.1)
PROT SERPL-MCNC: 7.1 G/DL (ref 6–8.4)
RBC # BLD AUTO: 2.88 M/UL (ref 4–5.4)
SODIUM SERPL-SCNC: 132 MMOL/L (ref 136–145)
WBC # BLD AUTO: 8.44 K/UL (ref 3.9–12.7)

## 2022-08-15 PROCEDURE — 83735 ASSAY OF MAGNESIUM: CPT | Performed by: NURSE PRACTITIONER

## 2022-08-15 PROCEDURE — 36415 COLL VENOUS BLD VENIPUNCTURE: CPT | Performed by: NURSE PRACTITIONER

## 2022-08-15 PROCEDURE — 85025 COMPLETE CBC W/AUTO DIFF WBC: CPT | Performed by: NURSE PRACTITIONER

## 2022-08-15 PROCEDURE — 80053 COMPREHEN METABOLIC PANEL: CPT | Performed by: NURSE PRACTITIONER

## 2022-08-24 ENCOUNTER — INFUSION (OUTPATIENT)
Dept: INFUSION THERAPY | Facility: HOSPITAL | Age: 72
End: 2022-08-24
Attending: INTERNAL MEDICINE
Payer: MEDICARE

## 2022-08-24 VITALS
HEIGHT: 62 IN | TEMPERATURE: 98 F | DIASTOLIC BLOOD PRESSURE: 62 MMHG | SYSTOLIC BLOOD PRESSURE: 144 MMHG | BODY MASS INDEX: 17.91 KG/M2 | OXYGEN SATURATION: 95 % | WEIGHT: 97.31 LBS | RESPIRATION RATE: 17 BRPM | HEART RATE: 84 BPM

## 2022-08-24 DIAGNOSIS — C34.31 MALIGNANT NEOPLASM OF LOWER LOBE OF RIGHT LUNG: Primary | ICD-10-CM

## 2022-08-24 PROCEDURE — 63600175 PHARM REV CODE 636 W HCPCS: Performed by: INTERNAL MEDICINE

## 2022-08-24 PROCEDURE — 96523 IRRIG DRUG DELIVERY DEVICE: CPT

## 2022-08-24 PROCEDURE — 25000003 PHARM REV CODE 250: Performed by: INTERNAL MEDICINE

## 2022-08-24 PROCEDURE — A4216 STERILE WATER/SALINE, 10 ML: HCPCS | Performed by: INTERNAL MEDICINE

## 2022-08-24 RX ORDER — SODIUM CHLORIDE 0.9 % (FLUSH) 0.9 %
10 SYRINGE (ML) INJECTION
Status: DISCONTINUED | OUTPATIENT
Start: 2022-08-24 | End: 2022-08-24 | Stop reason: HOSPADM

## 2022-08-24 RX ORDER — SODIUM CHLORIDE 0.9 % (FLUSH) 0.9 %
10 SYRINGE (ML) INJECTION
Status: CANCELLED | OUTPATIENT
Start: 2022-08-24

## 2022-08-24 RX ORDER — HEPARIN 100 UNIT/ML
500 SYRINGE INTRAVENOUS
Status: CANCELLED | OUTPATIENT
Start: 2022-08-24

## 2022-08-24 RX ORDER — HEPARIN 100 UNIT/ML
500 SYRINGE INTRAVENOUS
Status: DISCONTINUED | OUTPATIENT
Start: 2022-08-24 | End: 2022-08-24 | Stop reason: HOSPADM

## 2022-08-24 RX ADMIN — SODIUM CHLORIDE, PRESERVATIVE FREE 10 ML: 5 INJECTION INTRAVENOUS at 09:08

## 2022-08-24 RX ADMIN — HEPARIN 500 UNITS: 100 SYRINGE at 09:08

## 2022-08-24 NOTE — PLAN OF CARE
Problem: Infection  Goal: Absence of Infection Signs and Symptoms  Outcome: Ongoing, Progressing  Intervention: Prevent or Manage Infection  Flowsheets (Taken 8/24/2022 9782)  Infection Management: aseptic technique maintained

## 2022-08-31 ENCOUNTER — NURSE TRIAGE (OUTPATIENT)
Dept: ADMINISTRATIVE | Facility: CLINIC | Age: 72
End: 2022-08-31
Payer: MEDICARE

## 2022-08-31 NOTE — TELEPHONE ENCOUNTER
Mrs. Cool is fully vaccinated, was exposed to her son on Monday, and he tested positive for COVID.  She is asymptomatic and has tested negative for COVID yesterday and today.  She is asking if she should cancel her mammogram that is scheduled for tomorrow?  I advised if fully vaccinated and asymptomatic, she does not need to quarantine, however, if she should develop any symptoms, she should cancel the mammogram, and she will need to quarantine and be retested; she verbalized understanding.      Reason for Disposition   [1] CLOSE CONTACT COVID-19 EXPOSURE within last 14 days AND [2] NO symptoms    Additional Information   Negative: COVID-19 lab test positive   Negative: [1] Lives with someone known to have influenza (flu test positive) AND [2] flu-like symptoms (e.g., cough, runny nose, sore throat, SOB; with or without fever)   Negative: [1] Symptoms of COVID-19 (e.g., cough, fever, SOB, or others) AND [2] doctor (or NP/PA) diagnosed COVID-19 based on symptoms   Negative: [1] Symptoms of COVID-19 (e.g., cough, fever, SOB, or others) AND [2] lives in an area with community spread   Negative: [1] Symptoms of COVID-19 (e.g., cough, fever, SOB, or others) AND [2] within 14 days of EXPOSURE (close contact) with diagnosed or suspected COVID-19 patient   Negative: [1] Symptoms of COVID-19 (e.g., cough, fever, SOB, or others) AND [2] within 14 days of travel from high-risk area for COVID-19 community spread (identified by CDC)   Negative: [1] Difficulty breathing (shortness of breath) occurs AND [2] onset > 14 days after COVID-19 EXPOSURE (Close Contact)   Negative: [1] Cough occurs AND [2] onset > 14 days after COVID-19 EXPOSURE   Negative: [1] Common cold symptoms AND [2] onset > 14 days after COVID-19 EXPOSURE   Negative: COVID-19 vaccine reaction suspected (e.g., fever, headache, muscle aches) occurring during days 1-3 after getting vaccine   Negative: COVID-19 vaccine, questions about   Negative: [1] CLOSE CONTACT  COVID-19 EXPOSURE within last 14 days AND [2] needs COVID-19 lab test to return to work AND [3] NO symptoms   Negative: [1] CLOSE CONTACT COVID-19 EXPOSURE within last 14 days AND [2] exposed person is a  (e.g., police or paramedic) AND [3] NO symptoms   Negative: [1] CLOSE CONTACT COVID-19 EXPOSURE within last 14 days AND [2] exposed person is a healthcare worker who was NOT using all recommended personal protective equipment (e.g., a respirator-N95 mask, eye protection, gloves, and gown) AND [3] NO symptoms   Negative: [1] Living or working in a correctional facility, long-term care facility, or shelter (i.e., congregate setting; densely populated) AND [2] where an outbreak has occurred AND [3] NO symptoms   Negative: [1] CLOSE CONTACT COVID-19 EXPOSURE within last 14 days AND [2] weak immune system (e.g., HIV positive, cancer chemo, splenectomy, organ transplant, chronic steroids) AND [3] NO symptoms    Protocols used: Coronavirus (COVID-19) Exposure-A-[place]

## 2022-09-01 ENCOUNTER — HOSPITAL ENCOUNTER (OUTPATIENT)
Dept: RADIOLOGY | Facility: CLINIC | Age: 72
Discharge: HOME | End: 2022-09-01
Attending: FAMILY MEDICINE
Payer: MEDICARE

## 2022-09-01 DIAGNOSIS — Z12.31 ENCOUNTER FOR SCREENING MAMMOGRAM FOR MALIGNANT NEOPLASM OF BREAST: ICD-10-CM

## 2022-09-01 PROCEDURE — 77063 BREAST TOMOSYNTHESIS BI: CPT | Mod: 26,,, | Performed by: RADIOLOGY

## 2022-09-01 PROCEDURE — 77067 MAMMO DIGITAL SCREENING BILAT WITH TOMO: ICD-10-PCS | Mod: 26,,, | Performed by: RADIOLOGY

## 2022-09-01 PROCEDURE — 77067 SCR MAMMO BI INCL CAD: CPT | Mod: 26,,, | Performed by: RADIOLOGY

## 2022-09-01 PROCEDURE — 77063 MAMMO DIGITAL SCREENING BILAT WITH TOMO: ICD-10-PCS | Mod: 26,,, | Performed by: RADIOLOGY

## 2022-09-01 PROCEDURE — 77063 BREAST TOMOSYNTHESIS BI: CPT | Mod: TC,PO

## 2022-09-01 PROCEDURE — 77067 SCR MAMMO BI INCL CAD: CPT | Mod: TC,PO

## 2022-09-06 ENCOUNTER — HOSPITAL ENCOUNTER (OUTPATIENT)
Dept: RADIOLOGY | Facility: HOSPITAL | Age: 72
Discharge: HOME | End: 2022-09-06
Attending: PHYSICIAN ASSISTANT
Payer: MEDICARE

## 2022-09-06 DIAGNOSIS — E04.2 MULTIPLE THYROID NODULES: ICD-10-CM

## 2022-09-06 PROCEDURE — 76536 US EXAM OF HEAD AND NECK: CPT | Mod: 26,,, | Performed by: RADIOLOGY

## 2022-09-06 PROCEDURE — 76536 US SOFT TISSUE HEAD NECK THYROID: ICD-10-PCS | Mod: 26,,, | Performed by: RADIOLOGY

## 2022-09-06 PROCEDURE — 76536 US EXAM OF HEAD AND NECK: CPT | Mod: TC

## 2022-09-07 ENCOUNTER — OFFICE VISIT (OUTPATIENT)
Dept: HEMATOLOGY/ONCOLOGY | Facility: CLINIC | Age: 72
End: 2022-09-07
Payer: MEDICARE

## 2022-09-07 VITALS
HEART RATE: 89 BPM | DIASTOLIC BLOOD PRESSURE: 70 MMHG | SYSTOLIC BLOOD PRESSURE: 175 MMHG | HEIGHT: 62 IN | RESPIRATION RATE: 18 BRPM | TEMPERATURE: 98 F | WEIGHT: 98.63 LBS | BODY MASS INDEX: 18.15 KG/M2

## 2022-09-07 DIAGNOSIS — C34.31 MALIGNANT NEOPLASM OF LOWER LOBE OF RIGHT LUNG: ICD-10-CM

## 2022-09-07 DIAGNOSIS — C34.12 MALIGNANT NEOPLASM OF UPPER LOBE OF LEFT LUNG: ICD-10-CM

## 2022-09-07 PROCEDURE — 99213 OFFICE O/P EST LOW 20 MIN: CPT | Mod: S$GLB,,, | Performed by: INTERNAL MEDICINE

## 2022-09-07 PROCEDURE — 99213 PR OFFICE/OUTPT VISIT, EST, LEVL III, 20-29 MIN: ICD-10-PCS | Mod: S$GLB,,, | Performed by: INTERNAL MEDICINE

## 2022-09-07 NOTE — PROGRESS NOTES
PROGRESS NOTE    Subjective:       Patient ID: Anuja Cool is a 72 y.o. female.    2/25/2022:  CT lung Screen:  3.4cm FRANCIS mass and 2.0cm RLL    3/11/2022:  PET scan:  3.9cm hypermetabolic mass in FRANCIS  2.3cm hypermetabolic mass in RLL    3/31/2022:  FRANCIS CT biopsy:  Small Cell carcinoma    Carbo/Etop:  Cycle 1: 4/27/2022  Cycle 2: 5/25/2022  Cycle 3: 6/15/2022  Cycle 4: 7/6/2022    5/3/2022:  RLL CT Biopsy:  Adenocarcinoma    5/15/2022-5/22/2022:  SBRT to Right LL Adenocarcinoma    7/11/2022:  xrt to complete to left lung    8/1/2022:  PET:  1. Favorable interval response to therapy, with decreased size and FDG uptake of noncalcified pulmonary nodules in both lungs.  2. No findings of regional metastatic disease in the chest, or distant metastatic disease.      Chief Complaint:  No chief complaint on file.  synchronous primary lung cancer-SCLC and Adenocarcinoma Follow up    History of Present Illness:   Anuja Cool is a 72 y.o. female who presents for follow up of above.       Family and Social history reviewed and is unchanged from 4/14/2022      ROS:  Review of Systems   Constitutional:  Negative for fever.   Respiratory:  Negative for shortness of breath.    Cardiovascular:  Negative for chest pain and leg swelling.   Gastrointestinal:  Negative for abdominal pain and blood in stool.   Genitourinary:  Negative for hematuria.   Skin:  Negative for rash.        Current Outpatient Medications:     alendronate (FOSAMAX) 70 MG tablet, Take 1 tablet by mouth once a week, Disp: 12 tablet, Rfl: 3    aspirin (ECOTRIN) 81 MG EC tablet, Take 81 mg by mouth once daily., Disp: , Rfl:     atenoloL (TENORMIN) 100 MG tablet, Take 1 tablet by mouth once daily, Disp: 90 tablet, Rfl: 0    cholecalciferol, vitamin D3, 3,000 unit Tab, Take by mouth., Disp: , Rfl:     fluticasone-umeclidin-vilanter (TRELEGY ELLIPTA) 200-62.5-25 mcg inhaler, Inhale 1 puff into the  "lungs once daily., Disp: 90 each, Rfl: 3    ibuprofen (ADVIL,MOTRIN) 200 MG tablet, Take 200 mg by mouth every 6 (six) hours as needed for Pain., Disp: , Rfl:     levalbuterol (XOPENEX HFA) 45 mcg/actuation inhaler, INHALE 1 TO 2 PUFFS INTO LUNGS EVERY 4 HOURS AS NEEDED FOR WHEEZING AND FOR SHORTNESS OF BREATH, Disp: 45 g, Rfl: 1    methIMAzole (TAPAZOLE) 5 MG Tab, Take one tablet Monday-Friday and two tablets on Saturday and Sunday., Disp: 180 tablet, Rfl: 3    multivit with minerals/lutein (MULTIVITAMIN 50 PLUS ORAL), Take by mouth., Disp: , Rfl:     nicotine (NICODERM CQ) 14 mg/24 hr, Place 1 patch onto the skin every 24 hours., Disp: , Rfl:     ondansetron (ZOFRAN) 8 MG tablet, Take 1 tablet (8 mg total) by mouth every 8 (eight) hours as needed., Disp: 30 tablet, Rfl: 5    promethazine (PHENERGAN) 25 MG tablet, Take 1 tablet (25 mg total) by mouth every 4 to 6 hours as needed., Disp: 30 tablet, Rfl: 5    rosuvastatin (CRESTOR) 20 MG tablet, Take 1 tablet by mouth once daily, Disp: 90 tablet, Rfl: 3    levocetirizine (XYZAL) 5 MG tablet, Take 1 tablet (5 mg total) by mouth every evening., Disp: 30 tablet, Rfl: 2        Objective:       Physical Examination:     BP (!) 175/70   Pulse 89   Temp 98 °F (36.7 °C)   Resp 18   Ht 5' 2" (1.575 m)   Wt 44.7 kg (98 lb 9.6 oz)   BMI 18.03 kg/m²     Physical Exam  Constitutional:       Appearance: She is well-developed.   HENT:      Head: Normocephalic and atraumatic.      Right Ear: External ear normal.      Left Ear: External ear normal.   Eyes:      Conjunctiva/sclera: Conjunctivae normal.      Pupils: Pupils are equal, round, and reactive to light.   Neck:      Thyroid: No thyromegaly.      Trachea: No tracheal deviation.   Cardiovascular:      Rate and Rhythm: Normal rate and regular rhythm.      Heart sounds: Normal heart sounds.   Pulmonary:      Effort: Pulmonary effort is normal.      Breath sounds: Normal breath sounds.   Abdominal:      General: Bowel sounds " are normal. There is no distension.      Palpations: Abdomen is soft. There is no mass.      Tenderness: There is no abdominal tenderness.   Skin:     Findings: No rash.   Neurological:      Comments: Neuro intact througout   Psychiatric:         Behavior: Behavior normal.         Thought Content: Thought content normal.         Judgment: Judgment normal.       Labs:   No results found for this or any previous visit (from the past 336 hour(s)).    CMP  Sodium   Date Value Ref Range Status   08/15/2022 132 (L) 136 - 145 mmol/L Final     Potassium   Date Value Ref Range Status   08/15/2022 3.9 3.5 - 5.1 mmol/L Final     Chloride   Date Value Ref Range Status   08/15/2022 96 95 - 110 mmol/L Final     CO2   Date Value Ref Range Status   08/15/2022 25 23 - 29 mmol/L Final     Glucose   Date Value Ref Range Status   08/15/2022 82 70 - 110 mg/dL Final     BUN   Date Value Ref Range Status   08/15/2022 15 8 - 23 mg/dL Final     Creatinine   Date Value Ref Range Status   08/15/2022 0.4 (L) 0.5 - 1.4 mg/dL Final     Calcium   Date Value Ref Range Status   08/15/2022 9.2 8.7 - 10.5 mg/dL Final     Total Protein   Date Value Ref Range Status   08/15/2022 7.1 6.0 - 8.4 g/dL Final     Albumin   Date Value Ref Range Status   08/15/2022 4.2 3.5 - 5.2 g/dL Final     Total Bilirubin   Date Value Ref Range Status   08/15/2022 0.6 0.1 - 1.0 mg/dL Final     Comment:     For infants and newborns, interpretation of results should be based  on gestational age, weight and in agreement with clinical  observations.    Premature Infant recommended reference ranges:  Up to 24 hours.............<8.0 mg/dL  Up to 48 hours............<12.0 mg/dL  3-5 days..................<15.0 mg/dL  6-29 days.................<15.0 mg/dL       Alkaline Phosphatase   Date Value Ref Range Status   08/15/2022 66 55 - 135 U/L Final     AST   Date Value Ref Range Status   08/15/2022 19 10 - 40 U/L Final     ALT   Date Value Ref Range Status   08/15/2022 15 10 - 44 U/L  Final     Anion Gap   Date Value Ref Range Status   08/15/2022 11 8 - 16 mmol/L Final     eGFR if    Date Value Ref Range Status   07/25/2022 >60.0 >60 mL/min/1.73 m^2 Final     eGFR if non    Date Value Ref Range Status   07/25/2022 >60.0 >60 mL/min/1.73 m^2 Final     Comment:     Calculation used to obtain the estimated glomerular filtration  rate (eGFR) is the CKD-EPI equation.        No results found for: CEA  No results found for: PSA        Assessment/Plan:     Problem List Items Addressed This Visit       Small Cell Lung Cancer FRANCIS     Patient is doing well and the PET scan shows improving disease.  I discussed this today and will get a CT chest in November to continue to watch these areas.  Patient doing very well o/w.           Relevant Orders    CT Chest Without Contrast    Adenocarcinoma Lung Cancer RLL     Second primary.  She is doing well.  CT scan again in 2 months.          Relevant Orders    CT Chest Without Contrast       Discussion:     Follow up in about 3 months (around 12/7/2022).      Electronically signed by Jonathan Wagner

## 2022-09-07 NOTE — ASSESSMENT & PLAN NOTE
Patient is doing well and the PET scan shows improving disease.  I discussed this today and will get a CT chest in November to continue to watch these areas.  Patient doing very well o/w.

## 2022-09-12 ENCOUNTER — OFFICE VISIT (OUTPATIENT)
Dept: ENDOCRINOLOGY | Facility: CLINIC | Age: 72
End: 2022-09-12
Payer: MEDICARE

## 2022-09-12 VITALS
HEIGHT: 62 IN | BODY MASS INDEX: 17.83 KG/M2 | HEART RATE: 74 BPM | OXYGEN SATURATION: 99 % | WEIGHT: 96.88 LBS | TEMPERATURE: 98 F | SYSTOLIC BLOOD PRESSURE: 110 MMHG | DIASTOLIC BLOOD PRESSURE: 80 MMHG

## 2022-09-12 DIAGNOSIS — I82.90 ACUTE DEEP VEIN THROMBOSIS (DVT) OF NON-EXTREMITY VEIN: ICD-10-CM

## 2022-09-12 DIAGNOSIS — M85.80 OSTEOPENIA, UNSPECIFIED LOCATION: ICD-10-CM

## 2022-09-12 DIAGNOSIS — J44.9 CHRONIC OBSTRUCTIVE PULMONARY DISEASE, UNSPECIFIED COPD TYPE: ICD-10-CM

## 2022-09-12 DIAGNOSIS — Z72.0 TOBACCO USE: ICD-10-CM

## 2022-09-12 DIAGNOSIS — I10 HYPERTENSION, UNSPECIFIED TYPE: ICD-10-CM

## 2022-09-12 DIAGNOSIS — E55.9 HYPOVITAMINOSIS D: ICD-10-CM

## 2022-09-12 DIAGNOSIS — E04.2 MULTIPLE THYROID NODULES: ICD-10-CM

## 2022-09-12 DIAGNOSIS — E05.90 HYPERTHYROIDISM: Primary | ICD-10-CM

## 2022-09-12 DIAGNOSIS — E78.5 HYPERLIPIDEMIA, UNSPECIFIED HYPERLIPIDEMIA TYPE: ICD-10-CM

## 2022-09-12 PROCEDURE — 99213 OFFICE O/P EST LOW 20 MIN: CPT | Mod: PBBFAC,PO | Performed by: PHYSICIAN ASSISTANT

## 2022-09-12 PROCEDURE — 99999 PR PBB SHADOW E&M-EST. PATIENT-LVL III: ICD-10-PCS | Mod: PBBFAC,,, | Performed by: PHYSICIAN ASSISTANT

## 2022-09-12 PROCEDURE — 99213 PR OFFICE/OUTPT VISIT, EST, LEVL III, 20-29 MIN: ICD-10-PCS | Mod: S$PBB,,, | Performed by: PHYSICIAN ASSISTANT

## 2022-09-12 PROCEDURE — 99999 PR PBB SHADOW E&M-EST. PATIENT-LVL III: CPT | Mod: PBBFAC,,, | Performed by: PHYSICIAN ASSISTANT

## 2022-09-12 PROCEDURE — 99213 OFFICE O/P EST LOW 20 MIN: CPT | Mod: S$PBB,,, | Performed by: PHYSICIAN ASSISTANT

## 2022-09-14 ENCOUNTER — TELEPHONE (OUTPATIENT)
Dept: HEMATOLOGY/ONCOLOGY | Facility: CLINIC | Age: 72
End: 2022-09-14

## 2022-09-14 DIAGNOSIS — I82.90 ACUTE DEEP VEIN THROMBOSIS (DVT) OF NON-EXTREMITY VEIN: Primary | ICD-10-CM

## 2022-09-14 NOTE — TELEPHONE ENCOUNTER
Patient notified and Eliquis sent  [facility] per her request. Will follow up with me in 4 weeks. She denies any s/s of blood clot. Will stop ASA and start Eliquis once she gets it

## 2022-09-14 NOTE — TELEPHONE ENCOUNTER
----- Message from Jonathan Tinoco MD sent at 9/13/2022  4:39 PM CDT -----  I think I would begin her on low dose Eliquis 2.5mg bid.     Martha,  can you reach out to her to get this started?   ----- Message -----  From: JUDD Garner PA-C  Sent: 9/12/2022  11:11 AM CDT  To: MD Dr. Earnest Ojeda,    I see Ms. Cool for thyroid nodules. A nonocclusive blood clot was seen in her right IJV on her last thyroid ultrasound. I advised her to increase her ASA dose to 325 mg daily. Just letting you know in case you have any further recommendations.    Thank you,    Christy Garner PA-C  Endocrinology

## 2022-10-05 NOTE — PLAN OF CARE
Problem: Fatigue  Goal: Improved Activity Tolerance  Intervention: Promote Improved Energy  Flowsheets (Taken 10/5/2022 1383)  Fatigue Management: frequent rest breaks encouraged  Activity Management: Ambulated -L4

## 2022-10-05 NOTE — PLAN OF CARE
Problem: Fatigue  Goal: Improved Activity Tolerance  Intervention: Promote Improved Energy  Flowsheets (Taken 10/5/2022 7995)  Fatigue Management: frequent rest breaks encouraged  Activity Management: Ambulated -L4

## 2022-10-17 NOTE — TELEPHONE ENCOUNTER
----- Message from SCOOTER Aggarwal sent at 10/17/2022  2:51 PM CDT -----  Please call and notify patient xray result is do not show any abnormal fracture etc. Continue current treatment plan and follow up as scheduled.

## 2022-10-17 NOTE — TELEPHONE ENCOUNTER
Spoke to pt and informed her that nothing abnormal was shown on her xray. Continue current treatment plan and follow up as scheduled. Pt verbalized understanding.

## 2022-10-17 NOTE — PROGRESS NOTES
PROGRESS NOTE    Subjective:       Patient ID: Anuja Cool is a 72 y.o. female.    2/25/2022:  CT lung Screen:  3.4cm FRANCIS mass and 2.0cm RLL    3/11/2022:  PET scan:  3.9cm hypermetabolic mass in FRANCIS  2.3cm hypermetabolic mass in RLL    3/31/2022:  FRANCIS CT biopsy:  Small Cell carcinoma    Carbo/Etop:  Cycle 1: 4/27/2022  Cycle 2: 5/25/2022  Cycle 3: 6/15/2022  Cycle 4: 7/6/2022    5/3/2022:  RLL CT Biopsy:  Adenocarcinoma    5/15/2022-5/22/2022:  SBRT to Right LL Adenocarcinoma    7/11/2022:  xrt to complete to left lung    8/1/2022:  PET:  1. Favorable interval response to therapy, with decreased size and FDG uptake of noncalcified pulmonary nodules in both lungs.  2. No findings of regional metastatic disease in the chest, or distant metastatic disease.      Chief Complaint:    synchronous primary lung cancer-SCLC and Adenocarcinoma Follow up    History of Present Illness:   Anuja Cool is a 72 y.o. female who presents for follow up of above.     Patient states she started having mid and lower back pain for 3 days. She is not able to take the Ibuprofen due to her Eliquis. Back pain is constant worse when bending lifting laying down rotating with sharp pain. She denies any trauma.     Due for CT chest in Nov. No Brain MRI since May will get update Brain MRI. If xrays are negative will get MRI thoracic and lumbar spine.    She was also found to have a nonocclusive blood clot in the right IJ at the site of her port catheter and started on Eliquis. She has tolerated this well and denies any s/s of bleeding. Discussed this was probably provoked by the chemo rads and cancer. She denies any personal or family history of blood clots.      Family and Social history reviewed and is unchanged from 4/14/2022      ROS:  Review of Systems   Constitutional:  Negative for fever.   Respiratory:  Negative for shortness of breath.    Cardiovascular:  Negative for  chest pain and leg swelling.   Gastrointestinal:  Negative for abdominal pain and blood in stool.   Genitourinary:  Negative for hematuria.   Musculoskeletal:  Positive for back pain.   Skin:  Negative for rash.        Current Outpatient Medications:     alendronate (FOSAMAX) 70 MG tablet, Take 1 tablet by mouth once a week, Disp: 12 tablet, Rfl: 3    apixaban (ELIQUIS) 2.5 mg Tab, Take 1 tablet (2.5 mg total) by mouth 2 (two) times daily., Disp: 60 tablet, Rfl: 5    aspirin (ECOTRIN) 81 MG EC tablet, Take 81 mg by mouth once daily., Disp: , Rfl:     cholecalciferol, vitamin D3, 3,000 unit Tab, Take by mouth., Disp: , Rfl:     fluticasone-umeclidin-vilanter (TRELEGY ELLIPTA) 200-62.5-25 mcg inhaler, Inhale 1 puff into the lungs once daily., Disp: 90 each, Rfl: 3    HYDROcodone-acetaminophen (NORCO) 5-325 mg per tablet, Take 1 tablet by mouth every 6 (six) hours as needed for Pain., Disp: 40 tablet, Rfl: 0    ibuprofen (ADVIL,MOTRIN) 200 MG tablet, Take 200 mg by mouth every 6 (six) hours as needed for Pain., Disp: , Rfl:     levalbuterol (XOPENEX HFA) 45 mcg/actuation inhaler, INHALE 1 TO 2 PUFFS INTO LUNGS EVERY 4 HOURS AS NEEDED FOR WHEEZING AND FOR SHORTNESS OF BREATH, Disp: 45 g, Rfl: 1    levocetirizine (XYZAL) 5 MG tablet, Take 1 tablet (5 mg total) by mouth every evening., Disp: 30 tablet, Rfl: 2    methIMAzole (TAPAZOLE) 5 MG Tab, Take one tablet Monday-Friday and two tablets on Saturday and Sunday., Disp: 180 tablet, Rfl: 3    multivit with minerals/lutein (MULTIVITAMIN 50 PLUS ORAL), Take by mouth., Disp: , Rfl:     nicotine (NICODERM CQ) 14 mg/24 hr, Place 1 patch onto the skin every 24 hours., Disp: , Rfl:     ondansetron (ZOFRAN) 8 MG tablet, Take 1 tablet (8 mg total) by mouth every 8 (eight) hours as needed., Disp: 30 tablet, Rfl: 5    promethazine (PHENERGAN) 25 MG tablet, Take 1 tablet (25 mg total) by mouth every 4 to 6 hours as needed., Disp: 30 tablet, Rfl: 5    rosuvastatin (CRESTOR) 20 MG  tablet, Take 1 tablet by mouth once daily, Disp: 90 tablet, Rfl: 3        Objective:       Physical Examination:     BP (!) 144/65   Pulse 88   Temp 98.1 °F (36.7 °C)   Wt 44.5 kg (98 lb 3.2 oz)   BMI 17.96 kg/m²     Physical Exam  Constitutional:       Appearance: She is well-developed.   HENT:      Head: Normocephalic and atraumatic.      Right Ear: External ear normal.      Left Ear: External ear normal.   Eyes:      Conjunctiva/sclera: Conjunctivae normal.      Pupils: Pupils are equal, round, and reactive to light.   Neck:      Thyroid: No thyromegaly.      Trachea: No tracheal deviation.   Cardiovascular:      Rate and Rhythm: Normal rate and regular rhythm.      Heart sounds: Normal heart sounds.   Pulmonary:      Effort: Pulmonary effort is normal.      Breath sounds: Normal breath sounds.   Abdominal:      General: Bowel sounds are normal. There is no distension.      Palpations: Abdomen is soft. There is no mass.      Tenderness: There is no abdominal tenderness.   Skin:     Findings: No rash.   Neurological:      Comments: Neuro intact througout   Psychiatric:         Behavior: Behavior normal.         Thought Content: Thought content normal.         Judgment: Judgment normal.       Labs:   No results found for this or any previous visit (from the past 336 hour(s)).    CMP  Sodium   Date Value Ref Range Status   08/15/2022 132 (L) 136 - 145 mmol/L Final     Potassium   Date Value Ref Range Status   08/15/2022 3.9 3.5 - 5.1 mmol/L Final     Chloride   Date Value Ref Range Status   08/15/2022 96 95 - 110 mmol/L Final     CO2   Date Value Ref Range Status   08/15/2022 25 23 - 29 mmol/L Final     Glucose   Date Value Ref Range Status   08/15/2022 82 70 - 110 mg/dL Final     BUN   Date Value Ref Range Status   08/15/2022 15 8 - 23 mg/dL Final     Creatinine   Date Value Ref Range Status   08/15/2022 0.4 (L) 0.5 - 1.4 mg/dL Final     Calcium   Date Value Ref Range Status   08/15/2022 9.2 8.7 - 10.5 mg/dL  Final     Total Protein   Date Value Ref Range Status   08/15/2022 7.1 6.0 - 8.4 g/dL Final     Albumin   Date Value Ref Range Status   08/15/2022 4.2 3.5 - 5.2 g/dL Final     Total Bilirubin   Date Value Ref Range Status   08/15/2022 0.6 0.1 - 1.0 mg/dL Final     Comment:     For infants and newborns, interpretation of results should be based  on gestational age, weight and in agreement with clinical  observations.    Premature Infant recommended reference ranges:  Up to 24 hours.............<8.0 mg/dL  Up to 48 hours............<12.0 mg/dL  3-5 days..................<15.0 mg/dL  6-29 days.................<15.0 mg/dL       Alkaline Phosphatase   Date Value Ref Range Status   08/15/2022 66 55 - 135 U/L Final     AST   Date Value Ref Range Status   08/15/2022 19 10 - 40 U/L Final     ALT   Date Value Ref Range Status   08/15/2022 15 10 - 44 U/L Final     Anion Gap   Date Value Ref Range Status   08/15/2022 11 8 - 16 mmol/L Final     eGFR if    Date Value Ref Range Status   07/25/2022 >60.0 >60 mL/min/1.73 m^2 Final     eGFR if non    Date Value Ref Range Status   07/25/2022 >60.0 >60 mL/min/1.73 m^2 Final     Comment:     Calculation used to obtain the estimated glomerular filtration  rate (eGFR) is the CKD-EPI equation.        No results found for: CEA      Assessment/Plan:     Problem List Items Addressed This Visit          Oncology    Small Cell Lung Cancer FRANCIS - Primary    Relevant Medications    HYDROcodone-acetaminophen (NORCO) 5-325 mg per tablet    Other Relevant Orders    MRI BRAIN W WO CONTRAST    Creatinine, serum    X-Ray Lumbar Spine 2 Or 3 Views    X-Ray Thoracic Spine 4 or more views    MRI Lumbar Spine Without Contrast    MRI Thoracic Spine W WO Contrast     Other Visit Diagnoses       Back pain, unspecified back location, unspecified back pain laterality, unspecified chronicity        Relevant Medications    HYDROcodone-acetaminophen (NORCO) 5-325 mg per tablet     Other Relevant Orders    X-Ray Lumbar Spine 2 Or 3 Views    X-Ray Thoracic Spine 4 or more views    MRI Lumbar Spine Without Contrast    MRI Thoracic Spine W WO Contrast    Acute bilateral back pain, unspecified back location        Relevant Medications    HYDROcodone-acetaminophen (NORCO) 5-325 mg per tablet    Acute thrombosis of right internal jugular vein              NSCLC- CT Chest due Nov 2022  2. SCLC- CT Chest due Nov 2022; Brain MRI due now  3. Back Pain- Thoracic and Lumbar Xrays if neg will get MRI thoracic and Lumbar. Norco PRN pain  4. Right IJ Nonocclusive blood clot- Continue Eliquis BID    Discussion:     Follow up in about 2 months (around 12/17/2022) for with Dr. Tinoco.      Electronically signed by Martha Granado, MSN, APRN, AGNP-C- [facility]

## 2022-10-17 NOTE — PROGRESS NOTES
Please call and notify patient xray result is do not show any abnormal fracture etc. Continue current treatment plan and follow up as scheduled.

## 2022-11-08 NOTE — PROGRESS NOTES
Please notify the patient that their MRI does not show any cancer but does show some age related dz and narrowing. See if she is still having pain. If so we can sen her to an ortho doc.

## 2022-11-09 NOTE — PROGRESS NOTES
Office Visit Note *    Patient Name: Anuja Cool  MRN: [id number]  : 1950      Reason for visit: COPD    HPI:     2022 - Here to establish care,  Diagnosed with COPD (severity unknown).  Recently changed to TRELEGY and feels that she is doing better on that.  Currently smoking about 3/4 PPD  (1-1.5 PPD x 50 years).  Also carries diagnosed of asthma, has h/o allergies (+ skin test, never desensitized).  HAs never had a screening CT chest and does not remember her last CXR.  ROS as below.  We discussed cigarette cessation at length.    3/17/2022 - Here for review.  CT scan showed subpleural masses and PET scan shows activity in those areas.  Reviewed images with pt and we will plan t proceed with CT guided biopsy (left lesion first).  No evidence of other + areas.  PFT c/w   GOLD II A (FEV1 - 52%, DLCO 23 %).  CT scan does show fairly extensive emphysema changes.    2022 - Here for review of biopsy results - + small cell cancer.  We discussed moving forward with MRI brain and referral to Radiation Oncology and Oncology.  All questions have been answered.    2022 - Here for follow up, has had first chemotherapy (carboplatin and etoposide) and she tolerated well, WBC is down some (being addressed).  To see radiation therapy today.  No new symptoms. Did have biopsy of right nodule which was + for adenocarcinoma.    2022 - Here for follow up, recent PET scan reviewed (see below) and doing well.  She is to see Dr Tinoco next month.  No other new issues.  We reviewed the PET scan images and compared the 2.  Reports that she tolerated the chemo well.    2022 - Here for follow up, chemo and XRT are done and she is getting followup studies.  She has had some back pain (MRI of spine is OK - no mets).  No respiratory issues.  Had synchronous adeno and small cell    Past Medical History    Past Medical History:   Diagnosis Date    Allergy     Arthritis     Asthma     Bursitis     COPD (chronic  obstructive pulmonary disease)     Hypertension     Low sodium     Lung nodules 03/2022    Small cell carcinoma of left lung 03/31/2022    Thyroid disease     nodules    Tinea pedis        Past Surgical History    Past Surgical History:   Procedure Laterality Date    COLONOSCOPY N/A 05/08/2018    Procedure: COLONOSCOPY;  Surgeon: Grey Roberts MD;  Location: [facility] ENDO;  Service: Endoscopy;  Laterality: N/A;    CT BIOPSY LUNG W/ GUIDANCE Left 03/31/2022    HYSTERECTOMY      INSERTION OF TUNNELED CENTRAL VENOUS CATHETER (CVC) WITH SUBCUTANEOUS PORT N/A 4/26/2022    Procedure: [id number];  Surgeon: Noel Sadler MD;  Location: [facility] OR;  Service: General;  Laterality: N/A;    OOPHORECTOMY         Medications      Current Outpatient Medications:     alendronate (FOSAMAX) 70 MG tablet, Take 1 tablet by mouth once a week, Disp: 12 tablet, Rfl: 3    apixaban (ELIQUIS) 2.5 mg Tab, Take 1 tablet (2.5 mg total) by mouth 2 (two) times daily., Disp: 60 tablet, Rfl: 5    aspirin (ECOTRIN) 81 MG EC tablet, Take 81 mg by mouth once daily., Disp: , Rfl:     cholecalciferol, vitamin D3, 3,000 unit Tab, Take by mouth., Disp: , Rfl:     fluticasone-umeclidin-vilanter (TRELEGY ELLIPTA) 200-62.5-25 mcg inhaler, Inhale 1 puff into the lungs once daily., Disp: 90 each, Rfl: 3    HYDROcodone-acetaminophen (NORCO) 5-325 mg per tablet, Take 1 tablet by mouth every 6 (six) hours as needed for Pain., Disp: 40 tablet, Rfl: 0    ibuprofen (ADVIL,MOTRIN) 200 MG tablet, Take 200 mg by mouth every 6 (six) hours as needed for Pain., Disp: , Rfl:     levalbuterol (XOPENEX HFA) 45 mcg/actuation inhaler, INHALE 1 TO 2 PUFFS INTO LUNGS EVERY 4 HOURS AS NEEDED FOR WHEEZING AND FOR SHORTNESS OF BREATH, Disp: 45 g, Rfl: 1    methIMAzole (TAPAZOLE) 5 MG Tab, Take one tablet Monday-Friday and two tablets on Saturday and Sunday., Disp: 180 tablet, Rfl: 3    multivit with minerals/lutein (MULTIVITAMIN 50 PLUS ORAL), Take by mouth., Disp: , Rfl:      nicotine (NICODERM CQ) 14 mg/24 hr, Place 1 patch onto the skin every 24 hours., Disp: , Rfl:     ondansetron (ZOFRAN) 8 MG tablet, Take 1 tablet (8 mg total) by mouth every 8 (eight) hours as needed., Disp: 30 tablet, Rfl: 5    promethazine (PHENERGAN) 25 MG tablet, Take 1 tablet (25 mg total) by mouth every 4 to 6 hours as needed., Disp: 30 tablet, Rfl: 5    rosuvastatin (CRESTOR) 20 MG tablet, Take 1 tablet by mouth once daily, Disp: 90 tablet, Rfl: 3    levocetirizine (XYZAL) 5 MG tablet, Take 1 tablet (5 mg total) by mouth every evening., Disp: 30 tablet, Rfl: 2    Allergies    Review of patient's allergies indicates:   Allergen Reactions    Tinactin [tolnaftate] Dermatitis    Advair diskus [fluticasone propion-salmeterol]      shakes    Albuterol      tremors    Sulfa (sulfonamide antibiotics)      Unknown    Years ago       SocHx    Social History     Tobacco Use   Smoking Status Former    Packs/day: 0.50    Types: Cigarettes    Quit date: 4/3/2022    Years since quittin.6   Smokeless Tobacco Never       Social History     Substance and Sexual Activity   Alcohol Use Yes    Alcohol/week: 4.0 standard drinks    Types: 4 Glasses of wine per week    Comment: occ. glass of wine       Drug Use - no  Occupation - retired, customer service  Asbestos exposure - no  Pets - no    FMHx    Family History   Problem Relation Age of Onset    Heart disease Mother     Cancer Mother         lung    Heart disease Father     Cancer Father         lymphoma    Diabetes Neg Hx          Review of Systems  Review of Systems   Constitutional: Negative for chills, diaphoresis, fever, malaise/fatigue and weight loss.        Weight loss   HENT: Negative for congestion.    Eyes: Negative for pain.   Respiratory: Positive for shortness of breath (ELLISON). Negative for cough, hemoptysis, sputum production, wheezing and stridor.    Cardiovascular: Negative for chest pain, palpitations, orthopnea, claudication, leg swelling and PND.    Gastrointestinal: Negative for abdominal pain, constipation, diarrhea, heartburn, nausea and vomiting.   Genitourinary: Negative for dysuria, frequency and urgency.   Musculoskeletal: Negative for falls and myalgias.   Neurological: Negative for sensory change, focal weakness and weakness.   Endo/Heme/Allergies:        + over active thyroid   Psychiatric/Behavioral: Negative for depression, substance abuse and suicidal ideas. The patient is not nervous/anxious.        Physical Exam    Vitals:    11/09/22 0955   BP: 118/72   BP Location: Left arm   Patient Position: Sitting   BP Method: Medium (Manual)   Pulse: 93   SpO2: 96%   Weight: 44.3 kg (97 lb 11.2 oz)       Physical Exam  Vitals and nursing note reviewed.   Constitutional:       General: She is not in acute distress.     Appearance: She is well-developed. She is not ill-appearing, toxic-appearing or diaphoretic.      Comments: Thin female   HENT:      Head: Normocephalic and atraumatic.      Right Ear: External ear normal.      Left Ear: External ear normal.      Nose: Nose normal.   Eyes:      General: No scleral icterus.        Right eye: No discharge.         Left eye: No discharge.      Extraocular Movements: Extraocular movements intact.      Conjunctiva/sclera: Conjunctivae normal.      Pupils: Pupils are equal, round, and reactive to light.   Neck:      Thyroid: No thyromegaly.      Vascular: No JVD.      Trachea: No tracheal deviation.   Cardiovascular:      Rate and Rhythm: Normal rate and regular rhythm.      Heart sounds: Normal heart sounds. No murmur heard.    No friction rub. No gallop.      Comments: + bilateral carotid bruits (known issue)  Pulmonary:      Effort: Pulmonary effort is normal. No respiratory distress.      Breath sounds: No stridor. No wheezing, rhonchi or rales.      Comments: Decreased BS  No acc m use  Chest:      Chest wall: No tenderness.   Abdominal:      General: Bowel sounds are normal. There is no distension.       Palpations: Abdomen is soft.      Tenderness: There is no abdominal tenderness. There is no guarding.   Musculoskeletal:         General: No tenderness. Normal range of motion.      Cervical back: Normal range of motion and neck supple.      Right lower leg: No edema.      Left lower leg: No edema.   Lymphadenopathy:      Cervical: No cervical adenopathy.   Skin:     General: Skin is warm and dry.   Neurological:      General: No focal deficit present.      Mental Status: She is alert and oriented to person, place, and time. Mental status is at baseline.      Cranial Nerves: No cranial nerve deficit.      Motor: No weakness.      Gait: Gait normal.   Psychiatric:         Mood and Affect: Mood normal.         Behavior: Behavior normal.         Thought Content: Thought content normal.         Judgment: Judgment normal.         Labs    Lab Results   Component Value Date    WBC 8.44 08/15/2022    HGB 10.3 (L) 08/15/2022    HCT 31.3 (L) 08/15/2022     08/15/2022       Sodium   Date Value Ref Range Status   08/15/2022 132 (L) 136 - 145 mmol/L Final     Potassium   Date Value Ref Range Status   08/15/2022 3.9 3.5 - 5.1 mmol/L Final     Chloride   Date Value Ref Range Status   08/15/2022 96 95 - 110 mmol/L Final     CO2   Date Value Ref Range Status   08/15/2022 25 23 - 29 mmol/L Final     Glucose   Date Value Ref Range Status   08/15/2022 82 70 - 110 mg/dL Final     BUN   Date Value Ref Range Status   08/15/2022 15 8 - 23 mg/dL Final     Creatinine   Date Value Ref Range Status   08/15/2022 0.4 (L) 0.5 - 1.4 mg/dL Final     Calcium   Date Value Ref Range Status   08/15/2022 9.2 8.7 - 10.5 mg/dL Final     Total Protein   Date Value Ref Range Status   08/15/2022 7.1 6.0 - 8.4 g/dL Final     Albumin   Date Value Ref Range Status   08/15/2022 4.2 3.5 - 5.2 g/dL Final     Total Bilirubin   Date Value Ref Range Status   08/15/2022 0.6 0.1 - 1.0 mg/dL Final     Comment:     For infants and newborns, interpretation of  results should be based  on gestational age, weight and in agreement with clinical  observations.    Premature Infant recommended reference ranges:  Up to 24 hours.............<8.0 mg/dL  Up to 48 hours............<12.0 mg/dL  3-5 days..................<15.0 mg/dL  6-29 days.................<15.0 mg/dL       Alkaline Phosphatase   Date Value Ref Range Status   08/15/2022 66 55 - 135 U/L Final     AST   Date Value Ref Range Status   08/15/2022 19 10 - 40 U/L Final     ALT   Date Value Ref Range Status   08/15/2022 15 10 - 44 U/L Final     Anion Gap   Date Value Ref Range Status   08/15/2022 11 8 - 16 mmol/L Final       Xrays    INTEGRATED PET CT WITH IMAGE FUSION     HISTORY: Restaging, subsequent treatment strategy of small cell lung carcinoma left upper lobe and right lower lobe.     TECHNIQUE: Following the injection of 11.1 mCi of F-18 labeled FDG into a right forearm vein, PET CT was performed from the vertex of the skull through the proximal thighs with an integrated full ring PET CT scanner with image fusion. The patient's serum glucose at the time of the exam was 106 mg/dL.     FINDINGS: Comparison to prior exams including PET CT of 03/11/2022. There has been favorable interval response to therapy, with significant interval decreased size and near resolution of FDG hypermetabolism of bilateral pulmonary masses. The left upper lobe nodule abutting the pleura anteriorly measures 17 mm maximum dimension with max SUV of 1.7. The right lower lobe nodule measures 14 mm with max SUV of 1.4.     There is no abnormal focal FDG hypermetabolism in the mediastinum or bettina, or elsewhere in the head, neck, chest, abdomen, pelvis, or the skeletal system to suggest metastatic disease.     CT images of the brain show no intra-axial mass or abnormal extra-axial fluid. There are no suspicious sclerotic or lytic osseous abnormalities of the calvarium.     CT images of the chest show severe upper lobe predominant pulmonary  emphysema, with no new suspicious pulmonary nodules or masses. There is no pleural or pericardial effusion, with diffuse aortic and coronary arterial calcifications.     CT images of the abdomen and pelvis show diffuse calcified plaque of the abdominal aorta and iliac arteries, with no ascites. Large volume of stool distends the rectum. There are no suspicious sclerotic or lytic osseous abnormalities by CT. There is severe degenerative disc disease and facet arthropathy in the cervical spine, with anterolisthesis of C2 upon C3 and C3 upon C4, and reversal of the normal cervical spinal curvature. There is also grade 1 anterolisthesis of L4 upon L5.     IMPRESSION:  1. Favorable interval response to therapy, with decreased size and FDG uptake of noncalcified pulmonary nodules in both lungs.  2. No findings of regional metastatic disease in the chest, or distant metastatic disease.     Electronically signed by:  Derrick Pat MD  8/1/2022 11:34 AM CDT Workstation: [id number]    Impression/Plan    Problem List Items Addressed This Visit          Pulmonary    Chronic obstructive pulmonary disease     Continue present medications.  Will refill medications as needed.  Instructed patient to contact us with any issues concerning their medications (cost, reactions, etc.).  Have discussed with patient about inciting conditions which may exacerbate their disease.  We did discuss possible new therapies or de-escalation of therapy (if appropriate).  Asked patient if they were interested in pursuing pulmonary rehabilitation.  All questions answered  RTC 3 months  Patient instructed that they are to call if symptoms change or new issues develop prior to their next visit.              Oncology    Small cell lung cancer     As above  RTC 3 months         Adenocarcinoma Lung Cancer RLL     S/p treatment and looks good            Other    Tobacco use     Smoking about 2 cigarette per day  I have counseled pt for 3-5 minutes regarding  "cigarette cessation.  This has included the need to stop smoking as well as strategies, including but not limited to "cold turkey", CHANTIX (including risks and benefits of the drug), nicotine replacement and WELLBUTRIN.                    Jonathan Jeffries MD              "
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
## 2022-11-09 NOTE — TELEPHONE ENCOUNTER
Referral sent to Dr. Wayne Pelayo per JAN Thomas's verbal order. Reviewed above with patient and she verbalized understanding. Patient also reports being almost out of her pain medication. Per JAN Thomas's verbal order, refill sent to patient's preferred pharmacy.

## 2022-11-10 NOTE — PROGRESS NOTES
Please call and notify patient her Brain MRI was negatvie for cancer. She has some degnerative changes in the spine. Continue current treatment plan and follow up as scheduled.

## 2022-11-10 NOTE — TELEPHONE ENCOUNTER
----- Message from SCOOTER Aggarwal sent at 11/10/2022  1:45 PM CST -----  Please call and notify patient her Brain MRI was negatvie for cancer. She has some degnerative changes in the spine. Continue current treatment plan and follow up as scheduled.

## 2022-11-14 NOTE — PROGRESS NOTES
Subjective:       Patient ID: Anuja Cool is a 72 y.o. female.    Chief Complaint: Pain of the Lumbar Spine (Lumbar pian x 2 weeks, no leg symptoms, pain to b/l sides of back, limtied standing, walking, bending, )      History of Present Illness    Prior to meeting with the patient I reviewed the medical chart in [facility]. This included reviewing the previous progress notes from our office, review of the patient's last appointment with their primary care provider, review of any visits to the emergency room, and review of any pain management appointments or procedures.   Long history of low back pain over 15 20 years of chronic intermittent episodes without radiation this episode 2 weeks more severe than in the past no bowel or bladder incontinence.    Current Medications  Current Outpatient Medications   Medication Sig Dispense Refill    alendronate (FOSAMAX) 70 MG tablet Take 1 tablet by mouth once a week 12 tablet 3    apixaban (ELIQUIS) 2.5 mg Tab Take 1 tablet (2.5 mg total) by mouth 2 (two) times daily. 60 tablet 5    cholecalciferol, vitamin D3, 3,000 unit Tab Take by mouth.      fluticasone-umeclidin-vilanter (TRELEGY ELLIPTA) 200-62.5-25 mcg inhaler Inhale 1 puff into the lungs once daily. 90 each 3    HYDROcodone-acetaminophen (NORCO) 5-325 mg per tablet Take 1 tablet by mouth every 6 (six) hours as needed for Pain. 40 tablet 0    levalbuterol (XOPENEX HFA) 45 mcg/actuation inhaler INHALE 1 TO 2 PUFFS INTO LUNGS EVERY 4 HOURS AS NEEDED FOR WHEEZING AND FOR SHORTNESS OF BREATH 45 g 1    methIMAzole (TAPAZOLE) 5 MG Tab Take one tablet Monday-Friday and two tablets on Saturday and Sunday. 180 tablet 3    multivit with minerals/lutein (MULTIVITAMIN 50 PLUS ORAL) Take by mouth.      rosuvastatin (CRESTOR) 20 MG tablet Take 1 tablet by mouth once daily 90 tablet 3    aspirin (ECOTRIN) 81 MG EC tablet Take 81 mg by mouth once daily.      ibuprofen (ADVIL,MOTRIN) 200 MG tablet Take 200 mg by mouth every 6 (six)  hours as needed for Pain.      levocetirizine (XYZAL) 5 MG tablet Take 1 tablet (5 mg total) by mouth every evening. 30 tablet 2    nicotine (NICODERM CQ) 14 mg/24 hr Place 1 patch onto the skin every 24 hours.      ondansetron (ZOFRAN) 8 MG tablet Take 1 tablet (8 mg total) by mouth every 8 (eight) hours as needed. (Patient not taking: Reported on 11/14/2022) 30 tablet 5    promethazine (PHENERGAN) 25 MG tablet Take 1 tablet (25 mg total) by mouth every 4 to 6 hours as needed. (Patient not taking: Reported on 11/14/2022) 30 tablet 5     No current facility-administered medications for this visit.       Allergies  Review of patient's allergies indicates:   Allergen Reactions    Tinactin [tolnaftate] Dermatitis    Advair diskus [fluticasone propion-salmeterol]      shakes    Albuterol      tremors    Sulfa (sulfonamide antibiotics)      Unknown    Years ago       Past Medical History  Past Medical History:   Diagnosis Date    Allergy     Arthritis     Asthma     Bursitis     COPD (chronic obstructive pulmonary disease)     Hypertension     Low sodium     Lung nodules 03/2022    Small cell carcinoma of left lung 03/31/2022    Thyroid disease     nodules    Tinea pedis        Surgical History  Past Surgical History:   Procedure Laterality Date    COLONOSCOPY N/A 05/08/2018    Procedure: COLONOSCOPY;  Surgeon: Grey Roberts MD;  Location: [facility] ENDO;  Service: Endoscopy;  Laterality: N/A;    CT BIOPSY LUNG W/ GUIDANCE Left 03/31/2022    HYSTERECTOMY      INSERTION OF TUNNELED CENTRAL VENOUS CATHETER (CVC) WITH SUBCUTANEOUS PORT N/A 4/26/2022    Procedure: [id number];  Surgeon: Noel Sadler MD;  Location: [facility] OR;  Service: General;  Laterality: N/A;    OOPHORECTOMY         Family History:   Family History   Problem Relation Age of Onset    Heart disease Mother     Cancer Mother         lung    Heart disease Father     Cancer Father         lymphoma    Diabetes Neg Hx        Social History:   Social  History     Socioeconomic History    Marital status:    Tobacco Use    Smoking status: Former     Packs/day: 0.50     Types: Cigarettes     Quit date: 4/3/2022     Years since quittin.6    Smokeless tobacco: Never   Substance and Sexual Activity    Alcohol use: Yes     Alcohol/week: 4.0 standard drinks     Types: 4 Glasses of wine per week     Comment: occ. glass of wine    Drug use: No       Hospitalization/Major Diagnostic Procedure:     Review of Systems     General/Constitutional:  Chills denies. Fatigue denies. Fever denies. Weight gain denies. Weight loss denies.    Respiratory:  Shortness of breath denies.    Cardiovascular:  Chest pain denies.    Gastrointestinal:  Constipation denies. Diarrhea denies. Nausea denies. Vomiting denies.     Hematology:  Easy bruising denies. Prolonged bleeding denies.     Genitourinary:  Frequent urination denies. Pain in lower back denies. Painful urination denies.     Musculoskeletal:  See HPI for details    Skin:  Rash denies.    Neurologic:  Dizziness denies. Gait abnormalities denies. Seizures denies. Tingling/Numbess denies.    Psychiatric:  Anxiety denies. Depressed mood denies.     Objective:   Vital Signs: There were no vitals filed for this visit.     Physical Exam      General Examination:     Constitutional: The patient is alert and oriented to lace person and time. Mood is pleasant.     Head/Face: Normal facial features normal eyebrows    Eyes: Normal extraocular motion bilaterally    Lungs: Respirations are equal and unlabored    Gait is coordinated.    Cardiovascular: There are no swelling or varicosities present.    Lymphatic: Negative for adenopathy    Skin: Normal    Neurological: Level of consciousness normal. Oriented to place person and time and situation    Psychiatric: Oriented to time place person and situation    Patient stand erect obvious scoliosis tenderness left and right posterior iliac spines no spasm range of motion mildly restricted  minimal pain with facet loading extension and bending since seated straight leg raising normal motor exam grossly normal all major muscle groups.    XRAY Report/ Interpretation :  AP and lateral x-rays of lumbar spine will performed today. Indications low back pain. Findings:  X-rays performed October 17, 2022 were reviewed there is obvious scoliosis and multilevel disc degeneration facet arthritis no acute changes    MRI study lumbar spine a November 7, 2022 was reviewed evidence of multilevel disc degeneration L4-5 spondylolisthesis minimal foraminal stenosis present no acute changes please see report for details      Assessment:       1. Disc degeneration, lumbar    2. Malignant neoplasm of upper lobe of left lung    3. Spondylolisthesis of lumbar region    4. Acute bilateral back pain, unspecified back location          Plan:       Anuja was seen today for pain.    Diagnoses and all orders for this visit:    Disc degeneration, lumbar    Malignant neoplasm of upper lobe of left lung  -     Ambulatory referral/consult to Orthopedics    Spondylolisthesis of lumbar region  -     Ambulatory referral/consult to Orthopedics    Acute bilateral back pain, unspecified back location  -     Ambulatory referral/consult to Orthopedics       No follow-ups on file.    Patient has axial back pain from scoliosis and secondary disc degeneration facet arthritis I have advised treatment options of observation referral of physical therapy or pain management for medial branch blocks and possible rhizotomy patient undecided and ask for literature regarding rhizotomy and medial branch blocks.  Literature was given to the patient he she will contact me she wants to be referred to pain management and if so we will refer her there for an evaluation and management of her chronic axial back pain.  Return as needed.      Treatment options were discussed with regards to the nature of the medical condition. Conservative pain intervention and  surgical options were discussed in detail. The probability of success of each separate treatment option was discussed. The patient expressed a clear understanding of the treatment options. With regards to surgery, the procedure risk, benefits, complications, and outcomes were discussed. No guarantees were given with regards to surgical outcome.   The risk of complications, morbidity, and mortality of patient management decisions have been made at the time of this visit. These are associated with the patient's problems, diagnostic procedures and treatment options. This includes the possible management options selected and those considered but not selected by the patient after shared medical decision making we discussed with the patient.   This note was created using Dragon voice recognition software that occasionally misinterpreted phrases or words.

## 2022-11-16 NOTE — PLAN OF CARE
Problem: Fatigue  Goal: Improved Activity Tolerance  Intervention: Promote Improved Energy  Flowsheets (Taken 11/16/2022 3625)  Fatigue Management: frequent rest breaks encouraged  Activity Management: Ambulated -L4

## 2022-11-21 NOTE — TELEPHONE ENCOUNTER
Patient called wanted to let you know that they got a ct done on Friday 11/18/22 . Wanted to make you aware that she did it so she can see if you can look at it .

## 2022-11-23 NOTE — TELEPHONE ENCOUNTER
----- Message from Livia Mae sent at 11/23/2022  9:42 AM CST -----  PT would like a refill on hydrocodone, she has one left  [phone]

## 2022-11-23 NOTE — TELEPHONE ENCOUNTER
----- Message from Jada Prajapati NP sent at 11/23/2022  1:09 PM CST -----  Regarding: FW: Anuja Cool    ----- Message -----  From: Heidy Tilley  Sent: 11/21/2022   1:32 PM CST  To: ERWIN AggarwalC  Subject: Anuja Ruiz Mrs. Licea called to see if you could call her about her CT scan thanks

## 2022-12-06 NOTE — PROGRESS NOTES
PROGRESS NOTE    Subjective:       Patient ID: Anuja Cool is a 72 y.o. female.    2/25/2022:  CT lung Screen:  3.4cm FRANCIS mass and 2.0cm RLL    3/11/2022:  PET scan:  3.9cm hypermetabolic mass in FRANCIS  2.3cm hypermetabolic mass in RLL    3/31/2022:  FRANCIS CT biopsy:  Small Cell carcinoma    Carbo/Etop:  Cycle 1: 4/27/2022  Cycle 2: 5/25/2022  Cycle 3: 6/15/2022  Cycle 4: 7/6/2022    5/3/2022:  RLL CT Biopsy:  Adenocarcinoma    5/15/2022-5/22/2022:  SBRT to Right LL Adenocarcinoma    7/11/2022:  xrt to complete to left lung    8/1/2022:  PET:  1. Favorable interval response to therapy, with decreased size and FDG uptake of noncalcified pulmonary nodules in both lungs.  2. No findings of regional metastatic disease in the chest, or distant metastatic disease.    11/18/2022 CT Chest  Multiple new bilateral lung nodules    Chief Complaint:    synchronous primary lung cancer-SCLC and Adenocarcinoma Follow up    History of Present Illness:   Anuja Cool is a 72 y.o. female who presents for follow up of above.     Patient states returns for follow up to discuss the results of her recent CT chest. CT shows multiple new pulm nodules. Patient has a history of 2 primary lung cancers. SCLC and NSCLC. Discussed with her the importance of getting a new biopsy.    She was also found to have a nonocclusive blood clot in the right IJ at the site of her port catheter and started on Eliquis. She has tolerated this well and denies any s/s of bleeding. Discussed this was probably provoked by the chemo rads and cancer. She denies any personal or family history of blood clots.      Family and Social history reviewed and is unchanged from 4/14/2022      ROS:  Review of Systems   Constitutional:  Negative for fever.   Respiratory:  Negative for shortness of breath.    Cardiovascular:  Negative for chest pain and leg swelling.   Gastrointestinal:  Negative for abdominal pain  and blood in stool.   Genitourinary:  Negative for hematuria.   Musculoskeletal:  Positive for back pain.   Skin:  Negative for rash.        Current Outpatient Medications:     alendronate (FOSAMAX) 70 MG tablet, Take 1 tablet by mouth once a week, Disp: 12 tablet, Rfl: 3    apixaban (ELIQUIS) 2.5 mg Tab, Take 1 tablet (2.5 mg total) by mouth 2 (two) times daily., Disp: 60 tablet, Rfl: 5    cholecalciferol, vitamin D3, 3,000 unit Tab, Take by mouth., Disp: , Rfl:     fluticasone-umeclidin-vilanter (TRELEGY ELLIPTA) 200-62.5-25 mcg inhaler, Inhale 1 puff into the lungs once daily., Disp: 90 each, Rfl: 3    ibuprofen (ADVIL,MOTRIN) 200 MG tablet, Take 200 mg by mouth every 6 (six) hours as needed for Pain., Disp: , Rfl:     levalbuterol (XOPENEX HFA) 45 mcg/actuation inhaler, INHALE 1 TO 2 PUFFS INTO LUNGS EVERY 4 HOURS AS NEEDED FOR WHEEZING AND FOR SHORTNESS OF BREATH, Disp: 45 g, Rfl: 1    methIMAzole (TAPAZOLE) 5 MG Tab, Take one tablet Monday-Friday and two tablets on Saturday and Sunday., Disp: 180 tablet, Rfl: 3    multivit with minerals/lutein (MULTIVITAMIN 50 PLUS ORAL), Take by mouth., Disp: , Rfl:     nicotine (NICODERM CQ) 14 mg/24 hr, Place 1 patch onto the skin every 24 hours., Disp: , Rfl:     ondansetron (ZOFRAN) 8 MG tablet, Take 1 tablet (8 mg total) by mouth every 8 (eight) hours as needed., Disp: 30 tablet, Rfl: 5    promethazine (PHENERGAN) 25 MG tablet, Take 1 tablet (25 mg total) by mouth every 4 to 6 hours as needed., Disp: 30 tablet, Rfl: 5    rosuvastatin (CRESTOR) 20 MG tablet, Take 1 tablet by mouth once daily, Disp: 90 tablet, Rfl: 3    aspirin (ECOTRIN) 81 MG EC tablet, Take 81 mg by mouth once daily., Disp: , Rfl:     HYDROcodone-acetaminophen (NORCO) 5-325 mg per tablet, Take 1 tablet by mouth every 6 (six) hours as needed for Pain., Disp: 40 tablet, Rfl: 0    levocetirizine (XYZAL) 5 MG tablet, Take 1 tablet (5 mg total) by mouth every evening., Disp: 30 tablet, Rfl:  "2        Objective:       Physical Examination:     /61   Pulse 80   Temp 97.4 °F (36.3 °C)   Resp 18   Ht 5' 2" (1.575 m)   Wt 44.2 kg (97 lb 8 oz)   BMI 17.83 kg/m²     Physical Exam  Constitutional:       Appearance: Normal appearance. She is well-developed.   HENT:      Head: Normocephalic and atraumatic.      Right Ear: External ear normal.      Left Ear: External ear normal.   Eyes:      Conjunctiva/sclera: Conjunctivae normal.      Pupils: Pupils are equal, round, and reactive to light.   Neck:      Thyroid: No thyromegaly.      Trachea: No tracheal deviation.   Cardiovascular:      Rate and Rhythm: Normal rate and regular rhythm.      Heart sounds: Normal heart sounds.   Pulmonary:      Effort: Pulmonary effort is normal.      Breath sounds: Normal breath sounds.   Abdominal:      General: Bowel sounds are normal. There is no distension.      Palpations: Abdomen is soft. There is no mass.      Tenderness: There is no abdominal tenderness.   Skin:     General: Skin is warm and dry.      Findings: No rash.   Neurological:      General: No focal deficit present.      Mental Status: She is alert and oriented to person, place, and time.      Comments: Neuro intact througout   Psychiatric:         Mood and Affect: Mood normal.         Behavior: Behavior normal.         Thought Content: Thought content normal.         Judgment: Judgment normal.       Labs:   No results found for this or any previous visit (from the past 336 hour(s)).    CMP  Sodium   Date Value Ref Range Status   08/15/2022 132 (L) 136 - 145 mmol/L Final     Potassium   Date Value Ref Range Status   08/15/2022 3.9 3.5 - 5.1 mmol/L Final     Chloride   Date Value Ref Range Status   08/15/2022 96 95 - 110 mmol/L Final     CO2   Date Value Ref Range Status   08/15/2022 25 23 - 29 mmol/L Final     Glucose   Date Value Ref Range Status   08/15/2022 82 70 - 110 mg/dL Final     BUN   Date Value Ref Range Status   08/15/2022 15 8 - 23 mg/dL " Final     Creatinine   Date Value Ref Range Status   08/15/2022 0.4 (L) 0.5 - 1.4 mg/dL Final     Calcium   Date Value Ref Range Status   08/15/2022 9.2 8.7 - 10.5 mg/dL Final     Total Protein   Date Value Ref Range Status   08/15/2022 7.1 6.0 - 8.4 g/dL Final     Albumin   Date Value Ref Range Status   08/15/2022 4.2 3.5 - 5.2 g/dL Final     Total Bilirubin   Date Value Ref Range Status   08/15/2022 0.6 0.1 - 1.0 mg/dL Final     Comment:     For infants and newborns, interpretation of results should be based  on gestational age, weight and in agreement with clinical  observations.    Premature Infant recommended reference ranges:  Up to 24 hours.............<8.0 mg/dL  Up to 48 hours............<12.0 mg/dL  3-5 days..................<15.0 mg/dL  6-29 days.................<15.0 mg/dL       Alkaline Phosphatase   Date Value Ref Range Status   08/15/2022 66 55 - 135 U/L Final     AST   Date Value Ref Range Status   08/15/2022 19 10 - 40 U/L Final     ALT   Date Value Ref Range Status   08/15/2022 15 10 - 44 U/L Final     Anion Gap   Date Value Ref Range Status   08/15/2022 11 8 - 16 mmol/L Final     eGFR if    Date Value Ref Range Status   07/25/2022 >60.0 >60 mL/min/1.73 m^2 Final     eGFR if non    Date Value Ref Range Status   07/25/2022 >60.0 >60 mL/min/1.73 m^2 Final     Comment:     Calculation used to obtain the estimated glomerular filtration  rate (eGFR) is the CKD-EPI equation.        No results found for: CEA      Assessment/Plan:     Problem List Items Addressed This Visit          Oncology    Small Cell Lung Cancer FRANCIS    Relevant Medications    HYDROcodone-acetaminophen (NORCO) 5-325 mg per tablet    Small cell lung cancer    Relevant Orders    IR Biopsy Lung w/ guidance    Adenocarcinoma Lung Cancer RLL    Relevant Orders    IR Biopsy Lung w/ guidance     Other Visit Diagnoses       Pulmonary nodules/lesions, multiple    -  Primary    Relevant Orders    IR Biopsy Lung w/  guidance    Back pain, unspecified back location, unspecified back pain laterality, unspecified chronicity        Relevant Medications    HYDROcodone-acetaminophen (NORCO) 5-325 mg per tablet    Acute bilateral back pain, unspecified back location        Relevant Medications    HYDROcodone-acetaminophen (NORCO) 5-325 mg per tablet          NSCLC- Lung biopsy of new lung nodules  2. SCLC- Lung Biopsy of new lung nodules  3. Acute Back Pain- Continue Norco PRN pain  4. Right IJ Nonocclusive blood clot- Continue Eliquis BID    Discussion:     Follow up in about 2 weeks (around 12/21/2022) for with Dr. Tinoco.      Electronically signed by Martha Granado, MSN, APRN, AGNP-C- [facility]

## 2022-12-07 NOTE — TELEPHONE ENCOUNTER
Care Due:                  Date            Visit Type   Department     Provider  --------------------------------------------------------------------------------                                EP -                              PRIMARY      [facility]  Last Visit: 08-      CARE ([facility])   JOHN Lew                              EP -                              PRIMARY      [facility]  Next Visit: 02-      CARE ([facility])   MEDICINE       Isis Lew                                                            Last  Test          Frequency    Reason                     Performed    Due Date  --------------------------------------------------------------------------------    Lipid Panel.  12 months..  rosuvastatin.............  02- 02-    [facility] Embedded Care Gaps. Reference number: [id number]. 12/07/2022   9:07:26 AM CST

## 2022-12-08 NOTE — TELEPHONE ENCOUNTER
Refill Routing Note   Medication(s) are not appropriate for processing by [facility] for the following reason(s):      - Allergy/Contraindication (levalbuterol)    [facility] action(s):  Defer Medication-related problems identified: Requires labs        Medication reconciliation completed: No     Appointments  past 12m or future 3m with PCP    Date Provider   Last Visit   8/10/2022 Isis Lew MD   Next Visit   2/15/2023 Isis Lew MD   ED visits in past 90 days: 0        Note composed:10:41 AM 12/08/2022

## 2022-12-13 NOTE — TELEPHONE ENCOUNTER
Called and spoke to patient regarding Rx refill request. Informed patient that her refill request was forwarded to Dr. Lew for approval.

## 2022-12-13 NOTE — TELEPHONE ENCOUNTER
No new care gaps identified.  [facility] Embedded Care Gaps. Reference number: [id number]. 12/13/2022   8:42:48 AM CST

## 2022-12-13 NOTE — TELEPHONE ENCOUNTER
----- Message from Evelyne Jose sent at 12/13/2022  8:44 AM CST -----  Contact: patient  Type:  RX Refill Request    Who Called:  patient     Refill or New Rx: refill     RX Name and Strength: levalbuterol (XOPENEX HFA) 45 mcg/actuation inhaler      How is the patient currently taking it? (ex. 1XDay): every 4 hours     Is this a 30 day or 90 day RX: 90    Preferred Pharm[facility] with phone number:       [email or web address]        Local or Mail Order: local     Ordering Provider:     Would the patient rather a call back or a response via [facility]? Call     Best Call Back Number:[phone] (home)      Additional Information:

## 2022-12-14 NOTE — TELEPHONE ENCOUNTER
----- Message from Raina Johns sent at 12/14/2022  1:03 PM CST -----  Contact: Patient  Type:  RX Refill Request    Who Called:  Patient     Refill or New Rx: refill    RX Name and Strength: levalbuterol (XOPENEX HFA) 45 mcg/actuation inhaler      How is the patient currently taking it? (ex. 1XDay):     Is this a 30 day or 90 day RX: 30    Preferred Pharmacy with phone number:      [facility] [place]      Local or Mail Order: Local     Ordering Provider:  Dr Lew    Would the patient rather a call back or a response via [facility]?  Call    Best Call Back Number: [phone] (home)     Additional Information:  Patient called a week ago to get this filled and its still not at the pharmacy and she almost out     Please call

## 2022-12-14 NOTE — PROGRESS NOTES
IR is not able to biopsy patients lung lesion. Patient notified orders placed for her to see pulm to eval bx

## 2022-12-19 NOTE — PROGRESS NOTES
PROGRESS NOTE    Subjective:       Patient ID: Anuja Cool is a 72 y.o. female.    2/25/2022:  CT lung Screen:  3.4cm FRANCIS mass and 2.0cm RLL    3/11/2022:  PET scan:  3.9cm hypermetabolic mass in FRANCIS  2.3cm hypermetabolic mass in RLL    3/31/2022:  FRANCIS CT biopsy:  Small Cell carcinoma    Carbo/Etop:  Cycle 1: 4/27/2022  Cycle 2: 5/25/2022  Cycle 3: 6/15/2022  Cycle 4: 7/6/2022    5/3/2022:  RLL CT Biopsy:  Adenocarcinoma    5/15/2022-5/22/2022:  SBRT to Right LL Adenocarcinoma    7/11/2022:  xrt to complete to left lung    8/1/2022:  PET:  1. Favorable interval response to therapy, with decreased size and FDG uptake of noncalcified pulmonary nodules in both lungs.  2. No findings of regional metastatic disease in the chest, or distant metastatic disease.    11/18/2022:  Chest CT:  1.  Masses in the anterior left upper lobe and right lower lobe are unchanged from previous PET/CT.  2.  There is interval development of multiple new bilateral pulmonary nodules as described, suspicious for metastatic disease.  3.  Additional incidental observations as described.    ---IR not able to biopsy above new lesions, appt 12/28/2022 with Dr. Nunes for EBUS consideration.       Chief Complaint:  No chief complaint on file.    synchronous primary lung cancer-SCLC and Adenocarcinoma Follow up    History of Present Illness:   Anuja Cool is a 72 y.o. female who presents for follow up of above.       Family and Social history reviewed and is unchanged from 4/14/2022      ROS:  Review of Systems   Constitutional:  Negative for fever.   Respiratory:  Negative for shortness of breath.    Cardiovascular:  Negative for chest pain and leg swelling.   Gastrointestinal:  Negative for abdominal pain and blood in stool.   Genitourinary:  Negative for hematuria.   Skin:  Negative for rash.        Current Outpatient Medications:     alendronate (FOSAMAX) 70 MG tablet, Take 1  "tablet by mouth once a week, Disp: 12 tablet, Rfl: 3    apixaban (ELIQUIS) 2.5 mg Tab, Take 1 tablet (2.5 mg total) by mouth 2 (two) times daily., Disp: 60 tablet, Rfl: 5    cholecalciferol, vitamin D3, 3,000 unit Tab, Take by mouth., Disp: , Rfl:     fluticasone-umeclidin-vilanter (TRELEGY ELLIPTA) 200-62.5-25 mcg inhaler, Inhale 1 puff into the lungs once daily., Disp: 90 each, Rfl: 3    HYDROcodone-acetaminophen (NORCO) 5-325 mg per tablet, Take 1 tablet by mouth every 6 (six) hours as needed for Pain., Disp: 40 tablet, Rfl: 0    ibuprofen (ADVIL,MOTRIN) 200 MG tablet, Take 200 mg by mouth every 6 (six) hours as needed for Pain., Disp: , Rfl:     levalbuterol (XOPENEX HFA) 45 mcg/actuation inhaler, INHALE 1 TO 2 PUFFS INTO LUNGS EVERY 4 HOURS AS NEEDED FOR WHEEZING AND FOR SHORTNESS OF BREATH, Disp: 45 g, Rfl: 1    methIMAzole (TAPAZOLE) 5 MG Tab, Take one tablet Monday-Friday and two tablets on Saturday and Sunday., Disp: 180 tablet, Rfl: 3    multivit with minerals/lutein (MULTIVITAMIN 50 PLUS ORAL), Take by mouth., Disp: , Rfl:     nicotine (NICODERM CQ) 14 mg/24 hr, Place 1 patch onto the skin every 24 hours., Disp: , Rfl:     ondansetron (ZOFRAN) 8 MG tablet, Take 1 tablet (8 mg total) by mouth every 8 (eight) hours as needed., Disp: 30 tablet, Rfl: 5    promethazine (PHENERGAN) 25 MG tablet, Take 1 tablet (25 mg total) by mouth every 4 to 6 hours as needed., Disp: 30 tablet, Rfl: 5    rosuvastatin (CRESTOR) 20 MG tablet, Take 1 tablet by mouth once daily, Disp: 90 tablet, Rfl: 3    aspirin (ECOTRIN) 81 MG EC tablet, Take 81 mg by mouth once daily., Disp: , Rfl:     levocetirizine (XYZAL) 5 MG tablet, Take 1 tablet (5 mg total) by mouth every evening., Disp: 30 tablet, Rfl: 2        Objective:       Physical Examination:     BP (!) 101/55   Pulse 79   Temp 97.3 °F (36.3 °C)   Resp 18   Ht 5' 2" (1.575 m)   Wt 44.5 kg (98 lb 3.2 oz)   BMI 17.96 kg/m²     Physical Exam  Constitutional:       Appearance: " She is well-developed.   HENT:      Head: Normocephalic and atraumatic.      Right Ear: External ear normal.      Left Ear: External ear normal.   Eyes:      Conjunctiva/sclera: Conjunctivae normal.      Pupils: Pupils are equal, round, and reactive to light.   Neck:      Thyroid: No thyromegaly.      Trachea: No tracheal deviation.   Cardiovascular:      Rate and Rhythm: Normal rate and regular rhythm.      Heart sounds: Normal heart sounds.   Pulmonary:      Effort: Pulmonary effort is normal.      Breath sounds: Normal breath sounds.   Abdominal:      General: Bowel sounds are normal. There is no distension.      Palpations: Abdomen is soft. There is no mass.      Tenderness: There is no abdominal tenderness.   Skin:     Findings: No rash.   Neurological:      Comments: Neuro intact througout   Psychiatric:         Behavior: Behavior normal.         Thought Content: Thought content normal.         Judgment: Judgment normal.       Labs:   No results found for this or any previous visit (from the past 336 hour(s)).    CMP  Sodium   Date Value Ref Range Status   08/15/2022 132 (L) 136 - 145 mmol/L Final     Potassium   Date Value Ref Range Status   08/15/2022 3.9 3.5 - 5.1 mmol/L Final     Chloride   Date Value Ref Range Status   08/15/2022 96 95 - 110 mmol/L Final     CO2   Date Value Ref Range Status   08/15/2022 25 23 - 29 mmol/L Final     Glucose   Date Value Ref Range Status   08/15/2022 82 70 - 110 mg/dL Final     BUN   Date Value Ref Range Status   08/15/2022 15 8 - 23 mg/dL Final     Creatinine   Date Value Ref Range Status   08/15/2022 0.4 (L) 0.5 - 1.4 mg/dL Final     Calcium   Date Value Ref Range Status   08/15/2022 9.2 8.7 - 10.5 mg/dL Final     Total Protein   Date Value Ref Range Status   08/15/2022 7.1 6.0 - 8.4 g/dL Final     Albumin   Date Value Ref Range Status   08/15/2022 4.2 3.5 - 5.2 g/dL Final     Total Bilirubin   Date Value Ref Range Status   08/15/2022 0.6 0.1 - 1.0 mg/dL Final      Comment:     For infants and newborns, interpretation of results should be based  on gestational age, weight and in agreement with clinical  observations.    Premature Infant recommended reference ranges:  Up to 24 hours.............<8.0 mg/dL  Up to 48 hours............<12.0 mg/dL  3-5 days..................<15.0 mg/dL  6-29 days.................<15.0 mg/dL       Alkaline Phosphatase   Date Value Ref Range Status   08/15/2022 66 55 - 135 U/L Final     AST   Date Value Ref Range Status   08/15/2022 19 10 - 40 U/L Final     ALT   Date Value Ref Range Status   08/15/2022 15 10 - 44 U/L Final     Anion Gap   Date Value Ref Range Status   08/15/2022 11 8 - 16 mmol/L Final     eGFR if    Date Value Ref Range Status   07/25/2022 >60.0 >60 mL/min/1.73 m^2 Final     eGFR if non    Date Value Ref Range Status   07/25/2022 >60.0 >60 mL/min/1.73 m^2 Final     Comment:     Calculation used to obtain the estimated glomerular filtration  rate (eGFR) is the CKD-EPI equation.        No results found for: CEA  No results found for: PSA        Assessment/Plan:     Problem List Items Addressed This Visit       Small Cell Lung Cancer FRANCIS     See above.          Adenocarcinoma Lung Cancer RLL     Patient has a complicated picture with history of both small cell and NSCL lung cancers.  I have reviewed the CT images and discussed with IR and these are not amenable to CT needle biopsy given severe copd and location.  She will be referred to [facility] for review by pulmonary for EBUS and hopefully this can clarify this situation.  Discussed this today and will have her back with me once this is done.  appt is 12/28.  Will see her back in about 3 weeks. Spent ? 25 min in total care today.               Discussion:     Follow up in about 4 weeks (around 1/17/2023).      Electronically signed by Jonathan Wagner

## 2022-12-20 NOTE — ASSESSMENT & PLAN NOTE
Patient has a complicated picture with history of both small cell and NSCL lung cancers.  I have reviewed the CT images and discussed with IR and these are not amenable to CT needle biopsy given severe copd and location.  She will be referred to [facility] for review by pulmonary for EBUS and hopefully this can clarify this situation.  Discussed this today and will have her back with me once this is done.  appt is 12/28.  Will see her back in about 3 weeks. Spent ? 25 min in total care today.

## 2022-12-22 NOTE — TELEPHONE ENCOUNTER
----- Message from Livia Mae sent at 12/22/2022  8:44 AM CST -----  Pt would like a refill on hydrocodone and send it to [facility] on [address] [phone]

## 2022-12-28 NOTE — TELEPHONE ENCOUNTER
----- Message from Yasmin Alvarez sent at 12/28/2022  1:33 PM CST -----  Contact: pt  Pt requesting socorro back RE: would like to advise that she will not be going through with procedure      Confirmed contact below:  Contact Name:Anuja Travon  Phone Number: [phone]

## 2022-12-28 NOTE — PROGRESS NOTES
Pulmonary & Critical Care Medicine   Consultation Note    Reason for Consultation:    HPI: 73 y/o female with severe COPD- Complicated Malignancy history as summarized by Dr. Tinoco:   2/25/2022:  CT lung Screen:  3.4cm FRANCIS mass and 2.0cm RLL     3/11/2022:  PET scan:  3.9cm hypermetabolic mass in FRANCIS  2.3cm hypermetabolic mass in RLL     3/31/2022:  FRANCIS CT biopsy:  Small Cell carcinoma     Carbo/Etop:  Cycle 1:          4/27/2022  Cycle 2:          5/25/2022  Cycle 3:          6/15/2022  Cycle 4:          7/6/2022     5/3/2022:  RLL CT Biopsy:  Adenocarcinoma     5/15/2022-5/22/2022:  SBRT to Right LL Adenocarcinoma     7/11/2022:  xrt to complete to left lung     8/1/2022:  PET:  1. Favorable interval response to therapy, with decreased size and FDG uptake of noncalcified pulmonary nodules in both lungs.  2. No findings of regional metastatic disease in the chest, or distant metastatic disease.     11/18/2022:  Chest CT:  1.  Masses in the anterior left upper lobe and right lower lobe are unchanged from previous PET/CT.  2.  There is interval development of multiple new bilateral pulmonary nodules as described, suspicious for metastatic disease.  3.  Additional incidental observations as described.      Referral to me for consideration of biopsy (EBUS/Robotic). She has limited functional status and ambulates at slow pace. Still able to complete all ADLs independently. MMRC 3-4. Continues to smoke, but down.   Inhaler therapy includes trelegy and xopenex. Feels respiratory status at baseline. No recent hospitalizations or exacerbations of small airway disease.   Daughter present at todays visit.     Additional Pulmonary History:   Occupational/Environmental Exposures:Customer services. No exposures.. No hobbies.  Lives in [place].. Same home x 3 years. No renovations/construction.   Exposure to Animals/Pets: None   Travel History: None   History of exposures to TB: None   Family History of Lung Cancer: Personal  history as above.   Childhood history of Lung Disease:None   Anesthesia history- Not since age 31   OAC/AntiPLT- Eliquis-- Blood clot. Line related  port (2022)--   Recurrent PNA- None   Tobacco use- 5-6 daily currently       Past Medical History:   Diagnosis Date    Allergy     Arthritis     Asthma     Bursitis     COPD (chronic obstructive pulmonary disease)     Hypertension     Low sodium     Lung nodules 2022    Small cell carcinoma of left lung 2022    Thyroid disease     nodules    Tinea pedis      Past Surgical History:   Procedure Laterality Date    COLONOSCOPY N/A 2018    Procedure: COLONOSCOPY;  Surgeon: Grey Roberts MD;  Location: [facility] ENDO;  Service: Endoscopy;  Laterality: N/A;    CT BIOPSY LUNG W/ GUIDANCE Left 2022    HYSTERECTOMY      INSERTION OF TUNNELED CENTRAL VENOUS CATHETER (CVC) WITH SUBCUTANEOUS PORT N/A 2022    Procedure: [id number];  Surgeon: Noel Sadler MD;  Location: [facility] OR;  Service: General;  Laterality: N/A;    OOPHORECTOMY       Social History:   Social History     Socioeconomic History    Marital status:    Tobacco Use    Smoking status: Former     Packs/day: 0.50     Types: Cigarettes     Quit date: 4/3/2022     Years since quittin.7    Smokeless tobacco: Never   Substance and Sexual Activity    Alcohol use: Yes     Alcohol/week: 4.0 standard drinks     Types: 4 Glasses of wine per week     Comment: occ. glass of wine    Drug use: No     Family History   Problem Relation Age of Onset    Heart disease Mother     Cancer Mother         lung    Heart disease Father     Cancer Father         lymphoma    Diabetes Neg Hx      Drug Allergies:   Review of patient's allergies indicates:   Allergen Reactions    Tinactin [tolnaftate] Dermatitis    Advair diskus [fluticasone propion-salmeterol]      shakes    Albuterol      tremors    Sulfa (sulfonamide antibiotics)      Unknown    Years ago     Review of Systems:   A  "comprehensive 12-point review of systems was performed, and is negative except for those items mentioned above in the HPI section of this note.     Vital Signs:    BP (!) 148/63 (BP Location: Left arm, Patient Position: Sitting)   Pulse 86   Ht 5' 2" (1.575 m)   Wt 44.5 kg (98 lb 1.7 oz)   SpO2 (!) 88%   BMI 17.94 kg/m²      Physical Exam:   GEN- NAD AAOx3 Well Built, Well Appearing   HEENT- ATNC, PERRLA, EOMI, OP-Cl. No JVD, LAD or bruit noted. Trachea Midline.   CV- RRR No M/R/G  RESP- CTA-Bilateral   GI- S/NT/ND. Positive BS X 4. No HSM Noted  BACK- Spine midline. No step off, crepitus or deformity noted. No midline TTP.   Ext- MAEW, No deformity. No edema or rashes noted.   Neuro- Strength 5/5 symmetric. CN 2-12 intact. Normal gait. Normal sensation.      Personal Review and Summary of Prior Diagnostics    Laboratory Studies: Reviewed     Microbiology Data: Reviewed       Summary of Chest Imaging Personally Reviewed:     CT Chest-     MRI Brain- 1. No evidence of intracranial metastatic disease.  2. Minor chronic small vessel ischemic changes.  3. Mild chronic frontal sinusitis.  4. Suboptimally assessed on this examination, there are changes of severe degenerative disc disease in the upper cervical spine.    CT CHEST 11/2022- 1.  Masses in the anterior left upper lobe and right lower lobe are unchanged from previous PET/CT.  2.  There is interval development of multiple new bilateral pulmonary nodules as described, suspicious for metastatic disease.  3.  Additional incidental observations as described    NM PET 8/2022- Comparison to prior exams including PET CT of 03/11/2022. There has been favorable interval response to therapy, with significant interval decreased size and near resolution of FDG hypermetabolism of bilateral pulmonary masses. The left upper lobe nodule abutting the pleura anteriorly measures 17 mm maximum dimension with max SUV of 1.7. The right lower lobe nodule measures 14 mm with max " SUV of 1.4.     There is no abnormal focal FDG hypermetabolism in the mediastinum or bettina, or elsewhere in the head, neck, chest, abdomen, pelvis, or the skeletal system to suggest metastatic disease.     CT images of the brain show no intra-axial mass or abnormal extra-axial fluid. There are no suspicious sclerotic or lytic osseous abnormalities of the calvarium.    2D Echo: None     PFT's:   FEV1/FVC- 49 (LLN-65)  FEV1- 0.93L (29%)   FVC- 1.88L (77%)   + BD                 Assessment:       No diagnosis found.    Outpatient Encounter Medications as of 12/28/2022   Medication Sig Dispense Refill    alendronate (FOSAMAX) 70 MG tablet Take 1 tablet by mouth once a week 12 tablet 3    apixaban (ELIQUIS) 2.5 mg Tab Take 1 tablet (2.5 mg total) by mouth 2 (two) times daily. 60 tablet 5    aspirin (ECOTRIN) 81 MG EC tablet Take 81 mg by mouth once daily.      cholecalciferol, vitamin D3, 3,000 unit Tab Take by mouth.      fluticasone-umeclidin-vilanter (TRELEGY ELLIPTA) 200-62.5-25 mcg inhaler Inhale 1 puff into the lungs once daily. 90 each 3    HYDROcodone-acetaminophen (NORCO) 5-325 mg per tablet Take 1 tablet by mouth every 6 (six) hours as needed for Pain. 40 tablet 0    ibuprofen (ADVIL,MOTRIN) 200 MG tablet Take 200 mg by mouth every 6 (six) hours as needed for Pain.      levalbuterol (XOPENEX HFA) 45 mcg/actuation inhaler INHALE 1 TO 2 PUFFS INTO LUNGS EVERY 4 HOURS AS NEEDED FOR WHEEZING AND FOR SHORTNESS OF BREATH 45 g 1    levocetirizine (XYZAL) 5 MG tablet Take 1 tablet (5 mg total) by mouth every evening. 30 tablet 2    methIMAzole (TAPAZOLE) 5 MG Tab Take one tablet Monday-Friday and two tablets on Saturday and Sunday. 180 tablet 3    multivit with minerals/lutein (MULTIVITAMIN 50 PLUS ORAL) Take by mouth.      nicotine (NICODERM CQ) 14 mg/24 hr Place 1 patch onto the skin every 24 hours.      ondansetron (ZOFRAN) 8 MG tablet Take 1 tablet (8 mg total) by mouth every 8 (eight) hours as needed. 30 tablet 5     promethazine (PHENERGAN) 25 MG tablet Take 1 tablet (25 mg total) by mouth every 4 to 6 hours as needed. 30 tablet 5    rosuvastatin (CRESTOR) 20 MG tablet Take 1 tablet by mouth once daily 90 tablet 3    [DISCONTINUED] fluticasone-salmeterol 100-50 mcg/dose (ADVAIR) 100-50 mcg/dose diskus inhaler Inhale 1 puff into the lungs 2 (two) times daily.      [DISCONTINUED] HYDROcodone-acetaminophen (NORCO) 5-325 mg per tablet Take 1 tablet by mouth every 6 (six) hours as needed for Pain. 40 tablet 0     No facility-administered encounter medications on file as of 12/28/2022.     No orders of the defined types were placed in this encounter.      Plan:       Synchronous NSCLC (adeno) + small cell- oncology history summarized as above. On repeat CT imaging in November she has multiple new pulmonary nodules bilateral with spiculated appearance. The new nodule are highly suspicious (almost certainly) e/o new metastatic disease. Oncology team is unclear if progression of adeno or small cell and requesting tissue to biopsy.. On my review of imaging she does not appear to have any significant LN enlargement to target. The nodules are amenable to robotic approach, but given baseline respiratory impairment and profound emphysematous change she is certainly at much higher risk for complication. I have offered this procedure. Patient and daughter would like to discuss further.     -- Repeat CT with robotic protocol.. LLN is about 1cm, but has feeding airway and less surrounding emphysema.. This is first target, but will map multiple if she decides to proceed. Will evaluate nodes concurrently with EBUS  -- Messaged Dr. Tinoco to provide update.   -- Will need to hold eliquis 48h prior to procedure     Will tentatively schedule 1/24 or 27th if they wish to proceed.     2. COPD- MMRC  3-4. No significant exacerbation history. Sever obstruction by PFTs with profound UL predominate emphysematous changes. SpO2 88% in clinic. Prior walk  2/2022-- Lowest de-sat 89.. No E/O acute exacerbation on my evaluation today.     -- Maintain LABA/LAMA/ICS and prn Xopenex.   -- given marginal SpO2 I will repeat walk as I would like to have supplemental O2 arranged prior to procedure if indicated.   -- Keep follow up with current pulmonologist     3. Tobacco use- Need for cessation discussed.     Jesu Ayala MD   [facility]

## 2022-12-28 NOTE — TELEPHONE ENCOUNTER
I spoke with patient. Ms Cool stated she does not want to go through with any procedures. I let her know I would send a message to Dr Ayala for review and to advise.   Also, I would cancel ct scan scheduled on 1/5/23 at 12:45pm. However, 6mwt still scheduled for 1/5/23 at 1:20pm. She would like to see if she needs oxygen. Patient verbalized understanding.

## 2023-01-01 ENCOUNTER — OFFICE VISIT (OUTPATIENT)
Dept: HOME HEALTH SERVICES | Facility: CLINIC | Age: 73
End: 2023-01-01
Payer: MEDICARE

## 2023-01-01 ENCOUNTER — OFFICE VISIT (OUTPATIENT)
Dept: ENDOCRINOLOGY | Facility: CLINIC | Age: 73
End: 2023-01-01
Payer: MEDICARE

## 2023-01-01 ENCOUNTER — INFUSION (OUTPATIENT)
Dept: INFUSION THERAPY | Facility: HOSPITAL | Age: 73
End: 2023-01-01
Attending: INTERNAL MEDICINE
Payer: MEDICARE

## 2023-01-01 ENCOUNTER — LAB VISIT (OUTPATIENT)
Dept: LAB | Facility: HOSPITAL | Age: 73
End: 2023-01-01
Attending: NURSE PRACTITIONER
Payer: MEDICARE

## 2023-01-01 ENCOUNTER — OFFICE VISIT (OUTPATIENT)
Dept: HEMATOLOGY/ONCOLOGY | Facility: CLINIC | Age: 73
End: 2023-01-01
Payer: MEDICARE

## 2023-01-01 ENCOUNTER — TELEPHONE (OUTPATIENT)
Dept: FAMILY MEDICINE | Facility: CLINIC | Age: 73
End: 2023-01-01
Payer: MEDICARE

## 2023-01-01 ENCOUNTER — HOSPITAL ENCOUNTER (OUTPATIENT)
Dept: RADIOLOGY | Facility: HOSPITAL | Age: 73
Discharge: HOME | End: 2023-01-27
Attending: INTERNAL MEDICINE
Payer: MEDICARE

## 2023-01-01 ENCOUNTER — LAB VISIT (OUTPATIENT)
Dept: LAB | Facility: HOSPITAL | Age: 73
End: 2023-01-01
Attending: INTERNAL MEDICINE
Payer: MEDICARE

## 2023-01-01 ENCOUNTER — TELEPHONE (OUTPATIENT)
Dept: PULMONOLOGY | Facility: CLINIC | Age: 73
End: 2023-01-01

## 2023-01-01 ENCOUNTER — TELEPHONE (OUTPATIENT)
Dept: HEMATOLOGY/ONCOLOGY | Facility: CLINIC | Age: 73
End: 2023-01-01

## 2023-01-01 ENCOUNTER — HOSPITAL ENCOUNTER (OUTPATIENT)
Dept: RADIOLOGY | Facility: HOSPITAL | Age: 73
Discharge: HOME | End: 2023-09-06
Attending: PHYSICIAN ASSISTANT
Payer: MEDICARE

## 2023-01-01 ENCOUNTER — HOSPITAL ENCOUNTER (OUTPATIENT)
Dept: RADIOLOGY | Facility: HOSPITAL | Age: 73
Discharge: HOME | End: 2023-05-18
Attending: NURSE PRACTITIONER
Payer: MEDICARE

## 2023-01-01 ENCOUNTER — OFFICE VISIT (OUTPATIENT)
Dept: PULMONOLOGY | Facility: CLINIC | Age: 73
End: 2023-01-01
Payer: MEDICARE

## 2023-01-01 ENCOUNTER — LAB VISIT (OUTPATIENT)
Dept: LAB | Facility: HOSPITAL | Age: 73
End: 2023-01-01
Payer: MEDICARE

## 2023-01-01 ENCOUNTER — HOSPITAL ENCOUNTER (OUTPATIENT)
Dept: RADIOLOGY | Facility: HOSPITAL | Age: 73
Discharge: HOME | End: 2023-04-18
Attending: NURSE PRACTITIONER
Payer: MEDICARE

## 2023-01-01 ENCOUNTER — HOSPITAL ENCOUNTER (OUTPATIENT)
Dept: RADIOLOGY | Facility: HOSPITAL | Age: 73
Discharge: HOME | End: 2023-08-17
Attending: INTERNAL MEDICINE
Payer: MEDICARE

## 2023-01-01 ENCOUNTER — OFFICE VISIT (OUTPATIENT)
Dept: FAMILY MEDICINE | Facility: CLINIC | Age: 73
End: 2023-01-01
Payer: MEDICARE

## 2023-01-01 ENCOUNTER — HOSPITAL ENCOUNTER (OUTPATIENT)
Dept: RADIOLOGY | Facility: CLINIC | Age: 73
Discharge: HOME | End: 2023-09-08
Attending: FAMILY MEDICINE
Payer: MEDICARE

## 2023-01-01 ENCOUNTER — PES CALL (OUTPATIENT)
Dept: ADMINISTRATIVE | Facility: CLINIC | Age: 73
End: 2023-01-01
Payer: MEDICARE

## 2023-01-01 ENCOUNTER — DOCUMENTATION ONLY (OUTPATIENT)
Dept: PULMONOLOGY | Facility: CLINIC | Age: 73
End: 2023-01-01
Payer: MEDICARE

## 2023-01-01 ENCOUNTER — SOCIAL WORK (OUTPATIENT)
Dept: HEMATOLOGY/ONCOLOGY | Facility: CLINIC | Age: 73
End: 2023-01-01

## 2023-01-01 ENCOUNTER — HOSPITAL ENCOUNTER (OUTPATIENT)
Dept: RADIOLOGY | Facility: HOSPITAL | Age: 73
Discharge: HOME | End: 2023-04-05
Attending: NURSE PRACTITIONER
Payer: MEDICARE

## 2023-01-01 ENCOUNTER — HOSPITAL ENCOUNTER (OUTPATIENT)
Dept: PULMONOLOGY | Facility: CLINIC | Age: 73
Discharge: HOME | End: 2023-01-05
Payer: MEDICARE

## 2023-01-01 VITALS
TEMPERATURE: 98 F | SYSTOLIC BLOOD PRESSURE: 128 MMHG | RESPIRATION RATE: 18 BRPM | HEIGHT: 62 IN | HEIGHT: 62 IN | HEART RATE: 100 BPM | DIASTOLIC BLOOD PRESSURE: 57 MMHG | WEIGHT: 98.88 LBS | TEMPERATURE: 97 F | BODY MASS INDEX: 18.35 KG/M2 | SYSTOLIC BLOOD PRESSURE: 130 MMHG | HEART RATE: 97 BPM | BODY MASS INDEX: 18.2 KG/M2 | DIASTOLIC BLOOD PRESSURE: 65 MMHG | WEIGHT: 99.69 LBS | RESPIRATION RATE: 17 BRPM

## 2023-01-01 VITALS
WEIGHT: 97.19 LBS | RESPIRATION RATE: 17 BRPM | RESPIRATION RATE: 18 BRPM | BODY MASS INDEX: 18.24 KG/M2 | OXYGEN SATURATION: 94 % | SYSTOLIC BLOOD PRESSURE: 104 MMHG | HEIGHT: 62 IN | SYSTOLIC BLOOD PRESSURE: 135 MMHG | HEART RATE: 92 BPM | DIASTOLIC BLOOD PRESSURE: 62 MMHG | BODY MASS INDEX: 17.88 KG/M2 | DIASTOLIC BLOOD PRESSURE: 69 MMHG | TEMPERATURE: 98 F | TEMPERATURE: 98 F | WEIGHT: 99.13 LBS | HEIGHT: 62 IN | HEART RATE: 91 BPM | OXYGEN SATURATION: 90 %

## 2023-01-01 VITALS
HEART RATE: 68 BPM | HEART RATE: 87 BPM | BODY MASS INDEX: 17.81 KG/M2 | BODY MASS INDEX: 18.29 KG/M2 | WEIGHT: 99.38 LBS | SYSTOLIC BLOOD PRESSURE: 106 MMHG | TEMPERATURE: 98 F | HEART RATE: 103 BPM | WEIGHT: 97.5 LBS | DIASTOLIC BLOOD PRESSURE: 62 MMHG | HEIGHT: 62 IN | TEMPERATURE: 98 F | TEMPERATURE: 99 F | SYSTOLIC BLOOD PRESSURE: 130 MMHG | DIASTOLIC BLOOD PRESSURE: 72 MMHG | WEIGHT: 96.81 LBS | HEIGHT: 62 IN | BODY MASS INDEX: 18.01 KG/M2 | HEIGHT: 62 IN | BODY MASS INDEX: 17.83 KG/M2 | RESPIRATION RATE: 18 BRPM | SYSTOLIC BLOOD PRESSURE: 119 MMHG | DIASTOLIC BLOOD PRESSURE: 56 MMHG | OXYGEN SATURATION: 92 % | SYSTOLIC BLOOD PRESSURE: 120 MMHG | OXYGEN SATURATION: 90 % | RESPIRATION RATE: 18 BRPM | WEIGHT: 97.88 LBS | RESPIRATION RATE: 18 BRPM | DIASTOLIC BLOOD PRESSURE: 55 MMHG

## 2023-01-01 VITALS
DIASTOLIC BLOOD PRESSURE: 70 MMHG | SYSTOLIC BLOOD PRESSURE: 132 MMHG | WEIGHT: 96.88 LBS | BODY MASS INDEX: 18.01 KG/M2 | HEIGHT: 62 IN | RESPIRATION RATE: 20 BRPM | BODY MASS INDEX: 17.72 KG/M2 | WEIGHT: 97.88 LBS | HEART RATE: 103 BPM | DIASTOLIC BLOOD PRESSURE: 60 MMHG | TEMPERATURE: 99 F | SYSTOLIC BLOOD PRESSURE: 110 MMHG

## 2023-01-01 VITALS
TEMPERATURE: 98 F | BODY MASS INDEX: 18 KG/M2 | TEMPERATURE: 97 F | DIASTOLIC BLOOD PRESSURE: 68 MMHG | TEMPERATURE: 98 F | WEIGHT: 97 LBS | WEIGHT: 97.63 LBS | HEART RATE: 99 BPM | RESPIRATION RATE: 18 BRPM | OXYGEN SATURATION: 97 % | SYSTOLIC BLOOD PRESSURE: 127 MMHG | BODY MASS INDEX: 17.85 KG/M2 | DIASTOLIC BLOOD PRESSURE: 73 MMHG | DIASTOLIC BLOOD PRESSURE: 74 MMHG | WEIGHT: 97.81 LBS | BODY MASS INDEX: 17.85 KG/M2 | SYSTOLIC BLOOD PRESSURE: 121 MMHG | RESPIRATION RATE: 16 BRPM | HEART RATE: 86 BPM | HEIGHT: 62 IN | HEIGHT: 62 IN | SYSTOLIC BLOOD PRESSURE: 155 MMHG | HEART RATE: 94 BPM

## 2023-01-01 VITALS
BODY MASS INDEX: 17.32 KG/M2 | HEIGHT: 62 IN | TEMPERATURE: 98 F | OXYGEN SATURATION: 95 % | HEART RATE: 95 BPM | SYSTOLIC BLOOD PRESSURE: 111 MMHG | DIASTOLIC BLOOD PRESSURE: 58 MMHG | RESPIRATION RATE: 18 BRPM | WEIGHT: 94.13 LBS

## 2023-01-01 VITALS
DIASTOLIC BLOOD PRESSURE: 63 MMHG | TEMPERATURE: 98 F | BODY MASS INDEX: 17.23 KG/M2 | HEART RATE: 105 BPM | OXYGEN SATURATION: 93 % | WEIGHT: 94.19 LBS | SYSTOLIC BLOOD PRESSURE: 143 MMHG

## 2023-01-01 VITALS
TEMPERATURE: 98 F | SYSTOLIC BLOOD PRESSURE: 162 MMHG | DIASTOLIC BLOOD PRESSURE: 68 MMHG | BODY MASS INDEX: 18.03 KG/M2 | HEART RATE: 91 BPM | WEIGHT: 98.63 LBS

## 2023-01-01 VITALS
TEMPERATURE: 99 F | WEIGHT: 93.69 LBS | RESPIRATION RATE: 16 BRPM | OXYGEN SATURATION: 95 % | DIASTOLIC BLOOD PRESSURE: 60 MMHG | SYSTOLIC BLOOD PRESSURE: 100 MMHG | HEIGHT: 62 IN | HEART RATE: 60 BPM | BODY MASS INDEX: 17.24 KG/M2

## 2023-01-01 VITALS
DIASTOLIC BLOOD PRESSURE: 70 MMHG | HEIGHT: 62 IN | OXYGEN SATURATION: 96 % | HEART RATE: 101 BPM | WEIGHT: 97.69 LBS | SYSTOLIC BLOOD PRESSURE: 118 MMHG | TEMPERATURE: 98 F | BODY MASS INDEX: 17.98 KG/M2

## 2023-01-01 VITALS
RESPIRATION RATE: 17 BRPM | DIASTOLIC BLOOD PRESSURE: 64 MMHG | OXYGEN SATURATION: 92 % | TEMPERATURE: 98 F | WEIGHT: 97 LBS | HEIGHT: 62 IN | SYSTOLIC BLOOD PRESSURE: 121 MMHG | BODY MASS INDEX: 17.85 KG/M2 | HEART RATE: 91 BPM

## 2023-01-01 VITALS
BODY MASS INDEX: 17.87 KG/M2 | DIASTOLIC BLOOD PRESSURE: 65 MMHG | WEIGHT: 97.13 LBS | SYSTOLIC BLOOD PRESSURE: 138 MMHG | HEIGHT: 62 IN | HEART RATE: 88 BPM | TEMPERATURE: 98 F | RESPIRATION RATE: 18 BRPM

## 2023-01-01 VITALS
DIASTOLIC BLOOD PRESSURE: 70 MMHG | HEART RATE: 102 BPM | TEMPERATURE: 98 F | DIASTOLIC BLOOD PRESSURE: 61 MMHG | BODY MASS INDEX: 17.56 KG/M2 | OXYGEN SATURATION: 94 % | HEART RATE: 105 BPM | WEIGHT: 96 LBS | SYSTOLIC BLOOD PRESSURE: 138 MMHG | BODY MASS INDEX: 17.66 KG/M2 | SYSTOLIC BLOOD PRESSURE: 128 MMHG | TEMPERATURE: 99 F | HEIGHT: 62 IN | WEIGHT: 96 LBS

## 2023-01-01 VITALS
HEART RATE: 86 BPM | SYSTOLIC BLOOD PRESSURE: 104 MMHG | BODY MASS INDEX: 18.03 KG/M2 | OXYGEN SATURATION: 95 % | TEMPERATURE: 98 F | DIASTOLIC BLOOD PRESSURE: 59 MMHG | HEIGHT: 62 IN | RESPIRATION RATE: 18 BRPM | WEIGHT: 98 LBS

## 2023-01-01 VITALS
OXYGEN SATURATION: 95 % | SYSTOLIC BLOOD PRESSURE: 132 MMHG | TEMPERATURE: 98 F | RESPIRATION RATE: 15 BRPM | HEIGHT: 62 IN | BODY MASS INDEX: 18.1 KG/M2 | WEIGHT: 98.38 LBS | HEART RATE: 110 BPM | DIASTOLIC BLOOD PRESSURE: 54 MMHG

## 2023-01-01 VITALS
BODY MASS INDEX: 17.48 KG/M2 | HEART RATE: 101 BPM | TEMPERATURE: 98 F | HEIGHT: 62 IN | SYSTOLIC BLOOD PRESSURE: 133 MMHG | RESPIRATION RATE: 18 BRPM | OXYGEN SATURATION: 98 % | DIASTOLIC BLOOD PRESSURE: 60 MMHG | WEIGHT: 95 LBS

## 2023-01-01 VITALS
WEIGHT: 99 LBS | HEART RATE: 91 BPM | TEMPERATURE: 98 F | SYSTOLIC BLOOD PRESSURE: 114 MMHG | RESPIRATION RATE: 17 BRPM | BODY MASS INDEX: 18.22 KG/M2 | DIASTOLIC BLOOD PRESSURE: 52 MMHG | HEIGHT: 62 IN

## 2023-01-01 VITALS
DIASTOLIC BLOOD PRESSURE: 49 MMHG | TEMPERATURE: 98 F | WEIGHT: 97 LBS | SYSTOLIC BLOOD PRESSURE: 112 MMHG | SYSTOLIC BLOOD PRESSURE: 108 MMHG | WEIGHT: 97.69 LBS | DIASTOLIC BLOOD PRESSURE: 64 MMHG | HEIGHT: 62 IN | HEART RATE: 108 BPM | HEART RATE: 86 BPM | OXYGEN SATURATION: 92 % | BODY MASS INDEX: 17.98 KG/M2 | HEIGHT: 62 IN | RESPIRATION RATE: 18 BRPM | OXYGEN SATURATION: 93 % | BODY MASS INDEX: 17.85 KG/M2 | RESPIRATION RATE: 17 BRPM | TEMPERATURE: 98 F

## 2023-01-01 VITALS
SYSTOLIC BLOOD PRESSURE: 131 MMHG | OXYGEN SATURATION: 93 % | BODY MASS INDEX: 18.06 KG/M2 | WEIGHT: 98.13 LBS | HEIGHT: 62 IN | HEART RATE: 94 BPM | RESPIRATION RATE: 18 BRPM | TEMPERATURE: 98 F | DIASTOLIC BLOOD PRESSURE: 57 MMHG

## 2023-01-01 VITALS
DIASTOLIC BLOOD PRESSURE: 67 MMHG | SYSTOLIC BLOOD PRESSURE: 123 MMHG | RESPIRATION RATE: 18 BRPM | BODY MASS INDEX: 17.79 KG/M2 | TEMPERATURE: 99 F | HEART RATE: 102 BPM | WEIGHT: 96.69 LBS | HEIGHT: 62 IN

## 2023-01-01 VITALS
HEART RATE: 85 BPM | RESPIRATION RATE: 17 BRPM | DIASTOLIC BLOOD PRESSURE: 53 MMHG | TEMPERATURE: 97 F | WEIGHT: 98.38 LBS | HEIGHT: 62 IN | BODY MASS INDEX: 18.1 KG/M2 | SYSTOLIC BLOOD PRESSURE: 133 MMHG

## 2023-01-01 VITALS
WEIGHT: 98.13 LBS | TEMPERATURE: 98 F | HEART RATE: 101 BPM | BODY MASS INDEX: 18.06 KG/M2 | HEIGHT: 62 IN | SYSTOLIC BLOOD PRESSURE: 106 MMHG | RESPIRATION RATE: 18 BRPM | DIASTOLIC BLOOD PRESSURE: 65 MMHG

## 2023-01-01 VITALS
RESPIRATION RATE: 18 BRPM | SYSTOLIC BLOOD PRESSURE: 108 MMHG | OXYGEN SATURATION: 92 % | HEIGHT: 62 IN | BODY MASS INDEX: 17.98 KG/M2 | DIASTOLIC BLOOD PRESSURE: 50 MMHG | TEMPERATURE: 98 F | HEART RATE: 90 BPM | WEIGHT: 97.69 LBS

## 2023-01-01 VITALS
RESPIRATION RATE: 18 BRPM | BODY MASS INDEX: 18.22 KG/M2 | HEIGHT: 62 IN | TEMPERATURE: 98 F | WEIGHT: 99 LBS | HEART RATE: 104 BPM | DIASTOLIC BLOOD PRESSURE: 57 MMHG | OXYGEN SATURATION: 96 % | SYSTOLIC BLOOD PRESSURE: 123 MMHG

## 2023-01-01 VITALS
WEIGHT: 97.88 LBS | OXYGEN SATURATION: 95 % | HEART RATE: 93 BPM | SYSTOLIC BLOOD PRESSURE: 118 MMHG | DIASTOLIC BLOOD PRESSURE: 65 MMHG | DIASTOLIC BLOOD PRESSURE: 58 MMHG | HEIGHT: 62 IN | BODY MASS INDEX: 18.33 KG/M2 | TEMPERATURE: 98 F | TEMPERATURE: 98 F | HEART RATE: 99 BPM | RESPIRATION RATE: 18 BRPM | WEIGHT: 99.63 LBS | SYSTOLIC BLOOD PRESSURE: 116 MMHG | HEIGHT: 62 IN | RESPIRATION RATE: 15 BRPM | BODY MASS INDEX: 18.01 KG/M2

## 2023-01-01 VITALS
WEIGHT: 98.31 LBS | BODY MASS INDEX: 18.07 KG/M2 | OXYGEN SATURATION: 94 % | RESPIRATION RATE: 18 BRPM | DIASTOLIC BLOOD PRESSURE: 61 MMHG | TEMPERATURE: 97 F | TEMPERATURE: 99 F | HEIGHT: 62 IN | DIASTOLIC BLOOD PRESSURE: 62 MMHG | BODY MASS INDEX: 18.09 KG/M2 | SYSTOLIC BLOOD PRESSURE: 132 MMHG | HEART RATE: 92 BPM | SYSTOLIC BLOOD PRESSURE: 123 MMHG | RESPIRATION RATE: 17 BRPM | WEIGHT: 98.19 LBS | HEIGHT: 62 IN | HEART RATE: 105 BPM

## 2023-01-01 VITALS
RESPIRATION RATE: 16 BRPM | TEMPERATURE: 98 F | DIASTOLIC BLOOD PRESSURE: 68 MMHG | HEART RATE: 109 BPM | BODY MASS INDEX: 17.79 KG/M2 | HEIGHT: 62 IN | OXYGEN SATURATION: 94 % | WEIGHT: 96.69 LBS | SYSTOLIC BLOOD PRESSURE: 116 MMHG

## 2023-01-01 VITALS
DIASTOLIC BLOOD PRESSURE: 56 MMHG | RESPIRATION RATE: 18 BRPM | HEIGHT: 62 IN | HEART RATE: 110 BPM | SYSTOLIC BLOOD PRESSURE: 118 MMHG | WEIGHT: 98 LBS | OXYGEN SATURATION: 86 % | TEMPERATURE: 98 F | BODY MASS INDEX: 18.03 KG/M2

## 2023-01-01 VITALS
HEIGHT: 62 IN | RESPIRATION RATE: 16 BRPM | SYSTOLIC BLOOD PRESSURE: 143 MMHG | TEMPERATURE: 98 F | RESPIRATION RATE: 20 BRPM | DIASTOLIC BLOOD PRESSURE: 65 MMHG | HEART RATE: 105 BPM | SYSTOLIC BLOOD PRESSURE: 132 MMHG | TEMPERATURE: 98 F | BODY MASS INDEX: 17.85 KG/M2 | HEIGHT: 62 IN | WEIGHT: 97 LBS | BODY MASS INDEX: 17.78 KG/M2 | DIASTOLIC BLOOD PRESSURE: 72 MMHG | WEIGHT: 96.63 LBS | HEART RATE: 83 BPM

## 2023-01-01 VITALS
HEIGHT: 62 IN | TEMPERATURE: 98 F | WEIGHT: 98.81 LBS | DIASTOLIC BLOOD PRESSURE: 63 MMHG | RESPIRATION RATE: 17 BRPM | SYSTOLIC BLOOD PRESSURE: 126 MMHG | HEART RATE: 87 BPM | BODY MASS INDEX: 18.18 KG/M2 | OXYGEN SATURATION: 91 %

## 2023-01-01 VITALS
SYSTOLIC BLOOD PRESSURE: 116 MMHG | HEIGHT: 62 IN | RESPIRATION RATE: 18 BRPM | DIASTOLIC BLOOD PRESSURE: 62 MMHG | TEMPERATURE: 97 F | HEART RATE: 84 BPM | BODY MASS INDEX: 17.77 KG/M2 | WEIGHT: 96.56 LBS

## 2023-01-01 VITALS
BODY MASS INDEX: 17.56 KG/M2 | DIASTOLIC BLOOD PRESSURE: 65 MMHG | SYSTOLIC BLOOD PRESSURE: 137 MMHG | WEIGHT: 96 LBS | HEART RATE: 99 BPM

## 2023-01-01 VITALS
HEART RATE: 90 BPM | HEIGHT: 62 IN | BODY MASS INDEX: 17.92 KG/M2 | RESPIRATION RATE: 17 BRPM | TEMPERATURE: 98 F | DIASTOLIC BLOOD PRESSURE: 58 MMHG | SYSTOLIC BLOOD PRESSURE: 124 MMHG | WEIGHT: 97.38 LBS | OXYGEN SATURATION: 95 %

## 2023-01-01 VITALS
SYSTOLIC BLOOD PRESSURE: 126 MMHG | RESPIRATION RATE: 16 BRPM | WEIGHT: 99.13 LBS | BODY MASS INDEX: 18.24 KG/M2 | DIASTOLIC BLOOD PRESSURE: 68 MMHG | HEART RATE: 85 BPM | HEIGHT: 62 IN | TEMPERATURE: 98 F

## 2023-01-01 VITALS
BODY MASS INDEX: 17.65 KG/M2 | HEART RATE: 45 BPM | WEIGHT: 96.5 LBS | DIASTOLIC BLOOD PRESSURE: 62 MMHG | OXYGEN SATURATION: 95 % | SYSTOLIC BLOOD PRESSURE: 108 MMHG

## 2023-01-01 VITALS
HEART RATE: 86 BPM | SYSTOLIC BLOOD PRESSURE: 173 MMHG | DIASTOLIC BLOOD PRESSURE: 71 MMHG | WEIGHT: 97.31 LBS | TEMPERATURE: 98 F | BODY MASS INDEX: 17.8 KG/M2

## 2023-01-01 VITALS
DIASTOLIC BLOOD PRESSURE: 62 MMHG | WEIGHT: 95 LBS | SYSTOLIC BLOOD PRESSURE: 112 MMHG | HEIGHT: 62 IN | RESPIRATION RATE: 17 BRPM | TEMPERATURE: 97 F | HEART RATE: 93 BPM | BODY MASS INDEX: 17.48 KG/M2

## 2023-01-01 VITALS — HEIGHT: 62 IN | BODY MASS INDEX: 17.85 KG/M2 | WEIGHT: 97 LBS

## 2023-01-01 DIAGNOSIS — C34.12 MALIGNANT NEOPLASM OF UPPER LOBE OF LEFT LUNG: ICD-10-CM

## 2023-01-01 DIAGNOSIS — R05.8 PRODUCTIVE COUGH: ICD-10-CM

## 2023-01-01 DIAGNOSIS — J44.9 CHRONIC OBSTRUCTIVE PULMONARY DISEASE, UNSPECIFIED COPD TYPE: ICD-10-CM

## 2023-01-01 DIAGNOSIS — T45.1X5A CHEMOTHERAPY-INDUCED NEUTROPENIA: ICD-10-CM

## 2023-01-01 DIAGNOSIS — I82.90 ACUTE DEEP VEIN THROMBOSIS (DVT) OF NON-EXTREMITY VEIN: ICD-10-CM

## 2023-01-01 DIAGNOSIS — D70.1 CHEMOTHERAPY-INDUCED NEUTROPENIA: ICD-10-CM

## 2023-01-01 DIAGNOSIS — C34.90 SMALL CELL LUNG CANCER: ICD-10-CM

## 2023-01-01 DIAGNOSIS — I10 ESSENTIAL HYPERTENSION: Primary | ICD-10-CM

## 2023-01-01 DIAGNOSIS — C34.31 MALIGNANT NEOPLASM OF LOWER LOBE OF RIGHT LUNG: ICD-10-CM

## 2023-01-01 DIAGNOSIS — M54.9 BACK PAIN, UNSPECIFIED BACK LOCATION, UNSPECIFIED BACK PAIN LATERALITY, UNSPECIFIED CHRONICITY: ICD-10-CM

## 2023-01-01 DIAGNOSIS — J01.10 ACUTE NON-RECURRENT FRONTAL SINUSITIS: ICD-10-CM

## 2023-01-01 DIAGNOSIS — T45.1X5A CHEMOTHERAPY-INDUCED NEUTROPENIA: Primary | ICD-10-CM

## 2023-01-01 DIAGNOSIS — E86.0 DEHYDRATION: Primary | ICD-10-CM

## 2023-01-01 DIAGNOSIS — C34.90 NON-SMALL CELL LUNG CANCER, UNSPECIFIED LATERALITY: ICD-10-CM

## 2023-01-01 DIAGNOSIS — E78.5 HYPERLIPIDEMIA LDL GOAL <130: ICD-10-CM

## 2023-01-01 DIAGNOSIS — Z72.0 TOBACCO USE: ICD-10-CM

## 2023-01-01 DIAGNOSIS — C34.12 MALIGNANT NEOPLASM OF UPPER LOBE OF LEFT LUNG: Primary | ICD-10-CM

## 2023-01-01 DIAGNOSIS — E05.90 HYPERTHYROIDISM: Primary | ICD-10-CM

## 2023-01-01 DIAGNOSIS — G89.3 CANCER ASSOCIATED PAIN: ICD-10-CM

## 2023-01-01 DIAGNOSIS — D70.1 CHEMOTHERAPY-INDUCED NEUTROPENIA: Primary | ICD-10-CM

## 2023-01-01 DIAGNOSIS — E04.2 MULTIPLE THYROID NODULES: ICD-10-CM

## 2023-01-01 DIAGNOSIS — I82.C11 ACUTE THROMBOSIS OF RIGHT INTERNAL JUGULAR VEIN: ICD-10-CM

## 2023-01-01 DIAGNOSIS — M54.9 ACUTE BILATERAL BACK PAIN, UNSPECIFIED BACK LOCATION: ICD-10-CM

## 2023-01-01 DIAGNOSIS — B19.9 VIRAL HEPATITIS WITHOUT HEPATIC COMA, UNSPECIFIED CHRONICITY, UNSPECIFIED VIRAL HEPATITIS TYPE: ICD-10-CM

## 2023-01-01 DIAGNOSIS — R63.6 UNDERWEIGHT: ICD-10-CM

## 2023-01-01 DIAGNOSIS — E55.9 HYPOVITAMINOSIS D: ICD-10-CM

## 2023-01-01 DIAGNOSIS — M85.80 OSTEOPENIA, UNSPECIFIED LOCATION: ICD-10-CM

## 2023-01-01 DIAGNOSIS — C34.90 SMALL CELL LUNG CANCER: Primary | ICD-10-CM

## 2023-01-01 DIAGNOSIS — E05.90 HYPERTHYROIDISM: ICD-10-CM

## 2023-01-01 DIAGNOSIS — R53.83 FATIGUE, UNSPECIFIED TYPE: ICD-10-CM

## 2023-01-01 DIAGNOSIS — R09.02 HYPOXIA: ICD-10-CM

## 2023-01-01 DIAGNOSIS — E87.5 HYPERKALEMIA: Primary | ICD-10-CM

## 2023-01-01 DIAGNOSIS — R91.8 PULMONARY NODULES/LESIONS, MULTIPLE: ICD-10-CM

## 2023-01-01 DIAGNOSIS — C34.31 MALIGNANT NEOPLASM OF LOWER LOBE OF RIGHT LUNG: Primary | ICD-10-CM

## 2023-01-01 DIAGNOSIS — Z71.89 COMPLEX CARE COORDINATION: ICD-10-CM

## 2023-01-01 DIAGNOSIS — E04.2 MULTINODULAR GOITER: ICD-10-CM

## 2023-01-01 DIAGNOSIS — Z78.0 POSTMENOPAUSAL: ICD-10-CM

## 2023-01-01 DIAGNOSIS — E78.5 HYPERLIPIDEMIA, UNSPECIFIED HYPERLIPIDEMIA TYPE: ICD-10-CM

## 2023-01-01 DIAGNOSIS — Z00.00 ENCOUNTER FOR PREVENTIVE HEALTH EXAMINATION: Primary | ICD-10-CM

## 2023-01-01 DIAGNOSIS — Z12.31 ENCOUNTER FOR SCREENING MAMMOGRAM FOR MALIGNANT NEOPLASM OF BREAST: ICD-10-CM

## 2023-01-01 DIAGNOSIS — Z99.81 DEPENDENCE ON SUPPLEMENTAL OXYGEN: ICD-10-CM

## 2023-01-01 DIAGNOSIS — Z12.31 ENCOUNTER FOR SCREENING MAMMOGRAM FOR MALIGNANT NEOPLASM OF BREAST: Primary | ICD-10-CM

## 2023-01-01 DIAGNOSIS — R09.82 POST-NASAL DRIP: ICD-10-CM

## 2023-01-01 DIAGNOSIS — M85.80 OSTEOPENIA WITH HIGH RISK OF FRACTURE: ICD-10-CM

## 2023-01-01 DIAGNOSIS — J01.90 ACUTE NON-RECURRENT SINUSITIS, UNSPECIFIED LOCATION: ICD-10-CM

## 2023-01-01 DIAGNOSIS — R79.89 LOW SERUM THYROID STIMULATING HORMONE (TSH): ICD-10-CM

## 2023-01-01 DIAGNOSIS — I10 HYPERTENSION, UNSPECIFIED TYPE: ICD-10-CM

## 2023-01-01 DIAGNOSIS — R09.82 PND (POST-NASAL DRIP): ICD-10-CM

## 2023-01-01 LAB
25(OH)D3+25(OH)D2 SERPL-MCNC: 53 NG/ML (ref 30–96)
ALBUMIN SERPL BCP-MCNC: 3.6 G/DL (ref 3.5–5.2)
ALBUMIN SERPL BCP-MCNC: 3.7 G/DL (ref 3.5–5.2)
ALBUMIN SERPL BCP-MCNC: 3.8 G/DL (ref 3.5–5.2)
ALBUMIN SERPL BCP-MCNC: 3.9 G/DL (ref 3.5–5.2)
ALBUMIN SERPL BCP-MCNC: 4 G/DL (ref 3.5–5.2)
ALBUMIN SERPL BCP-MCNC: 4 G/DL (ref 3.5–5.2)
ALBUMIN SERPL BCP-MCNC: 4.1 G/DL (ref 3.5–5.2)
ALBUMIN SERPL BCP-MCNC: 4.2 G/DL (ref 3.5–5.2)
ALBUMIN SERPL BCP-MCNC: 4.3 G/DL (ref 3.5–5.2)
ALBUMIN SERPL BCP-MCNC: 4.3 G/DL (ref 3.5–5.2)
ALBUMIN SERPL BCP-MCNC: 4.4 G/DL (ref 3.5–5.2)
ALP SERPL-CCNC: 103 U/L (ref 55–135)
ALP SERPL-CCNC: 119 U/L (ref 55–135)
ALP SERPL-CCNC: 54 U/L (ref 55–135)
ALP SERPL-CCNC: 54 U/L (ref 55–135)
ALP SERPL-CCNC: 55 U/L (ref 55–135)
ALP SERPL-CCNC: 58 U/L (ref 55–135)
ALP SERPL-CCNC: 59 U/L (ref 55–135)
ALP SERPL-CCNC: 61 U/L (ref 55–135)
ALP SERPL-CCNC: 62 U/L (ref 55–135)
ALP SERPL-CCNC: 65 U/L (ref 55–135)
ALP SERPL-CCNC: 65 U/L (ref 55–135)
ALP SERPL-CCNC: 66 U/L (ref 55–135)
ALP SERPL-CCNC: 68 U/L (ref 55–135)
ALP SERPL-CCNC: 68 U/L (ref 55–135)
ALP SERPL-CCNC: 69 U/L (ref 55–135)
ALP SERPL-CCNC: 71 U/L (ref 55–135)
ALP SERPL-CCNC: 73 U/L (ref 55–135)
ALP SERPL-CCNC: 75 U/L (ref 55–135)
ALP SERPL-CCNC: 76 U/L (ref 55–135)
ALP SERPL-CCNC: 77 U/L (ref 55–135)
ALP SERPL-CCNC: 77 U/L (ref 55–135)
ALP SERPL-CCNC: 80 U/L (ref 55–135)
ALP SERPL-CCNC: 80 U/L (ref 55–135)
ALP SERPL-CCNC: 81 U/L (ref 55–135)
ALP SERPL-CCNC: 86 U/L (ref 55–135)
ALP SERPL-CCNC: 87 U/L (ref 55–135)
ALP SERPL-CCNC: 96 U/L (ref 55–135)
ALP SERPL-CCNC: 97 U/L (ref 55–135)
ALP SERPL-CCNC: <5 U/L (ref 55–135)
ALT SERPL W/O P-5'-P-CCNC: 11 U/L (ref 10–44)
ALT SERPL W/O P-5'-P-CCNC: 13 U/L (ref 10–44)
ALT SERPL W/O P-5'-P-CCNC: 14 U/L (ref 10–44)
ALT SERPL W/O P-5'-P-CCNC: 15 U/L (ref 10–44)
ALT SERPL W/O P-5'-P-CCNC: 16 U/L (ref 10–44)
ALT SERPL W/O P-5'-P-CCNC: 17 U/L (ref 10–44)
ALT SERPL W/O P-5'-P-CCNC: 17 U/L (ref 10–44)
ALT SERPL W/O P-5'-P-CCNC: 19 U/L (ref 10–44)
ALT SERPL W/O P-5'-P-CCNC: 20 U/L (ref 10–44)
ALT SERPL W/O P-5'-P-CCNC: 20 U/L (ref 10–44)
ALT SERPL W/O P-5'-P-CCNC: 21 U/L (ref 10–44)
ALT SERPL W/O P-5'-P-CCNC: 21 U/L (ref 10–44)
ALT SERPL W/O P-5'-P-CCNC: 30 U/L (ref 10–44)
ANION GAP SERPL CALC-SCNC: 10 MMOL/L (ref 8–16)
ANION GAP SERPL CALC-SCNC: 12 MMOL/L (ref 8–16)
ANION GAP SERPL CALC-SCNC: 4 MMOL/L (ref 8–16)
ANION GAP SERPL CALC-SCNC: 4 MMOL/L (ref 8–16)
ANION GAP SERPL CALC-SCNC: 5 MMOL/L (ref 8–16)
ANION GAP SERPL CALC-SCNC: 6 MMOL/L (ref 8–16)
ANION GAP SERPL CALC-SCNC: 7 MMOL/L (ref 8–16)
ANION GAP SERPL CALC-SCNC: 8 MMOL/L (ref 8–16)
ANION GAP SERPL CALC-SCNC: 9 MMOL/L (ref 8–16)
ANISOCYTOSIS BLD QL SMEAR: ABNORMAL
ANISOCYTOSIS BLD QL SMEAR: ABNORMAL
ANISOCYTOSIS BLD QL SMEAR: SLIGHT
AST SERPL-CCNC: 15 U/L (ref 10–40)
AST SERPL-CCNC: 16 U/L (ref 10–40)
AST SERPL-CCNC: 17 U/L (ref 10–40)
AST SERPL-CCNC: 18 U/L (ref 10–40)
AST SERPL-CCNC: 19 U/L (ref 10–40)
AST SERPL-CCNC: 20 U/L (ref 10–40)
AST SERPL-CCNC: 21 U/L (ref 10–40)
AST SERPL-CCNC: 23 U/L (ref 10–40)
AST SERPL-CCNC: 24 U/L (ref 10–40)
AST SERPL-CCNC: 25 U/L (ref 10–40)
AST SERPL-CCNC: 25 U/L (ref 10–40)
AST SERPL-CCNC: 30 U/L (ref 10–40)
AST SERPL-CCNC: 39 U/L (ref 10–40)
AST SERPL-CCNC: 43 U/L (ref 10–40)
BASOPHILS # BLD AUTO: 0 K/UL (ref 0–0.2)
BASOPHILS # BLD AUTO: 0.01 K/UL (ref 0–0.2)
BASOPHILS # BLD AUTO: 0.01 K/UL (ref 0–0.2)
BASOPHILS # BLD AUTO: 0.02 K/UL (ref 0–0.2)
BASOPHILS # BLD AUTO: 0.03 K/UL (ref 0–0.2)
BASOPHILS # BLD AUTO: 0.04 K/UL (ref 0–0.2)
BASOPHILS # BLD AUTO: 0.05 K/UL (ref 0–0.2)
BASOPHILS # BLD AUTO: ABNORMAL K/UL (ref 0–0.2)
BASOPHILS NFR BLD: 0 % (ref 0–1.9)
BASOPHILS NFR BLD: 0.1 % (ref 0–1.9)
BASOPHILS NFR BLD: 0.2 % (ref 0–1.9)
BASOPHILS NFR BLD: 0.3 % (ref 0–1.9)
BASOPHILS NFR BLD: 0.4 % (ref 0–1.9)
BASOPHILS NFR BLD: 0.4 % (ref 0–1.9)
BASOPHILS NFR BLD: 0.5 % (ref 0–1.9)
BASOPHILS NFR BLD: 0.5 % (ref 0–1.9)
BASOPHILS NFR BLD: 0.6 % (ref 0–1.9)
BASOPHILS NFR BLD: 0.7 % (ref 0–1.9)
BASOPHILS NFR BLD: 0.8 % (ref 0–1.9)
BILIRUB SERPL-MCNC: 0.2 MG/DL (ref 0.1–1)
BILIRUB SERPL-MCNC: 0.2 MG/DL (ref 0.1–1)
BILIRUB SERPL-MCNC: 0.3 MG/DL (ref 0.1–1)
BILIRUB SERPL-MCNC: 0.4 MG/DL (ref 0.1–1)
BILIRUB SERPL-MCNC: 0.5 MG/DL (ref 0.1–1)
BILIRUB SERPL-MCNC: 0.6 MG/DL (ref 0.1–1)
BILIRUB SERPL-MCNC: 0.7 MG/DL (ref 0.1–1)
BILIRUB SERPL-MCNC: 0.8 MG/DL (ref 0.1–1)
BILIRUB SERPL-MCNC: 0.8 MG/DL (ref 0.1–1)
BILIRUB SERPL-MCNC: 0.9 MG/DL (ref 0.1–1)
BUN SERPL-MCNC: 10 MG/DL (ref 8–23)
BUN SERPL-MCNC: 11 MG/DL (ref 8–23)
BUN SERPL-MCNC: 12 MG/DL (ref 8–23)
BUN SERPL-MCNC: 13 MG/DL (ref 8–23)
BUN SERPL-MCNC: 14 MG/DL (ref 8–23)
BUN SERPL-MCNC: 16 MG/DL (ref 8–23)
BUN SERPL-MCNC: 17 MG/DL (ref 8–23)
BUN SERPL-MCNC: 18 MG/DL (ref 8–23)
BUN SERPL-MCNC: 18 MG/DL (ref 8–23)
BUN SERPL-MCNC: 19 MG/DL (ref 8–23)
BUN SERPL-MCNC: 23 MG/DL (ref 8–23)
BUN SERPL-MCNC: 6 MG/DL (ref 8–23)
BUN SERPL-MCNC: 7 MG/DL (ref 8–23)
BUN SERPL-MCNC: 8 MG/DL (ref 8–23)
BUN SERPL-MCNC: 8 MG/DL (ref 8–23)
CALCIUM SERPL-MCNC: 8.9 MG/DL (ref 8.7–10.5)
CALCIUM SERPL-MCNC: 9 MG/DL (ref 8.7–10.5)
CALCIUM SERPL-MCNC: 9.1 MG/DL (ref 8.7–10.5)
CALCIUM SERPL-MCNC: 9.2 MG/DL (ref 8.7–10.5)
CALCIUM SERPL-MCNC: 9.3 MG/DL (ref 8.7–10.5)
CALCIUM SERPL-MCNC: 9.4 MG/DL (ref 8.7–10.5)
CALCIUM SERPL-MCNC: 9.5 MG/DL (ref 8.7–10.5)
CALCIUM SERPL-MCNC: 9.6 MG/DL (ref 8.7–10.5)
CALCIUM SERPL-MCNC: 9.6 MG/DL (ref 8.7–10.5)
CALCIUM SERPL-MCNC: 9.8 MG/DL (ref 8.7–10.5)
CALCIUM SERPL-MCNC: 9.8 MG/DL (ref 8.7–10.5)
CHLORIDE SERPL-SCNC: 100 MMOL/L (ref 95–110)
CHLORIDE SERPL-SCNC: 100 MMOL/L (ref 95–110)
CHLORIDE SERPL-SCNC: 93 MMOL/L (ref 95–110)
CHLORIDE SERPL-SCNC: 93 MMOL/L (ref 95–110)
CHLORIDE SERPL-SCNC: 95 MMOL/L (ref 95–110)
CHLORIDE SERPL-SCNC: 96 MMOL/L (ref 95–110)
CHLORIDE SERPL-SCNC: 97 MMOL/L (ref 95–110)
CHLORIDE SERPL-SCNC: 98 MMOL/L (ref 95–110)
CHLORIDE SERPL-SCNC: 99 MMOL/L (ref 95–110)
CHLORIDE SERPL-SCNC: 99 MMOL/L (ref 95–110)
CHOLEST SERPL-MCNC: 147 MG/DL (ref 120–199)
CHOLEST/HDLC SERPL: 2.5 {RATIO} (ref 2–5)
CK MB SERPL-MCNC: 4 NG/ML (ref 0.1–6.5)
CK SERPL-CCNC: 61 U/L (ref 20–180)
CO2 SERPL-SCNC: 24 MMOL/L (ref 23–29)
CO2 SERPL-SCNC: 25 MMOL/L (ref 23–29)
CO2 SERPL-SCNC: 26 MMOL/L (ref 23–29)
CO2 SERPL-SCNC: 27 MMOL/L (ref 23–29)
CO2 SERPL-SCNC: 28 MMOL/L (ref 23–29)
CO2 SERPL-SCNC: 29 MMOL/L (ref 23–29)
CO2 SERPL-SCNC: 30 MMOL/L (ref 23–29)
CREAT SERPL-MCNC: 0.4 MG/DL (ref 0.5–1.4)
CREAT SERPL-MCNC: 0.5 MG/DL (ref 0.5–1.4)
CREAT SERPL-MCNC: 0.6 MG/DL (ref 0.5–1.4)
CREAT SERPL-MCNC: 0.8 MG/DL (ref 0.5–1.4)
DIFFERENTIAL METHOD: ABNORMAL
EOSINOPHIL # BLD AUTO: 0 K/UL (ref 0–0.5)
EOSINOPHIL # BLD AUTO: 0.1 K/UL (ref 0–0.5)
EOSINOPHIL # BLD AUTO: 0.2 K/UL (ref 0–0.5)
EOSINOPHIL # BLD AUTO: 0.3 K/UL (ref 0–0.5)
EOSINOPHIL # BLD AUTO: 0.4 K/UL (ref 0–0.5)
EOSINOPHIL # BLD AUTO: 0.4 K/UL (ref 0–0.5)
EOSINOPHIL # BLD AUTO: ABNORMAL K/UL (ref 0–0.5)
EOSINOPHIL NFR BLD: 0 % (ref 0–8)
EOSINOPHIL NFR BLD: 0.1 % (ref 0–8)
EOSINOPHIL NFR BLD: 0.1 % (ref 0–8)
EOSINOPHIL NFR BLD: 0.2 % (ref 0–8)
EOSINOPHIL NFR BLD: 0.4 % (ref 0–8)
EOSINOPHIL NFR BLD: 0.7 % (ref 0–8)
EOSINOPHIL NFR BLD: 0.7 % (ref 0–8)
EOSINOPHIL NFR BLD: 1.1 % (ref 0–8)
EOSINOPHIL NFR BLD: 1.3 % (ref 0–8)
EOSINOPHIL NFR BLD: 1.4 % (ref 0–8)
EOSINOPHIL NFR BLD: 1.5 % (ref 0–8)
EOSINOPHIL NFR BLD: 1.5 % (ref 0–8)
EOSINOPHIL NFR BLD: 1.9 % (ref 0–8)
EOSINOPHIL NFR BLD: 2 % (ref 0–8)
EOSINOPHIL NFR BLD: 2.6 % (ref 0–8)
EOSINOPHIL NFR BLD: 2.9 % (ref 0–8)
EOSINOPHIL NFR BLD: 3.4 % (ref 0–8)
EOSINOPHIL NFR BLD: 3.8 % (ref 0–8)
EOSINOPHIL NFR BLD: 4 % (ref 0–8)
EOSINOPHIL NFR BLD: 4.4 % (ref 0–8)
EOSINOPHIL NFR BLD: 5.2 % (ref 0–8)
EOSINOPHIL NFR BLD: 5.9 % (ref 0–8)
EOSINOPHIL NFR BLD: 6.7 % (ref 0–8)
EOSINOPHIL NFR BLD: 7.1 % (ref 0–8)
ERYTHROCYTE [DISTWIDTH] IN BLOOD BY AUTOMATED COUNT: 13.2 % (ref 11.5–14.5)
ERYTHROCYTE [DISTWIDTH] IN BLOOD BY AUTOMATED COUNT: 13.9 % (ref 11.5–14.5)
ERYTHROCYTE [DISTWIDTH] IN BLOOD BY AUTOMATED COUNT: 14 % (ref 11.5–14.5)
ERYTHROCYTE [DISTWIDTH] IN BLOOD BY AUTOMATED COUNT: 14.6 % (ref 11.5–14.5)
ERYTHROCYTE [DISTWIDTH] IN BLOOD BY AUTOMATED COUNT: 15.2 % (ref 11.5–14.5)
ERYTHROCYTE [DISTWIDTH] IN BLOOD BY AUTOMATED COUNT: 15.5 % (ref 11.5–14.5)
ERYTHROCYTE [DISTWIDTH] IN BLOOD BY AUTOMATED COUNT: 15.8 % (ref 11.5–14.5)
ERYTHROCYTE [DISTWIDTH] IN BLOOD BY AUTOMATED COUNT: 15.9 % (ref 11.5–14.5)
ERYTHROCYTE [DISTWIDTH] IN BLOOD BY AUTOMATED COUNT: 16 % (ref 11.5–14.5)
ERYTHROCYTE [DISTWIDTH] IN BLOOD BY AUTOMATED COUNT: 16.3 % (ref 11.5–14.5)
ERYTHROCYTE [DISTWIDTH] IN BLOOD BY AUTOMATED COUNT: 16.3 % (ref 11.5–14.5)
ERYTHROCYTE [DISTWIDTH] IN BLOOD BY AUTOMATED COUNT: 16.4 % (ref 11.5–14.5)
ERYTHROCYTE [DISTWIDTH] IN BLOOD BY AUTOMATED COUNT: 16.4 % (ref 11.5–14.5)
ERYTHROCYTE [DISTWIDTH] IN BLOOD BY AUTOMATED COUNT: 16.5 % (ref 11.5–14.5)
ERYTHROCYTE [DISTWIDTH] IN BLOOD BY AUTOMATED COUNT: 16.5 % (ref 11.5–14.5)
ERYTHROCYTE [DISTWIDTH] IN BLOOD BY AUTOMATED COUNT: 17.1 % (ref 11.5–14.5)
ERYTHROCYTE [DISTWIDTH] IN BLOOD BY AUTOMATED COUNT: 17.1 % (ref 11.5–14.5)
ERYTHROCYTE [DISTWIDTH] IN BLOOD BY AUTOMATED COUNT: 17.2 % (ref 11.5–14.5)
ERYTHROCYTE [DISTWIDTH] IN BLOOD BY AUTOMATED COUNT: 17.3 % (ref 11.5–14.5)
ERYTHROCYTE [DISTWIDTH] IN BLOOD BY AUTOMATED COUNT: 17.4 % (ref 11.5–14.5)
ERYTHROCYTE [DISTWIDTH] IN BLOOD BY AUTOMATED COUNT: 17.5 % (ref 11.5–14.5)
ERYTHROCYTE [DISTWIDTH] IN BLOOD BY AUTOMATED COUNT: 17.6 % (ref 11.5–14.5)
ERYTHROCYTE [DISTWIDTH] IN BLOOD BY AUTOMATED COUNT: 17.7 % (ref 11.5–14.5)
ERYTHROCYTE [DISTWIDTH] IN BLOOD BY AUTOMATED COUNT: 18.3 % (ref 11.5–14.5)
ERYTHROCYTE [DISTWIDTH] IN BLOOD BY AUTOMATED COUNT: 18.5 % (ref 11.5–14.5)
ERYTHROCYTE [DISTWIDTH] IN BLOOD BY AUTOMATED COUNT: 19.3 % (ref 11.5–14.5)
EST. GFR  (NO RACE VARIABLE): >60 ML/MIN/1.73 M^2
GLUCOSE SERPL-MCNC: 100 MG/DL (ref 70–110)
GLUCOSE SERPL-MCNC: 101 MG/DL (ref 70–110)
GLUCOSE SERPL-MCNC: 101 MG/DL (ref 70–110)
GLUCOSE SERPL-MCNC: 102 MG/DL (ref 70–110)
GLUCOSE SERPL-MCNC: 103 MG/DL (ref 70–110)
GLUCOSE SERPL-MCNC: 104 MG/DL (ref 70–110)
GLUCOSE SERPL-MCNC: 105 MG/DL (ref 70–110)
GLUCOSE SERPL-MCNC: 108 MG/DL (ref 70–110)
GLUCOSE SERPL-MCNC: 111 MG/DL (ref 70–110)
GLUCOSE SERPL-MCNC: 118 MG/DL (ref 70–110)
GLUCOSE SERPL-MCNC: 119 MG/DL (ref 70–110)
GLUCOSE SERPL-MCNC: 74 MG/DL (ref 70–110)
GLUCOSE SERPL-MCNC: 77 MG/DL (ref 70–110)
GLUCOSE SERPL-MCNC: 77 MG/DL (ref 70–110)
GLUCOSE SERPL-MCNC: 80 MG/DL (ref 70–110)
GLUCOSE SERPL-MCNC: 80 MG/DL (ref 70–110)
GLUCOSE SERPL-MCNC: 82 MG/DL (ref 70–110)
GLUCOSE SERPL-MCNC: 85 MG/DL (ref 70–110)
GLUCOSE SERPL-MCNC: 87 MG/DL (ref 70–110)
GLUCOSE SERPL-MCNC: 89 MG/DL (ref 70–110)
GLUCOSE SERPL-MCNC: 91 MG/DL (ref 70–110)
GLUCOSE SERPL-MCNC: 93 MG/DL (ref 70–110)
GLUCOSE SERPL-MCNC: 95 MG/DL (ref 70–110)
GLUCOSE SERPL-MCNC: 99 MG/DL (ref 70–110)
GLUCOSE SERPL-MCNC: 99 MG/DL (ref 70–110)
HAV IGM SERPL QL IA: NEGATIVE
HBV CORE IGM SERPL QL IA: NEGATIVE
HBV SURFACE AG SERPL QL IA: NEGATIVE
HCT VFR BLD AUTO: 26.9 % (ref 37–48.5)
HCT VFR BLD AUTO: 27 % (ref 37–48.5)
HCT VFR BLD AUTO: 29.1 % (ref 37–48.5)
HCT VFR BLD AUTO: 29.1 % (ref 37–48.5)
HCT VFR BLD AUTO: 29.2 % (ref 37–48.5)
HCT VFR BLD AUTO: 29.2 % (ref 37–48.5)
HCT VFR BLD AUTO: 29.4 % (ref 37–48.5)
HCT VFR BLD AUTO: 29.8 % (ref 37–48.5)
HCT VFR BLD AUTO: 29.9 % (ref 37–48.5)
HCT VFR BLD AUTO: 29.9 % (ref 37–48.5)
HCT VFR BLD AUTO: 30 % (ref 37–48.5)
HCT VFR BLD AUTO: 30.2 % (ref 37–48.5)
HCT VFR BLD AUTO: 30.5 % (ref 37–48.5)
HCT VFR BLD AUTO: 30.6 % (ref 37–48.5)
HCT VFR BLD AUTO: 31.2 % (ref 37–48.5)
HCT VFR BLD AUTO: 31.4 % (ref 37–48.5)
HCT VFR BLD AUTO: 31.4 % (ref 37–48.5)
HCT VFR BLD AUTO: 32.4 % (ref 37–48.5)
HCT VFR BLD AUTO: 32.7 % (ref 37–48.5)
HCT VFR BLD AUTO: 32.7 % (ref 37–48.5)
HCT VFR BLD AUTO: 33.3 % (ref 37–48.5)
HCT VFR BLD AUTO: 33.4 % (ref 37–48.5)
HCT VFR BLD AUTO: 33.7 % (ref 37–48.5)
HCT VFR BLD AUTO: 34 % (ref 37–48.5)
HCT VFR BLD AUTO: 34.7 % (ref 37–48.5)
HCT VFR BLD AUTO: 34.9 % (ref 37–48.5)
HCT VFR BLD AUTO: 35.3 % (ref 37–48.5)
HCT VFR BLD AUTO: 39.4 % (ref 37–48.5)
HCV AB S/CO SERPL IA: NON REACTIVE
HCV AB SERPL QL IA: NORMAL
HDLC SERPL-MCNC: 58 MG/DL (ref 40–75)
HDLC SERPL: 39.5 % (ref 20–50)
HGB BLD-MCNC: 10 G/DL (ref 12–16)
HGB BLD-MCNC: 10 G/DL (ref 12–16)
HGB BLD-MCNC: 10.1 G/DL (ref 12–16)
HGB BLD-MCNC: 10.3 G/DL (ref 12–16)
HGB BLD-MCNC: 10.5 G/DL (ref 12–16)
HGB BLD-MCNC: 10.5 G/DL (ref 12–16)
HGB BLD-MCNC: 10.6 G/DL (ref 12–16)
HGB BLD-MCNC: 10.8 G/DL (ref 12–16)
HGB BLD-MCNC: 10.9 G/DL (ref 12–16)
HGB BLD-MCNC: 10.9 G/DL (ref 12–16)
HGB BLD-MCNC: 11.1 G/DL (ref 12–16)
HGB BLD-MCNC: 11.1 G/DL (ref 12–16)
HGB BLD-MCNC: 11.3 G/DL (ref 12–16)
HGB BLD-MCNC: 11.4 G/DL (ref 12–16)
HGB BLD-MCNC: 11.5 G/DL (ref 12–16)
HGB BLD-MCNC: 11.6 G/DL (ref 12–16)
HGB BLD-MCNC: 12 G/DL (ref 12–16)
HGB BLD-MCNC: 13.1 G/DL (ref 12–16)
HGB BLD-MCNC: 9 G/DL (ref 12–16)
HGB BLD-MCNC: 9.1 G/DL (ref 12–16)
HGB BLD-MCNC: 9.5 G/DL (ref 12–16)
HGB BLD-MCNC: 9.6 G/DL (ref 12–16)
HGB BLD-MCNC: 9.6 G/DL (ref 12–16)
HGB BLD-MCNC: 9.8 G/DL (ref 12–16)
HGB BLD-MCNC: 9.9 G/DL (ref 12–16)
HGB BLD-MCNC: 9.9 G/DL (ref 12–16)
HYPOCHROMIA BLD QL SMEAR: ABNORMAL
IMM GRANULOCYTES # BLD AUTO: 0 K/UL (ref 0–0.04)
IMM GRANULOCYTES # BLD AUTO: 0 K/UL (ref 0–0.04)
IMM GRANULOCYTES # BLD AUTO: 0.01 K/UL (ref 0–0.04)
IMM GRANULOCYTES # BLD AUTO: 0.02 K/UL (ref 0–0.04)
IMM GRANULOCYTES # BLD AUTO: 0.03 K/UL (ref 0–0.04)
IMM GRANULOCYTES # BLD AUTO: 0.04 K/UL (ref 0–0.04)
IMM GRANULOCYTES # BLD AUTO: 0.04 K/UL (ref 0–0.04)
IMM GRANULOCYTES # BLD AUTO: 0.06 K/UL (ref 0–0.04)
IMM GRANULOCYTES # BLD AUTO: 0.07 K/UL (ref 0–0.04)
IMM GRANULOCYTES # BLD AUTO: 0.15 K/UL (ref 0–0.04)
IMM GRANULOCYTES # BLD AUTO: 0.18 K/UL (ref 0–0.04)
IMM GRANULOCYTES # BLD AUTO: 0.25 K/UL (ref 0–0.04)
IMM GRANULOCYTES # BLD AUTO: 0.33 K/UL (ref 0–0.04)
IMM GRANULOCYTES # BLD AUTO: 0.75 K/UL (ref 0–0.04)
IMM GRANULOCYTES # BLD AUTO: ABNORMAL K/UL (ref 0–0.04)
IMM GRANULOCYTES NFR BLD AUTO: 0 % (ref 0–0.5)
IMM GRANULOCYTES NFR BLD AUTO: 0 % (ref 0–0.5)
IMM GRANULOCYTES NFR BLD AUTO: 0.1 % (ref 0–0.5)
IMM GRANULOCYTES NFR BLD AUTO: 0.2 % (ref 0–0.5)
IMM GRANULOCYTES NFR BLD AUTO: 0.3 % (ref 0–0.5)
IMM GRANULOCYTES NFR BLD AUTO: 0.4 % (ref 0–0.5)
IMM GRANULOCYTES NFR BLD AUTO: 0.5 % (ref 0–0.5)
IMM GRANULOCYTES NFR BLD AUTO: 1.2 % (ref 0–0.5)
IMM GRANULOCYTES NFR BLD AUTO: 1.4 % (ref 0–0.5)
IMM GRANULOCYTES NFR BLD AUTO: 1.5 % (ref 0–0.5)
IMM GRANULOCYTES NFR BLD AUTO: 2.7 % (ref 0–0.5)
IMM GRANULOCYTES NFR BLD AUTO: 4.5 % (ref 0–0.5)
IMM GRANULOCYTES NFR BLD AUTO: ABNORMAL % (ref 0–0.5)
LDLC SERPL CALC-MCNC: 69.6 MG/DL (ref 63–159)
LYMPHOCYTES # BLD AUTO: 0.6 K/UL (ref 1–4.8)
LYMPHOCYTES # BLD AUTO: 0.7 K/UL (ref 1–4.8)
LYMPHOCYTES # BLD AUTO: 0.8 K/UL (ref 1–4.8)
LYMPHOCYTES # BLD AUTO: 0.8 K/UL (ref 1–4.8)
LYMPHOCYTES # BLD AUTO: 1 K/UL (ref 1–4.8)
LYMPHOCYTES # BLD AUTO: 1.2 K/UL (ref 1–4.8)
LYMPHOCYTES # BLD AUTO: 1.3 K/UL (ref 1–4.8)
LYMPHOCYTES # BLD AUTO: 1.4 K/UL (ref 1–4.8)
LYMPHOCYTES # BLD AUTO: 1.5 K/UL (ref 1–4.8)
LYMPHOCYTES # BLD AUTO: 1.6 K/UL (ref 1–4.8)
LYMPHOCYTES # BLD AUTO: 1.7 K/UL (ref 1–4.8)
LYMPHOCYTES # BLD AUTO: 1.8 K/UL (ref 1–4.8)
LYMPHOCYTES # BLD AUTO: 2.1 K/UL (ref 1–4.8)
LYMPHOCYTES # BLD AUTO: ABNORMAL K/UL (ref 1–4.8)
LYMPHOCYTES NFR BLD: 10 % (ref 18–48)
LYMPHOCYTES NFR BLD: 10 % (ref 18–48)
LYMPHOCYTES NFR BLD: 10.4 % (ref 18–48)
LYMPHOCYTES NFR BLD: 10.9 % (ref 18–48)
LYMPHOCYTES NFR BLD: 13 % (ref 18–48)
LYMPHOCYTES NFR BLD: 14.9 % (ref 18–48)
LYMPHOCYTES NFR BLD: 15.1 % (ref 18–48)
LYMPHOCYTES NFR BLD: 16.9 % (ref 18–48)
LYMPHOCYTES NFR BLD: 17.1 % (ref 18–48)
LYMPHOCYTES NFR BLD: 17.5 % (ref 18–48)
LYMPHOCYTES NFR BLD: 19.5 % (ref 18–48)
LYMPHOCYTES NFR BLD: 2 % (ref 18–48)
LYMPHOCYTES NFR BLD: 2 % (ref 18–48)
LYMPHOCYTES NFR BLD: 20 % (ref 18–48)
LYMPHOCYTES NFR BLD: 20.7 % (ref 18–48)
LYMPHOCYTES NFR BLD: 21 % (ref 18–48)
LYMPHOCYTES NFR BLD: 24.5 % (ref 18–48)
LYMPHOCYTES NFR BLD: 26 % (ref 18–48)
LYMPHOCYTES NFR BLD: 30.2 % (ref 18–48)
LYMPHOCYTES NFR BLD: 33 % (ref 18–48)
LYMPHOCYTES NFR BLD: 35 % (ref 18–48)
LYMPHOCYTES NFR BLD: 39.9 % (ref 18–48)
LYMPHOCYTES NFR BLD: 4 % (ref 18–48)
LYMPHOCYTES NFR BLD: 40 % (ref 18–48)
LYMPHOCYTES NFR BLD: 47.6 % (ref 18–48)
LYMPHOCYTES NFR BLD: 5 % (ref 18–48)
LYMPHOCYTES NFR BLD: 6 % (ref 18–48)
LYMPHOCYTES NFR BLD: 8.9 % (ref 18–48)
LYMPHOCYTES NFR BLD: 9.4 % (ref 18–48)
LYMPHOCYTES NFR BLD: 9.5 % (ref 18–48)
MCH RBC QN AUTO: 31.4 PG (ref 27–31)
MCH RBC QN AUTO: 31.7 PG (ref 27–31)
MCH RBC QN AUTO: 31.9 PG (ref 27–31)
MCH RBC QN AUTO: 32.2 PG (ref 27–31)
MCH RBC QN AUTO: 32.3 PG (ref 27–31)
MCH RBC QN AUTO: 32.5 PG (ref 27–31)
MCH RBC QN AUTO: 32.6 PG (ref 27–31)
MCH RBC QN AUTO: 32.6 PG (ref 27–31)
MCH RBC QN AUTO: 32.8 PG (ref 27–31)
MCH RBC QN AUTO: 32.8 PG (ref 27–31)
MCH RBC QN AUTO: 32.9 PG (ref 27–31)
MCH RBC QN AUTO: 33 PG (ref 27–31)
MCH RBC QN AUTO: 33.1 PG (ref 27–31)
MCH RBC QN AUTO: 33.2 PG (ref 27–31)
MCH RBC QN AUTO: 33.6 PG (ref 27–31)
MCH RBC QN AUTO: 33.8 PG (ref 27–31)
MCH RBC QN AUTO: 33.9 PG (ref 27–31)
MCH RBC QN AUTO: 34.1 PG (ref 27–31)
MCH RBC QN AUTO: 34.2 PG (ref 27–31)
MCH RBC QN AUTO: 34.2 PG (ref 27–31)
MCH RBC QN AUTO: 34.4 PG (ref 27–31)
MCH RBC QN AUTO: 34.5 PG (ref 27–31)
MCH RBC QN AUTO: 34.6 PG (ref 27–31)
MCH RBC QN AUTO: 34.7 PG (ref 27–31)
MCH RBC QN AUTO: 34.9 PG (ref 27–31)
MCH RBC QN AUTO: 34.9 PG (ref 27–31)
MCH RBC QN AUTO: 35.1 PG (ref 27–31)
MCH RBC QN AUTO: 35.2 PG (ref 27–31)
MCHC RBC AUTO-ENTMCNC: 31.8 G/DL (ref 32–36)
MCHC RBC AUTO-ENTMCNC: 32.1 G/DL (ref 32–36)
MCHC RBC AUTO-ENTMCNC: 32.1 G/DL (ref 32–36)
MCHC RBC AUTO-ENTMCNC: 32.4 G/DL (ref 32–36)
MCHC RBC AUTO-ENTMCNC: 32.6 G/DL (ref 32–36)
MCHC RBC AUTO-ENTMCNC: 32.6 G/DL (ref 32–36)
MCHC RBC AUTO-ENTMCNC: 32.7 G/DL (ref 32–36)
MCHC RBC AUTO-ENTMCNC: 32.7 G/DL (ref 32–36)
MCHC RBC AUTO-ENTMCNC: 32.9 G/DL (ref 32–36)
MCHC RBC AUTO-ENTMCNC: 32.9 G/DL (ref 32–36)
MCHC RBC AUTO-ENTMCNC: 33 G/DL (ref 32–36)
MCHC RBC AUTO-ENTMCNC: 33.1 G/DL (ref 32–36)
MCHC RBC AUTO-ENTMCNC: 33.2 G/DL (ref 32–36)
MCHC RBC AUTO-ENTMCNC: 33.3 G/DL (ref 32–36)
MCHC RBC AUTO-ENTMCNC: 33.4 G/DL (ref 32–36)
MCHC RBC AUTO-ENTMCNC: 33.5 G/DL (ref 32–36)
MCHC RBC AUTO-ENTMCNC: 33.6 G/DL (ref 32–36)
MCHC RBC AUTO-ENTMCNC: 33.6 G/DL (ref 32–36)
MCHC RBC AUTO-ENTMCNC: 33.7 G/DL (ref 32–36)
MCHC RBC AUTO-ENTMCNC: 33.8 G/DL (ref 32–36)
MCHC RBC AUTO-ENTMCNC: 33.9 G/DL (ref 32–36)
MCHC RBC AUTO-ENTMCNC: 34 G/DL (ref 32–36)
MCHC RBC AUTO-ENTMCNC: 34.3 G/DL (ref 32–36)
MCHC RBC AUTO-ENTMCNC: 34.4 G/DL (ref 32–36)
MCV RBC AUTO: 100 FL (ref 82–98)
MCV RBC AUTO: 100 FL (ref 82–98)
MCV RBC AUTO: 101 FL (ref 82–98)
MCV RBC AUTO: 102 FL (ref 82–98)
MCV RBC AUTO: 102 FL (ref 82–98)
MCV RBC AUTO: 103 FL (ref 82–98)
MCV RBC AUTO: 104 FL (ref 82–98)
MCV RBC AUTO: 104 FL (ref 82–98)
MCV RBC AUTO: 105 FL (ref 82–98)
MCV RBC AUTO: 106 FL (ref 82–98)
MCV RBC AUTO: 106 FL (ref 82–98)
MCV RBC AUTO: 107 FL (ref 82–98)
MCV RBC AUTO: 94 FL (ref 82–98)
MCV RBC AUTO: 95 FL (ref 82–98)
MCV RBC AUTO: 96 FL (ref 82–98)
MCV RBC AUTO: 97 FL (ref 82–98)
MCV RBC AUTO: 99 FL (ref 82–98)
METAMYELOCYTES NFR BLD MANUAL: 2 %
MONOCYTES # BLD AUTO: 0.1 K/UL (ref 0.3–1)
MONOCYTES # BLD AUTO: 0.2 K/UL (ref 0.3–1)
MONOCYTES # BLD AUTO: 0.3 K/UL (ref 0.3–1)
MONOCYTES # BLD AUTO: 0.3 K/UL (ref 0.3–1)
MONOCYTES # BLD AUTO: 0.4 K/UL (ref 0.3–1)
MONOCYTES # BLD AUTO: 0.4 K/UL (ref 0.3–1)
MONOCYTES # BLD AUTO: 0.5 K/UL (ref 0.3–1)
MONOCYTES # BLD AUTO: 0.5 K/UL (ref 0.3–1)
MONOCYTES # BLD AUTO: 0.6 K/UL (ref 0.3–1)
MONOCYTES # BLD AUTO: 0.7 K/UL (ref 0.3–1)
MONOCYTES # BLD AUTO: 0.8 K/UL (ref 0.3–1)
MONOCYTES # BLD AUTO: 1 K/UL (ref 0.3–1)
MONOCYTES # BLD AUTO: 1 K/UL (ref 0.3–1)
MONOCYTES # BLD AUTO: 1.1 K/UL (ref 0.3–1)
MONOCYTES # BLD AUTO: 1.8 K/UL (ref 0.3–1)
MONOCYTES # BLD AUTO: 1.9 K/UL (ref 0.3–1)
MONOCYTES # BLD AUTO: 2.2 K/UL (ref 0.3–1)
MONOCYTES # BLD AUTO: 2.7 K/UL (ref 0.3–1)
MONOCYTES # BLD AUTO: ABNORMAL K/UL (ref 0.3–1)
MONOCYTES NFR BLD: 1 % (ref 4–15)
MONOCYTES NFR BLD: 1 % (ref 4–15)
MONOCYTES NFR BLD: 10.2 % (ref 4–15)
MONOCYTES NFR BLD: 10.5 % (ref 4–15)
MONOCYTES NFR BLD: 10.5 % (ref 4–15)
MONOCYTES NFR BLD: 10.6 % (ref 4–15)
MONOCYTES NFR BLD: 10.8 % (ref 4–15)
MONOCYTES NFR BLD: 11.3 % (ref 4–15)
MONOCYTES NFR BLD: 13.4 % (ref 4–15)
MONOCYTES NFR BLD: 14 % (ref 4–15)
MONOCYTES NFR BLD: 14.1 % (ref 4–15)
MONOCYTES NFR BLD: 14.5 % (ref 4–15)
MONOCYTES NFR BLD: 15.1 % (ref 4–15)
MONOCYTES NFR BLD: 15.3 % (ref 4–15)
MONOCYTES NFR BLD: 16.1 % (ref 4–15)
MONOCYTES NFR BLD: 16.7 % (ref 4–15)
MONOCYTES NFR BLD: 16.7 % (ref 4–15)
MONOCYTES NFR BLD: 2 % (ref 4–15)
MONOCYTES NFR BLD: 2.4 % (ref 4–15)
MONOCYTES NFR BLD: 3.6 % (ref 4–15)
MONOCYTES NFR BLD: 5 % (ref 4–15)
MONOCYTES NFR BLD: 5.1 % (ref 4–15)
MONOCYTES NFR BLD: 6.4 % (ref 4–15)
MONOCYTES NFR BLD: 7.6 % (ref 4–15)
MONOCYTES NFR BLD: 7.8 % (ref 4–15)
MONOCYTES NFR BLD: 7.9 % (ref 4–15)
MONOCYTES NFR BLD: 7.9 % (ref 4–15)
MONOCYTES NFR BLD: 8.1 % (ref 4–15)
MONOCYTES NFR BLD: 8.2 % (ref 4–15)
MONOCYTES NFR BLD: 8.7 % (ref 4–15)
NEUTROPHILS # BLD AUTO: 1.1 K/UL (ref 1.8–7.7)
NEUTROPHILS # BLD AUTO: 1.2 K/UL (ref 1.8–7.7)
NEUTROPHILS # BLD AUTO: 1.7 K/UL (ref 1.8–7.7)
NEUTROPHILS # BLD AUTO: 1.9 K/UL (ref 1.8–7.7)
NEUTROPHILS # BLD AUTO: 1.9 K/UL (ref 1.8–7.7)
NEUTROPHILS # BLD AUTO: 10.6 K/UL (ref 1.8–7.7)
NEUTROPHILS # BLD AUTO: 10.6 K/UL (ref 1.8–7.7)
NEUTROPHILS # BLD AUTO: 11.4 K/UL (ref 1.8–7.7)
NEUTROPHILS # BLD AUTO: 13.3 K/UL (ref 1.8–7.7)
NEUTROPHILS # BLD AUTO: 2.1 K/UL (ref 1.8–7.7)
NEUTROPHILS # BLD AUTO: 2.8 K/UL (ref 1.8–7.7)
NEUTROPHILS # BLD AUTO: 2.9 K/UL (ref 1.8–7.7)
NEUTROPHILS # BLD AUTO: 3.2 K/UL (ref 1.8–7.7)
NEUTROPHILS # BLD AUTO: 3.7 K/UL (ref 1.8–7.7)
NEUTROPHILS # BLD AUTO: 4 K/UL (ref 1.8–7.7)
NEUTROPHILS # BLD AUTO: 4.3 K/UL (ref 1.8–7.7)
NEUTROPHILS # BLD AUTO: 4.9 K/UL (ref 1.8–7.7)
NEUTROPHILS # BLD AUTO: 5.4 K/UL (ref 1.8–7.7)
NEUTROPHILS # BLD AUTO: 5.9 K/UL (ref 1.8–7.7)
NEUTROPHILS # BLD AUTO: 6.3 K/UL (ref 1.8–7.7)
NEUTROPHILS # BLD AUTO: 7.3 K/UL (ref 1.8–7.7)
NEUTROPHILS # BLD AUTO: 7.9 K/UL (ref 1.8–7.7)
NEUTROPHILS # BLD AUTO: 7.9 K/UL (ref 1.8–7.7)
NEUTROPHILS # BLD AUTO: 8.6 K/UL (ref 1.8–7.7)
NEUTROPHILS # BLD AUTO: 9.1 K/UL (ref 1.8–7.7)
NEUTROPHILS NFR BLD: 37.8 % (ref 38–73)
NEUTROPHILS NFR BLD: 41.5 % (ref 38–73)
NEUTROPHILS NFR BLD: 43.8 % (ref 38–73)
NEUTROPHILS NFR BLD: 45.2 % (ref 38–73)
NEUTROPHILS NFR BLD: 45.5 % (ref 38–73)
NEUTROPHILS NFR BLD: 55.9 % (ref 38–73)
NEUTROPHILS NFR BLD: 56 % (ref 38–73)
NEUTROPHILS NFR BLD: 61 % (ref 38–73)
NEUTROPHILS NFR BLD: 61.2 % (ref 38–73)
NEUTROPHILS NFR BLD: 63.5 % (ref 38–73)
NEUTROPHILS NFR BLD: 63.6 % (ref 38–73)
NEUTROPHILS NFR BLD: 65.4 % (ref 38–73)
NEUTROPHILS NFR BLD: 67.8 % (ref 38–73)
NEUTROPHILS NFR BLD: 67.9 % (ref 38–73)
NEUTROPHILS NFR BLD: 68.8 % (ref 38–73)
NEUTROPHILS NFR BLD: 70.9 % (ref 38–73)
NEUTROPHILS NFR BLD: 71.8 % (ref 38–73)
NEUTROPHILS NFR BLD: 72.8 % (ref 38–73)
NEUTROPHILS NFR BLD: 76.8 % (ref 38–73)
NEUTROPHILS NFR BLD: 77.4 % (ref 38–73)
NEUTROPHILS NFR BLD: 77.7 % (ref 38–73)
NEUTROPHILS NFR BLD: 78.3 % (ref 38–73)
NEUTROPHILS NFR BLD: 78.6 % (ref 38–73)
NEUTROPHILS NFR BLD: 79 % (ref 38–73)
NEUTROPHILS NFR BLD: 80.5 % (ref 38–73)
NEUTROPHILS NFR BLD: 81.4 % (ref 38–73)
NEUTROPHILS NFR BLD: 83.1 % (ref 38–73)
NEUTROPHILS NFR BLD: 87 % (ref 38–73)
NEUTROPHILS NFR BLD: 93 % (ref 38–73)
NEUTROPHILS NFR BLD: 94 % (ref 38–73)
NEUTS BAND NFR BLD MANUAL: 17 %
NEUTS BAND NFR BLD MANUAL: 19 %
NEUTS BAND NFR BLD MANUAL: 6 %
NONHDLC SERPL-MCNC: 89 MG/DL
NRBC BLD-RTO: 0 /100 WBC
OVALOCYTES BLD QL SMEAR: ABNORMAL
PLATELET # BLD AUTO: 149 K/UL (ref 150–450)
PLATELET # BLD AUTO: 186 K/UL (ref 150–450)
PLATELET # BLD AUTO: 203 K/UL (ref 150–450)
PLATELET # BLD AUTO: 215 K/UL (ref 150–450)
PLATELET # BLD AUTO: 218 K/UL (ref 150–450)
PLATELET # BLD AUTO: 220 K/UL (ref 150–450)
PLATELET # BLD AUTO: 242 K/UL (ref 150–450)
PLATELET # BLD AUTO: 245 K/UL (ref 150–450)
PLATELET # BLD AUTO: 259 K/UL (ref 150–450)
PLATELET # BLD AUTO: 262 K/UL (ref 150–450)
PLATELET # BLD AUTO: 274 K/UL (ref 150–450)
PLATELET # BLD AUTO: 283 K/UL (ref 150–450)
PLATELET # BLD AUTO: 284 K/UL (ref 150–450)
PLATELET # BLD AUTO: 290 K/UL (ref 150–450)
PLATELET # BLD AUTO: 292 K/UL (ref 150–450)
PLATELET # BLD AUTO: 297 K/UL (ref 150–450)
PLATELET # BLD AUTO: 302 K/UL (ref 150–450)
PLATELET # BLD AUTO: 303 K/UL (ref 150–450)
PLATELET # BLD AUTO: 323 K/UL (ref 150–450)
PLATELET # BLD AUTO: 337 K/UL (ref 150–450)
PLATELET # BLD AUTO: 338 K/UL (ref 150–450)
PLATELET # BLD AUTO: 342 K/UL (ref 150–450)
PLATELET # BLD AUTO: 348 K/UL (ref 150–450)
PLATELET # BLD AUTO: 351 K/UL (ref 150–450)
PLATELET # BLD AUTO: 354 K/UL (ref 150–450)
PLATELET # BLD AUTO: 402 K/UL (ref 150–450)
PLATELET # BLD AUTO: 418 K/UL (ref 150–450)
PLATELET # BLD AUTO: 466 K/UL (ref 150–450)
PLATELET # BLD AUTO: 521 K/UL (ref 150–450)
PLATELET # BLD AUTO: 559 K/UL (ref 150–450)
PLATELET BLD QL SMEAR: ABNORMAL
PMV BLD AUTO: 8.3 FL (ref 9.2–12.9)
PMV BLD AUTO: 8.4 FL (ref 9.2–12.9)
PMV BLD AUTO: 8.5 FL (ref 9.2–12.9)
PMV BLD AUTO: 8.6 FL (ref 9.2–12.9)
PMV BLD AUTO: 8.7 FL (ref 9.2–12.9)
PMV BLD AUTO: 8.8 FL (ref 9.2–12.9)
PMV BLD AUTO: 8.8 FL (ref 9.2–12.9)
PMV BLD AUTO: 8.9 FL (ref 9.2–12.9)
PMV BLD AUTO: 9 FL (ref 9.2–12.9)
PMV BLD AUTO: 9.1 FL (ref 9.2–12.9)
PMV BLD AUTO: 9.2 FL (ref 9.2–12.9)
PMV BLD AUTO: 9.3 FL (ref 9.2–12.9)
PMV BLD AUTO: 9.5 FL (ref 9.2–12.9)
PMV BLD AUTO: 9.6 FL (ref 9.2–12.9)
PMV BLD AUTO: 9.6 FL (ref 9.2–12.9)
PMV BLD AUTO: 9.7 FL (ref 9.2–12.9)
PMV BLD AUTO: 9.7 FL (ref 9.2–12.9)
PMV BLD AUTO: 9.8 FL (ref 9.2–12.9)
PMV BLD AUTO: 9.9 FL (ref 9.2–12.9)
POIKILOCYTOSIS BLD QL SMEAR: ABNORMAL
POIKILOCYTOSIS BLD QL SMEAR: ABNORMAL
POIKILOCYTOSIS BLD QL SMEAR: SLIGHT
POLYCHROMASIA BLD QL SMEAR: ABNORMAL
POTASSIUM SERPL-SCNC: 3.8 MMOL/L (ref 3.5–5.1)
POTASSIUM SERPL-SCNC: 4.2 MMOL/L (ref 3.5–5.1)
POTASSIUM SERPL-SCNC: 4.4 MMOL/L (ref 3.5–5.1)
POTASSIUM SERPL-SCNC: 4.5 MMOL/L (ref 3.5–5.1)
POTASSIUM SERPL-SCNC: 4.5 MMOL/L (ref 3.5–5.1)
POTASSIUM SERPL-SCNC: 4.6 MMOL/L (ref 3.5–5.1)
POTASSIUM SERPL-SCNC: 4.6 MMOL/L (ref 3.5–5.1)
POTASSIUM SERPL-SCNC: 4.7 MMOL/L (ref 3.5–5.1)
POTASSIUM SERPL-SCNC: 4.8 MMOL/L (ref 3.5–5.1)
POTASSIUM SERPL-SCNC: 4.9 MMOL/L (ref 3.5–5.1)
POTASSIUM SERPL-SCNC: 5 MMOL/L (ref 3.5–5.1)
POTASSIUM SERPL-SCNC: 5.2 MMOL/L (ref 3.5–5.1)
POTASSIUM SERPL-SCNC: 5.3 MMOL/L (ref 3.5–5.1)
POTASSIUM SERPL-SCNC: 5.4 MMOL/L (ref 3.5–5.1)
POTASSIUM SERPL-SCNC: 5.5 MMOL/L (ref 3.5–5.1)
POTASSIUM SERPL-SCNC: 5.5 MMOL/L (ref 3.5–5.1)
POTASSIUM SERPL-SCNC: 5.6 MMOL/L (ref 3.5–5.1)
PROT SERPL-MCNC: 6.8 G/DL (ref 6–8.4)
PROT SERPL-MCNC: 6.9 G/DL (ref 6–8.4)
PROT SERPL-MCNC: 6.9 G/DL (ref 6–8.4)
PROT SERPL-MCNC: 7 G/DL (ref 6–8.4)
PROT SERPL-MCNC: 7.1 G/DL (ref 6–8.4)
PROT SERPL-MCNC: 7.2 G/DL (ref 6–8.4)
PROT SERPL-MCNC: 7.3 G/DL (ref 6–8.4)
PROT SERPL-MCNC: 7.4 G/DL (ref 6–8.4)
PROT SERPL-MCNC: 7.4 G/DL (ref 6–8.4)
PROT SERPL-MCNC: 7.5 G/DL (ref 6–8.4)
PROT SERPL-MCNC: 7.6 G/DL (ref 6–8.4)
RBC # BLD AUTO: 2.61 M/UL (ref 4–5.4)
RBC # BLD AUTO: 2.67 M/UL (ref 4–5.4)
RBC # BLD AUTO: 2.81 M/UL (ref 4–5.4)
RBC # BLD AUTO: 2.82 M/UL (ref 4–5.4)
RBC # BLD AUTO: 2.84 M/UL (ref 4–5.4)
RBC # BLD AUTO: 2.86 M/UL (ref 4–5.4)
RBC # BLD AUTO: 2.88 M/UL (ref 4–5.4)
RBC # BLD AUTO: 2.89 M/UL (ref 4–5.4)
RBC # BLD AUTO: 2.91 M/UL (ref 4–5.4)
RBC # BLD AUTO: 2.92 M/UL (ref 4–5.4)
RBC # BLD AUTO: 2.93 M/UL (ref 4–5.4)
RBC # BLD AUTO: 2.95 M/UL (ref 4–5.4)
RBC # BLD AUTO: 2.98 M/UL (ref 4–5.4)
RBC # BLD AUTO: 3.04 M/UL (ref 4–5.4)
RBC # BLD AUTO: 3.1 M/UL (ref 4–5.4)
RBC # BLD AUTO: 3.1 M/UL (ref 4–5.4)
RBC # BLD AUTO: 3.12 M/UL (ref 4–5.4)
RBC # BLD AUTO: 3.16 M/UL (ref 4–5.4)
RBC # BLD AUTO: 3.22 M/UL (ref 4–5.4)
RBC # BLD AUTO: 3.28 M/UL (ref 4–5.4)
RBC # BLD AUTO: 3.28 M/UL (ref 4–5.4)
RBC # BLD AUTO: 3.31 M/UL (ref 4–5.4)
RBC # BLD AUTO: 3.32 M/UL (ref 4–5.4)
RBC # BLD AUTO: 3.34 M/UL (ref 4–5.4)
RBC # BLD AUTO: 3.35 M/UL (ref 4–5.4)
RBC # BLD AUTO: 3.44 M/UL (ref 4–5.4)
RBC # BLD AUTO: 3.57 M/UL (ref 4–5.4)
RBC # BLD AUTO: 3.63 M/UL (ref 4–5.4)
RBC # BLD AUTO: 3.73 M/UL (ref 4–5.4)
RBC # BLD AUTO: 4.17 M/UL (ref 4–5.4)
SODIUM SERPL-SCNC: 128 MMOL/L (ref 136–145)
SODIUM SERPL-SCNC: 130 MMOL/L (ref 136–145)
SODIUM SERPL-SCNC: 131 MMOL/L (ref 136–145)
SODIUM SERPL-SCNC: 132 MMOL/L (ref 136–145)
SODIUM SERPL-SCNC: 133 MMOL/L (ref 136–145)
SODIUM SERPL-SCNC: 134 MMOL/L (ref 136–145)
SODIUM SERPL-SCNC: 135 MMOL/L (ref 136–145)
SODIUM SERPL-SCNC: 136 MMOL/L (ref 136–145)
SODIUM SERPL-SCNC: 137 MMOL/L (ref 136–145)
T4 FREE SERPL-MCNC: 0.75 NG/DL (ref 0.71–1.51)
TARGETS BLD QL SMEAR: ABNORMAL
TRIGL SERPL-MCNC: 97 MG/DL (ref 30–150)
TSH SERPL DL<=0.005 MIU/L-ACNC: 1.08 UIU/ML (ref 0.4–4)
WBC # BLD AUTO: 10.25 K/UL (ref 3.9–12.7)
WBC # BLD AUTO: 11.11 K/UL (ref 3.9–12.7)
WBC # BLD AUTO: 11.69 K/UL (ref 3.9–12.7)
WBC # BLD AUTO: 12.37 K/UL (ref 3.9–12.7)
WBC # BLD AUTO: 13.22 K/UL (ref 3.9–12.7)
WBC # BLD AUTO: 14.51 K/UL (ref 3.9–12.7)
WBC # BLD AUTO: 16.56 K/UL (ref 3.9–12.7)
WBC # BLD AUTO: 16.93 K/UL (ref 3.9–12.7)
WBC # BLD AUTO: 2.91 K/UL (ref 3.9–12.7)
WBC # BLD AUTO: 2.94 K/UL (ref 3.9–12.7)
WBC # BLD AUTO: 24.85 K/UL (ref 3.9–12.7)
WBC # BLD AUTO: 3.15 K/UL (ref 3.9–12.7)
WBC # BLD AUTO: 3.95 K/UL (ref 3.9–12.7)
WBC # BLD AUTO: 4.07 K/UL (ref 3.9–12.7)
WBC # BLD AUTO: 4.12 K/UL (ref 3.9–12.7)
WBC # BLD AUTO: 4.51 K/UL (ref 3.9–12.7)
WBC # BLD AUTO: 4.94 K/UL (ref 3.9–12.7)
WBC # BLD AUTO: 4.98 K/UL (ref 3.9–12.7)
WBC # BLD AUTO: 49.03 K/UL (ref 3.9–12.7)
WBC # BLD AUTO: 5.24 K/UL (ref 3.9–12.7)
WBC # BLD AUTO: 5.81 K/UL (ref 3.9–12.7)
WBC # BLD AUTO: 6.87 K/UL (ref 3.9–12.7)
WBC # BLD AUTO: 6.98 K/UL (ref 3.9–12.7)
WBC # BLD AUTO: 7.52 K/UL (ref 3.9–12.7)
WBC # BLD AUTO: 7.63 K/UL (ref 3.9–12.7)
WBC # BLD AUTO: 74.88 K/UL (ref 3.9–12.7)
WBC # BLD AUTO: 8.68 K/UL (ref 3.9–12.7)
WBC # BLD AUTO: 86.85 K/UL (ref 3.9–12.7)
WBC # BLD AUTO: 9.25 K/UL (ref 3.9–12.7)
WBC # BLD AUTO: 98.25 K/UL (ref 3.9–12.7)

## 2023-01-01 PROCEDURE — 99214 PR OFFICE/OUTPT VISIT, EST, LEVL IV, 30-39 MIN: ICD-10-PCS | Mod: S$GLB,,, | Performed by: NURSE PRACTITIONER

## 2023-01-01 PROCEDURE — 80053 COMPREHEN METABOLIC PANEL: CPT | Performed by: NURSE PRACTITIONER

## 2023-01-01 PROCEDURE — 77067 SCR MAMMO BI INCL CAD: CPT | Mod: TC,PO

## 2023-01-01 PROCEDURE — 99214 OFFICE O/P EST MOD 30 MIN: CPT | Mod: PBBFAC,PO | Performed by: PHYSICIAN ASSISTANT

## 2023-01-01 PROCEDURE — 36415 COLL VENOUS BLD VENIPUNCTURE: CPT | Performed by: NURSE PRACTITIONER

## 2023-01-01 PROCEDURE — 85025 COMPLETE CBC W/AUTO DIFF WBC: CPT | Performed by: INTERNAL MEDICINE

## 2023-01-01 PROCEDURE — 99213 PR OFFICE/OUTPT VISIT, EST, LEVL III, 20-29 MIN: ICD-10-PCS | Mod: S$PBB,,, | Performed by: PHYSICIAN ASSISTANT

## 2023-01-01 PROCEDURE — 99213 OFFICE O/P EST LOW 20 MIN: CPT | Performed by: INTERNAL MEDICINE

## 2023-01-01 PROCEDURE — 25000003 PHARM REV CODE 250: Performed by: INTERNAL MEDICINE

## 2023-01-01 PROCEDURE — 77063 MAMMO DIGITAL SCREENING BILAT WITH TOMO: ICD-10-PCS | Mod: 26,,, | Performed by: RADIOLOGY

## 2023-01-01 PROCEDURE — 71260 CT THORAX DX C+: CPT | Mod: TC,PO

## 2023-01-01 PROCEDURE — 85025 COMPLETE CBC W/AUTO DIFF WBC: CPT | Performed by: NURSE PRACTITIONER

## 2023-01-01 PROCEDURE — 99214 OFFICE O/P EST MOD 30 MIN: CPT | Mod: S$GLB,,, | Performed by: INTERNAL MEDICINE

## 2023-01-01 PROCEDURE — A4216 STERILE WATER/SALINE, 10 ML: HCPCS | Performed by: NURSE PRACTITIONER

## 2023-01-01 PROCEDURE — 99214 PR OFFICE/OUTPT VISIT, EST, LEVL IV, 30-39 MIN: ICD-10-PCS | Mod: S$PBB,,, | Performed by: FAMILY MEDICINE

## 2023-01-01 PROCEDURE — 80053 COMPREHEN METABOLIC PANEL: CPT | Performed by: INTERNAL MEDICINE

## 2023-01-01 PROCEDURE — 99999 PR PBB SHADOW E&M-EST. PATIENT-LVL III: CPT | Mod: PBBFAC,,, | Performed by: FAMILY MEDICINE

## 2023-01-01 PROCEDURE — 96367 TX/PROPH/DG ADDL SEQ IV INF: CPT

## 2023-01-01 PROCEDURE — 99214 PR OFFICE/OUTPT VISIT, EST, LEVL IV, 30-39 MIN: ICD-10-PCS | Mod: S$GLB,,, | Performed by: INTERNAL MEDICINE

## 2023-01-01 PROCEDURE — 36415 COLL VENOUS BLD VENIPUNCTURE: CPT | Performed by: INTERNAL MEDICINE

## 2023-01-01 PROCEDURE — 76536 US SOFT TISSUE HEAD NECK THYROID: ICD-10-PCS | Mod: 26,,, | Performed by: RADIOLOGY

## 2023-01-01 PROCEDURE — 63600175 PHARM REV CODE 636 W HCPCS: Performed by: INTERNAL MEDICINE

## 2023-01-01 PROCEDURE — 99213 OFFICE O/P EST LOW 20 MIN: CPT | Mod: PBBFAC,PO | Performed by: FAMILY MEDICINE

## 2023-01-01 PROCEDURE — 96413 CHEMO IV INFUSION 1 HR: CPT

## 2023-01-01 PROCEDURE — 77067 SCR MAMMO BI INCL CAD: CPT | Mod: 26,,, | Performed by: RADIOLOGY

## 2023-01-01 PROCEDURE — 96377 APPLICATON ON-BODY INJECTOR: CPT | Mod: 59

## 2023-01-01 PROCEDURE — 84443 ASSAY THYROID STIM HORMONE: CPT | Performed by: PHYSICIAN ASSISTANT

## 2023-01-01 PROCEDURE — 96372 THER/PROPH/DIAG INJ SC/IM: CPT

## 2023-01-01 PROCEDURE — 96523 IRRIG DRUG DELIVERY DEVICE: CPT

## 2023-01-01 PROCEDURE — 77063 BREAST TOMOSYNTHESIS BI: CPT | Mod: 26,,, | Performed by: RADIOLOGY

## 2023-01-01 PROCEDURE — 63600175 PHARM REV CODE 636 W HCPCS: Performed by: NURSE PRACTITIONER

## 2023-01-01 PROCEDURE — 85007 BL SMEAR W/DIFF WBC COUNT: CPT | Performed by: INTERNAL MEDICINE

## 2023-01-01 PROCEDURE — 99215 OFFICE O/P EST HI 40 MIN: CPT | Performed by: NURSE PRACTITIONER

## 2023-01-01 PROCEDURE — 80053 COMPREHEN METABOLIC PANEL: CPT | Performed by: PHYSICIAN ASSISTANT

## 2023-01-01 PROCEDURE — 25000003 PHARM REV CODE 250: Performed by: NURSE PRACTITIONER

## 2023-01-01 PROCEDURE — 71046 X-RAY EXAM CHEST 2 VIEWS: CPT | Mod: TC,PO

## 2023-01-01 PROCEDURE — 94618 PULMONARY STRESS TESTING: ICD-10-PCS | Mod: 26,S$PBB,, | Performed by: INTERNAL MEDICINE

## 2023-01-01 PROCEDURE — 82553 CREATINE MB FRACTION: CPT | Performed by: INTERNAL MEDICINE

## 2023-01-01 PROCEDURE — 99999 PR PBB SHADOW E&M-EST. PATIENT-LVL IV: CPT | Mod: PBBFAC,,, | Performed by: PHYSICIAN ASSISTANT

## 2023-01-01 PROCEDURE — 85027 COMPLETE CBC AUTOMATED: CPT | Performed by: INTERNAL MEDICINE

## 2023-01-01 PROCEDURE — 99214 OFFICE O/P EST MOD 30 MIN: CPT | Mod: S$PBB,,, | Performed by: NURSE PRACTITIONER

## 2023-01-01 PROCEDURE — 99999 PR PBB SHADOW E&M-EST. PATIENT-LVL IV: ICD-10-PCS | Mod: PBBFAC,,, | Performed by: PHYSICIAN ASSISTANT

## 2023-01-01 PROCEDURE — 99999 PR PBB SHADOW E&M-EST. PATIENT-LVL III: ICD-10-PCS | Mod: PBBFAC,,, | Performed by: FAMILY MEDICINE

## 2023-01-01 PROCEDURE — 99213 OFFICE O/P EST LOW 20 MIN: CPT | Mod: S$PBB,,, | Performed by: PHYSICIAN ASSISTANT

## 2023-01-01 PROCEDURE — G0439 PPPS, SUBSEQ VISIT: HCPCS | Mod: S$GLB,,, | Performed by: NURSE PRACTITIONER

## 2023-01-01 PROCEDURE — 74177 CT ABD & PELVIS W/CONTRAST: CPT | Mod: TC,PO

## 2023-01-01 PROCEDURE — 25500020 PHARM REV CODE 255: Mod: PO | Performed by: INTERNAL MEDICINE

## 2023-01-01 PROCEDURE — 84132 ASSAY OF SERUM POTASSIUM: CPT | Performed by: INTERNAL MEDICINE

## 2023-01-01 PROCEDURE — 99214 OFFICE O/P EST MOD 30 MIN: CPT | Performed by: NURSE PRACTITIONER

## 2023-01-01 PROCEDURE — 77067 MAMMO DIGITAL SCREENING BILAT WITH TOMO: ICD-10-PCS | Mod: 26,,, | Performed by: RADIOLOGY

## 2023-01-01 PROCEDURE — 99214 PR OFFICE/OUTPT VISIT, EST, LEVL IV, 30-39 MIN: ICD-10-PCS | Mod: S$PBB,,, | Performed by: INTERNAL MEDICINE

## 2023-01-01 PROCEDURE — 99214 PR OFFICE/OUTPT VISIT, EST, LEVL IV, 30-39 MIN: ICD-10-PCS | Mod: S$PBB,,, | Performed by: NURSE PRACTITIONER

## 2023-01-01 PROCEDURE — 63600175 PHARM REV CODE 636 W HCPCS: Mod: TB | Performed by: NURSE PRACTITIONER

## 2023-01-01 PROCEDURE — 96377 APPLICATON ON-BODY INJECTOR: CPT

## 2023-01-01 PROCEDURE — 99215 OFFICE O/P EST HI 40 MIN: CPT | Mod: S$GLB,,, | Performed by: NURSE PRACTITIONER

## 2023-01-01 PROCEDURE — 76536 US EXAM OF HEAD AND NECK: CPT | Mod: TC

## 2023-01-01 PROCEDURE — 25500020 PHARM REV CODE 255: Mod: PO | Performed by: NURSE PRACTITIONER

## 2023-01-01 PROCEDURE — 63600175 PHARM REV CODE 636 W HCPCS: Mod: JZ,JG | Performed by: INTERNAL MEDICINE

## 2023-01-01 PROCEDURE — A4216 STERILE WATER/SALINE, 10 ML: HCPCS | Performed by: INTERNAL MEDICINE

## 2023-01-01 PROCEDURE — 76536 US EXAM OF HEAD AND NECK: CPT | Mod: 26,,, | Performed by: RADIOLOGY

## 2023-01-01 PROCEDURE — 85007 BL SMEAR W/DIFF WBC COUNT: CPT | Performed by: NURSE PRACTITIONER

## 2023-01-01 PROCEDURE — 99214 OFFICE O/P EST MOD 30 MIN: CPT | Mod: S$PBB,,, | Performed by: INTERNAL MEDICINE

## 2023-01-01 PROCEDURE — 63600175 PHARM REV CODE 636 W HCPCS: Mod: JW,TB | Performed by: NURSE PRACTITIONER

## 2023-01-01 PROCEDURE — 36415 COLL VENOUS BLD VENIPUNCTURE: CPT | Mod: PO | Performed by: PHYSICIAN ASSISTANT

## 2023-01-01 PROCEDURE — 99214 OFFICE O/P EST MOD 30 MIN: CPT | Mod: S$GLB,,, | Performed by: NURSE PRACTITIONER

## 2023-01-01 PROCEDURE — 80061 LIPID PANEL: CPT | Performed by: PHYSICIAN ASSISTANT

## 2023-01-01 PROCEDURE — G0439 PR MEDICARE ANNUAL WELLNESS SUBSEQUENT VISIT: ICD-10-PCS | Mod: S$GLB,,, | Performed by: NURSE PRACTITIONER

## 2023-01-01 PROCEDURE — 82306 VITAMIN D 25 HYDROXY: CPT | Performed by: PHYSICIAN ASSISTANT

## 2023-01-01 PROCEDURE — 85027 COMPLETE CBC AUTOMATED: CPT | Performed by: NURSE PRACTITIONER

## 2023-01-01 PROCEDURE — 99215 PR OFFICE/OUTPT VISIT, EST, LEVL V, 40-54 MIN: ICD-10-PCS | Mod: S$GLB,,, | Performed by: NURSE PRACTITIONER

## 2023-01-01 PROCEDURE — 94618 PULMONARY STRESS TESTING: CPT | Mod: 26,S$PBB,, | Performed by: INTERNAL MEDICINE

## 2023-01-01 PROCEDURE — 80074 ACUTE HEPATITIS PANEL: CPT | Performed by: INTERNAL MEDICINE

## 2023-01-01 PROCEDURE — 82550 ASSAY OF CK (CPK): CPT | Performed by: INTERNAL MEDICINE

## 2023-01-01 PROCEDURE — 94618 PULMONARY STRESS TESTING: CPT | Mod: PBBFAC | Performed by: INTERNAL MEDICINE

## 2023-01-01 PROCEDURE — 71250 CT THORAX DX C-: CPT | Mod: TC,PO

## 2023-01-01 PROCEDURE — 99214 OFFICE O/P EST MOD 30 MIN: CPT | Mod: S$PBB,,, | Performed by: FAMILY MEDICINE

## 2023-01-01 PROCEDURE — 84439 ASSAY OF FREE THYROXINE: CPT | Performed by: PHYSICIAN ASSISTANT

## 2023-01-01 PROCEDURE — 99214 OFFICE O/P EST MOD 30 MIN: CPT | Performed by: INTERNAL MEDICINE

## 2023-01-01 RX ORDER — HEPARIN 100 UNIT/ML
500 SYRINGE INTRAVENOUS
Status: DISCONTINUED | OUTPATIENT
Start: 2023-01-01 | End: 2023-01-01 | Stop reason: HOSPADM

## 2023-01-01 RX ORDER — SODIUM CHLORIDE 0.9 % (FLUSH) 0.9 %
10 SYRINGE (ML) INJECTION
Status: DISCONTINUED | OUTPATIENT
Start: 2023-01-01 | End: 2023-01-01 | Stop reason: HOSPADM

## 2023-01-01 RX ORDER — HYDROCODONE BITARTRATE AND ACETAMINOPHEN 5; 325 MG/1; MG/1
1 TABLET ORAL EVERY 6 HOURS PRN
Qty: 40 TABLET | Refills: 0 | Status: SHIPPED | OUTPATIENT
Start: 2023-01-01 | End: 2023-01-01 | Stop reason: SDUPTHER

## 2023-01-01 RX ORDER — SODIUM CHLORIDE 0.9 % (FLUSH) 0.9 %
10 SYRINGE (ML) INJECTION
Status: CANCELLED | OUTPATIENT
Start: 2023-01-01

## 2023-01-01 RX ORDER — ONDANSETRON 2 MG/ML
8 INJECTION INTRAMUSCULAR; INTRAVENOUS ONCE
Status: CANCELLED
Start: 2023-01-01 | End: 2023-01-01

## 2023-01-01 RX ORDER — HEPARIN 100 UNIT/ML
500 SYRINGE INTRAVENOUS
Status: CANCELLED | OUTPATIENT
Start: 2023-01-01

## 2023-01-01 RX ORDER — ROSUVASTATIN CALCIUM 20 MG/1
TABLET, COATED ORAL
Qty: 90 TABLET | Refills: 2 | Status: SHIPPED | OUTPATIENT
Start: 2023-01-01

## 2023-01-01 RX ORDER — LEVALBUTEROL TARTRATE 45 UG/1
AEROSOL, METERED ORAL
Qty: 45 G | Refills: 1 | Status: CANCELLED | OUTPATIENT
Start: 2023-01-01

## 2023-01-01 RX ORDER — ALBUTEROL SULFATE 90 UG/1
2 AEROSOL, METERED RESPIRATORY (INHALATION) EVERY 6 HOURS PRN
Qty: 18 G | Refills: 5 | Status: SHIPPED | OUTPATIENT
Start: 2023-01-01 | End: 2023-01-01

## 2023-01-01 RX ORDER — ONDANSETRON HYDROCHLORIDE 8 MG/1
8 TABLET, FILM COATED ORAL EVERY 8 HOURS PRN
Qty: 30 TABLET | Refills: 5 | Status: SHIPPED | OUTPATIENT
Start: 2023-01-01 | End: 2024-06-20

## 2023-01-01 RX ORDER — ONDANSETRON HCL IN 0.9 % NACL 8 MG/50 ML
8 INTRAVENOUS SOLUTION, PIGGYBACK (ML) INTRAVENOUS ONCE
Status: COMPLETED | OUTPATIENT
Start: 2023-01-01 | End: 2023-01-01

## 2023-01-01 RX ORDER — ALBUTEROL SULFATE 90 UG/1
2 AEROSOL, METERED RESPIRATORY (INHALATION) EVERY 6 HOURS
Qty: 18 G | Refills: 11 | Status: SHIPPED | OUTPATIENT
Start: 2023-01-01

## 2023-01-01 RX ORDER — LEVOFLOXACIN 500 MG/1
500 TABLET, FILM COATED ORAL DAILY
Qty: 10 TABLET | Refills: 0 | Status: SHIPPED | OUTPATIENT
Start: 2023-01-01 | End: 2023-01-01

## 2023-01-01 RX ORDER — ONDANSETRON HYDROCHLORIDE 8 MG/1
8 TABLET, FILM COATED ORAL EVERY 8 HOURS PRN
Qty: 30 TABLET | Refills: 5 | Status: SHIPPED | OUTPATIENT
Start: 2023-01-01 | End: 2023-01-01 | Stop reason: SDUPTHER

## 2023-01-01 RX ORDER — AZITHROMYCIN 250 MG/1
TABLET, FILM COATED ORAL
Qty: 6 TABLET | Refills: 0 | Status: SHIPPED | OUTPATIENT
Start: 2023-01-01 | End: 2023-01-01

## 2023-01-01 RX ORDER — HYDROCODONE BITARTRATE AND ACETAMINOPHEN 5; 325 MG/1; MG/1
1 TABLET ORAL EVERY 6 HOURS PRN
Qty: 40 TABLET | Refills: 0 | Status: SHIPPED | OUTPATIENT
Start: 2023-01-01

## 2023-01-01 RX ORDER — ALENDRONATE SODIUM 70 MG/1
TABLET ORAL
Qty: 12 TABLET | Refills: 3 | Status: SHIPPED | OUTPATIENT
Start: 2023-01-01

## 2023-01-01 RX ORDER — LORATADINE 10 MG/1
10 TABLET ORAL DAILY
COMMUNITY

## 2023-01-01 RX ORDER — AZELASTINE 1 MG/ML
1 SPRAY, METERED NASAL 2 TIMES DAILY
Qty: 30 ML | Refills: 0 | Status: SHIPPED | OUTPATIENT
Start: 2023-01-01 | End: 2024-02-14

## 2023-01-01 RX ORDER — PROMETHAZINE HYDROCHLORIDE 25 MG/1
25 TABLET ORAL
Qty: 30 TABLET | Refills: 5 | Status: SHIPPED | OUTPATIENT
Start: 2023-01-01

## 2023-01-01 RX ORDER — LEVALBUTEROL TARTRATE 45 UG/1
AEROSOL, METERED ORAL
Qty: 45 G | Refills: 3 | Status: SHIPPED | OUTPATIENT
Start: 2023-01-01 | End: 2023-01-01

## 2023-01-01 RX ADMIN — HEPARIN 500 UNITS: 100 SYRINGE at 10:02

## 2023-01-01 RX ADMIN — HEPARIN 500 UNITS: 100 SYRINGE at 01:04

## 2023-01-01 RX ADMIN — TOPOTECAN 2.1 MG: 1 INJECTION, SOLUTION, CONCENTRATE INTRAVENOUS at 10:02

## 2023-01-01 RX ADMIN — SODIUM CHLORIDE, PRESERVATIVE FREE 10 ML: 5 INJECTION INTRAVENOUS at 12:08

## 2023-01-01 RX ADMIN — HEPARIN 500 UNITS: 100 SYRINGE at 01:03

## 2023-01-01 RX ADMIN — SODIUM CHLORIDE: 0.9 INJECTION, SOLUTION INTRAVENOUS at 11:06

## 2023-01-01 RX ADMIN — TOPOTECAN 2.1 MG: 1 INJECTION, SOLUTION, CONCENTRATE INTRAVENOUS at 12:04

## 2023-01-01 RX ADMIN — FILGRASTIM-SNDZ 300 MCG: 300 INJECTION, SOLUTION INTRAVENOUS; SUBCUTANEOUS at 01:05

## 2023-01-01 RX ADMIN — TOPOTECAN 2.09 MG: 1 INJECTION, SOLUTION, CONCENTRATE INTRAVENOUS at 09:01

## 2023-01-01 RX ADMIN — LURBINECTEDIN 4.5 MG: 0.5 INJECTION, POWDER, LYOPHILIZED, FOR SOLUTION INTRAVENOUS at 11:05

## 2023-01-01 RX ADMIN — HEPARIN 500 UNITS: 100 SYRINGE at 08:02

## 2023-01-01 RX ADMIN — FILGRASTIM-SNDZ 300 MCG: 300 INJECTION, SOLUTION INTRAVENOUS; SUBCUTANEOUS at 10:05

## 2023-01-01 RX ADMIN — FILGRASTIM-SNDZ 300 MCG: 300 INJECTION, SOLUTION INTRAVENOUS; SUBCUTANEOUS at 11:05

## 2023-01-01 RX ADMIN — HEPARIN 500 UNITS: 100 SYRINGE at 12:05

## 2023-01-01 RX ADMIN — LURBINECTEDIN 4.5 MG: 0.5 INJECTION, POWDER, LYOPHILIZED, FOR SOLUTION INTRAVENOUS at 11:07

## 2023-01-01 RX ADMIN — HEPARIN 500 UNITS: 100 SYRINGE at 12:09

## 2023-01-01 RX ADMIN — SODIUM CHLORIDE: 0.9 INJECTION, SOLUTION INTRAVENOUS at 08:01

## 2023-01-01 RX ADMIN — FILGRASTIM-SNDZ 300 MCG: 300 INJECTION, SOLUTION INTRAVENOUS; SUBCUTANEOUS at 08:06

## 2023-01-01 RX ADMIN — SODIUM CHLORIDE, PRESERVATIVE FREE 10 ML: 5 INJECTION INTRAVENOUS at 11:02

## 2023-01-01 RX ADMIN — ONDANSETRON 8 MG: 2 INJECTION INTRAMUSCULAR; INTRAVENOUS at 07:02

## 2023-01-01 RX ADMIN — SODIUM CHLORIDE: 0.9 INJECTION, SOLUTION INTRAVENOUS at 10:04

## 2023-01-01 RX ADMIN — IOHEXOL 100 ML: 350 INJECTION, SOLUTION INTRAVENOUS at 08:04

## 2023-01-01 RX ADMIN — DEXAMETHASONE SODIUM PHOSPHATE 12 MG: 4 INJECTION, SOLUTION INTRA-ARTICULAR; INTRALESIONAL; INTRAMUSCULAR; INTRAVENOUS; SOFT TISSUE at 11:04

## 2023-01-01 RX ADMIN — SODIUM CHLORIDE, PRESERVATIVE FREE 10 ML: 5 INJECTION INTRAVENOUS at 01:03

## 2023-01-01 RX ADMIN — HEPARIN 500 UNITS: 100 SYRINGE at 12:08

## 2023-01-01 RX ADMIN — SODIUM CHLORIDE, PRESERVATIVE FREE 10 ML: 5 INJECTION INTRAVENOUS at 12:05

## 2023-01-01 RX ADMIN — ONDANSETRON 8 MG: 2 INJECTION INTRAMUSCULAR; INTRAVENOUS at 11:04

## 2023-01-01 RX ADMIN — SODIUM CHLORIDE, PRESERVATIVE FREE 10 ML: 5 INJECTION INTRAVENOUS at 01:06

## 2023-01-01 RX ADMIN — TOPOTECAN 2.1 MG: 1 INJECTION, SOLUTION, CONCENTRATE INTRAVENOUS at 11:04

## 2023-01-01 RX ADMIN — LURBINECTEDIN 4.5 MG: 0.5 INJECTION, POWDER, LYOPHILIZED, FOR SOLUTION INTRAVENOUS at 11:09

## 2023-01-01 RX ADMIN — DEXAMETHASONE SODIUM PHOSPHATE 12 MG: 4 INJECTION, SOLUTION INTRA-ARTICULAR; INTRALESIONAL; INTRAMUSCULAR; INTRAVENOUS; SOFT TISSUE at 09:02

## 2023-01-01 RX ADMIN — TOPOTECAN 2.1 MG: 1 INJECTION, SOLUTION, CONCENTRATE INTRAVENOUS at 09:02

## 2023-01-01 RX ADMIN — PEGFILGRASTIM 6 MG: KIT SUBCUTANEOUS at 12:06

## 2023-01-01 RX ADMIN — SODIUM CHLORIDE: 0.9 INJECTION, SOLUTION INTRAVENOUS at 11:04

## 2023-01-01 RX ADMIN — ONDANSETRON 8 MG: 2 INJECTION INTRAMUSCULAR; INTRAVENOUS at 11:03

## 2023-01-01 RX ADMIN — DEXAMETHASONE SODIUM PHOSPHATE 12 MG: 4 INJECTION, SOLUTION INTRA-ARTICULAR; INTRALESIONAL; INTRAMUSCULAR; INTRAVENOUS; SOFT TISSUE at 07:02

## 2023-01-01 RX ADMIN — ONDANSETRON 8 MG: 2 INJECTION INTRAMUSCULAR; INTRAVENOUS at 08:02

## 2023-01-01 RX ADMIN — HEPARIN 500 UNITS: 100 SYRINGE at 12:04

## 2023-01-01 RX ADMIN — SODIUM CHLORIDE, PRESERVATIVE FREE 10 ML: 5 INJECTION INTRAVENOUS at 12:04

## 2023-01-01 RX ADMIN — HEPARIN 500 UNITS: 100 SYRINGE at 11:04

## 2023-01-01 RX ADMIN — TOPOTECAN 2.09 MG: 1 INJECTION, SOLUTION, CONCENTRATE INTRAVENOUS at 09:02

## 2023-01-01 RX ADMIN — HEPARIN 500 UNITS: 100 SYRINGE at 10:01

## 2023-01-01 RX ADMIN — FILGRASTIM-SNDZ 300 MCG: 300 INJECTION, SOLUTION INTRAVENOUS; SUBCUTANEOUS at 02:06

## 2023-01-01 RX ADMIN — PALONOSETRON HYDROCHLORIDE 0.25 MG: 0.25 INJECTION INTRAVENOUS at 11:05

## 2023-01-01 RX ADMIN — SODIUM CHLORIDE, PRESERVATIVE FREE 10 ML: 5 INJECTION INTRAVENOUS at 09:01

## 2023-01-01 RX ADMIN — SODIUM CHLORIDE, PRESERVATIVE FREE 10 ML: 5 INJECTION INTRAVENOUS at 12:09

## 2023-01-01 RX ADMIN — SODIUM CHLORIDE: 9 INJECTION, SOLUTION INTRAVENOUS at 08:02

## 2023-01-01 RX ADMIN — TOPOTECAN 2.1 MG: 1 INJECTION, SOLUTION, CONCENTRATE INTRAVENOUS at 10:01

## 2023-01-01 RX ADMIN — LURBINECTEDIN 4.5 MG: 0.5 INJECTION, POWDER, LYOPHILIZED, FOR SOLUTION INTRAVENOUS at 12:08

## 2023-01-01 RX ADMIN — HEPARIN 500 UNITS: 100 SYRINGE at 11:02

## 2023-01-01 RX ADMIN — PALONOSETRON HYDROCHLORIDE 0.25 MG: 0.25 INJECTION INTRAVENOUS at 11:06

## 2023-01-01 RX ADMIN — PEGFILGRASTIM 6 MG: KIT SUBCUTANEOUS at 12:08

## 2023-01-01 RX ADMIN — TOPOTECAN 2.09 MG: 1 INJECTION, SOLUTION, CONCENTRATE INTRAVENOUS at 08:02

## 2023-01-01 RX ADMIN — SODIUM CHLORIDE, PRESERVATIVE FREE 10 ML: 5 INJECTION INTRAVENOUS at 12:03

## 2023-01-01 RX ADMIN — SODIUM CHLORIDE, PRESERVATIVE FREE 10 ML: 5 INJECTION INTRAVENOUS at 08:02

## 2023-01-01 RX ADMIN — ONDANSETRON 8 MG: 2 INJECTION INTRAMUSCULAR; INTRAVENOUS at 09:02

## 2023-01-01 RX ADMIN — LURBINECTEDIN 4.5 MG: 0.5 INJECTION, POWDER, LYOPHILIZED, FOR SOLUTION INTRAVENOUS at 12:06

## 2023-01-01 RX ADMIN — ONDANSETRON 8 MG: 2 INJECTION INTRAMUSCULAR; INTRAVENOUS at 10:04

## 2023-01-01 RX ADMIN — HEPARIN 500 UNITS: 100 SYRINGE at 09:01

## 2023-01-01 RX ADMIN — IOHEXOL 100 ML: 350 INJECTION, SOLUTION INTRAVENOUS at 09:05

## 2023-01-01 RX ADMIN — SODIUM CHLORIDE: 0.9 INJECTION, SOLUTION INTRAVENOUS at 11:08

## 2023-01-01 RX ADMIN — LURBINECTEDIN 4.5 MG: 0.5 INJECTION, POWDER, LYOPHILIZED, FOR SOLUTION INTRAVENOUS at 11:06

## 2023-01-01 RX ADMIN — PALONOSETRON HYDROCHLORIDE 0.25 MG: 0.25 INJECTION INTRAVENOUS at 11:08

## 2023-01-01 RX ADMIN — HEPARIN 500 UNITS: 100 SYRINGE at 12:07

## 2023-01-01 RX ADMIN — SODIUM CHLORIDE: 0.9 INJECTION, SOLUTION INTRAVENOUS at 11:03

## 2023-01-01 RX ADMIN — PALONOSETRON HYDROCHLORIDE 0.25 MG: 0.25 INJECTION INTRAVENOUS at 11:04

## 2023-01-01 RX ADMIN — PEGFILGRASTIM 6 MG: KIT SUBCUTANEOUS at 12:09

## 2023-01-01 RX ADMIN — SODIUM CHLORIDE: 0.9 INJECTION, SOLUTION INTRAVENOUS at 09:02

## 2023-01-01 RX ADMIN — FILGRASTIM-SNDZ 300 MCG: 300 INJECTION, SOLUTION INTRAVENOUS; SUBCUTANEOUS at 02:05

## 2023-01-01 RX ADMIN — LURBINECTEDIN 4.5 MG: 0.5 INJECTION, POWDER, LYOPHILIZED, FOR SOLUTION INTRAVENOUS at 12:04

## 2023-01-01 RX ADMIN — DEXAMETHASONE SODIUM PHOSPHATE 12 MG: 4 INJECTION, SOLUTION INTRA-ARTICULAR; INTRALESIONAL; INTRAMUSCULAR; INTRAVENOUS; SOFT TISSUE at 12:03

## 2023-01-01 RX ADMIN — DEXAMETHASONE SODIUM PHOSPHATE 12 MG: 4 INJECTION, SOLUTION INTRA-ARTICULAR; INTRALESIONAL; INTRAMUSCULAR; INTRAVENOUS; SOFT TISSUE at 10:04

## 2023-01-01 RX ADMIN — HEPARIN 500 UNITS: 100 SYRINGE at 12:03

## 2023-01-01 RX ADMIN — DEXAMETHASONE SODIUM PHOSPHATE 12 MG: 4 INJECTION, SOLUTION INTRA-ARTICULAR; INTRALESIONAL; INTRAMUSCULAR; INTRAVENOUS; SOFT TISSUE at 11:03

## 2023-01-01 RX ADMIN — PALONOSETRON HYDROCHLORIDE 0.25 MG: 0.25 INJECTION INTRAVENOUS at 11:09

## 2023-01-01 RX ADMIN — FILGRASTIM-SNDZ 300 MCG: 300 INJECTION, SOLUTION INTRAVENOUS; SUBCUTANEOUS at 04:05

## 2023-01-01 RX ADMIN — DEXAMETHASONE SODIUM PHOSPHATE 12 MG: 4 INJECTION, SOLUTION INTRA-ARTICULAR; INTRALESIONAL; INTRAMUSCULAR; INTRAVENOUS; SOFT TISSUE at 09:01

## 2023-01-01 RX ADMIN — DEXAMETHASONE SODIUM PHOSPHATE 0.25 MG: 4 INJECTION, SOLUTION INTRA-ARTICULAR; INTRALESIONAL; INTRAMUSCULAR; INTRAVENOUS; SOFT TISSUE at 11:07

## 2023-01-01 RX ADMIN — DEXAMETHASONE SODIUM PHOSPHATE 12 MG: 4 INJECTION, SOLUTION INTRA-ARTICULAR; INTRALESIONAL; INTRAMUSCULAR; INTRAVENOUS; SOFT TISSUE at 10:02

## 2023-01-01 RX ADMIN — HEPARIN 500 UNITS: 100 SYRINGE at 01:06

## 2023-01-01 RX ADMIN — SODIUM CHLORIDE, PRESERVATIVE FREE 10 ML: 5 INJECTION INTRAVENOUS at 10:01

## 2023-01-01 RX ADMIN — ONDANSETRON 8 MG: 2 INJECTION INTRAMUSCULAR; INTRAVENOUS at 12:03

## 2023-01-01 RX ADMIN — SODIUM CHLORIDE: 0.9 INJECTION, SOLUTION INTRAVENOUS at 12:03

## 2023-01-01 RX ADMIN — TOPOTECAN 2.1 MG: 1 INJECTION, SOLUTION, CONCENTRATE INTRAVENOUS at 01:03

## 2023-01-01 RX ADMIN — SODIUM CHLORIDE, PRESERVATIVE FREE 10 ML: 5 INJECTION INTRAVENOUS at 01:04

## 2023-01-01 RX ADMIN — TOPOTECAN 2.1 MG: 1 INJECTION, SOLUTION, CONCENTRATE INTRAVENOUS at 12:03

## 2023-01-01 RX ADMIN — TOPOTECAN 2.1 MG: 1 INJECTION, SOLUTION, CONCENTRATE INTRAVENOUS at 10:04

## 2023-01-01 RX ADMIN — ONDANSETRON 8 MG: 2 INJECTION INTRAMUSCULAR; INTRAVENOUS at 08:01

## 2023-01-01 RX ADMIN — IOHEXOL 100 ML: 350 INJECTION, SOLUTION INTRAVENOUS at 10:08

## 2023-01-01 RX ADMIN — ONDANSETRON 8 MG: 2 INJECTION INTRAMUSCULAR; INTRAVENOUS at 09:01

## 2023-01-01 RX ADMIN — SODIUM CHLORIDE: 0.9 INJECTION, SOLUTION INTRAVENOUS at 11:09

## 2023-01-01 RX ADMIN — LURBINECTEDIN 4.5 MG: 0.5 INJECTION, POWDER, LYOPHILIZED, FOR SOLUTION INTRAVENOUS at 11:08

## 2023-01-01 RX ADMIN — DEXAMETHASONE SODIUM PHOSPHATE 12 MG: 4 INJECTION, SOLUTION INTRA-ARTICULAR; INTRALESIONAL; INTRAMUSCULAR; INTRAVENOUS; SOFT TISSUE at 08:01

## 2023-01-05 PROBLEM — R91.8 PULMONARY NODULES/LESIONS, MULTIPLE: Status: ACTIVE | Noted: 2023-01-01

## 2023-01-05 NOTE — PROCEDURES
Anuja Cool is a 72 y.o.  female patient, who presents for a 6 minute walk test ordered by MD Jamie.  The diagnosis is Shortness of Breath; COPD/Emphysema; Lung Cancer.  The patient's BMI is 17.7 kg/m2.  Predicted distance (lower limit of normal) is 353.62 meters.      Test Results:    The test was completed without stopping.  The total time walked was 360 seconds.  During walking, the patient reported:  Dyspnea; Leg Fatigue.  The patient used no assistive devices during testing.     01/05/2023---------Distance: 274.32 meters (900 feet)     O2 Sat % Supplemental Oxygen Heart Rate Blood Pressure Weston Scale   Pre-exercise  (Resting) 96 % Room Air 94 bpm 146/65 mmHg 0   During Exercise 91 % Room Air 107 bpm 190/84 mmHg 4   Post-exercise  (Recovery) 98 % Room Air  92 bpm 141/66 mmHg      Recovery Time: 220 seconds    Performing nurse/tech: Estopinal RRT      PREVIOUS STUDY:   The patient has not had a previous study.      CLINICAL INTERPRETATION:  Six minute walk distance is 274.32 meters (900 feet) with somewhat heavy dyspnea.  During exercise, there was significant desaturation while breathing room air.  Blood pressure increased significantly and Heart rate remained stable with walking.  This may represent a hypertensive response to exercise.  The patient reported non-pulmonary symptoms during exercise.  Significant exercise impairment is likely due to cardiovascular causes and subjective symptoms.  The patient did complete the study, walking 360 seconds of the 360 second test.  No previous study performed.  Based upon age and body mass index, exercise capacity is less than predicted.

## 2023-01-06 NOTE — PROGRESS NOTES
6MWT reviewed. Based on results no supplemental O2 is required. Called x 2- LVM. Sent portal message.     Jesu Ayala MD   [facility]

## 2023-01-10 NOTE — TELEPHONE ENCOUNTER
----- Message from Gisel Alvarado sent at 1/10/2023  9:16 AM CST -----  The patient called again about getting a prescription for hydrocodone. # [phone]

## 2023-01-25 NOTE — PROGRESS NOTES
FOLLOW-UP APPOINTMENT    PATIENT:   Anuja Cool  :    1950  MR#:    [id number]  DATE OF VISIT:  2023      Chief Complaint: Chemo School/Lung cancer    HPI:   Ms. Anuja Cool presents today for chemotherapy education.  She will be starting treatment with Topotecan for the above diagnosis.     Depression Patient Health Questionnaire 2023 2023 2022 2022 10/17/2022 2022 2022   Over the last two weeks how often have you been bothered by little interest or pleasure in doing things Not at all Not at all Not at all Not at all Not at all Not at all Not at all   Over the last two weeks how often have you been bothered by feeling down, depressed or hopeless Not at all Not at all Not at all Not at all Not at all Not at all Not at all   PHQ-2 Total Score 0 0 0 0 0 0 0         Review of Systems   Constitutional:  Positive for fatigue. Negative for appetite change and unexpected weight change.   HENT:   Negative for hearing loss.    Respiratory:  Positive for cough. Negative for shortness of breath.    Cardiovascular:  Negative for chest pain, leg swelling and palpitations.   Gastrointestinal:  Positive for constipation. Negative for abdominal pain.   Genitourinary:  Negative for dysuria.    Musculoskeletal:  Positive for back pain. Negative for neck pain.   Hematological:  Negative for adenopathy.   Psychiatric/Behavioral:  The patient is not nervous/anxious.      Oncology History   Small Cell Lung Cancer FRANCIS   3/9/2022 Initial Diagnosis    Small Cell Lung Cancer of FRANCIS and RLL of lung     2022 Cancer Staged    Staging form: Lung, AJCC 8th Edition  - Clinical: Stage SUSIE (cT2, cN0, cM1a)       2022 - 2022 Chemotherapy    Treatment Summary   Plan Name: OP CARBOPLATIN (AUC) + ETOPOSIDE Q3W  Treatment Goal: Control  Status: Inactive  Start Date: 2022  End Date: 2022  Provider: Jonathan Tinoco MD  Chemotherapy: CARBOplatin (PARAPLATIN) 580 mg in sodium  chloride 0.9% 308 mL chemo infusion, 580 mg (100 % of original dose 579.5 mg), Intravenous, Clinic/HOD 1 time, 4 of 4 cycles  Dose modification:   (original dose 579.5 mg, Cycle 1),   (original dose 588 mg, Cycle 2),   (original dose 588 mg, Cycle 3),   (original dose 588 mg, Cycle 3), 585 mg (original dose 588 mg, Cycle 4, Reason: MD Discretion)  Administration: 580 mg (4/27/2022), 590 mg (5/25/2022), 585 mg (6/15/2022), 585 mg (7/11/2022)  etoposide (VEPESID) 100 mg/m2 = 140 mg in sodium chloride 0.9% 507 mL chemo infusion, 100 mg/m2 = 140 mg, Intravenous, Clinic/HOD 1 time, 4 of 4 cycles  Administration: 140 mg (4/27/2022), 140 mg (4/28/2022), 140 mg (5/25/2022), 142 mg (4/29/2022), 142 mg (5/27/2022), 142 mg (5/26/2022), 142 mg (6/17/2022), 140 mg (6/15/2022), 142 mg (6/16/2022), 140 mg (7/11/2022), 142 mg (7/12/2022), 142 mg (7/13/2022)       1/30/2023 -  Chemotherapy    Treatment Summary   Plan Name: OP SCLC TOPOTECAN Q3W  Treatment Goal: Control  Status: Active  Start Date: 1/30/2023 (Planned)  End Date: 5/17/2023 (Planned)  Provider: Jonathan Tinoco MD  Chemotherapy: topotecan (HYCAMTIN) 2.09 mg in sodium chloride 0.9% 102.09 mL chemo infusion, 1.5 mg/m2 = 2.09 mg, Intravenous, Clinic/HOD 1 time, 0 of 6 cycles           Patient Active Problem List   Diagnosis    Asthma    Allergy    Essential hypertension    Hyperlipidemia LDL goal <130    Tobacco use    Chronic obstructive pulmonary disease    Bilateral carotid bruits    OAB (overactive bladder)    Osteopenia    BMI 21.0-21.9, adult    Low serum thyroid stimulating hormone (TSH)    Multinodular goiter    Stenosis of carotid artery    Constipation    Postmenopausal    ASCVD (arteriosclerotic cardiovascular disease)    Hyperthyroidism    BMI less than 19,adult    Small Cell Lung Cancer FRANCIS    Small cell lung cancer    Chemotherapy-induced neutropenia    Dehydration    Hypotension    Adenocarcinoma Lung Cancer RLL    Pulmonary nodules/lesions, multiple        Past Medical History:   Diagnosis Date    Allergy     Arthritis     Asthma     Bursitis     COPD (chronic obstructive pulmonary disease)     Hypertension     Low sodium     Lung nodules 2022    Small cell carcinoma of left lung 2022    Thyroid disease     nodules    Tinea pedis        Past Surgical History:   Procedure Laterality Date    COLONOSCOPY N/A 2018    Procedure: COLONOSCOPY;  Surgeon: Grey Roberts MD;  Location: [facility] ENDO;  Service: Endoscopy;  Laterality: N/A;    CT BIOPSY LUNG W/ GUIDANCE Left 2022    HYSTERECTOMY      INSERTION OF TUNNELED CENTRAL VENOUS CATHETER (CVC) WITH SUBCUTANEOUS PORT N/A 2022    Procedure: [id number];  Surgeon: Noel Sadler MD;  Location: [facility] OR;  Service: General;  Laterality: N/A;    OOPHORECTOMY         Social History     Socioeconomic History    Marital status:    Tobacco Use    Smoking status: Former     Packs/day: 0.50     Types: Cigarettes     Quit date: 4/3/2022     Years since quittin.8    Smokeless tobacco: Never   Substance and Sexual Activity    Alcohol use: Yes     Alcohol/week: 4.0 standard drinks     Types: 4 Glasses of wine per week     Comment: occ. glass of wine    Drug use: No       Family History   Problem Relation Age of Onset    Heart disease Mother     Cancer Mother         lung    Heart disease Father     Cancer Father         lymphoma    Diabetes Neg Hx          Current Outpatient Medications:     alendronate (FOSAMAX) 70 MG tablet, Take 1 tablet by mouth once a week, Disp: 12 tablet, Rfl: 3    apixaban (ELIQUIS) 2.5 mg Tab, Take 1 tablet (2.5 mg total) by mouth 2 (two) times daily., Disp: 60 tablet, Rfl: 5    cholecalciferol, vitamin D3, 3,000 unit Tab, Take by mouth., Disp: , Rfl:     fluticasone-umeclidin-vilanter (TRELEGY ELLIPTA) 200-62.5-25 mcg inhaler, Inhale 1 puff into the lungs once daily., Disp: 90 each, Rfl: 3    HYDROcodone-acetaminophen (NORCO) 5-325 mg per tablet, Take 1  tablet by mouth every 6 (six) hours as needed for Pain., Disp: 40 tablet, Rfl: 0    ibuprofen (ADVIL,MOTRIN) 200 MG tablet, Take 200 mg by mouth every 6 (six) hours as needed for Pain., Disp: , Rfl:     levalbuterol (XOPENEX HFA) 45 mcg/actuation inhaler, INHALE 1 TO 2 PUFFS INTO LUNGS EVERY 4 HOURS AS NEEDED FOR WHEEZING AND FOR SHORTNESS OF BREATH, Disp: 45 g, Rfl: 1    levocetirizine (XYZAL) 5 MG tablet, Take 1 tablet (5 mg total) by mouth every evening., Disp: 30 tablet, Rfl: 2    methIMAzole (TAPAZOLE) 5 MG Tab, Take one tablet Monday-Friday and two tablets on Saturday and Sunday., Disp: 180 tablet, Rfl: 3    multivit with minerals/lutein (MULTIVITAMIN 50 PLUS ORAL), Take by mouth., Disp: , Rfl:     nicotine (NICODERM CQ) 14 mg/24 hr, Place 1 patch onto the skin every 24 hours., Disp: , Rfl:     ondansetron (ZOFRAN) 8 MG tablet, Take 1 tablet (8 mg total) by mouth every 8 (eight) hours as needed., Disp: 30 tablet, Rfl: 5    promethazine (PHENERGAN) 25 MG tablet, Take 1 tablet (25 mg total) by mouth every 4 to 6 hours as needed., Disp: 30 tablet, Rfl: 5    rosuvastatin (CRESTOR) 20 MG tablet, Take 1 tablet by mouth once daily, Disp: 90 tablet, Rfl: 3    Review of patient's allergies indicates:   Allergen Reactions    Tinactin [tolnaftate] Dermatitis    Advair diskus [fluticasone propion-salmeterol]      shakes    Albuterol      tremors    Sulfa (sulfonamide antibiotics)      Unknown    Years ago       Physcial Examination  VITAL SIGNS:    Body surface area is 1.4 meters squared.   Pain Assessment  Vitals:    01/26/23 1300   BP: (!) 162/68   Pulse: 91   Temp: 97.8 °F (36.6 °C)   Weight: 44.7 kg (98 lb 9.6 oz)   PainSc: 0-No pain        Wt Readings from Last 5 Encounters:   01/26/23 44.7 kg (98 lb 9.6 oz)   01/25/23 44.3 kg (97 lb 9.6 oz)   01/05/23 44 kg (97 lb)   01/04/23 44 kg (97 lb)   12/28/22 44.5 kg (98 lb 1.7 oz)       GENERAL:  Anuja Cool is healthy-appearing 72 y.o. female, in no distress.    EYES:   Pupils equal, round, reactive.  Conjunctivae, sclera and lids normal.  HEENT: Head normocephalic and atraumatic, without alopecia.  Oropharynx is unremarkable.  No icterus, jaundice, stomatitis, mucositis, or ulceration is noted.  Ears are clear and unremarkable.  Nose, nares, and septum are unremarkable.    NECK:   No masses.  Thyroid and trachea are normal.    BREASTS:  Deferred.  RESPIRATORY: Clear to auscultation bilaterally.  Symmetrically effortless expansion.  No wheezing and no stridor.    CV: Heart reveals regular rate and rhythm without murmur, rub, or gallops.  ABDOMEN: Soft, non-tender.  No masses, no hernias, and no rebound or rigidity are noted.  /RECTAL:  Deferred.  LYMPHATICS: No preauricular, submandibular, cervical, supraclavicular, axillary, lymphadenopathy.  MUSCULOSKELETAL:Fair musculature, no atrophy.  No arthritic changes.  No edema or cyanosis. Back is without gross abnormal curvature.   NEUROLOGICAL: Cranial nerves II-XII grossly intact.  Motor and sensory exam intact.  SKIN:   No lesions, bruises, petechiae or rashes.  Good turgor.    PSYCHIATRIC: Patient is alert and oriented to time, place and person.  Mood and affect are appropriate.         Laboratory and Radiology   Lab Results   Component Value Date    WBC 6.98 01/26/2023    RBC 4.17 01/26/2023    HGB 13.1 01/26/2023    HCT 39.4 01/26/2023    MCV 95 01/26/2023    MCH 31.4 (H) 01/26/2023    MCHC 33.2 01/26/2023    RDW 14.0 01/26/2023     01/26/2023    MPV 8.4 (L) 01/26/2023    GRAN 4.3 01/26/2023    GRAN 61.2 01/26/2023    LYMPH 1.7 01/26/2023    LYMPH 24.5 01/26/2023    MONO 0.6 01/26/2023    MONO 8.2 01/26/2023    EOS 0.4 01/26/2023    BASO 0.04 01/26/2023    EOSINOPHIL 5.2 01/26/2023    BASOPHIL 0.6 01/26/2023     BMP  Lab Results   Component Value Date     (L) 08/15/2022    K 3.9 08/15/2022    CL 96 08/15/2022    CO2 25 08/15/2022    BUN 15 08/15/2022    CREATININE 0.4 (L) 08/15/2022    CALCIUM 9.2  08/15/2022    ANIONGAP 11 08/15/2022    ESTGFRAFRICA >60.0 07/25/2022    EGFRNONAA >60.0 07/25/2022     Lab Results   Component Value Date    ALT 15 08/15/2022    AST 19 08/15/2022    ALKPHOS 66 08/15/2022    BILITOT 0.6 08/15/2022     No results found for this or any previous visit (from the past 2160 hour(s)).  No results found for this or any previous visit (from the past 2160 hour(s)).  No results found for this or any previous visit (from the past 2160 hour(s)).    Pathology  Pathology Results  (Last 10 years)      None            TITLE: PLAN OF CARE FOR THE CHEMOTHERAPY PATIENT / TEACHING PROTOCOL    PURPOSE: To involve the patient / significant other in the plan of care and to provide teaching to the significant other & patient receiving chemotherapy.    LEVEL: Independent.    CONTENT: The Plan of Care for the chemotherapy patient is individualized and appropriate to the patients needs, strengths, limitations, & goals.  Education includes information regarding chemotherapy side effects, the treatment itself, and self-care  Activities.    GOAL / OUTCOME STANDARDS    PHYSIOLOGIC: The client will remain free or experience minimal side effects or toxicities throughout the chemotherapy treatment period.     PSYCHOLOGIC: The client/significant others will demonstrate positive coping mechanisms in relation to chemotherapy and its side effects.      COGINITIVE: The client/significant others will verbalize understanding of self-care measure to avoid/minimize side effects of the chemotherapy regimen.    EVALUATION / COMMENT KEY:    V = Audiovisual/Video  S = Successfully meets outcome  N = Needs further instruction  NA = Not applicable to the patient  P = Previous knowledge  U = Unable to comprehend  * = See progress notes          PLAN OF CARE  INFORMATION TO BE DELIVERED / NURSING INTERVENTIONS DATE EVALUATION   Assessment of client/caregiver,         knowledge of cancer diagnosis,         and chemotherapy as a  treatment. 1a. Evaluate patient/caregiver learning ability    b. Plan teaching sessions with patient/caregiver according to needs and present anxiety level/ability to learn.    c. Provide Chemotherapy Education Packet,        Mouth Care Protocol,         Specific Patient Education Sheets. 01/26/2023 S   Individual chemotherapy treatment         plan. 2a. Review of Chemotherapy Education handout from [employer]            01/26/2023   S   Knowledge Deficit & Self-Management of general side effects common to all chemotherapy:  Nausea/Vomiting  b.   Diarrhea  Mouth Care  Dental care  Constipation  Hair Loss  Potential for infection  Fatigue   3a. Reinforce that the majority of side effects from chemotherapy are reversible and are  controlled both in the hospital and at home        (blood counts recover, hair grows back).   b.  Refer to the following for reinforcement of         information post-treatment:  Mouth Care Protocol.  Bowel Protocol for constipation or diarrhea.  3.  Drug Specific Chemotherapy Information Sheets for each medication patient receiving.    01/26/2023     S     PLAN OF CARE  INFORMATION TO BE DELIVERED / NURSING INTERVENTIONS DATE EVALUATION   h. Potential for bleeding         i. Potential anemia/fatigue         j. Potential sunburn         k. Birth control measures  l. Safety measures post treatment 4.  Chemotherapy Home Care Instruction  and Safety Information Sheet.  A. patient/caregivers to thoroughly cook shellfish (shrimp, crab, etc) to decrease the chance of infection.    B.  Use sunscreen and protective clothing while in the sun.   01/26/2023      Knowledge deficit & Self Management of Drug Specific  Side Effects.    BLADDER EFFECTS        (Hemorrhagic Cystitis)                  Preventable with adequate hydration; occurs 2-3 days or more post treatment.   1.  Instruct patient to:  a.   Void at least every 2 hours; increase intake.  b.   DO NOT hold urine; go when urge is felt.  c.     Empty bladder at bedtime and on         awakening.  d.   Observe for color changes (red to tea           colored), amount and frequency changes.  e.   Notify oncologist of any abnormalities           in urine or voiding or if you cannot               drink adequate fluids.   01/26/2023   S   b.   CHANGES IN URINE        COLOR:      1.   Instruct patient:  a.   Most evident in first 2-3 voidings after           administration.  Lasts less than 24 hours.  If urine is discolored 2 or more days post- treatment, notify oncologist.      01/26/2023 S   c.    KIDNEY EFFECTS           (Nephrotoxicity)   1.  Instruct patient to:  a.   Drink 8-16 glasses of fluid/day the day   pre-treatment and 3-4 days post-treatment to maintain hydration; the best way to minimize kidney problems.  b.   Notify oncologist immediately if unable to drink fluids or if changes are noted in urinary elimination.     01/26/2023   S   PULMONARY TOXICITY    Instruct patient to report symptoms such as shortness of breath, chest pain, shallow breathing, or chest wall discomfort to physician.  Reinforce preventative measures used by the health care team.  Baseline and periodic PFT and chest x-ray.   01/26/2023   S     PLAN OF CARE INFORMATION TO BE DELIVERED / NURSING INTERVENTIONS DATE EVALUATION   NERVE & MUSCLE EFFECTS (neurotoxocity; neuropathy, possible visual/hearing changes)        Instruct patient to:    Report numbness or tingling of the hands/feet, loss of fine motor movement (buttoning shirt, tying shoelaces), or gait changes to your oncologist.  If numbness/tingling are present:  protect feet with shoes at all times.  Use gloves for washing dishes/gardening & potholders in kitchen.       01/26/2023   S   CARDIOTOXICITY  Decreased effectiveness of             cardiac function. Effective are                  cumulative and irreversible.                                    CARDIAC ARRYTHMIAS              4   Instruct:  Heart function may be tested  before treatment and perdiocally during treatment.  Notify oncologist of irregular pulse, palpitations, shortness of breath, or swelling in lower extremities/feet.          Can cause arrhythmias on infusion that resolve once infusion discontinued. Instruct nurse if any irregularity felt.    01/26/2023   S   EXTRAVASTION  Occurs when vesicants leak outside of vein and cause damage to the skin and underlying tissues.   Reinforce preventive measures used to avoid complications.  Fresh IV site or central line monitored continuously with vesicant IVP.  Continuous infusion via central line site and blood return monitored periodically around the clock.  Instruct to:  Notify nurse of any discomfort, burning, stinging, etc. at IV site during chemotherapy administration.  Notify oncologist of any redness, pain, or swelling at IV site after discharge from hospital.   01/26/2023   S   HYPERSENSITIVITY can happen with any medication.   Instruct patient:  Nurse is with them during the initial part of treatment and will be close by to monitor.  Pre-medication ordered by the oncologist must be taken on time. If doses are missed, treatment will need to be re-scheduled.  Skin redness, itching, or hives appearing after discharge should be reported to oncologist. 01/26/2023   S       PLAN OF CARE INFORMATION TO BE DELIVERED / NURSING INTERVENTIONS DATE EVALUATION   FLU-LIKE SYNDROME      Instruct patient symptoms are hard to prevent and may include fever, shaking chills, muscle and body aches.  Taking prescribed medications from physician if needed.  Adequate fluids are important.    Reinforce the need to call if temperature is         elevated to 100.4 or more  01/26/2023   S   HAND-FOOT SYNDROME  causes painful, symmetric swelling and redness of palms and soles                  Instruct patient to report any numbness or tingling in the hands or feet.  Explain prevention techniques, such as     Use heavy moisturizers to lessen skin  dryness and itching, but to avoid if skin is cracked or broken  Bathe in tepid water, use non-perfumed soap, and wash gently. Baths with oatmeal or diluted baking soda may be soothing.  Avoid tight fitting shoes and repetitive actions, such as rubbing hands or applying pressure to hands/feet.  Review measures to take should syndrome occur:  Cold compresses and elevation for          edema  Pain medications and other measures as ordered by oncologist.   4.   Syndrome resolves few weeks after therapy. 01/26/2023   S   5. DISCHARGE PLANNING /        EDUCATION 1.    Explain importance of compliance with follow- up  tests (CBC, CMP).  2.    Verify patient/caregiver know:  a.    Oncologists office phone number.  b.    Dates of follow-up appointments.  c.    Prescriptions given for nausea  3.   Review side effects to monitor and notify          oncologist about.  4.   Reinforce the need for patient and caregivers to:  a.    Review information given.  b.    Call oncologists office with questions          or symptoms  5.   Provide [facility] [place] [facility]chure make referrals if needed for financial or .   01/26/2023   S     PROGRESS NOTES: I met with the patient today for chemotherapy education. she will be starting treatment with Toptecan. We discussed the mechanism of action, potential side effects of this treatment as well as ways she can manage them at home. Some of these side effects include but or not limited to fever, nausea, vomiting, decreased appetite, fatigue, weakness, cytopenias, myalgia/arthralgia, constipation, diarrhea, bleeding, headache, shortness of breath, nail changes, taste change, hair thinning/loss, mood disturbances, or edema. We also discussed dietary modifications she should make although this will be discussed in more detail with the dietician. she was provided with anti-emetic medication, a copy of all of the information we discussed today as well as our contact information.  she will be provided a schedule on his first day of treatment. We will obtain labs on a weekly basis and the patient will follow-up with the physician for toxicity monitoring throughout treatment. All questions were answered and an informed consent was obtained. she was reminded to certainly contact us sooner if needed.  Attached to the patients folder and discussed with the patient the 24 hour/ 7 days a week after hours telephone number for the physician.  Patient notified to call anytime 24/7 because their is a physician on call for any problems that may arise.  Patient also notified to report to [facility] ER if they can not get in touch with a physician after hours.  Discussed the five wishes booklet with the patient and their family.           Assessment/Plan     ICD-10-CM ICD-9-CM   1. Malignant neoplasm of upper lobe of left lung  C34.12 162.3   2. Malignant neoplasm of lower lobe of right lung  C34.31 162.5   3. Small cell lung cancer  C34.90 162.9          Medications Ordered:  Zofran 8mg 1 tab PO Q8h prn nausea  Phenergan 25mg PO Q4-6h prn nausea    Standing Labs Ordered:  CBC weekly  CMP weekly    Follow up in about 2 weeks (around 2/9/2023) for with Dr. Tinoco and 5 weeks with me.    Total Face to Face Time: 45 minutes face to face with the patient and their family discussing the chemotherapy side-effects and when to call our office.   Electronically signed by: Martha Granado, MSN, APRN, AGNP-C, OCN

## 2023-01-25 NOTE — PROGRESS NOTES
PROGRESS NOTE    Subjective:       Patient ID: Anuja Cool is a 72 y.o. female.    2/25/2022:  CT lung Screen:  3.4cm FRANCIS mass and 2.0cm RLL    3/11/2022:  PET scan:  3.9cm hypermetabolic mass in FRANCIS  2.3cm hypermetabolic mass in RLL    3/31/2022:  FRANCIS CT biopsy:  Small Cell carcinoma    Carbo/Etop:  Cycle 1: 4/27/2022  Cycle 2: 5/25/2022  Cycle 3: 6/15/2022  Cycle 4: 7/6/2022    5/3/2022:  RLL CT Biopsy:  Adenocarcinoma    5/15/2022-5/22/2022:  SBRT to Right LL Adenocarcinoma    7/11/2022:  xrt to complete to left lung    8/1/2022:  PET:  1. Favorable interval response to therapy, with decreased size and FDG uptake of noncalcified pulmonary nodules in both lungs.  2. No findings of regional metastatic disease in the chest, or distant metastatic disease.    11/18/2022:  Chest CT:  1.  Masses in the anterior left upper lobe and right lower lobe are unchanged from previous PET/CT.  2.  There is interval development of multiple new bilateral pulmonary nodules as described, suspicious for metastatic disease.  3.  Additional incidental observations as described.    ---IR not able to biopsy above new lesions, appt 12/28/2022 with Dr. Nunes for EBUS consideration.   Patient seen 12/28/2022 by Dr. Nunes.  Patient refused biopsy.       Chief Complaint:  No chief complaint on file.  synchronous primary lung cancer-SCLC and Adenocarcinoma Follow up    History of Present Illness:   Anuja Cool is a 72 y.o. female who presents for follow up of above.   Patient returns today.  Refused biopsy.      Family and Social history reviewed and is unchanged from 4/14/2022      ROS:  Review of Systems   Constitutional:  Negative for fever.   Respiratory:  Negative for shortness of breath.    Cardiovascular:  Negative for chest pain and leg swelling.   Gastrointestinal:  Negative for abdominal pain and blood in stool.   Genitourinary:  Negative for hematuria.   Skin:   Negative for rash.        Current Outpatient Medications:     alendronate (FOSAMAX) 70 MG tablet, Take 1 tablet by mouth once a week, Disp: 12 tablet, Rfl: 3    apixaban (ELIQUIS) 2.5 mg Tab, Take 1 tablet (2.5 mg total) by mouth 2 (two) times daily., Disp: 60 tablet, Rfl: 5    aspirin (ECOTRIN) 81 MG EC tablet, Take 81 mg by mouth once daily., Disp: , Rfl:     cholecalciferol, vitamin D3, 3,000 unit Tab, Take by mouth., Disp: , Rfl:     fluticasone-umeclidin-vilanter (TRELEGY ELLIPTA) 200-62.5-25 mcg inhaler, Inhale 1 puff into the lungs once daily., Disp: 90 each, Rfl: 3    HYDROcodone-acetaminophen (NORCO) 5-325 mg per tablet, Take 1 tablet by mouth every 6 (six) hours as needed for Pain., Disp: 40 tablet, Rfl: 0    ibuprofen (ADVIL,MOTRIN) 200 MG tablet, Take 200 mg by mouth every 6 (six) hours as needed for Pain., Disp: , Rfl:     levalbuterol (XOPENEX HFA) 45 mcg/actuation inhaler, INHALE 1 TO 2 PUFFS INTO LUNGS EVERY 4 HOURS AS NEEDED FOR WHEEZING AND FOR SHORTNESS OF BREATH, Disp: 45 g, Rfl: 1    levocetirizine (XYZAL) 5 MG tablet, Take 1 tablet (5 mg total) by mouth every evening., Disp: 30 tablet, Rfl: 2    methIMAzole (TAPAZOLE) 5 MG Tab, Take one tablet Monday-Friday and two tablets on Saturday and Sunday., Disp: 180 tablet, Rfl: 3    multivit with minerals/lutein (MULTIVITAMIN 50 PLUS ORAL), Take by mouth., Disp: , Rfl:     nicotine (NICODERM CQ) 14 mg/24 hr, Place 1 patch onto the skin every 24 hours., Disp: , Rfl:     ondansetron (ZOFRAN) 8 MG tablet, Take 1 tablet (8 mg total) by mouth every 8 (eight) hours as needed., Disp: 30 tablet, Rfl: 5    promethazine (PHENERGAN) 25 MG tablet, Take 1 tablet (25 mg total) by mouth every 4 to 6 hours as needed., Disp: 30 tablet, Rfl: 5    rosuvastatin (CRESTOR) 20 MG tablet, Take 1 tablet by mouth once daily, Disp: 90 tablet, Rfl: 3        Objective:       Physical Examination:     BP (!) 155/68   Pulse 99   Temp 98 °F (36.7 °C)   Wt 44.3 kg (97 lb 9.6 oz)    BMI 17.85 kg/m²     Physical Exam  Constitutional:       Appearance: She is well-developed.   HENT:      Head: Normocephalic and atraumatic.      Right Ear: External ear normal.      Left Ear: External ear normal.   Eyes:      Conjunctiva/sclera: Conjunctivae normal.      Pupils: Pupils are equal, round, and reactive to light.   Neck:      Thyroid: No thyromegaly.      Trachea: No tracheal deviation.   Cardiovascular:      Rate and Rhythm: Normal rate and regular rhythm.      Heart sounds: Normal heart sounds.   Pulmonary:      Effort: Pulmonary effort is normal.      Breath sounds: Normal breath sounds.   Abdominal:      General: Bowel sounds are normal. There is no distension.      Palpations: Abdomen is soft. There is no mass.      Tenderness: There is no abdominal tenderness.   Skin:     Findings: No rash.   Neurological:      Comments: Neuro intact througout   Psychiatric:         Behavior: Behavior normal.         Thought Content: Thought content normal.         Judgment: Judgment normal.       Labs:   No results found for this or any previous visit (from the past 336 hour(s)).    CMP  Sodium   Date Value Ref Range Status   08/15/2022 132 (L) 136 - 145 mmol/L Final     Potassium   Date Value Ref Range Status   08/15/2022 3.9 3.5 - 5.1 mmol/L Final     Chloride   Date Value Ref Range Status   08/15/2022 96 95 - 110 mmol/L Final     CO2   Date Value Ref Range Status   08/15/2022 25 23 - 29 mmol/L Final     Glucose   Date Value Ref Range Status   08/15/2022 82 70 - 110 mg/dL Final     BUN   Date Value Ref Range Status   08/15/2022 15 8 - 23 mg/dL Final     Creatinine   Date Value Ref Range Status   08/15/2022 0.4 (L) 0.5 - 1.4 mg/dL Final     Calcium   Date Value Ref Range Status   08/15/2022 9.2 8.7 - 10.5 mg/dL Final     Total Protein   Date Value Ref Range Status   08/15/2022 7.1 6.0 - 8.4 g/dL Final     Albumin   Date Value Ref Range Status   08/15/2022 4.2 3.5 - 5.2 g/dL Final     Total Bilirubin   Date  Value Ref Range Status   08/15/2022 0.6 0.1 - 1.0 mg/dL Final     Comment:     For infants and newborns, interpretation of results should be based  on gestational age, weight and in agreement with clinical  observations.    Premature Infant recommended reference ranges:  Up to 24 hours.............<8.0 mg/dL  Up to 48 hours............<12.0 mg/dL  3-5 days..................<15.0 mg/dL  6-29 days.................<15.0 mg/dL       Alkaline Phosphatase   Date Value Ref Range Status   08/15/2022 66 55 - 135 U/L Final     AST   Date Value Ref Range Status   08/15/2022 19 10 - 40 U/L Final     ALT   Date Value Ref Range Status   08/15/2022 15 10 - 44 U/L Final     Anion Gap   Date Value Ref Range Status   08/15/2022 11 8 - 16 mmol/L Final     eGFR if    Date Value Ref Range Status   07/25/2022 >60.0 >60 mL/min/1.73 m^2 Final     eGFR if non    Date Value Ref Range Status   07/25/2022 >60.0 >60 mL/min/1.73 m^2 Final     Comment:     Calculation used to obtain the estimated glomerular filtration  rate (eGFR) is the CKD-EPI equation.        No results found for: CEA  No results found for: PSA        Assessment/Plan:     Problem List Items Addressed This Visit       Small Cell Lung Cancer FRANCIS     I discussed the situation with Ms. Cool and her daughter today and note is made that she refused the biopsy offered.  I discussed that there is no way for me to know if this is small cell or adeno and that treatments can differ between these two.  It is hard to know what the most effective therapy is.  This being said,  I discussed that topotecan may be a good choice given it's good activity in small cell(which is more likely here) and some activity in NSCLC.  She is ok with this approach and will move forward.  Will get new CT to evaluate current status of the disease and begin next week.  Will have her back with me in three weeks to see how she is doing.  Spent over 30 min in total care today  including pre-visit review and charting, visit and post-visit planning.            Relevant Orders    CT Chest Without Contrast    Ambulatory referral/consult to [facility]           Discussion:     Follow up in about 3 weeks (around 2/15/2023).      Electronically signed by Jonathan Wagner

## 2023-01-25 NOTE — ASSESSMENT & PLAN NOTE
I discussed the situation with Ms. Cool and her daughter today and note is made that she refused the biopsy offered.  I discussed that there is no way for me to know if this is small cell or adeno and that treatments can differ between these two.  It is hard to know what the most effective therapy is.  This being said,  I discussed that topotecan may be a good choice given it's good activity in small cell(which is more likely here) and some activity in NSCLC.  She is ok with this approach and will move forward.  Will get new CT to evaluate current status of the disease and begin next week.  Will have her back with me in three weeks to see how she is doing.  Spent over 30 min in total care today including pre-visit review and charting, visit and post-visit planning.

## 2023-01-30 NOTE — PLAN OF CARE
Problem: Fatigue  Goal: Improved Activity Tolerance  Outcome: Ongoing, Progressing  Intervention: Promote Improved Energy  Flowsheets (Taken 1/30/2023 0820)  Fatigue Management: frequent rest breaks encouraged  Sleep/Rest Enhancement:   regular sleep/rest pattern promoted   relaxation techniques promoted  Activity Management: Ambulated -L4

## 2023-02-09 NOTE — ASSESSMENT & PLAN NOTE
"· Smoking about 2 cigarette per day   I have counseled pt for 3-5 minutes regarding cigarette cessation.  This has included the need to stop smoking as well as strategies, including but not limited to "cold turkey", CHANTIX (including risks and benefits of the drug), nicotine replacement and WELLBUTRIN.   Has a lot of stress    "

## 2023-02-09 NOTE — PROGRESS NOTES
Office Visit Note *    Patient Name: Anuja Cool  MRN: [id number]  : 1950      Reason for visit: COPD    HPI:     2022 - Here to establish care,  Diagnosed with COPD (severity unknown).  Recently changed to TRELEGY and feels that she is doing better on that.  Currently smoking about 3/4 PPD  (1-1.5 PPD x 50 years).  Also carries diagnosed of asthma, has h/o allergies (+ skin test, never desensitized).  HAs never had a screening CT chest and does not remember her last CXR.  ROS as below.  We discussed cigarette cessation at length.    3/17/2022 - Here for review.  CT scan showed subpleural masses and PET scan shows activity in those areas.  Reviewed images with pt and we will plan t proceed with CT guided biopsy (left lesion first).  No evidence of other + areas.  PFT c/w   GOLD II A (FEV1 - 52%, DLCO 23 %).  CT scan does show fairly extensive emphysema changes.    2022 - Here for review of biopsy results - + small cell cancer.  We discussed moving forward with MRI brain and referral to Radiation Oncology and Oncology.  All questions have been answered.    2022 - Here for follow up, has had first chemotherapy (carboplatin and etoposide) and she tolerated well, WBC is down some (being addressed).  To see radiation therapy today.  No new symptoms. Did have biopsy of right nodule which was + for adenocarcinoma.    2022 - Here for follow up, recent PET scan reviewed (see below) and doing well.  She is to see Dr Tinoco next month.  No other new issues.  We reviewed the PET scan images and compared the 2.  Reports that she tolerated the chemo well.    2022 - Here for follow up, chemo and XRT are done and she is getting followup studies.  She has had some back pain (MRI of spine is OK - no mets).  No respiratory issues.  Had synchronous adeno and small cell    2023 - Here for follow up, has completed first cycle of chemo (TOPOTECAN) and tolerated well, most recent CT reviewed (see  below).  No new respiratory issues.  We were not able to get a pulse ox reading in the office today.  Had pulmonary stress test at [facility] lowest sat 91%, did have some hypertension and had decreased exercise capacity.  Pt is currently stable on present medications with no recent increases in their symptoms or use of rescue medications.  Since our last visit there have been no hospitalizations or ER visits for their respiratory issues and there does not seem to be anything to suggest unrecognized exacerbations.  I have reviewed the medical regimen and re-educated the pt on the role of rescue and controlling medications.  Inhaler technique and understanding seems adequate.  The patient reports no issues with any of there medications for their COPD.  Refills will be taken care of as needed.  All questions answered.  She has had flu vaccine but has not had the bivalent Covid vaccine.      Past Medical History    Past Medical History:   Diagnosis Date    Allergy     Arthritis     Asthma     Bursitis     COPD (chronic obstructive pulmonary disease)     Hypertension     Low sodium     Lung nodules 03/2022    Small cell carcinoma of left lung 03/31/2022    Thyroid disease     nodules    Tinea pedis        Past Surgical History    Past Surgical History:   Procedure Laterality Date    COLONOSCOPY N/A 05/08/2018    Procedure: COLONOSCOPY;  Surgeon: Grey Roberts MD;  Location: [facility] ENDO;  Service: Endoscopy;  Laterality: N/A;    CT BIOPSY LUNG W/ GUIDANCE Left 03/31/2022    HYSTERECTOMY      INSERTION OF TUNNELED CENTRAL VENOUS CATHETER (CVC) WITH SUBCUTANEOUS PORT N/A 4/26/2022    Procedure: [id number];  Surgeon: Noel Sadler MD;  Location: [facility] OR;  Service: General;  Laterality: N/A;    OOPHORECTOMY         Medications      Current Outpatient Medications:     alendronate (FOSAMAX) 70 MG tablet, Take 1 tablet by mouth once a week, Disp: 12 tablet, Rfl: 3    apixaban (ELIQUIS) 2.5 mg Tab, Take 1 tablet (2.5  mg total) by mouth 2 (two) times daily., Disp: 60 tablet, Rfl: 5    cholecalciferol, vitamin D3, 3,000 unit Tab, Take by mouth., Disp: , Rfl:     fluticasone-umeclidin-vilanter (TRELEGY ELLIPTA) 200-62.5-25 mcg inhaler, Inhale 1 puff into the lungs once daily., Disp: 90 each, Rfl: 3    HYDROcodone-acetaminophen (NORCO) 5-325 mg per tablet, Take 1 tablet by mouth every 6 (six) hours as needed for Pain., Disp: 40 tablet, Rfl: 0    ibuprofen (ADVIL,MOTRIN) 200 MG tablet, Take 200 mg by mouth every 6 (six) hours as needed for Pain., Disp: , Rfl:     levalbuterol (XOPENEX HFA) 45 mcg/actuation inhaler, INHALE 1 TO 2 PUFFS INTO LUNGS EVERY 4 HOURS AS NEEDED FOR WHEEZING AND FOR SHORTNESS OF BREATH, Disp: 45 g, Rfl: 1    methIMAzole (TAPAZOLE) 5 MG Tab, Take one tablet Monday-Friday and two tablets on Saturday and ., Disp: 180 tablet, Rfl: 3    multivit with minerals/lutein (MULTIVITAMIN 50 PLUS ORAL), Take by mouth., Disp: , Rfl:     nicotine (NICODERM CQ) 14 mg/24 hr, Place 1 patch onto the skin every 24 hours., Disp: , Rfl:     ondansetron (ZOFRAN) 8 MG tablet, Take 1 tablet (8 mg total) by mouth every 8 (eight) hours as needed., Disp: 30 tablet, Rfl: 5    promethazine (PHENERGAN) 25 MG tablet, Take 1 tablet (25 mg total) by mouth every 4 to 6 hours as needed., Disp: 30 tablet, Rfl: 5    rosuvastatin (CRESTOR) 20 MG tablet, Take 1 tablet by mouth once daily, Disp: 90 tablet, Rfl: 3    levocetirizine (XYZAL) 5 MG tablet, Take 1 tablet (5 mg total) by mouth every evening., Disp: 30 tablet, Rfl: 2    Allergies    Review of patient's allergies indicates:   Allergen Reactions    Tinactin [tolnaftate] Dermatitis    Advair diskus [fluticasone propion-salmeterol]      shakes    Albuterol      tremors    Sulfa (sulfonamide antibiotics)      Unknown    Years ago       SocHx    Social History     Tobacco Use   Smoking Status Former    Packs/day: 0.50    Types: Cigarettes    Quit date: 4/3/2022    Years since quittin.8    Smokeless Tobacco Never       Social History     Substance and Sexual Activity   Alcohol Use Yes    Alcohol/week: 4.0 standard drinks    Types: 4 Glasses of wine per week    Comment: occ. glass of wine       Drug Use - no  Occupation - retired, customer service  Asbestos exposure - no  Pets - no    FMHx    Family History   Problem Relation Age of Onset    Heart disease Mother     Cancer Mother         lung    Heart disease Father     Cancer Father         lymphoma    Diabetes Neg Hx          Review of Systems  Review of Systems   Constitutional: Negative for chills, diaphoresis, fever, malaise/fatigue and weight loss.        Weight loss   HENT: Negative for congestion.    Eyes: Negative for pain.   Respiratory: Positive for shortness of breath (ELLISON). Negative for cough, hemoptysis, sputum production, wheezing and stridor.    Cardiovascular: Negative for chest pain, palpitations, orthopnea, claudication, leg swelling and PND.   Gastrointestinal: Negative for abdominal pain, constipation, diarrhea, heartburn, nausea and vomiting.   Genitourinary: Negative for dysuria, frequency and urgency.   Musculoskeletal: Negative for falls and myalgias.   Neurological: Negative for sensory change, focal weakness and weakness.   Endo/Heme/Allergies:        + over active thyroid   Psychiatric/Behavioral: Negative for depression, substance abuse and suicidal ideas. The patient is not nervous/anxious.        Physical Exam    Vitals:    02/09/23 1032   BP: 120/72   BP Location: Left arm   Patient Position: Sitting   BP Method: Medium (Manual)   Weight: 44.2 kg (97 lb 8 oz)       Physical Exam  Vitals and nursing note reviewed.   Constitutional:       General: She is not in acute distress.     Appearance: She is well-developed. She is not ill-appearing, toxic-appearing or diaphoretic.      Comments: Thin female   HENT:      Head: Normocephalic and atraumatic.      Right Ear: External ear normal.      Left Ear: External ear normal.       Nose: Nose normal.   Eyes:      General: No scleral icterus.        Right eye: No discharge.         Left eye: No discharge.      Extraocular Movements: Extraocular movements intact.      Conjunctiva/sclera: Conjunctivae normal.      Pupils: Pupils are equal, round, and reactive to light.   Neck:      Thyroid: No thyromegaly.      Vascular: No JVD.      Trachea: No tracheal deviation.   Cardiovascular:      Rate and Rhythm: Normal rate and regular rhythm.      Heart sounds: Normal heart sounds. No murmur heard.    No friction rub. No gallop.      Comments: + bilateral carotid bruits (known issue)  Pulmonary:      Effort: Pulmonary effort is normal. No respiratory distress.      Breath sounds: No stridor. No wheezing, rhonchi or rales.      Comments: Decreased BS  No acc m use  Chest:      Chest wall: No tenderness.   Abdominal:      General: Bowel sounds are normal. There is no distension.      Palpations: Abdomen is soft.      Tenderness: There is no abdominal tenderness. There is no guarding.   Musculoskeletal:         General: No tenderness. Normal range of motion.      Cervical back: Normal range of motion and neck supple.      Right lower leg: No edema.      Left lower leg: No edema.   Lymphadenopathy:      Cervical: No cervical adenopathy.   Skin:     General: Skin is warm and dry.   Neurological:      General: No focal deficit present.      Mental Status: She is alert and oriented to person, place, and time. Mental status is at baseline.      Cranial Nerves: No cranial nerve deficit.      Motor: No weakness.      Gait: Gait normal.   Psychiatric:         Mood and Affect: Mood normal.         Behavior: Behavior normal.         Thought Content: Thought content normal.         Judgment: Judgment normal.         Labs    Lab Results   Component Value Date    WBC 2.91 (L) 02/09/2023    HGB 11.4 (L) 02/09/2023    HCT 33.7 (L) 02/09/2023     (L) 02/09/2023       Sodium   Date Value Ref Range Status    02/02/2023 128 (L) 136 - 145 mmol/L Final     Potassium   Date Value Ref Range Status   02/02/2023 4.6 3.5 - 5.1 mmol/L Final     Chloride   Date Value Ref Range Status   02/02/2023 93 (L) 95 - 110 mmol/L Final     CO2   Date Value Ref Range Status   02/02/2023 27 23 - 29 mmol/L Final     Glucose   Date Value Ref Range Status   02/02/2023 95 70 - 110 mg/dL Final     BUN   Date Value Ref Range Status   02/02/2023 23 8 - 23 mg/dL Final     Creatinine   Date Value Ref Range Status   02/02/2023 0.4 (L) 0.5 - 1.4 mg/dL Final     Calcium   Date Value Ref Range Status   02/02/2023 9.2 8.7 - 10.5 mg/dL Final     Total Protein   Date Value Ref Range Status   02/02/2023 7.2 6.0 - 8.4 g/dL Final     Albumin   Date Value Ref Range Status   02/02/2023 4.2 3.5 - 5.2 g/dL Final     Total Bilirubin   Date Value Ref Range Status   02/02/2023 0.7 0.1 - 1.0 mg/dL Final     Comment:     For infants and newborns, interpretation of results should be based  on gestational age, weight and in agreement with clinical  observations.    Premature Infant recommended reference ranges:  Up to 24 hours.............<8.0 mg/dL  Up to 48 hours............<12.0 mg/dL  3-5 days..................<15.0 mg/dL  6-29 days.................<15.0 mg/dL       Alkaline Phosphatase   Date Value Ref Range Status   02/02/2023 54 (L) 55 - 135 U/L Final     AST   Date Value Ref Range Status   02/02/2023 20 10 - 40 U/L Final     ALT   Date Value Ref Range Status   02/02/2023 17 10 - 44 U/L Final     Anion Gap   Date Value Ref Range Status   02/02/2023 8 8 - 16 mmol/L Final       Xrays    CT chest (1/27/23)  1. Bilateral noncalcified pulmonary nodules, demonstrating mixed response to therapy as described.  2. No new suspicious pulmonary nodules, or other evidence of metastatic disease in the chest      Impression/Plan    Problem List Items Addressed This Visit          Pulmonary    Chronic obstructive pulmonary disease     Continue present medications.  Will refill  "medications as needed.  Instructed patient to contact us with any issues concerning their medications (cost, reactions, etc.).  Have discussed with patient about inciting conditions which may exacerbate their disease.  We did discuss possible new therapies or de-escalation of therapy (if appropriate).  Asked patient if they were interested in pursuing pulmonary rehabilitation.  All questions answered  RTC 3 months  Patient instructed that they are to call if symptoms change or new issues develop prior to their next visit.              Oncology    Small Cell Lung Cancer FRANCIS     Tolerating chemo and last CT scan OK  Per Dr Tinoco         Adenocarcinoma Lung Cancer RLL     S/p treatment and looks good at this point            Other    Tobacco use     Smoking about 2 cigarette per day  I have counseled pt for 3-5 minutes regarding cigarette cessation.  This has included the need to stop smoking as well as strategies, including but not limited to "cold turkey", CHANTIX (including risks and benefits of the drug), nicotine replacement and WELLBUTRIN.  Has a lot of stress                      Jonathan Jeffries MD                "

## 2023-02-10 NOTE — TELEPHONE ENCOUNTER
Spoke to pt and advised her to take medications suggested by Martha for sinus congestion. Pt verbalized understanding and all questions were answered.

## 2023-02-14 PROBLEM — J01.10 ACUTE NON-RECURRENT FRONTAL SINUSITIS: Status: ACTIVE | Noted: 2023-01-01

## 2023-02-14 NOTE — ASSESSMENT & PLAN NOTE
Patient is now on Topotecan and is doing well with this.  Her labs are good and will continue with therapy as planned.  Will have her back with me again in about 3 weeks.

## 2023-02-14 NOTE — PROGRESS NOTES
PROGRESS NOTE    Subjective:       Patient ID: Anuja Cool is a 72 y.o. female.    2/25/2022:  CT lung Screen:  3.4cm FRANCIS mass and 2.0cm RLL    3/11/2022:  PET scan:  3.9cm hypermetabolic mass in FRANCIS  2.3cm hypermetabolic mass in RLL    3/31/2022:  FRANCIS CT biopsy:  Small Cell carcinoma    Carbo/Etop:  Cycle 1: 4/27/2022  Cycle 2: 5/25/2022  Cycle 3: 6/15/2022  Cycle 4: 7/6/2022    5/3/2022:  RLL CT Biopsy:  Adenocarcinoma    5/15/2022-5/22/2022:  SBRT to Right LL Adenocarcinoma    7/11/2022:  xrt to complete to left lung    8/1/2022:  PET:  1. Favorable interval response to therapy, with decreased size and FDG uptake of noncalcified pulmonary nodules in both lungs.  2. No findings of regional metastatic disease in the chest, or distant metastatic disease.    11/18/2022:  Chest CT:  1.  Masses in the anterior left upper lobe and right lower lobe are unchanged from previous PET/CT.  2.  There is interval development of multiple new bilateral pulmonary nodules as described, suspicious for metastatic disease.  3.  Additional incidental observations as described.    ---IR not able to biopsy above new lesions, appt 12/28/2022 with Dr. Nunes for EBUS consideration.   Patient seen 12/28/2022 by Dr. Nunes.  Patient refused biopsy.     Topotecan:  Cycle 1: 1/30/2023  Cycle 2: 2/20/2023-due    Chief Complaint:  No chief complaint on file.  synchronous primary lung cancer-SCLC and Adenocarcinoma Follow up    History of Present Illness:   Anuja Cool is a 72 y.o. female who presents for follow up of above.     She has started topotecan and tolerating this well but having significant sinus pain and congestion.  No help with Tylenol sinus.       Family and Social history reviewed and is unchanged from 4/14/2022      ROS:  Review of Systems   Constitutional:  Negative for fever.   Respiratory:  Negative for shortness of breath.    Cardiovascular:  Negative  for chest pain and leg swelling.   Gastrointestinal:  Negative for abdominal pain and blood in stool.   Genitourinary:  Negative for hematuria.   Skin:  Negative for rash.        Current Outpatient Medications:     alendronate (FOSAMAX) 70 MG tablet, Take 1 tablet by mouth once a week, Disp: 12 tablet, Rfl: 3    apixaban (ELIQUIS) 2.5 mg Tab, Take 1 tablet (2.5 mg total) by mouth 2 (two) times daily., Disp: 60 tablet, Rfl: 5    azelastine (ASTELIN) 137 mcg (0.1 %) nasal spray, 1 spray (137 mcg total) by Nasal route 2 (two) times daily., Disp: 30 mL, Rfl: 0    cholecalciferol, vitamin D3, 3,000 unit Tab, Take by mouth., Disp: , Rfl:     fluticasone-umeclidin-vilanter (TRELEGY ELLIPTA) 200-62.5-25 mcg inhaler, Inhale 1 puff into the lungs once daily., Disp: 90 each, Rfl: 3    HYDROcodone-acetaminophen (NORCO) 5-325 mg per tablet, Take 1 tablet by mouth every 6 (six) hours as needed for Pain., Disp: 40 tablet, Rfl: 0    ibuprofen (ADVIL,MOTRIN) 200 MG tablet, Take 200 mg by mouth every 6 (six) hours as needed for Pain., Disp: , Rfl:     levalbuterol (XOPENEX HFA) 45 mcg/actuation inhaler, INHALE 1 TO 2 PUFFS INTO LUNGS EVERY 4 HOURS AS NEEDED FOR WHEEZING AND FOR SHORTNESS OF BREATH, Disp: 45 g, Rfl: 1    levocetirizine (XYZAL) 5 MG tablet, Take 1 tablet (5 mg total) by mouth every evening., Disp: 30 tablet, Rfl: 2    levoFLOXacin (LEVAQUIN) 500 MG tablet, Take 1 tablet (500 mg total) by mouth once daily. for 10 days, Disp: 10 tablet, Rfl: 0    methIMAzole (TAPAZOLE) 5 MG Tab, Take one tablet Monday-Friday and two tablets on Saturday and Sunday., Disp: 180 tablet, Rfl: 3    multivit with minerals/lutein (MULTIVITAMIN 50 PLUS ORAL), Take by mouth., Disp: , Rfl:     nicotine (NICODERM CQ) 14 mg/24 hr, Place 1 patch onto the skin every 24 hours., Disp: , Rfl:     ondansetron (ZOFRAN) 8 MG tablet, Take 1 tablet (8 mg total) by mouth every 8 (eight) hours as needed., Disp: 30 tablet, Rfl: 5    promethazine (PHENERGAN) 25 MG  tablet, Take 1 tablet (25 mg total) by mouth every 4 to 6 hours as needed., Disp: 30 tablet, Rfl: 5    rosuvastatin (CRESTOR) 20 MG tablet, Take 1 tablet by mouth once daily, Disp: 90 tablet, Rfl: 3        Objective:       Physical Examination:     BP (!) 173/71   Pulse 86   Temp 98 °F (36.7 °C)   Wt 44.1 kg (97 lb 4.8 oz)   BMI 17.80 kg/m²     Physical Exam  Constitutional:       Appearance: She is well-developed.   HENT:      Head: Normocephalic and atraumatic.      Right Ear: External ear normal.      Left Ear: External ear normal.   Eyes:      Conjunctiva/sclera: Conjunctivae normal.      Pupils: Pupils are equal, round, and reactive to light.   Neck:      Thyroid: No thyromegaly.      Trachea: No tracheal deviation.   Cardiovascular:      Rate and Rhythm: Normal rate and regular rhythm.      Heart sounds: Normal heart sounds.   Pulmonary:      Effort: Pulmonary effort is normal.      Breath sounds: Normal breath sounds.   Abdominal:      General: Bowel sounds are normal. There is no distension.      Palpations: Abdomen is soft. There is no mass.      Tenderness: There is no abdominal tenderness.   Skin:     Findings: No rash.   Neurological:      Comments: Neuro intact througout   Psychiatric:         Behavior: Behavior normal.         Thought Content: Thought content normal.         Judgment: Judgment normal.       Labs:   Recent Results (from the past 336 hour(s))   CBC Auto Differential    Collection Time: 02/09/23  9:28 AM   Result Value Ref Range    WBC 2.91 (L) 3.90 - 12.70 K/uL    Hemoglobin 11.4 (L) 12.0 - 16.0 g/dL    Hematocrit 33.7 (L) 37.0 - 48.5 %    Platelets 149 (L) 150 - 450 K/uL   CBC Auto Differential    Collection Time: 02/02/23  7:57 AM   Result Value Ref Range    WBC 11.69 3.90 - 12.70 K/uL    Hemoglobin 12.0 12.0 - 16.0 g/dL    Hematocrit 35.3 (L) 37.0 - 48.5 %    Platelets 297 150 - 450 K/uL       CMP  Sodium   Date Value Ref Range Status   02/09/2023 134 (L) 136 - 145 mmol/L Final      Potassium   Date Value Ref Range Status   02/09/2023 3.8 3.5 - 5.1 mmol/L Final     Chloride   Date Value Ref Range Status   02/09/2023 98 95 - 110 mmol/L Final     CO2   Date Value Ref Range Status   02/09/2023 27 23 - 29 mmol/L Final     Glucose   Date Value Ref Range Status   02/09/2023 80 70 - 110 mg/dL Final     BUN   Date Value Ref Range Status   02/09/2023 11 8 - 23 mg/dL Final     Creatinine   Date Value Ref Range Status   02/09/2023 0.5 0.5 - 1.4 mg/dL Final     Calcium   Date Value Ref Range Status   02/09/2023 9.4 8.7 - 10.5 mg/dL Final     Total Protein   Date Value Ref Range Status   02/09/2023 7.3 6.0 - 8.4 g/dL Final     Albumin   Date Value Ref Range Status   02/09/2023 3.9 3.5 - 5.2 g/dL Final     Total Bilirubin   Date Value Ref Range Status   02/09/2023 0.3 0.1 - 1.0 mg/dL Final     Comment:     For infants and newborns, interpretation of results should be based  on gestational age, weight and in agreement with clinical  observations.    Premature Infant recommended reference ranges:  Up to 24 hours.............<8.0 mg/dL  Up to 48 hours............<12.0 mg/dL  3-5 days..................<15.0 mg/dL  6-29 days.................<15.0 mg/dL       Alkaline Phosphatase   Date Value Ref Range Status   02/09/2023 66 55 - 135 U/L Final     AST   Date Value Ref Range Status   02/09/2023 18 10 - 40 U/L Final     ALT   Date Value Ref Range Status   02/09/2023 15 10 - 44 U/L Final     Anion Gap   Date Value Ref Range Status   02/09/2023 9 8 - 16 mmol/L Final     eGFR if    Date Value Ref Range Status   07/25/2022 >60.0 >60 mL/min/1.73 m^2 Final     eGFR if non    Date Value Ref Range Status   07/25/2022 >60.0 >60 mL/min/1.73 m^2 Final     Comment:     Calculation used to obtain the estimated glomerular filtration  rate (eGFR) is the CKD-EPI equation.        No results found for: CEA  No results found for: PSA        Assessment/Plan:     Problem List Items Addressed This  Visit       Small Cell Lung Cancer FRANCIS     Patient is now on Topotecan and is doing well with this.  Her labs are good and will continue with therapy as planned.  Will have her back with me again in about 3 weeks.           Acute non-recurrent frontal sinusitis     Will have her take claritinD 12 hour once in the am and will prescribe levaquin 500mg x 7 days.  Will also prescribe astilin nasal spray.           Relevant Medications    azelastine (ASTELIN) 137 mcg (0.1 %) nasal spray    levoFLOXacin (LEVAQUIN) 500 MG tablet         Discussion:     Follow up in about 3 weeks (around 3/7/2023).      Electronically signed by Jonathan Wagner

## 2023-02-14 NOTE — ASSESSMENT & PLAN NOTE
Will have her take claritinD 12 hour once in the am and will prescribe levaquin 500mg x 7 days.  Will also prescribe astilin nasal spray.

## 2023-02-15 PROBLEM — J01.10 ACUTE NON-RECURRENT FRONTAL SINUSITIS: Status: RESOLVED | Noted: 2023-01-01 | Resolved: 2023-01-01

## 2023-02-15 NOTE — PROGRESS NOTES
Subjective:       Patient ID: Anuja Cool is a 72 y.o. female.    Chief Complaint: Follow-up (6mth f/u)    HPI  Review of Systems   Constitutional:  Positive for fatigue. Negative for chills and fever.   HENT:  Positive for congestion, postnasal drip, rhinorrhea, sneezing and sore throat. Negative for ear discharge, ear pain and sinus pressure.    Respiratory:  Positive for cough. Negative for shortness of breath and wheezing.      Patient Active Problem List   Diagnosis    Asthma    Allergy    Essential hypertension    Hyperlipidemia LDL goal <130    Tobacco use    Chronic obstructive pulmonary disease    Bilateral carotid bruits    OAB (overactive bladder)    Osteopenia    BMI 21.0-21.9, adult    Low serum thyroid stimulating hormone (TSH)    Multinodular goiter    Stenosis of carotid artery    Constipation    Postmenopausal    ASCVD (arteriosclerotic cardiovascular disease)    Hyperthyroidism    BMI less than 19,adult    Small Cell Lung Cancer FRANCIS    Small cell lung cancer    Chemotherapy-induced neutropenia    Dehydration    Hypotension    Adenocarcinoma Lung Cancer RLL    Pulmonary nodules/lesions, multiple     Patient is here for a chronic conditions follow up.    C/o sinus congestion, pressure, cough, malaise few days. Has abx to  today. No fever or sob    Pulm Dr. Jeffries and Heme/onc Dr. Tinoco treating small cell lung cancer diagnosed 4/2022. Dr. Sadler placed port 5/22. Started chemo 5/22 and completed 7/22. XRT 7/22. PET scan 8/1/22 1. Favorable interval response to therapy, with decreased size and FDG uptake of noncalcified pulmonary nodules in both lungs.CT chest 11/22 new pulm nodules suspicious for mets. Bx declined. No on topotecan. Tolerating well -starts next week cycle 2 of 3  2. No findings of regional metastatic disease in the chest, or distant metastatic disease  COPD- trelegy mild sob and torres  Losing weight- can't afford ensure.       Thyroid ultrasound 6/2021 Multinodular  thyroid gland.  Slight enlargement of the largest nodules bilaterally has occurred since previous exam s/p FNA 9/2021  Benign; cytologic pattern   consistent with benign follicular nodule (see comment).     H/o carotid stenosis 9/18 last u/s- No evidence of a hemodynamically significant carotid bifurcation stenosis.     Endocrine Dr. Graves H/o hyperthyroidism, goiter. Osteopenia with high frax risk now on fosomax since 2020  Objective:      Physical Exam  Vitals and nursing note reviewed.   Constitutional:       Appearance: She is well-developed.   HENT:      Right Ear: Tympanic membrane and ear canal normal.      Left Ear: Tympanic membrane and ear canal normal.      Nose: Mucosal edema and rhinorrhea present.      Right Sinus: No maxillary sinus tenderness or frontal sinus tenderness.      Left Sinus: No maxillary sinus tenderness or frontal sinus tenderness.      Mouth/Throat:      Pharynx: Posterior oropharyngeal erythema present. No oropharyngeal exudate.      Tonsils: No tonsillar abscesses.   Cardiovascular:      Rate and Rhythm: Normal rate and regular rhythm.      Heart sounds: Normal heart sounds.   Pulmonary:      Effort: Pulmonary effort is normal.      Breath sounds: Normal breath sounds.   Skin:     General: Skin is warm and dry.       Assessment:       1. Essential hypertension    2. Hyperlipidemia LDL goal <130    3. Chronic obstructive pulmonary disease, unspecified COPD type    4. Small Cell Lung Cancer FRANCIS    5. Multinodular goiter    6. Hyperthyroidism    7. Osteopenia, unspecified location    8. Acute non-recurrent sinusitis, unspecified location          Plan:       1. Essential hypertension  Controlled on current medications.  Continue current medications.      2. Hyperlipidemia LDL goal <130  Stable condition.  Continue current medications.  Will adjust based on lab findings or if condition changes.      3. Chronic obstructive pulmonary disease, unspecified COPD type  Cont current  regimen    4. Small Cell Lung Cancer FRANCIS  Cont onc and pulm care    5. Multinodular goiter  Cont endocrine monitoring    6. Hyperthyroidism  Cont endocrine monitoring and treatment    7. Osteopenia, unspecified location  Cont endocrine monitoring and treatment    8. Acute non-recurrent sinusitis, unspecified location  General home care guidelines for cough and congestion:increase fluid intake, get plenty of rest, and use OTC cough and cold medications for relief of symptoms.  I recommended guafenesin for congestion and dextromethorphan as directed for cough.  Brands like Mucinex DM or Chloricidin HBP or similar are recommended.  Avoidance of decongestants is recommended for patients with heart problems and hypertension.  Extra vitamin C may also benefit.  Return to clinic if symptoms last longer than 10 days or sooner if worsen with symptoms like fever > 100.4, severe sinus pain or headache, thick yellow nasal discharge or sputum, dehydration, sudden confusion or mental status changes, shortness of breath, labored breathing, or lethargy. Anti-fever medications can be alternated like OTC ibuprofen or tylenol as needed and as directed unless told to avoid these by your doctor.   Take abx as precsribed        Time spent with patient: 20 minutes    Patient with be reevaluated in 6 months or sooner prn    Greater than 50% of this visit was spent counseling as described in above documentation:Yes

## 2023-02-23 NOTE — PLAN OF CARE
Problem: Fatigue  Goal: Improved Activity Tolerance  Outcome: Ongoing, Progressing  Intervention: Promote Improved Energy  Flowsheets (Taken 2/23/2023 0923)  Fatigue Management:   frequent rest breaks encouraged   fatigue-related activity identified  Sleep/Rest Enhancement:   regular sleep/rest pattern promoted   relaxation techniques promoted   reading promoted

## 2023-02-27 NOTE — TELEPHONE ENCOUNTER
----- Message from Gisel Alvarado sent at 2/27/2023  9:31 AM CST -----  The patient called for a prescription for hydrocodone 5mg #40. She said she is completely out and needs them today. # [phone]

## 2023-03-06 NOTE — TELEPHONE ENCOUNTER
Care Due:                  Date            Visit Type   Department     Provider  --------------------------------------------------------------------------------                                EP -                              PRIMARY      [facility]  Last Visit: 02-      CARE ([facility])   JOHN Lew                              EP -                              PRIMARY      [facility]  Next Visit: 08-      CARE ([facility])   MEDICINE       Isis Lew                                                            Last  Test          Frequency    Reason                     Performed    Due Date  --------------------------------------------------------------------------------    Lipid Panel.  12 months..  rosuvastatin.............  02- 02-    [facility] Embedded Care Gaps. Reference number: [id number]. 3/06/2023   6:52:07 AM CST

## 2023-03-06 NOTE — TELEPHONE ENCOUNTER
Refill Routing Note   Medication(s) are not appropriate for processing by [facility] for the following reason(s):       Allergy or intolerance    [facility] action(s):  Defer   Requires labs : Yes    Medication Therapy Plan: FOVS;Allergy/Contraindication: Fluticasone Propion-salmeterol    Appointments  past 12m or future 3m with PCP    Date Provider   Last Visit   2/15/2023 Isis Lew MD   Next Visit   8/15/2023 Isis Lew MD   ED visits in past 90 days: 0        Note composed:10:59 AM 03/06/2023

## 2023-03-07 NOTE — TELEPHONE ENCOUNTER
----- Message from SCOOTER Aggarwal sent at 3/7/2023  2:25 PM CST -----  No she can see him next week    Martha  ----- Message -----  From: Candace Daniels RN  Sent: 3/7/2023   9:43 AM CST  To: SCOOTER Aggarwal      ----- Message -----  From: Livia Mae  Sent: 3/7/2023   9:36 AM CST  To: [facility] Staff    Pt is asking if she needs to go to both appts, 1 is this Thurs with Martha and she comes back on Tues to see doc.      [phone]

## 2023-03-07 NOTE — TELEPHONE ENCOUNTER
Refill Decision Note   Anujapatrice Chehon  is requesting a refill authorization.  Brief Assessment and Rationale for Refill:  Approve     Medication Therapy Plan:       Medication Reconciliation Completed: No   Comments:     No Care Gaps recommended.     Note composed:12:20 PM 03/07/2023

## 2023-03-07 NOTE — TELEPHONE ENCOUNTER
Spoke to pt and made her aware that we will cancel her appt with Martha Thursday and she can just come in to see doc next week. No need for both appointments. Pt verbalized understanding .

## 2023-03-07 NOTE — TELEPHONE ENCOUNTER
No new care gaps identified.  [facility] Embedded Care Gaps. Reference number: [id number]. 3/07/2023   11:20:17 AM CST

## 2023-03-13 NOTE — PROGRESS NOTES
Patient ID: Anuja Cool is a 72 y.o. female.    Chief Complaint:  Nodular thyroid disease/hyperthyroidism    History of Present Illness    Anuja Cool is a 72 y.o. female here for a f/u care visit today on account of thyroid nodular disease and TSH suppression suggestive of possible hyperthyroidism.   Last thyroid USS was from 9/22 which shows a 2.7 cm nodule in the left thyroid. FNA was benign. This was previously biopsied in 2017, 2021 were benign Thyroid u/s also showed a nonocclusive clot in the right IJV. No fhx of DM or thyroid disease. No hx of radiation. Her son has lung cancer. Taking 5 mg of methimazole daily. 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    No dysphagia. Occasional voice changes while coughing and sob.    + constipation, insomnia (trouble staying asleep), cold intolerance.     No hair loss, tremors, changes in nails, wt loss, sweating, mental fog, palpitations.     Her last DEXA was from 3/22 and showed osteopenia with a high fracture risk. Taking fosamax 70 mg weekly (3/20) and otc vitamin d (2000 IU). No fractures, falls or recent steriod injections. No exercise.     Patient has long standing poor sleep. Patients sleep is fragmented mainly because of nocturia and frequency.  Grav 3 Para 3+0 (2 alive).     No tearing or grittiness. She does have bl cataracts L >> R for which she is pending cataract surgery.  She smokes 5 cigg daily. She smoked for 50 years. She will drink a glass of wine 4x weekly. Patient drinks 2 cups of decaffienated coffee.    Wt Readings from Last 10 Encounters:   03/13/23 44.3 kg (97 lb 10.6 oz)   02/23/23 45.1 kg (99 lb 6.4 oz)   02/22/23 44.3 kg (97 lb 11.2 oz)   02/20/23 44.4 kg (97 lb 14.4 oz)   02/15/23 43.9 kg (96 lb 12.5 oz)   02/14/23 44.1 kg (97 lb 4.8 oz)   02/09/23 44.2 kg (97 lb 8 oz)   02/01/23 44.8 kg (98 lb 12.8 oz)   01/31/23 44.5 kg (98 lb)   01/30/23 44.4 kg (97 lb 12.8 oz)      ROS:   Constitutional:  "energy is improved, Difficulty sleeping, weight stable.  Eyes: No recent visual changes, no SOB  Cardiovascular: Denies current anginal symptoms  Respiratory: + cough, sob (COPD)  Gastrointestinal: Denies recent bowel disturbances  GenitoUrinary - No dysuria  Skin: No new skin rash  Neurologic: No focal neurologic complaints  Endo: no polyphagia, polyuria or polydipsia  Remainder ROS negative     Objective:   /70 (BP Location: Left arm, Patient Position: Sitting, BP Method: Small (Manual))   Pulse 101   Temp 98.2 °F (36.8 °C) (Oral)   Ht 5' 2" (1.575 m)   Wt 44.3 kg (97 lb 10.6 oz)   SpO2 96%   BMI 17.86 kg/m²  Body surface area is 1.39 meters squared.     Physical Exam  Vitals and nursing note reviewed.   Constitutional:       General: She is not in acute distress.     Appearance: She is well-developed. She is not ill-appearing or diaphoretic.      Comments: Elderly female  Not pale, anicteric and afebrile. Well hydrated. Not in any acute distress.   HENT:      Head: Normocephalic and atraumatic. Not macrocephalic. No right periorbital erythema or left periorbital erythema. Hair is normal.      Nose: Nose normal.      Mouth/Throat:      Mouth: Mucous membranes are not pale and not dry.      Pharynx: No oropharyngeal exudate.   Eyes:      General: Lids are normal. No scleral icterus.        Right eye: No discharge.         Left eye: No discharge.      Conjunctiva/sclera: Conjunctivae normal.      Pupils: Pupils are equal, round, and reactive to light.      Comments: No evidence of any opthalmopathy   Neck:      Thyroid: No thyroid mass or thyromegaly.      Vascular: Normal carotid pulses. No carotid bruit or JVD.      Trachea: Trachea and phonation normal. No tracheal deviation.        Comments: No nuchal AN.   Cardiovascular:      Rate and Rhythm: Normal rate and regular rhythm.      Chest Wall: PMI is not displaced.      Pulses: Normal pulses.      Heart sounds: S1 normal and S2 normal. Murmur (soft " 3/6 ESM maximal over cardiac base with no radiation to the neck) heard.     No gallop.   Pulmonary:      Effort: Pulmonary effort is normal. No respiratory distress.      Breath sounds: Normal breath sounds. No wheezing or rales.   Abdominal:      Palpations: Abdomen is soft. There is no hepatomegaly or splenomegaly.      Hernia: There is no hernia in the ventral area.   Musculoskeletal:         General: No tenderness.      Right shoulder: No deformity, bony tenderness or crepitus. Normal range of motion. Normal strength. Normal pulse.      Cervical back: Full passive range of motion without pain, normal range of motion and neck supple.      Comments: No pedal edema. Has no digital clubbing and no nail changes suggestive of thyroid acropachy. She has no pretibial myxedema but does have fine tremors of the outstretched hands.   Lymphadenopathy:      Cervical: No cervical adenopathy.   Skin:     General: Skin is warm and dry.      Coloration: Skin is not pale.      Findings: No bruising, ecchymosis, erythema, petechiae or rash.      Nails: There is no clubbing.      Comments: Age appropriate diffuse cutaneous atrophy. No ecchymoses seen.   Neurological:      Mental Status: She is alert and oriented to person, place, and time.      Cranial Nerves: No cranial nerve deficit.      Sensory: No sensory deficit.      Motor: No tremor, atrophy or abnormal muscle tone.      Coordination: Coordination normal.      Gait: Gait normal.      Deep Tendon Reflexes: Reflexes are normal and symmetric. Reflexes normal.   Psychiatric:         Speech: Speech normal.         Behavior: Behavior normal.         Thought Content: Thought content normal.         Judgment: Judgment normal.     Lab Review:     Personally reviewed labs below:    Lab Visit on 03/09/2023   Component Date Value Ref Range Status    WBC 03/09/2023 8.68  3.90 - 12.70 K/uL Final    RBC 03/09/2023 3.22 (L)  4.00 - 5.40 M/uL Final    Hemoglobin 03/09/2023 10.5 (L)  12.0 -  16.0 g/dL Final    Hematocrit 03/09/2023 31.2 (L)  37.0 - 48.5 % Final    MCV 03/09/2023 97  82 - 98 fL Final    MCH 03/09/2023 32.6 (H)  27.0 - 31.0 pg Final    MCHC 03/09/2023 33.7  32.0 - 36.0 g/dL Final    RDW 03/09/2023 16.3 (H)  11.5 - 14.5 % Final    Platelets 03/09/2023 284  150 - 450 K/uL Final    MPV 03/09/2023 9.0 (L)  9.2 - 12.9 fL Final    Immature Granulocytes 03/09/2023 0.3  0.0 - 0.5 % Final    Gran # (ANC) 03/09/2023 5.9  1.8 - 7.7 K/uL Final    Immature Grans (Abs) 03/09/2023 0.03  0.00 - 0.04 K/uL Final    Comment: Mild elevation in immature granulocytes is non specific and   can be seen in a variety of conditions including stress response,   acute inflammation, trauma and pregnancy. Correlation with other   laboratory and clinical findings is essential.      Lymph # 03/09/2023 1.7  1.0 - 4.8 K/uL Final    Mono # 03/09/2023 0.7  0.3 - 1.0 K/uL Final    Eos # 03/09/2023 0.4  0.0 - 0.5 K/uL Final    Baso # 03/09/2023 0.04  0.00 - 0.20 K/uL Final    nRBC 03/09/2023 0  0 /100 WBC Final    Gran % 03/09/2023 67.8  38.0 - 73.0 % Final    Lymph % 03/09/2023 19.5  18.0 - 48.0 % Final    Mono % 03/09/2023 7.9  4.0 - 15.0 % Final    Eosinophil % 03/09/2023 4.0  0.0 - 8.0 % Final    Basophil % 03/09/2023 0.5  0.0 - 1.9 % Final    Differential Method 03/09/2023 Automated   Final    Sodium 03/09/2023 133 (L)  136 - 145 mmol/L Final    Potassium 03/09/2023 5.3 (H)  3.5 - 5.1 mmol/L Final    Chloride 03/09/2023 96  95 - 110 mmol/L Final    CO2 03/09/2023 29  23 - 29 mmol/L Final    Glucose 03/09/2023 85  70 - 110 mg/dL Final    BUN 03/09/2023 10  8 - 23 mg/dL Final    Creatinine 03/09/2023 0.5  0.5 - 1.4 mg/dL Final    Calcium 03/09/2023 9.1  8.7 - 10.5 mg/dL Final    Total Protein 03/09/2023 7.1  6.0 - 8.4 g/dL Final    Albumin 03/09/2023 3.9  3.5 - 5.2 g/dL Final    Total Bilirubin 03/09/2023 0.4  0.1 - 1.0 mg/dL Final    Comment: For infants and newborns, interpretation of results should be based  on  gestational age, weight and in agreement with clinical  observations.    Premature Infant recommended reference ranges:  Up to 24 hours.............<8.0 mg/dL  Up to 48 hours............<12.0 mg/dL  3-5 days..................<15.0 mg/dL  6-29 days.................<15.0 mg/dL      Alkaline Phosphatase 03/09/2023 71  55 - 135 U/L Final    AST 03/09/2023 17  10 - 40 U/L Final    ALT 03/09/2023 11  10 - 44 U/L Final    Anion Gap 03/09/2023 8  8 - 16 mmol/L Final    eGFR 03/09/2023 >60.0  >60 mL/min/1.73 m^2 Final   Lab Visit on 03/06/2023   Component Date Value Ref Range Status    TSH 03/06/2023 1.083  0.400 - 4.000 uIU/mL Final    Free T4 03/06/2023 0.75  0.71 - 1.51 ng/dL Final    Sodium 03/06/2023 136  136 - 145 mmol/L Final    Potassium 03/06/2023 4.4  3.5 - 5.1 mmol/L Final    Chloride 03/06/2023 100  95 - 110 mmol/L Final    CO2 03/06/2023 26  23 - 29 mmol/L Final    Glucose 03/06/2023 89  70 - 110 mg/dL Final    BUN 03/06/2023 6 (L)  8 - 23 mg/dL Final    Creatinine 03/06/2023 0.5  0.5 - 1.4 mg/dL Final    Calcium 03/06/2023 9.6  8.7 - 10.5 mg/dL Final    Total Protein 03/06/2023 7.1  6.0 - 8.4 g/dL Final    Albumin 03/06/2023 3.7  3.5 - 5.2 g/dL Final    Total Bilirubin 03/06/2023 0.3  0.1 - 1.0 mg/dL Final    Comment: For infants and newborns, interpretation of results should be based  on gestational age, weight and in agreement with clinical  observations.    Premature Infant recommended reference ranges:  Up to 24 hours.............<8.0 mg/dL  Up to 48 hours............<12.0 mg/dL  3-5 days..................<15.0 mg/dL  6-29 days.................<15.0 mg/dL      Alkaline Phosphatase 03/06/2023 80  55 - 135 U/L Final    AST 03/06/2023 15  10 - 40 U/L Final    ALT 03/06/2023 15  10 - 44 U/L Final    Anion Gap 03/06/2023 10  8 - 16 mmol/L Final    eGFR 03/06/2023 >60.0  >60 mL/min/1.73 m^2 Final    Vit D, 25-Hydroxy 03/06/2023 53  30 - 96 ng/mL Final    Comment: Vitamin D deficiency.........<10 ng/mL                               Vitamin D insufficiency......10-29 ng/mL       Vitamin D sufficiency........> or equal to 30 ng/mL  Vitamin D toxicity............>100 ng/mL      Cholesterol 03/06/2023 147  120 - 199 mg/dL Final    Comment: The National Cholesterol Education Program (NCEP) has set the  following guidelines (reference ranges) for Cholesterol:  Optimal.....................<200 mg/dL  Borderline High.............200-239 mg/dL  High........................> or = 240 mg/dL      Triglycerides 03/06/2023 97  30 - 150 mg/dL Final    Comment: The National Cholesterol Education Program (NCEP) has set the  following guidelines (reference values) for triglycerides:  Normal......................<150 mg/dL  Borderline High.............150-199 mg/dL  High........................200-499 mg/dL      HDL 03/06/2023 58  40 - 75 mg/dL Final    Comment: The National Cholesterol Education Program (NCEP) has set the  following guidelines (reference values) for HDL Cholesterol:  Low...............<40 mg/dL  Optimal...........>60 mg/dL      LDL Cholesterol 03/06/2023 69.6  63.0 - 159.0 mg/dL Final    Comment: The National Cholesterol Education Program (NCEP) has set the  following guidelines (reference values) for LDL Cholesterol:  Optimal.......................<130 mg/dL  Borderline High...............130-159 mg/dL  High..........................160-189 mg/dL  Very High.....................>190 mg/dL      HDL/Cholesterol Ratio 03/06/2023 39.5  20.0 - 50.0 % Final    Total Cholesterol/HDL Ratio 03/06/2023 2.5  2.0 - 5.0 Final    Non-HDL Cholesterol 03/06/2023 89  mg/dL Final    Comment: Risk category and Non-HDL cholesterol goals:  Coronary heart disease (CHD)or equivalent (10-year risk of CHD >20%):  Non-HDL cholesterol goal     <130 mg/dL  Two or more CHD risk factors and 10-year risk of CHD <= 20%:  Non-HDL cholesterol goal     <160 mg/dL  0 to 1 CHD risk factor:  Non-HDL cholesterol goal     <190 mg/dL     Lab Visit on 03/02/2023    Component Date Value Ref Range Status    WBC 03/02/2023 4.12  3.90 - 12.70 K/uL Final    RBC 03/02/2023 3.16 (L)  4.00 - 5.40 M/uL Final    Hemoglobin 03/02/2023 10.3 (L)  12.0 - 16.0 g/dL Final    Hematocrit 03/02/2023 30.0 (L)  37.0 - 48.5 % Final    MCV 03/02/2023 95  82 - 98 fL Final    MCH 03/02/2023 32.6 (H)  27.0 - 31.0 pg Final    MCHC 03/02/2023 34.3  32.0 - 36.0 g/dL Final    RDW 03/02/2023 14.0  11.5 - 14.5 % Final    Platelets 03/02/2023 220  150 - 450 K/uL Final    Results confirmed, test repeated    MPV 03/02/2023 8.4 (L)  9.2 - 12.9 fL Final    Immature Granulocytes 03/02/2023 0.5  0.0 - 0.5 % Final    Gran # (ANC) 03/02/2023 1.9  1.8 - 7.7 K/uL Final    Immature Grans (Abs) 03/02/2023 0.02  0.00 - 0.04 K/uL Final    Comment: Mild elevation in immature granulocytes is non specific and   can be seen in a variety of conditions including stress response,   acute inflammation, trauma and pregnancy. Correlation with other   laboratory and clinical findings is essential.      Lymph # 03/02/2023 1.4  1.0 - 4.8 K/uL Final    Mono # 03/02/2023 0.7  0.3 - 1.0 K/uL Final    Eos # 03/02/2023 0.1  0.0 - 0.5 K/uL Final    Baso # 03/02/2023 0.03  0.00 - 0.20 K/uL Final    nRBC 03/02/2023 0  0 /100 WBC Final    Gran % 03/02/2023 45.2  38.0 - 73.0 % Final    Lymph % 03/02/2023 35.0  18.0 - 48.0 % Final    Mono % 03/02/2023 16.7 (H)  4.0 - 15.0 % Final    Eosinophil % 03/02/2023 1.9  0.0 - 8.0 % Final    Basophil % 03/02/2023 0.7  0.0 - 1.9 % Final    Platelet Estimate 03/02/2023 Appears normal   Final    Differential Method 03/02/2023 Automated   Final    Sodium 03/02/2023 130 (L)  136 - 145 mmol/L Final    Potassium 03/02/2023 4.4  3.5 - 5.1 mmol/L Final    Chloride 03/02/2023 97  95 - 110 mmol/L Final    CO2 03/02/2023 24  23 - 29 mmol/L Final    Glucose 03/02/2023 93  70 - 110 mg/dL Final    BUN 03/02/2023 8  8 - 23 mg/dL Final    Creatinine 03/02/2023 0.4 (L)  0.5 - 1.4 mg/dL Final    Calcium 03/02/2023 9.4  8.7  - 10.5 mg/dL Final    Total Protein 03/02/2023 7.5  6.0 - 8.4 g/dL Final    Albumin 03/02/2023 3.9  3.5 - 5.2 g/dL Final    Total Bilirubin 03/02/2023 0.6  0.1 - 1.0 mg/dL Final    Comment: For infants and newborns, interpretation of results should be based  on gestational age, weight and in agreement with clinical  observations.    Premature Infant recommended reference ranges:  Up to 24 hours.............<8.0 mg/dL  Up to 48 hours............<12.0 mg/dL  3-5 days..................<15.0 mg/dL  6-29 days.................<15.0 mg/dL      Alkaline Phosphatase 03/02/2023 68  55 - 135 U/L Final    AST 03/02/2023 17  10 - 40 U/L Final    ALT 03/02/2023 14  10 - 44 U/L Final    Anion Gap 03/02/2023 9  8 - 16 mmol/L Final    eGFR 03/02/2023 >60.0  >60 mL/min/1.73 m^2 Final   Lab Visit on 02/24/2023   Component Date Value Ref Range Status    WBC 02/24/2023 7.63  3.90 - 12.70 K/uL Final    RBC 02/24/2023 3.32 (L)  4.00 - 5.40 M/uL Final    Hemoglobin 02/24/2023 10.8 (L)  12.0 - 16.0 g/dL Final    Hematocrit 02/24/2023 31.4 (L)  37.0 - 48.5 % Final    MCV 02/24/2023 95  82 - 98 fL Final    MCH 02/24/2023 32.5 (H)  27.0 - 31.0 pg Final    MCHC 02/24/2023 34.4  32.0 - 36.0 g/dL Final    RDW 02/24/2023 14.6 (H)  11.5 - 14.5 % Final    Platelets 02/24/2023 418  150 - 450 K/uL Final    MPV 02/24/2023 8.4 (L)  9.2 - 12.9 fL Final    Immature Granulocytes 02/24/2023 0.4  0.0 - 0.5 % Final    Gran # (ANC) 02/24/2023 5.4  1.8 - 7.7 K/uL Final    Immature Grans (Abs) 02/24/2023 0.03  0.00 - 0.04 K/uL Final    Comment: Mild elevation in immature granulocytes is non specific and   can be seen in a variety of conditions including stress response,   acute inflammation, trauma and pregnancy. Correlation with other   laboratory and clinical findings is essential.      Lymph # 02/24/2023 1.6  1.0 - 4.8 K/uL Final    Mono # 02/24/2023 0.6  0.3 - 1.0 K/uL Final    Eos # 02/24/2023 0.0  0.0 - 0.5 K/uL Final    Baso # 02/24/2023 0.00  0.00 - 0.20  K/uL Final    nRBC 02/24/2023 0  0 /100 WBC Final    Gran % 02/24/2023 70.9  38.0 - 73.0 % Final    Lymph % 02/24/2023 20.7  18.0 - 48.0 % Final    Mono % 02/24/2023 7.6  4.0 - 15.0 % Final    Eosinophil % 02/24/2023 0.4  0.0 - 8.0 % Final    Basophil % 02/24/2023 0.0  0.0 - 1.9 % Final    Differential Method 02/24/2023 Automated   Final    Sodium 02/24/2023 131 (L)  136 - 145 mmol/L Final    Potassium 02/24/2023 4.4  3.5 - 5.1 mmol/L Final    Chloride 02/24/2023 95  95 - 110 mmol/L Final    CO2 02/24/2023 29  23 - 29 mmol/L Final    Glucose 02/24/2023 77  70 - 110 mg/dL Final    BUN 02/24/2023 17  8 - 23 mg/dL Final    Creatinine 02/24/2023 0.5  0.5 - 1.4 mg/dL Final    Calcium 02/24/2023 9.2  8.7 - 10.5 mg/dL Final    Total Protein 02/24/2023 6.8  6.0 - 8.4 g/dL Final    Albumin 02/24/2023 3.8  3.5 - 5.2 g/dL Final    Total Bilirubin 02/24/2023 0.8  0.1 - 1.0 mg/dL Final    Comment: For infants and newborns, interpretation of results should be based  on gestational age, weight and in agreement with clinical  observations.    Premature Infant recommended reference ranges:  Up to 24 hours.............<8.0 mg/dL  Up to 48 hours............<12.0 mg/dL  3-5 days..................<15.0 mg/dL  6-29 days.................<15.0 mg/dL      Alkaline Phosphatase 02/24/2023 59  55 - 135 U/L Final    AST 02/24/2023 20  10 - 40 U/L Final    ALT 02/24/2023 16  10 - 44 U/L Final    Anion Gap 02/24/2023 7 (L)  8 - 16 mmol/L Final    eGFR 02/24/2023 >60.0  >60 mL/min/1.73 m^2 Final   Lab Visit on 02/16/2023   Component Date Value Ref Range Status    WBC 02/16/2023 5.81  3.90 - 12.70 K/uL Final    RBC 02/16/2023 3.63 (L)  4.00 - 5.40 M/uL Final    Hemoglobin 02/16/2023 11.5 (L)  12.0 - 16.0 g/dL Final    Hematocrit 02/16/2023 34.7 (L)  37.0 - 48.5 % Final    MCV 02/16/2023 96  82 - 98 fL Final    MCH 02/16/2023 31.7 (H)  27.0 - 31.0 pg Final    MCHC 02/16/2023 33.1  32.0 - 36.0 g/dL Final    RDW 02/16/2023 14.0  11.5 - 14.5 % Final     Platelets 02/16/2023 259  150 - 450 K/uL Final    Reviewed by Technologist.    MPV 02/16/2023 9.7  9.2 - 12.9 fL Final    Immature Granulocytes 02/16/2023 0.2  0.0 - 0.5 % Final    Gran # (ANC) 02/16/2023 4.0  1.8 - 7.7 K/uL Final    Immature Grans (Abs) 02/16/2023 0.01  0.00 - 0.04 K/uL Final    Comment: Mild elevation in immature granulocytes is non specific and   can be seen in a variety of conditions including stress response,   acute inflammation, trauma and pregnancy. Correlation with other   laboratory and clinical findings is essential.      Lymph # 02/16/2023 1.2  1.0 - 4.8 K/uL Final    Mono # 02/16/2023 0.5  0.3 - 1.0 K/uL Final    Eos # 02/16/2023 0.2  0.0 - 0.5 K/uL Final    Baso # 02/16/2023 0.01  0.00 - 0.20 K/uL Final    nRBC 02/16/2023 0  0 /100 WBC Final    Gran % 02/16/2023 67.9  38.0 - 73.0 % Final    Lymph % 02/16/2023 21.0  18.0 - 48.0 % Final    Mono % 02/16/2023 8.1  4.0 - 15.0 % Final    Eosinophil % 02/16/2023 2.6  0.0 - 8.0 % Final    Basophil % 02/16/2023 0.2  0.0 - 1.9 % Final    Platelet Estimate 02/16/2023 Decreased (A)   Final    Differential Method 02/16/2023 Automated   Final    Sodium 02/16/2023 135 (L)  136 - 145 mmol/L Final    Potassium 02/16/2023 4.8  3.5 - 5.1 mmol/L Final    Chloride 02/16/2023 99  95 - 110 mmol/L Final    CO2 02/16/2023 27  23 - 29 mmol/L Final    Glucose 02/16/2023 74  70 - 110 mg/dL Final    BUN 02/16/2023 10  8 - 23 mg/dL Final    Creatinine 02/16/2023 0.5  0.5 - 1.4 mg/dL Final    Calcium 02/16/2023 9.4  8.7 - 10.5 mg/dL Final    Total Protein 02/16/2023 7.4  6.0 - 8.4 g/dL Final    Albumin 02/16/2023 3.9  3.5 - 5.2 g/dL Final    Total Bilirubin 02/16/2023 0.4  0.1 - 1.0 mg/dL Final    Comment: For infants and newborns, interpretation of results should be based  on gestational age, weight and in agreement with clinical  observations.    Premature Infant recommended reference ranges:  Up to 24 hours.............<8.0 mg/dL  Up to 48 hours............<12.0  mg/dL  3-5 days..................<15.0 mg/dL  6-29 days.................<15.0 mg/dL      Alkaline Phosphatase 02/16/2023 62  55 - 135 U/L Final    AST 02/16/2023 21  10 - 40 U/L Final    ALT 02/16/2023 17  10 - 44 U/L Final    Anion Gap 02/16/2023 9  8 - 16 mmol/L Final    eGFR 02/16/2023 >60.0  >60 mL/min/1.73 m^2 Final   Lab Visit on 02/09/2023   Component Date Value Ref Range Status    WBC 02/09/2023 2.91 (L)  3.90 - 12.70 K/uL Final    RBC 02/09/2023 3.57 (L)  4.00 - 5.40 M/uL Final    Hemoglobin 02/09/2023 11.4 (L)  12.0 - 16.0 g/dL Final    Hematocrit 02/09/2023 33.7 (L)  37.0 - 48.5 % Final    MCV 02/09/2023 94  82 - 98 fL Final    MCH 02/09/2023 31.9 (H)  27.0 - 31.0 pg Final    MCHC 02/09/2023 33.8  32.0 - 36.0 g/dL Final    RDW 02/09/2023 13.2  11.5 - 14.5 % Final    Platelets 02/09/2023 149 (L)  150 - 450 K/uL Final    MPV 02/09/2023 9.2  9.2 - 12.9 fL Final    Immature Granulocytes 02/09/2023 0.0  0.0 - 0.5 % Final    Gran # (ANC) 02/09/2023 1.2 (L)  1.8 - 7.7 K/uL Final    Immature Grans (Abs) 02/09/2023 0.00  0.00 - 0.04 K/uL Final    Comment: Mild elevation in immature granulocytes is non specific and   can be seen in a variety of conditions including stress response,   acute inflammation, trauma and pregnancy. Correlation with other   laboratory and clinical findings is essential.      Lymph # 02/09/2023 1.2  1.0 - 4.8 K/uL Final    Mono # 02/09/2023 0.4  0.3 - 1.0 K/uL Final    Eos # 02/09/2023 0.1  0.0 - 0.5 K/uL Final    Baso # 02/09/2023 0.02  0.00 - 0.20 K/uL Final    nRBC 02/09/2023 0  0 /100 WBC Final    Gran % 02/09/2023 41.5  38.0 - 73.0 % Final    Lymph % 02/09/2023 39.9  18.0 - 48.0 % Final    Mono % 02/09/2023 14.1  4.0 - 15.0 % Final    Eosinophil % 02/09/2023 3.8  0.0 - 8.0 % Final    Basophil % 02/09/2023 0.7  0.0 - 1.9 % Final    Differential Method 02/09/2023 Automated   Final    Sodium 02/09/2023 134 (L)  136 - 145 mmol/L Final    Potassium 02/09/2023 3.8  3.5 - 5.1 mmol/L Final     Chloride 02/09/2023 98  95 - 110 mmol/L Final    CO2 02/09/2023 27  23 - 29 mmol/L Final    Glucose 02/09/2023 80  70 - 110 mg/dL Final    BUN 02/09/2023 11  8 - 23 mg/dL Final    Creatinine 02/09/2023 0.5  0.5 - 1.4 mg/dL Final    Calcium 02/09/2023 9.4  8.7 - 10.5 mg/dL Final    Total Protein 02/09/2023 7.3  6.0 - 8.4 g/dL Final    Albumin 02/09/2023 3.9  3.5 - 5.2 g/dL Final    Total Bilirubin 02/09/2023 0.3  0.1 - 1.0 mg/dL Final    Comment: For infants and newborns, interpretation of results should be based  on gestational age, weight and in agreement with clinical  observations.    Premature Infant recommended reference ranges:  Up to 24 hours.............<8.0 mg/dL  Up to 48 hours............<12.0 mg/dL  3-5 days..................<15.0 mg/dL  6-29 days.................<15.0 mg/dL      Alkaline Phosphatase 02/09/2023 66  55 - 135 U/L Final    AST 02/09/2023 18  10 - 40 U/L Final    ALT 02/09/2023 15  10 - 44 U/L Final    Anion Gap 02/09/2023 9  8 - 16 mmol/L Final    eGFR 02/09/2023 >60.0  >60 mL/min/1.73 m^2 Final   Lab Visit on 02/02/2023   Component Date Value Ref Range Status    WBC 02/02/2023 11.69  3.90 - 12.70 K/uL Final    RBC 02/02/2023 3.73 (L)  4.00 - 5.40 M/uL Final    Hemoglobin 02/02/2023 12.0  12.0 - 16.0 g/dL Final    Hematocrit 02/02/2023 35.3 (L)  37.0 - 48.5 % Final    MCV 02/02/2023 95  82 - 98 fL Final    MCH 02/02/2023 32.2 (H)  27.0 - 31.0 pg Final    MCHC 02/02/2023 34.0  32.0 - 36.0 g/dL Final    RDW 02/02/2023 13.9  11.5 - 14.5 % Final    Platelets 02/02/2023 297  150 - 450 K/uL Final    MPV 02/02/2023 8.9 (L)  9.2 - 12.9 fL Final    Immature Granulocytes 02/02/2023 0.5  0.0 - 0.5 % Final    Gran # (ANC) 02/02/2023 9.1 (H)  1.8 - 7.7 K/uL Final    Immature Grans (Abs) 02/02/2023 0.06 (H)  0.00 - 0.04 K/uL Final    Comment: Mild elevation in immature granulocytes is non specific and   can be seen in a variety of conditions including stress response,   acute inflammation, trauma and  pregnancy. Correlation with other   laboratory and clinical findings is essential.      Lymph # 02/02/2023 1.8  1.0 - 4.8 K/uL Final    Mono # 02/02/2023 0.8  0.3 - 1.0 K/uL Final    Eos # 02/02/2023 0.0  0.0 - 0.5 K/uL Final    Baso # 02/02/2023 0.01  0.00 - 0.20 K/uL Final    nRBC 02/02/2023 0  0 /100 WBC Final    Gran % 02/02/2023 77.7 (H)  38.0 - 73.0 % Final    Lymph % 02/02/2023 15.1 (L)  18.0 - 48.0 % Final    Mono % 02/02/2023 6.4  4.0 - 15.0 % Final    Eosinophil % 02/02/2023 0.2  0.0 - 8.0 % Final    Basophil % 02/02/2023 0.1  0.0 - 1.9 % Final    Differential Method 02/02/2023 Automated   Final    Sodium 02/02/2023 128 (L)  136 - 145 mmol/L Final    Potassium 02/02/2023 4.6  3.5 - 5.1 mmol/L Final    Chloride 02/02/2023 93 (L)  95 - 110 mmol/L Final    CO2 02/02/2023 27  23 - 29 mmol/L Final    Glucose 02/02/2023 95  70 - 110 mg/dL Final    BUN 02/02/2023 23  8 - 23 mg/dL Final    Creatinine 02/02/2023 0.4 (L)  0.5 - 1.4 mg/dL Final    Calcium 02/02/2023 9.2  8.7 - 10.5 mg/dL Final    Total Protein 02/02/2023 7.2  6.0 - 8.4 g/dL Final    Albumin 02/02/2023 4.2  3.5 - 5.2 g/dL Final    Total Bilirubin 02/02/2023 0.7  0.1 - 1.0 mg/dL Final    Comment: For infants and newborns, interpretation of results should be based  on gestational age, weight and in agreement with clinical  observations.    Premature Infant recommended reference ranges:  Up to 24 hours.............<8.0 mg/dL  Up to 48 hours............<12.0 mg/dL  3-5 days..................<15.0 mg/dL  6-29 days.................<15.0 mg/dL      Alkaline Phosphatase 02/02/2023 54 (L)  55 - 135 U/L Final    AST 02/02/2023 20  10 - 40 U/L Final    ALT 02/02/2023 17  10 - 44 U/L Final    Anion Gap 02/02/2023 8  8 - 16 mmol/L Final    eGFR 02/02/2023 >60.0  >60 mL/min/1.73 m^2 Final   Lab Visit on 01/26/2023   Component Date Value Ref Range Status    WBC 01/26/2023 6.98  3.90 - 12.70 K/uL Final    RBC 01/26/2023 4.17  4.00 - 5.40 M/uL Final    Hemoglobin  01/26/2023 13.1  12.0 - 16.0 g/dL Final    Hematocrit 01/26/2023 39.4  37.0 - 48.5 % Final    MCV 01/26/2023 95  82 - 98 fL Final    MCH 01/26/2023 31.4 (H)  27.0 - 31.0 pg Final    MCHC 01/26/2023 33.2  32.0 - 36.0 g/dL Final    RDW 01/26/2023 14.0  11.5 - 14.5 % Final    Platelets 01/26/2023 302  150 - 450 K/uL Final    MPV 01/26/2023 8.4 (L)  9.2 - 12.9 fL Final    Immature Granulocytes 01/26/2023 0.3  0.0 - 0.5 % Final    Gran # (ANC) 01/26/2023 4.3  1.8 - 7.7 K/uL Final    Immature Grans (Abs) 01/26/2023 0.02  0.00 - 0.04 K/uL Final    Comment: Mild elevation in immature granulocytes is non specific and   can be seen in a variety of conditions including stress response,   acute inflammation, trauma and pregnancy. Correlation with other   laboratory and clinical findings is essential.      Lymph # 01/26/2023 1.7  1.0 - 4.8 K/uL Final    Mono # 01/26/2023 0.6  0.3 - 1.0 K/uL Final    Eos # 01/26/2023 0.4  0.0 - 0.5 K/uL Final    Baso # 01/26/2023 0.04  0.00 - 0.20 K/uL Final    nRBC 01/26/2023 0  0 /100 WBC Final    Gran % 01/26/2023 61.2  38.0 - 73.0 % Final    Lymph % 01/26/2023 24.5  18.0 - 48.0 % Final    Mono % 01/26/2023 8.2  4.0 - 15.0 % Final    Eosinophil % 01/26/2023 5.2  0.0 - 8.0 % Final    Basophil % 01/26/2023 0.6  0.0 - 1.9 % Final    Differential Method 01/26/2023 Automated   Final    Sodium 01/26/2023 130 (L)  136 - 145 mmol/L Final    Potassium 01/26/2023 4.5  3.5 - 5.1 mmol/L Final    Chloride 01/26/2023 95  95 - 110 mmol/L Final    CO2 01/26/2023 29  23 - 29 mmol/L Final    Glucose 01/26/2023 101  70 - 110 mg/dL Final    BUN 01/26/2023 8  8 - 23 mg/dL Final    Creatinine 01/26/2023 0.4 (L)  0.5 - 1.4 mg/dL Final    Calcium 01/26/2023 9.5  8.7 - 10.5 mg/dL Final    Total Protein 01/26/2023 7.6  6.0 - 8.4 g/dL Final    Albumin 01/26/2023 4.4  3.5 - 5.2 g/dL Final    Total Bilirubin 01/26/2023 0.4  0.1 - 1.0 mg/dL Final    Comment: For infants and newborns, interpretation of results should be  based  on gestational age, weight and in agreement with clinical  observations.    Premature Infant recommended reference ranges:  Up to 24 hours.............<8.0 mg/dL  Up to 48 hours............<12.0 mg/dL  3-5 days..................<15.0 mg/dL  6-29 days.................<15.0 mg/dL      Alkaline Phosphatase 01/26/2023 77  55 - 135 U/L Final    AST 01/26/2023 21  10 - 40 U/L Final    ALT 01/26/2023 13  10 - 44 U/L Final    Anion Gap 01/26/2023 6 (L)  8 - 16 mmol/L Final    eGFR 01/26/2023 >60.0  >60 mL/min/1.73 m^2 Final   Hospital Outpatient Visit on 11/07/2022   Component Date Value Ref Range Status    POC Creatinine 11/07/2022 0.5  0.5 - 1.4 mg/dL Final    Sample 11/07/2022 VENOUS   Final      1. Hyperthyroidism  TSH    T4, Free      2. Multiple thyroid nodules  US Soft Tissue Head Neck Thyroid      3. Osteopenia, unspecified location        4. Tobacco use        5. Hypertension, unspecified type        6. Chronic obstructive pulmonary disease, unspecified COPD type        7. Hyperlipidemia, unspecified hyperlipidemia type           Hyperthyroidism-Pt is not symptomatic. Abs for Graves' Disease were negative. Discussed treatment options for hyperthyroidism including methimazole, [place] or an elective thyroidectomy. The pt elects to continue methimazole. Continue 5 mg qd.  Nodular thyroid disease-repeat thyroid u/s 9/23.  Osteopenia with a high fracture risk-continue calcium and vitamin D OTC supplements and will repeat DEXA ~ 3/24. Continue alendronate 70 mg weekly.  Tobacco use-encouraged to quit smoking  Hypertension-stable-continue meds  COPD-referral to pulmonology. Continue Xopenex.   IPI-regzox-vddidbmt statin-recheck    F/u in 6 mths w/ labs prior and thyroid u/s

## 2023-03-13 NOTE — PLAN OF CARE
Problem: Fatigue  Goal: Improved Activity Tolerance  Outcome: Met     Problem: Fall Injury Risk  Goal: Absence of Fall and Fall-Related Injury  Outcome: Met     Problem: Breathing Pattern Ineffective  Goal: Effective Breathing Pattern  Outcome: Met

## 2023-03-14 PROBLEM — G89.3 CANCER ASSOCIATED PAIN: Status: ACTIVE | Noted: 2023-01-01

## 2023-03-14 NOTE — ASSESSMENT & PLAN NOTE
Ms. Cool is doing well at this time.  Will continue treatment and will plan to image again after cycle number four.  Will have her back with me again in 3 weeks to arrange this and see how she is doing on therapy.

## 2023-03-14 NOTE — PROGRESS NOTES
PROGRESS NOTE    Subjective:       Patient ID: Anuja Cool is a 72 y.o. female.    2/25/2022:  CT lung Screen:  3.4cm FRANCIS mass and 2.0cm RLL    3/11/2022:  PET scan:  3.9cm hypermetabolic mass in FRANCIS  2.3cm hypermetabolic mass in RLL    3/31/2022:  FRANCIS CT biopsy:  Small Cell carcinoma    Carbo/Etop:  Cycle 1: 4/27/2022  Cycle 2: 5/25/2022  Cycle 3: 6/15/2022  Cycle 4: 7/6/2022    5/3/2022:  RLL CT Biopsy:  Adenocarcinoma    5/15/2022-5/22/2022:  SBRT to Right LL Adenocarcinoma    7/11/2022:  xrt to complete to left lung    8/1/2022:  PET:  1. Favorable interval response to therapy, with decreased size and FDG uptake of noncalcified pulmonary nodules in both lungs.  2. No findings of regional metastatic disease in the chest, or distant metastatic disease.    11/18/2022:  Chest CT:  1.  Masses in the anterior left upper lobe and right lower lobe are unchanged from previous PET/CT.  2.  There is interval development of multiple new bilateral pulmonary nodules as described, suspicious for metastatic disease.  3.  Additional incidental observations as described.    ---IR not able to biopsy above new lesions, appt 12/28/2022 with Dr. Nunes for EBUS consideration.   Patient seen 12/28/2022 by Dr. Nunes.  Patient refused biopsy.     Topotecan:  Cycle 1: 1/30/2023  Cycle 2: 2/20/2023  Cycle 3: 3/13/2023    Chief Complaint:  No chief complaint on file.  synchronous primary lung cancer-SCLC and Adenocarcinoma Follow up    History of Present Illness:   Anuja Cool is a 72 y.o. female who presents for follow up of above.     Ms. Cool is doing well today.  Still tolerating the therapy well.  No new complaints.     Family and Social history reviewed and is unchanged from 4/14/2022      ROS:  Review of Systems   Constitutional:  Negative for fever.   Respiratory:  Negative for shortness of breath.    Cardiovascular:  Negative for chest pain and leg  swelling.   Gastrointestinal:  Negative for abdominal pain and blood in stool.   Genitourinary:  Negative for hematuria.   Skin:  Negative for rash.        Current Outpatient Medications:     alendronate (FOSAMAX) 70 MG tablet, Take 1 tablet by mouth once a week, Disp: 12 tablet, Rfl: 3    azelastine (ASTELIN) 137 mcg (0.1 %) nasal spray, 1 spray (137 mcg total) by Nasal route 2 (two) times daily., Disp: 30 mL, Rfl: 0    cholecalciferol, vitamin D3, 3,000 unit Tab, Take by mouth., Disp: , Rfl:     fluticasone-umeclidin-vilanter (TRELEGY ELLIPTA) 200-62.5-25 mcg inhaler, Inhale 1 puff into the lungs once daily., Disp: 90 each, Rfl: 3    ibuprofen (ADVIL,MOTRIN) 200 MG tablet, Take 200 mg by mouth every 6 (six) hours as needed for Pain., Disp: , Rfl:     levalbuterol (XOPENEX HFA) 45 mcg/actuation inhaler, INHALE 1 TO 2 PUFFS INTO LUNGS EVERY 4 HOURS AS NEEDED FOR WHEEZING AND FOR SHORTNESS OF BREATH, Disp: 45 g, Rfl: 3    methIMAzole (TAPAZOLE) 5 MG Tab, Take one tablet Monday-Friday and two tablets on Saturday and Sunday., Disp: 180 tablet, Rfl: 3    multivit with minerals/lutein (MULTIVITAMIN 50 PLUS ORAL), Take by mouth., Disp: , Rfl:     nicotine (NICODERM CQ) 14 mg/24 hr, Place 1 patch onto the skin every 24 hours., Disp: , Rfl:     ondansetron (ZOFRAN) 8 MG tablet, Take 1 tablet (8 mg total) by mouth every 8 (eight) hours as needed., Disp: 30 tablet, Rfl: 5    promethazine (PHENERGAN) 25 MG tablet, Take 1 tablet (25 mg total) by mouth every 4 to 6 hours as needed., Disp: 30 tablet, Rfl: 5    rosuvastatin (CRESTOR) 20 MG tablet, Take 1 tablet by mouth once daily, Disp: 90 tablet, Rfl: 3    apixaban (ELIQUIS) 2.5 mg Tab, Take 1 tablet (2.5 mg total) by mouth 2 (two) times daily., Disp: 60 tablet, Rfl: 5    HYDROcodone-acetaminophen (NORCO) 5-325 mg per tablet, Take 1 tablet by mouth every 6 (six) hours as needed for Pain., Disp: 40 tablet, Rfl: 0    levocetirizine (XYZAL) 5 MG tablet, Take 1 tablet (5 mg total)  "by mouth every evening., Disp: 30 tablet, Rfl: 2  No current facility-administered medications for this visit.        Objective:       Physical Examination:     BP (!) 123/57   Pulse 104   Temp 97.7 °F (36.5 °C)   Resp 18   Ht 5' 2" (1.575 m)   Wt 44.9 kg (99 lb)   SpO2 96%   BMI 18.11 kg/m²     Physical Exam  Constitutional:       Appearance: She is well-developed.   HENT:      Head: Normocephalic and atraumatic.      Right Ear: External ear normal.      Left Ear: External ear normal.   Eyes:      Conjunctiva/sclera: Conjunctivae normal.      Pupils: Pupils are equal, round, and reactive to light.   Neck:      Thyroid: No thyromegaly.      Trachea: No tracheal deviation.   Cardiovascular:      Rate and Rhythm: Normal rate and regular rhythm.      Heart sounds: Normal heart sounds.   Pulmonary:      Effort: Pulmonary effort is normal.      Breath sounds: Normal breath sounds.   Abdominal:      General: Bowel sounds are normal. There is no distension.      Palpations: Abdomen is soft. There is no mass.      Tenderness: There is no abdominal tenderness.   Skin:     Findings: No rash.   Neurological:      Comments: Neuro intact througout   Psychiatric:         Behavior: Behavior normal.         Thought Content: Thought content normal.         Judgment: Judgment normal.       Labs:   Recent Results (from the past 336 hour(s))   CBC Auto Differential    Collection Time: 03/09/23  8:25 AM   Result Value Ref Range    WBC 8.68 3.90 - 12.70 K/uL    Hemoglobin 10.5 (L) 12.0 - 16.0 g/dL    Hematocrit 31.2 (L) 37.0 - 48.5 %    Platelets 284 150 - 450 K/uL   CBC Auto Differential    Collection Time: 03/02/23  7:59 AM   Result Value Ref Range    WBC 4.12 3.90 - 12.70 K/uL    Hemoglobin 10.3 (L) 12.0 - 16.0 g/dL    Hematocrit 30.0 (L) 37.0 - 48.5 %    Platelets 220 150 - 450 K/uL       CMP  Sodium   Date Value Ref Range Status   03/09/2023 133 (L) 136 - 145 mmol/L Final     Potassium   Date Value Ref Range Status "   03/09/2023 5.3 (H) 3.5 - 5.1 mmol/L Final     Chloride   Date Value Ref Range Status   03/09/2023 96 95 - 110 mmol/L Final     CO2   Date Value Ref Range Status   03/09/2023 29 23 - 29 mmol/L Final     Glucose   Date Value Ref Range Status   03/09/2023 85 70 - 110 mg/dL Final     BUN   Date Value Ref Range Status   03/09/2023 10 8 - 23 mg/dL Final     Creatinine   Date Value Ref Range Status   03/09/2023 0.5 0.5 - 1.4 mg/dL Final     Calcium   Date Value Ref Range Status   03/09/2023 9.1 8.7 - 10.5 mg/dL Final     Total Protein   Date Value Ref Range Status   03/09/2023 7.1 6.0 - 8.4 g/dL Final     Albumin   Date Value Ref Range Status   03/09/2023 3.9 3.5 - 5.2 g/dL Final     Total Bilirubin   Date Value Ref Range Status   03/09/2023 0.4 0.1 - 1.0 mg/dL Final     Comment:     For infants and newborns, interpretation of results should be based  on gestational age, weight and in agreement with clinical  observations.    Premature Infant recommended reference ranges:  Up to 24 hours.............<8.0 mg/dL  Up to 48 hours............<12.0 mg/dL  3-5 days..................<15.0 mg/dL  6-29 days.................<15.0 mg/dL       Alkaline Phosphatase   Date Value Ref Range Status   03/09/2023 71 55 - 135 U/L Final     AST   Date Value Ref Range Status   03/09/2023 17 10 - 40 U/L Final     ALT   Date Value Ref Range Status   03/09/2023 11 10 - 44 U/L Final     Anion Gap   Date Value Ref Range Status   03/09/2023 8 8 - 16 mmol/L Final     eGFR if    Date Value Ref Range Status   07/25/2022 >60.0 >60 mL/min/1.73 m^2 Final     eGFR if non    Date Value Ref Range Status   07/25/2022 >60.0 >60 mL/min/1.73 m^2 Final     Comment:     Calculation used to obtain the estimated glomerular filtration  rate (eGFR) is the CKD-EPI equation.        No results found for: CEA  No results found for: PSA        Assessment/Plan:     Problem List Items Addressed This Visit       Small Cell Lung Cancer FRANCIS      Ms. Cool is doing well at this time.  Will continue treatment and will plan to image again after cycle number four.  Will have her back with me again in 3 weeks to arrange this and see how she is doing on therapy.          Relevant Medications    HYDROcodone-acetaminophen (NORCO) 5-325 mg per tablet    Chronic obstructive pulmonary disease     Breathing is doing ok but intermittently worse with allergy season.  Continue current care approach currently.          Cancer associated pain     Patient feels this is more scoliosis pain.  Will continue current approach for now.           Other Visit Diagnoses       Back pain, unspecified back location, unspecified back pain laterality, unspecified chronicity        Relevant Medications    HYDROcodone-acetaminophen (NORCO) 5-325 mg per tablet    Acute bilateral back pain, unspecified back location        Relevant Medications    HYDROcodone-acetaminophen (NORCO) 5-325 mg per tablet    Acute deep vein thrombosis (DVT) of non-extremity vein        Relevant Medications    apixaban (ELIQUIS) 2.5 mg Tab            Discussion:     Follow up in about 3 weeks (around 4/4/2023).      Electronically signed by Jonathan Wagner

## 2023-03-14 NOTE — ASSESSMENT & PLAN NOTE
Breathing is doing ok but intermittently worse with allergy season.  Continue current care approach currently.

## 2023-03-15 NOTE — PLAN OF CARE
Problem: Fatigue  Goal: Improved Activity Tolerance  Outcome: Ongoing, Progressing  Intervention: Promote Improved Energy  Flowsheets (Taken 3/15/2023 1100)  Fatigue Management:   fatigue-related activity identified   frequent rest breaks encouraged   paced activity encouraged  Sleep/Rest Enhancement:   regular sleep/rest pattern promoted   relaxation techniques promoted

## 2023-03-24 NOTE — TELEPHONE ENCOUNTER
----- Message from Chandana Ochoa sent at 3/24/2023  8:11 AM CDT -----  Type: Needs Medical Advice  Who Called: Patient  Symptoms (please be specific):  Difficulty breathing-lung cancer  How long has patient had these symptoms:  Several weeks    Pharmacy name and phone #:    [facility] [id number]  [place], [place] - [email or web address]  [address] [place], [place] [email or web address]  [facility] [address]  Phone: [phone] Fax: [phone]      Best Call Back Number: [phone]  Additional Information: Pt states that Dr. Lew informed her recently that if her symptoms persist, that she should contact the office to have steroids called in.    Please call to advise

## 2023-03-29 NOTE — TELEPHONE ENCOUNTER
----- Message from Gisel Alvarado sent at 3/29/2023  9:32 AM CDT -----  The patient called for a prescription of Hydrocodone 5/325mg #40 to be sent to [facility] on [address]. # [phone]

## 2023-03-30 NOTE — TELEPHONE ENCOUNTER
Spoke to patient.   She was instructed patient instructed to go to Urgent care or ED if SOB   She states she is still coughing with congestion.   She will go to Urgent care.

## 2023-03-30 NOTE — TELEPHONE ENCOUNTER
Last visit was 2/15/23. Needs office visit-any provider. If having difficulty breathing she needs to go to ED or urgent care

## 2023-04-03 NOTE — PLAN OF CARE
Problem: Fatigue  Goal: Improved Activity Tolerance  Outcome: Ongoing, Progressing  Intervention: Promote Improved Energy  Flowsheets (Taken 4/3/2023 1012)  Fatigue Management:   fatigue-related activity identified   frequent rest breaks encouraged   paced activity encouraged  Sleep/Rest Enhancement:   regular sleep/rest pattern promoted   relaxation techniques promoted  Activity Management: Ambulated -L4

## 2023-04-04 NOTE — PLAN OF CARE
Problem: Fall Injury Risk  Goal: Absence of Fall and Fall-Related Injury  Outcome: Ongoing, Progressing  Intervention: Identify and Manage Contributors  Flowsheets (Taken 4/4/2023 1056)  Self-Care Promotion: independence encouraged  Medication Review/Management: medications reviewed  Intervention: Promote Injury-Free Environment  Flowsheets (Taken 4/4/2023 1056)  Safety Promotion/Fall Prevention: medications reviewed

## 2023-04-05 NOTE — PLAN OF CARE
Problem: Fall Injury Risk  Goal: Absence of Fall and Fall-Related Injury  Outcome: Ongoing, Progressing  Intervention: Identify and Manage Contributors  Flowsheets (Taken 4/5/2023 1056)  Self-Care Promotion: independence encouraged  Medication Review/Management: medications reviewed  Intervention: Promote Injury-Free Environment  Flowsheets (Taken 4/5/2023 1056)  Safety Promotion/Fall Prevention: medications reviewed

## 2023-04-10 NOTE — PROGRESS NOTES
Please notify the patient that their CXR only shows the known cancer and emphysema. See if she contacted her pulmonologist about the Trelogy inhaler and how her breathing is now.

## 2023-04-10 NOTE — PROGRESS NOTES
PROGRESS NOTE    Subjective:       Patient ID: Anuja Cool is a 72 y.o. female.    2/25/2022:  CT lung Screen:  3.4cm FRANCIS mass and 2.0cm RLL    3/11/2022:  PET scan:  3.9cm hypermetabolic mass in FRANCIS  2.3cm hypermetabolic mass in RLL    3/31/2022:  FRANCIS CT biopsy:  Small Cell carcinoma    Carbo/Etop:  Cycle 1: 4/27/2022  Cycle 2: 5/25/2022  Cycle 3: 6/15/2022  Cycle 4: 7/6/2022    5/3/2022:  RLL CT Biopsy:  Adenocarcinoma    5/15/2022-5/22/2022:  SBRT to Right LL Adenocarcinoma    7/11/2022:  xrt to complete to left lung    8/1/2022:  PET:  1. Favorable interval response to therapy, with decreased size and FDG uptake of noncalcified pulmonary nodules in both lungs.  2. No findings of regional metastatic disease in the chest, or distant metastatic disease.    11/18/2022:  Chest CT:  1.  Masses in the anterior left upper lobe and right lower lobe are unchanged from previous PET/CT.  2.  There is interval development of multiple new bilateral pulmonary nodules as described, suspicious for metastatic disease.  3.  Additional incidental observations as described.    ---IR not able to biopsy above new lesions, appt 12/28/2022 with Dr. Nunes for EBUS consideration.   Patient seen 12/28/2022 by Dr. Nunes.  Patient refused biopsy.     Topotecan:  Cycle 1: 1/30/2023  Cycle 2: 2/20/2023  Cycle 3: 3/13/2023  Cycle 4:  Cycle 5:    Chief Complaint:  synchronous primary lung cancer-SCLC and Adenocarcinoma Follow up    History of Present Illness:   Anuja Cool is a 72 y.o. female who presents for follow up of above.     Ms. Cool is doing well today.  Still tolerating the therapy well.  No new complaints. She states the SOB is better. She is following up with pulm.    Family and Social history reviewed and is unchanged from 4/14/2022      ROS:  Review of Systems   Constitutional:  Negative for fever.   Respiratory:  Negative for shortness of breath.     Cardiovascular:  Negative for chest pain and leg swelling.   Gastrointestinal:  Negative for abdominal pain and blood in stool.   Genitourinary:  Negative for hematuria.   Skin:  Negative for rash.        Current Outpatient Medications:     alendronate (FOSAMAX) 70 MG tablet, Take 1 tablet by mouth once a week, Disp: 12 tablet, Rfl: 3    apixaban (ELIQUIS) 2.5 mg Tab, Take 1 tablet (2.5 mg total) by mouth 2 (two) times daily., Disp: 60 tablet, Rfl: 5    azelastine (ASTELIN) 137 mcg (0.1 %) nasal spray, 1 spray (137 mcg total) by Nasal route 2 (two) times daily., Disp: 30 mL, Rfl: 0    cholecalciferol, vitamin D3, 3,000 unit Tab, Take by mouth., Disp: , Rfl:     fluticasone-umeclidin-vilanter (TRELEGY ELLIPTA) 200-62.5-25 mcg inhaler, Inhale 1 puff into the lungs once daily., Disp: 90 each, Rfl: 3    ibuprofen (ADVIL,MOTRIN) 200 MG tablet, Take 200 mg by mouth every 6 (six) hours as needed for Pain., Disp: , Rfl:     levalbuterol (XOPENEX HFA) 45 mcg/actuation inhaler, INHALE 1 TO 2 PUFFS INTO LUNGS EVERY 4 HOURS AS NEEDED FOR WHEEZING AND FOR SHORTNESS OF BREATH, Disp: 45 g, Rfl: 3    methIMAzole (TAPAZOLE) 5 MG Tab, TAKE 1 TABLET BY MOUTH ONCE DAILY ON  MONDAY  THROUGH  FRIDAY  AND  TWO  TABLETS  ON  SATURDAY  AND  SUNDAY, Disp: 90 tablet, Rfl: 3    multivit with minerals/lutein (MULTIVITAMIN 50 PLUS ORAL), Take by mouth., Disp: , Rfl:     nicotine (NICODERM CQ) 14 mg/24 hr, Place 1 patch onto the skin every 24 hours., Disp: , Rfl:     ondansetron (ZOFRAN) 8 MG tablet, Take 1 tablet (8 mg total) by mouth every 8 (eight) hours as needed., Disp: 30 tablet, Rfl: 5    promethazine (PHENERGAN) 25 MG tablet, Take 1 tablet (25 mg total) by mouth every 4 to 6 hours as needed., Disp: 30 tablet, Rfl: 5    rosuvastatin (CRESTOR) 20 MG tablet, Take 1 tablet by mouth once daily, Disp: 90 tablet, Rfl: 3    HYDROcodone-acetaminophen (NORCO) 5-325 mg per tablet, Take 1 tablet by mouth every 6 (six) hours as needed for Pain.,  "Disp: 40 tablet, Rfl: 0    levocetirizine (XYZAL) 5 MG tablet, Take 1 tablet (5 mg total) by mouth every evening., Disp: 30 tablet, Rfl: 2        Objective:       Physical Examination:     /72   Pulse 105   Temp 97.6 °F (36.4 °C)   Resp 16   Ht 5' 2" (1.575 m)   Wt 44 kg (97 lb)   BMI 17.74 kg/m²     Physical Exam  Constitutional:       Appearance: Normal appearance. She is well-developed.   HENT:      Head: Normocephalic and atraumatic.      Right Ear: External ear normal.      Left Ear: External ear normal.      Nose: Nose normal.      Mouth/Throat:      Mouth: Mucous membranes are moist.   Eyes:      Conjunctiva/sclera: Conjunctivae normal.      Pupils: Pupils are equal, round, and reactive to light.   Neck:      Thyroid: No thyromegaly.      Trachea: No tracheal deviation.   Cardiovascular:      Rate and Rhythm: Normal rate and regular rhythm.      Heart sounds: Normal heart sounds.   Pulmonary:      Effort: Pulmonary effort is normal.      Breath sounds: Normal breath sounds.   Abdominal:      General: Bowel sounds are normal. There is no distension.      Palpations: Abdomen is soft. There is no mass.      Tenderness: There is no abdominal tenderness.   Musculoskeletal:      Right lower leg: No edema.      Left lower leg: No edema.   Skin:     Findings: No rash.   Neurological:      General: No focal deficit present.      Mental Status: She is alert and oriented to person, place, and time.      Comments: Neuro intact througout   Psychiatric:         Mood and Affect: Mood normal.         Behavior: Behavior normal.         Thought Content: Thought content normal.         Judgment: Judgment normal.       Labs:   Recent Results (from the past 336 hour(s))   CBC Auto Differential    Collection Time: 04/06/23  8:31 AM   Result Value Ref Range    WBC 6.87 3.90 - 12.70 K/uL    Hemoglobin 9.9 (L) 12.0 - 16.0 g/dL    Hematocrit 29.9 (L) 37.0 - 48.5 %    Platelets 559 (H) 150 - 450 K/uL   CBC Auto Differential "    Collection Time: 03/30/23  8:26 AM   Result Value Ref Range    WBC 4.07 3.90 - 12.70 K/uL    Hemoglobin 10.6 (L) 12.0 - 16.0 g/dL    Hematocrit 32.7 (L) 37.0 - 48.5 %    Platelets 351 150 - 450 K/uL       CMP  Sodium   Date Value Ref Range Status   04/06/2023 132 (L) 136 - 145 mmol/L Final     Potassium   Date Value Ref Range Status   04/06/2023 4.7 3.5 - 5.1 mmol/L Final     Chloride   Date Value Ref Range Status   04/06/2023 95 95 - 110 mmol/L Final     CO2   Date Value Ref Range Status   04/06/2023 30 (H) 23 - 29 mmol/L Final     Glucose   Date Value Ref Range Status   04/06/2023 82 70 - 110 mg/dL Final     BUN   Date Value Ref Range Status   04/06/2023 11 8 - 23 mg/dL Final     Creatinine   Date Value Ref Range Status   04/06/2023 0.5 0.5 - 1.4 mg/dL Final     Calcium   Date Value Ref Range Status   04/06/2023 9.5 8.7 - 10.5 mg/dL Final     Total Protein   Date Value Ref Range Status   04/06/2023 7.2 6.0 - 8.4 g/dL Final     Albumin   Date Value Ref Range Status   04/06/2023 4.3 3.5 - 5.2 g/dL Final     Total Bilirubin   Date Value Ref Range Status   04/06/2023 0.9 0.1 - 1.0 mg/dL Final     Comment:     For infants and newborns, interpretation of results should be based  on gestational age, weight and in agreement with clinical  observations.    Premature Infant recommended reference ranges:  Up to 24 hours.............<8.0 mg/dL  Up to 48 hours............<12.0 mg/dL  3-5 days..................<15.0 mg/dL  6-29 days.................<15.0 mg/dL       Alkaline Phosphatase   Date Value Ref Range Status   04/06/2023 58 55 - 135 U/L Final     AST   Date Value Ref Range Status   04/06/2023 20 10 - 40 U/L Final     ALT   Date Value Ref Range Status   04/06/2023 15 10 - 44 U/L Final     Anion Gap   Date Value Ref Range Status   04/06/2023 7 (L) 8 - 16 mmol/L Final     eGFR if    Date Value Ref Range Status   07/25/2022 >60.0 >60 mL/min/1.73 m^2 Final     eGFR if non    Date Value Ref  Range Status   07/25/2022 >60.0 >60 mL/min/1.73 m^2 Final     Comment:     Calculation used to obtain the estimated glomerular filtration  rate (eGFR) is the CKD-EPI equation.        No results found for: CEA  No results found for: PSA        Assessment/Plan:     Problem List Items Addressed This Visit          Pulmonary    Chronic obstructive pulmonary disease       Oncology    Small Cell Lung Cancer FRANCIS - Primary    Relevant Orders    CT Chest Abdomen Pelvis W W/O Contrast (XPD)    Small cell lung cancer    Cancer associated pain     Other Visit Diagnoses       PND (post-nasal drip)        Productive cough                Lung Cancer- CT CAP  COPD- Follow up with Pulm as scheduled  Productive cough- Mucinex  PND- Zyrtec daily PRN  5. Cancer related pain- Refill Norco today    Discussion:     Follow up in about 2 weeks (around 4/25/2023) for with me and 5 weeks with Dr. Tinoco.      Electronically signed by Martha Granado, MSN, APRN, AGNP-C, OCN

## 2023-04-12 NOTE — TELEPHONE ENCOUNTER
Spoke to pt and notified her of CXR results. She has an appt set up with her pulmonologist to talk to him about the inhaler. She also stated that her breathing has been better.

## 2023-04-12 NOTE — TELEPHONE ENCOUNTER
----- Message from SCOOTER Aggarwal sent at 4/10/2023  9:05 AM CDT -----  Please notify the patient that their CXR only shows the known cancer and emphysema. See if she contacted her pulmonologist about the Trelogy inhaler and how her breathing is now.

## 2023-04-18 NOTE — PROGRESS NOTES
PROGRESS NOTE    Subjective:       Patient ID: Anuja Cool is a 72 y.o. female.    2/25/2022:  CT lung Screen:  3.4cm FRANCIS mass and 2.0cm RLL    3/11/2022:  PET scan:  3.9cm hypermetabolic mass in FRANCIS  2.3cm hypermetabolic mass in RLL    3/31/2022:  [facility] CT biopsy:  Small Cell carcinoma    Carbo/Etop:  Cycle 1: 4/27/2022  Cycle 2: 5/25/2022  Cycle 3: 6/15/2022  Cycle 4: 7/6/2022    5/3/2022:  RLL CT Biopsy:  Adenocarcinoma    5/15/2022-5/22/2022:  SBRT to Right LL Adenocarcinoma    7/11/2022:  xrt to complete to left lung    8/1/2022:  PET:  1. Favorable interval response to therapy, with decreased size and FDG uptake of noncalcified pulmonary nodules in both lungs.  2. No findings of regional metastatic disease in the chest, or distant metastatic disease.    11/18/2022:  Chest CT:  1.  Masses in the anterior left upper lobe and right lower lobe are unchanged from previous PET/CT.  2.  There is interval development of multiple new bilateral pulmonary nodules as described, suspicious for metastatic disease.  3.  Additional incidental observations as described.    ---IR not able to biopsy above new lesions, appt 12/28/2022 with Dr. Nunes for EBUS consideration.   Patient seen 12/28/2022 by Dr. Nunes.  Patient refused biopsy.     Topotecan:  Cycle 1: 1/30/2023  Cycle 2: 2/20/2023  Cycle 3: 3/13/2023  Cycle 4: 4/3/2023    4/18/2023- CT scan shows progression of right upper and lower lung nodules.    Plan:   Lurbinectadin [id number]  Cycle 1: 4/24/2023-Due    Chief Complaint:  synchronous primary lung cancer-SCLC and Adenocarcinoma Follow up    History of Present Illness:   Anuja Cool is a 72 y.o. female who presents for follow up of above.     Ms. Cool is doing well today.  Still tolerating the therapy well.  No new complaints. She states the SOB is better. She is following up with pulm tomorrow.    Patients recent CT scan shows progression in the  right upper and lower lobe Nodules    Family and Social history reviewed and is unchanged from 4/14/2022      ROS:  Review of Systems   Constitutional:  Negative for fever.   Respiratory:  Negative for shortness of breath.    Cardiovascular:  Negative for chest pain and leg swelling.   Gastrointestinal:  Negative for abdominal pain and blood in stool.   Genitourinary:  Negative for hematuria.   Skin:  Negative for rash.        Current Outpatient Medications:     alendronate (FOSAMAX) 70 MG tablet, Take 1 tablet by mouth once a week, Disp: 12 tablet, Rfl: 3    apixaban (ELIQUIS) 2.5 mg Tab, Take 1 tablet (2.5 mg total) by mouth 2 (two) times daily., Disp: 60 tablet, Rfl: 5    azelastine (ASTELIN) 137 mcg (0.1 %) nasal spray, 1 spray (137 mcg total) by Nasal route 2 (two) times daily., Disp: 30 mL, Rfl: 0    cholecalciferol, vitamin D3, 3,000 unit Tab, Take by mouth., Disp: , Rfl:     fluticasone-umeclidin-vilanter (TRELEGY ELLIPTA) 200-62.5-25 mcg inhaler, Inhale 1 puff into the lungs once daily., Disp: 90 each, Rfl: 3    HYDROcodone-acetaminophen (NORCO) 5-325 mg per tablet, Take 1 tablet by mouth every 6 (six) hours as needed for Pain., Disp: 40 tablet, Rfl: 0    ibuprofen (ADVIL,MOTRIN) 200 MG tablet, Take 200 mg by mouth every 6 (six) hours as needed for Pain., Disp: , Rfl:     levalbuterol (XOPENEX HFA) 45 mcg/actuation inhaler, INHALE 1 TO 2 PUFFS INTO LUNGS EVERY 4 HOURS AS NEEDED FOR WHEEZING AND FOR SHORTNESS OF BREATH, Disp: 45 g, Rfl: 3    methIMAzole (TAPAZOLE) 5 MG Tab, TAKE 1 TABLET BY MOUTH ONCE DAILY ON  MONDAY  THROUGH  FRIDAY  AND  TWO  TABLETS  ON  SATURDAY  AND  SUNDAY, Disp: 90 tablet, Rfl: 3    multivit with minerals/lutein (MULTIVITAMIN 50 PLUS ORAL), Take by mouth., Disp: , Rfl:     nicotine (NICODERM CQ) 14 mg/24 hr, Place 1 patch onto the skin every 24 hours., Disp: , Rfl:     ondansetron (ZOFRAN) 8 MG tablet, Take 1 tablet (8 mg total) by mouth every 8 (eight) hours as needed., Disp: 30  "tablet, Rfl: 5    promethazine (PHENERGAN) 25 MG tablet, Take 1 tablet (25 mg total) by mouth every 4 to 6 hours as needed., Disp: 30 tablet, Rfl: 5    rosuvastatin (CRESTOR) 20 MG tablet, Take 1 tablet by mouth once daily, Disp: 90 tablet, Rfl: 3    levocetirizine (XYZAL) 5 MG tablet, Take 1 tablet (5 mg total) by mouth every evening., Disp: 30 tablet, Rfl: 2        Objective:       Physical Examination:     BP (!) 143/65   Pulse 83   Temp 98.3 °F (36.8 °C)   Resp 20   Ht 5' 2" (1.575 m)   Wt 43.8 kg (96 lb 9.6 oz)   BMI 17.67 kg/m²     Physical Exam  Constitutional:       Appearance: Normal appearance. She is well-developed.   HENT:      Head: Normocephalic and atraumatic.      Right Ear: External ear normal.      Left Ear: External ear normal.      Nose: Nose normal.      Mouth/Throat:      Mouth: Mucous membranes are moist.   Eyes:      Conjunctiva/sclera: Conjunctivae normal.      Pupils: Pupils are equal, round, and reactive to light.   Neck:      Thyroid: No thyromegaly.      Trachea: No tracheal deviation.   Cardiovascular:      Rate and Rhythm: Normal rate and regular rhythm.      Heart sounds: Normal heart sounds.   Pulmonary:      Effort: Pulmonary effort is normal.      Breath sounds: Normal breath sounds.   Abdominal:      General: Bowel sounds are normal. There is no distension.      Palpations: Abdomen is soft. There is no mass.      Tenderness: There is no abdominal tenderness.   Musculoskeletal:      Right lower leg: No edema.      Left lower leg: No edema.   Skin:     Findings: No rash.   Neurological:      General: No focal deficit present.      Mental Status: She is alert and oriented to person, place, and time.      Comments: Neuro intact througout   Psychiatric:         Mood and Affect: Mood normal.         Behavior: Behavior normal.         Thought Content: Thought content normal.         Judgment: Judgment normal.       Labs:   Recent Results (from the past 336 hour(s))   CBC Auto " Differential    Collection Time: 04/13/23  8:10 AM   Result Value Ref Range    WBC 3.95 3.90 - 12.70 K/uL    Hemoglobin 9.6 (L) 12.0 - 16.0 g/dL    Hematocrit 29.1 (L) 37.0 - 48.5 %    Platelets 262 150 - 450 K/uL   CBC Auto Differential    Collection Time: 04/06/23  8:31 AM   Result Value Ref Range    WBC 6.87 3.90 - 12.70 K/uL    Hemoglobin 9.9 (L) 12.0 - 16.0 g/dL    Hematocrit 29.9 (L) 37.0 - 48.5 %    Platelets 559 (H) 150 - 450 K/uL       CMP  Sodium   Date Value Ref Range Status   04/13/2023 132 (L) 136 - 145 mmol/L Final     Potassium   Date Value Ref Range Status   04/13/2023 5.0 3.5 - 5.1 mmol/L Final     Chloride   Date Value Ref Range Status   04/13/2023 99 95 - 110 mmol/L Final     CO2   Date Value Ref Range Status   04/13/2023 27 23 - 29 mmol/L Final     Glucose   Date Value Ref Range Status   04/13/2023 87 70 - 110 mg/dL Final     BUN   Date Value Ref Range Status   04/13/2023 14 8 - 23 mg/dL Final     Creatinine   Date Value Ref Range Status   04/13/2023 0.4 (L) 0.5 - 1.4 mg/dL Final     Calcium   Date Value Ref Range Status   04/13/2023 9.6 8.7 - 10.5 mg/dL Final     Total Protein   Date Value Ref Range Status   04/13/2023 7.6 6.0 - 8.4 g/dL Final     Albumin   Date Value Ref Range Status   04/13/2023 4.2 3.5 - 5.2 g/dL Final     Total Bilirubin   Date Value Ref Range Status   04/13/2023 0.2 0.1 - 1.0 mg/dL Final     Comment:     For infants and newborns, interpretation of results should be based  on gestational age, weight and in agreement with clinical  observations.    Premature Infant recommended reference ranges:  Up to 24 hours.............<8.0 mg/dL  Up to 48 hours............<12.0 mg/dL  3-5 days..................<15.0 mg/dL  6-29 days.................<15.0 mg/dL       Alkaline Phosphatase   Date Value Ref Range Status   04/13/2023 65 55 - 135 U/L Final     AST   Date Value Ref Range Status   04/13/2023 18 10 - 40 U/L Final     ALT   Date Value Ref Range Status   04/13/2023 15 10 - 44 U/L  Final     Anion Gap   Date Value Ref Range Status   04/13/2023 6 (L) 8 - 16 mmol/L Final     eGFR if    Date Value Ref Range Status   07/25/2022 >60.0 >60 mL/min/1.73 m^2 Final     eGFR if non    Date Value Ref Range Status   07/25/2022 >60.0 >60 mL/min/1.73 m^2 Final     Comment:     Calculation used to obtain the estimated glomerular filtration  rate (eGFR) is the CKD-EPI equation.        No results found for: CEA  No results found for: PSA        Assessment/Plan:     Problem List Items Addressed This Visit          Pulmonary    Chronic obstructive pulmonary disease       Oncology    Small cell lung cancer    Adenocarcinoma Lung Cancer RLL - Primary    Cancer associated pain         Lung Cancer- D/c Topotecan and start Lurbinectadin. Patient is considering a needle biopsy will reach out to radiology and see if this is a possibility. Send [facility] testing  COPD- Follow up with Pulm as scheduled 4/20/2023  Productive cough- Mucinex  PND- Zyrtec daily PRN  5.   Cancer related pain- Refill Norco PRN    Discussion:     Follow up in about 3 weeks (around 5/10/2023) for with Dr. Tinoco and 6 weeks with me.      Electronically signed by Martha Granado, MSN, APRN, AGNP-C, OCN

## 2023-04-20 NOTE — PROGRESS NOTES
Office Visit Note *    Patient Name: Anuja Cool  MRN: [id number]  : 1950      Reason for visit: COPD    HPI:     2022 - Here to establish care,  Diagnosed with COPD (severity unknown).  Recently changed to TRELEGY and feels that she is doing better on that.  Currently smoking about 3/4 PPD  (1-1.5 PPD x 50 years).  Also carries diagnosed of asthma, has h/o allergies (+ skin test, never desensitized).  HAs never had a screening CT chest and does not remember her last CXR.  ROS as below.  We discussed cigarette cessation at length.    3/17/2022 - Here for review.  CT scan showed subpleural masses and PET scan shows activity in those areas.  Reviewed images with pt and we will plan t proceed with CT guided biopsy (left lesion first).  No evidence of other + areas.  PFT c/w   GOLD II A (FEV1 - 52%, DLCO 23 %).  CT scan does show fairly extensive emphysema changes.    2022 - Here for review of biopsy results - + small cell cancer.  We discussed moving forward with MRI brain and referral to Radiation Oncology and Oncology.  All questions have been answered.    2022 - Here for follow up, has had first chemotherapy (carboplatin and etoposide) and she tolerated well, WBC is down some (being addressed).  To see radiation therapy today.  No new symptoms. Did have biopsy of right nodule which was + for adenocarcinoma.    2022 - Here for follow up, recent PET scan reviewed (see below) and doing well.  She is to see Dr Tinoco next month.  No other new issues.  We reviewed the PET scan images and compared the 2.  Reports that she tolerated the chemo well.    2022 - Here for follow up, chemo and XRT are done and she is getting followup studies.  She has had some back pain (MRI of spine is OK - no mets).  No respiratory issues.  Had synchronous adeno and small cell    2023 - Here for follow up, has completed first cycle of chemo (TOPOTECAN) and tolerated well, most recent CT reviewed (see  below).  No new respiratory issues.  We were not able to get a pulse ox reading in the office today.  Had pulmonary stress test at [facility] lowest sat 91%, did have some hypertension and had decreased exercise capacity.  Pt is currently stable on present medications with no recent increases in their symptoms or use of rescue medications.  Since our last visit there have been no hospitalizations or ER visits for their respiratory issues and there does not seem to be anything to suggest unrecognized exacerbations.  I have reviewed the medical regimen and re-educated the pt on the role of rescue and controlling medications.  Inhaler technique and understanding seems adequate.  The patient reports no issues with any of there medications for their COPD.  Refills will be taken care of as needed.  All questions answered.  She has had flu vaccine but has not had the bivalent Covid vaccine.    4/20/2023 - Here for follow up, she continues with treatment to start new chemo (lurbinectadin) next week because of progressive disease.  They are looking into whether or not the increased nodules could be biopsied.  Pt is currently stable on present medications with no recent increases in their symptoms or use of rescue medications.  Since our last visit there have been no hospitalizations or ER visits for their respiratory issues and there does not seem to be anything to suggest unrecognized exacerbations.  I have reviewed the medical regimen and re-educated the pt on the role of rescue and controlling medications.  Inhaler technique and understanding seems adequate.  The patient reports no issues with any of there medications for their COPD.  Refills will be taken care of as needed.  All questions answered.  She does feel that her xopenex inhaler is not helping as well and we will try Albuterol.  We had trouble getting pulse ox reading - repeated and got 95-96% on RA with good signal.        Past Medical History    Past Medical  History:   Diagnosis Date    Allergy     Arthritis     Asthma     Bursitis     COPD (chronic obstructive pulmonary disease)     Hypertension     Low sodium     Lung nodules 03/2022    Small cell carcinoma of left lung 03/31/2022    Thyroid disease     nodules    Tinea pedis        Past Surgical History    Past Surgical History:   Procedure Laterality Date    COLONOSCOPY N/A 05/08/2018    Procedure: COLONOSCOPY;  Surgeon: Grey Roberts MD;  Location: [facility] ENDO;  Service: Endoscopy;  Laterality: N/A;    CT BIOPSY LUNG W/ GUIDANCE Left 03/31/2022    HYSTERECTOMY      INSERTION OF TUNNELED CENTRAL VENOUS CATHETER (CVC) WITH SUBCUTANEOUS PORT N/A 4/26/2022    Procedure: [id number];  Surgeon: oNel Sadler MD;  Location: [facility] OR;  Service: General;  Laterality: N/A;    OOPHORECTOMY         Medications      Current Outpatient Medications:     alendronate (FOSAMAX) 70 MG tablet, Take 1 tablet by mouth once a week, Disp: 12 tablet, Rfl: 3    apixaban (ELIQUIS) 2.5 mg Tab, Take 1 tablet (2.5 mg total) by mouth 2 (two) times daily., Disp: 60 tablet, Rfl: 5    cholecalciferol, vitamin D3, 3,000 unit Tab, Take by mouth., Disp: , Rfl:     fluticasone-umeclidin-vilanter (TRELEGY ELLIPTA) 200-62.5-25 mcg inhaler, Inhale 1 puff into the lungs once daily., Disp: 90 each, Rfl: 3    HYDROcodone-acetaminophen (NORCO) 5-325 mg per tablet, Take 1 tablet by mouth every 6 (six) hours as needed for Pain., Disp: 40 tablet, Rfl: 0    levalbuterol (XOPENEX HFA) 45 mcg/actuation inhaler, INHALE 1 TO 2 PUFFS INTO LUNGS EVERY 4 HOURS AS NEEDED FOR WHEEZING AND FOR SHORTNESS OF BREATH, Disp: 45 g, Rfl: 3    methIMAzole (TAPAZOLE) 5 MG Tab, TAKE 1 TABLET BY MOUTH ONCE DAILY ON  MONDAY  THROUGH  FRIDAY  AND  TWO  TABLETS  ON  SATURDAY  AND  SUNDAY, Disp: 90 tablet, Rfl: 3    multivit with minerals/lutein (MULTIVITAMIN 50 PLUS ORAL), Take by mouth., Disp: , Rfl:     nicotine (NICODERM CQ) 14 mg/24 hr, Place 1 patch onto the skin every  24 hours., Disp: , Rfl:     ondansetron (ZOFRAN) 8 MG tablet, Take 1 tablet (8 mg total) by mouth every 8 (eight) hours as needed., Disp: 30 tablet, Rfl: 5    promethazine (PHENERGAN) 25 MG tablet, Take 1 tablet (25 mg total) by mouth every 4 to 6 hours as needed., Disp: 30 tablet, Rfl: 5    rosuvastatin (CRESTOR) 20 MG tablet, Take 1 tablet by mouth once daily, Disp: 90 tablet, Rfl: 3    albuterol (PROVENTIL/VENTOLIN HFA) 90 mcg/actuation inhaler, Inhale 2 puffs into the lungs every 6 (six) hours as needed. Rescue, Disp: 18 g, Rfl: 5    azelastine (ASTELIN) 137 mcg (0.1 %) nasal spray, 1 spray (137 mcg total) by Nasal route 2 (two) times daily. (Patient not taking: Reported on 2023), Disp: 30 mL, Rfl: 0    ibuprofen (ADVIL,MOTRIN) 200 MG tablet, Take 200 mg by mouth every 6 (six) hours as needed for Pain., Disp: , Rfl:     levocetirizine (XYZAL) 5 MG tablet, Take 1 tablet (5 mg total) by mouth every evening., Disp: 30 tablet, Rfl: 2    Allergies    Review of patient's allergies indicates:   Allergen Reactions    Tinactin [tolnaftate] Dermatitis    Advair diskus [fluticasone propion-salmeterol]      shakes    Albuterol      tremors    Sulfa (sulfonamide antibiotics)      Unknown    Years ago       SocHx    Social History     Tobacco Use   Smoking Status Former    Packs/day: 0.50    Types: Cigarettes    Quit date: 4/3/2022    Years since quittin.0   Smokeless Tobacco Never       Social History     Substance and Sexual Activity   Alcohol Use Yes    Alcohol/week: 4.0 standard drinks    Types: 4 Glasses of wine per week    Comment: occ. glass of wine       Drug Use - no  Occupation - retired, customer service  Asbestos exposure - no  Pets - no    FMHx    Family History   Problem Relation Age of Onset    Heart disease Mother     Cancer Mother         lung    Heart disease Father     Cancer Father         lymphoma    Diabetes Neg Hx          Review of Systems  Review of Systems   Constitutional: Negative for  chills, diaphoresis, fever, malaise/fatigue and weight loss.        Weight loss   HENT: Negative for congestion.    Eyes: Negative for pain.   Respiratory: Positive for shortness of breath (ELLISON). Negative for cough, hemoptysis, sputum production, wheezing and stridor.    Cardiovascular: Negative for chest pain, palpitations, orthopnea, claudication, leg swelling and PND.   Gastrointestinal: Negative for abdominal pain, constipation, diarrhea, heartburn, nausea and vomiting.   Genitourinary: Negative for dysuria, frequency and urgency.   Musculoskeletal: Negative for falls and myalgias.   Neurological: Negative for sensory change, focal weakness and weakness.   Endo/Heme/Allergies:        + over active thyroid   Psychiatric/Behavioral: Negative for depression, substance abuse and suicidal ideas. The patient is not nervous/anxious.        Physical Exam    Vitals:    04/20/23 1005 04/20/23 1039   BP: 108/62    BP Location: Right arm    Patient Position: Sitting    BP Method: Medium (Manual)    Pulse: (!) 45    SpO2: (!) 82% 95%   Weight: 43.8 kg (96 lb 8 oz)        Physical Exam  Vitals and nursing note reviewed.   Constitutional:       General: She is not in acute distress.     Appearance: She is well-developed. She is not ill-appearing, toxic-appearing or diaphoretic.      Comments: Thin female   HENT:      Head: Normocephalic and atraumatic.      Right Ear: External ear normal.      Left Ear: External ear normal.      Nose: Nose normal.   Eyes:      General: No scleral icterus.        Right eye: No discharge.         Left eye: No discharge.      Extraocular Movements: Extraocular movements intact.      Conjunctiva/sclera: Conjunctivae normal.      Pupils: Pupils are equal, round, and reactive to light.   Neck:      Thyroid: No thyromegaly.      Vascular: No JVD.      Trachea: No tracheal deviation.   Cardiovascular:      Rate and Rhythm: Normal rate and regular rhythm.      Heart sounds: Normal heart sounds. No  murmur heard.    No friction rub. No gallop.      Comments: + bilateral carotid bruits (known issue)  Pulmonary:      Effort: Pulmonary effort is normal. No respiratory distress.      Breath sounds: No stridor. No wheezing, rhonchi or rales.      Comments: Decreased BS  No acc m use  Chest:      Chest wall: No tenderness.   Abdominal:      General: Bowel sounds are normal. There is no distension.      Palpations: Abdomen is soft.      Tenderness: There is no abdominal tenderness. There is no guarding.   Musculoskeletal:         General: No tenderness. Normal range of motion.      Cervical back: Normal range of motion and neck supple.      Right lower leg: No edema.      Left lower leg: No edema.   Lymphadenopathy:      Cervical: No cervical adenopathy.   Skin:     General: Skin is warm and dry.   Neurological:      General: No focal deficit present.      Mental Status: She is alert and oriented to person, place, and time. Mental status is at baseline.      Cranial Nerves: No cranial nerve deficit.      Motor: No weakness.      Gait: Gait normal.   Psychiatric:         Mood and Affect: Mood normal.         Behavior: Behavior normal.         Thought Content: Thought content normal.         Judgment: Judgment normal.         Labs    Lab Results   Component Value Date    WBC 5.24 04/20/2023    HGB 10.9 (L) 04/20/2023    HCT 32.7 (L) 04/20/2023     04/20/2023       Sodium   Date Value Ref Range Status   04/13/2023 132 (L) 136 - 145 mmol/L Final     Potassium   Date Value Ref Range Status   04/13/2023 5.0 3.5 - 5.1 mmol/L Final     Chloride   Date Value Ref Range Status   04/13/2023 99 95 - 110 mmol/L Final     CO2   Date Value Ref Range Status   04/13/2023 27 23 - 29 mmol/L Final     Glucose   Date Value Ref Range Status   04/13/2023 87 70 - 110 mg/dL Final     BUN   Date Value Ref Range Status   04/13/2023 14 8 - 23 mg/dL Final     Creatinine   Date Value Ref Range Status   04/13/2023 0.4 (L) 0.5 - 1.4 mg/dL  Final     Calcium   Date Value Ref Range Status   04/13/2023 9.6 8.7 - 10.5 mg/dL Final     Total Protein   Date Value Ref Range Status   04/13/2023 7.6 6.0 - 8.4 g/dL Final     Albumin   Date Value Ref Range Status   04/13/2023 4.2 3.5 - 5.2 g/dL Final     Total Bilirubin   Date Value Ref Range Status   04/13/2023 0.2 0.1 - 1.0 mg/dL Final     Comment:     For infants and newborns, interpretation of results should be based  on gestational age, weight and in agreement with clinical  observations.    Premature Infant recommended reference ranges:  Up to 24 hours.............<8.0 mg/dL  Up to 48 hours............<12.0 mg/dL  3-5 days..................<15.0 mg/dL  6-29 days.................<15.0 mg/dL       Alkaline Phosphatase   Date Value Ref Range Status   04/13/2023 65 55 - 135 U/L Final     AST   Date Value Ref Range Status   04/13/2023 18 10 - 40 U/L Final     ALT   Date Value Ref Range Status   04/13/2023 15 10 - 44 U/L Final     Anion Gap   Date Value Ref Range Status   04/13/2023 6 (L) 8 - 16 mmol/L Final       Xrays    CT chest (1/27/23)  1. Bilateral noncalcified pulmonary nodules, demonstrating mixed response to therapy as described.  2. No new suspicious pulmonary nodules, or other evidence of metastatic disease in the chest    CT chest (4/18/2023)  1. Multiple noncalcified pulmonary nodules in both lungs as described, including enlarging right middle lobe and right lower lobe nodules suggesting worsening pulmonary metastatic disease.  2. Otherwise no additional findings of active metastatic disease in the chest, abdomen, or the pelvis.  3. 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  4. Additional observations as described.      Impression/Plan    Problem List Items Addressed This Visit          Pulmonary    Chronic obstructive pulmonary disease     Continue present medications.  Will refill medications as needed.  Instructed patient to contact us with any issues concerning their medications (cost,  reactions, etc.).  Have discussed with patient about inciting conditions which may exacerbate their disease.  We did discuss possible new therapies or de-escalation of therapy (if appropriate).  Asked patient if they were interested in pursuing pulmonary rehabilitation.  All questions answered  RTC 3 months  Patient instructed that they are to call if symptoms change or new issues develop prior to their next visit.           Pulmonary nodules/lesions, multiple     As above            Oncology    Small cell lung cancer     Aware          Adenocarcinoma Lung Cancer RLL     Aware             Other    Tobacco use     Has stopped and is using nicotine patch                        Jonathan Jeffries MD

## 2023-04-24 NOTE — PLAN OF CARE
Problem: Fatigue  Goal: Improved Activity Tolerance  4/24/2023 1129 by Donna Manzanares RN  Outcome: Met  4/24/2023 1129 by Donna Manzanares RN  Outcome: Ongoing, Progressing

## 2023-04-25 NOTE — PROGRESS NOTES
Met with patient to update her NCCN Distress Screening; she indicated a rating of 0.  Patient denied needing any psychosocial support at this time.

## 2023-04-27 NOTE — TELEPHONE ENCOUNTER
----- Message from Gisel Alvarado sent at 4/27/2023 10:46 AM CDT -----  The patient requested a prescription for Hydrocodone 5mg #40. # [phone]

## 2023-05-04 NOTE — TELEPHONE ENCOUNTER
Critical labs  WBC 1.54    Per Jonn Thomas x 3 ordered. Pt was notified and verbalized understanding.

## 2023-05-04 NOTE — PLAN OF CARE
Problem: Infection  Goal: Absence of Infection Signs and Symptoms  5/4/2023 1430 by Donna Manzanares RN  Outcome: Met  5/4/2023 1430 by Donna Manzanares RN  Outcome: Ongoing, Progressing

## 2023-05-05 NOTE — PLAN OF CARE
Problem: Fatigue  Goal: Improved Activity Tolerance  Intervention: Promote Improved Energy  Flowsheets (Taken 5/5/2023 7789)  Fatigue Management: fatigue-related activity identified  Activity Management: Ambulated -L4

## 2023-05-05 NOTE — PLAN OF CARE
Problem: Fatigue  Goal: Improved Activity Tolerance  Intervention: Promote Improved Energy  Flowsheets (Taken 5/5/2023 7910)  Fatigue Management: fatigue-related activity identified  Activity Management: Ambulated -L4

## 2023-05-09 NOTE — PROGRESS NOTES
PROGRESS NOTE    Subjective:       Patient ID: Anuja Cool is a 72 y.o. female.    2/25/2022:  CT lung Screen:  3.4cm FRANCIS mass and 2.0cm RLL    3/11/2022:  PET scan:  3.9cm hypermetabolic mass in FRANCIS  2.3cm hypermetabolic mass in RLL    3/31/2022:  FRANCIS CT biopsy:  Small Cell carcinoma    Carbo/Etop:  Cycle 1: 4/27/2022  Cycle 2: 5/25/2022  Cycle 3: 6/15/2022  Cycle 4: 7/6/2022    5/3/2022:  RLL CT Biopsy:  Adenocarcinoma    5/15/2022-5/22/2022:  SBRT to Right LL Adenocarcinoma    7/11/2022:  xrt to complete to left lung    8/1/2022:  PET:  1. Favorable interval response to therapy, with decreased size and FDG uptake of noncalcified pulmonary nodules in both lungs.  2. No findings of regional metastatic disease in the chest, or distant metastatic disease.    11/18/2022:  Chest CT:  1.  Masses in the anterior left upper lobe and right lower lobe are unchanged from previous PET/CT.  2.  There is interval development of multiple new bilateral pulmonary nodules as described, suspicious for metastatic disease.  3.  Additional incidental observations as described.    ---IR not able to biopsy above new lesions, appt 12/28/2022 with Dr. Nunes for EBUS consideration.   Patient seen 12/28/2022 by Dr. Nunes.  Patient refused biopsy.     Topotecan:  Cycle 1: 1/30/2023  Cycle 2: 2/20/2023  Cycle 3: 3/13/2023  Cycle 4: 4/3/2023    4/18/2023- CT scan shows progression of right upper and lower lung nodules.    Plan:   Lurbinectadin [id number]  Cycle 1: 4/24/2023  Cycle 2: 5/15/2023- Due    Chief Complaint:  synchronous primary lung cancer-SCLC and Adenocarcinoma Follow up    History of Present Illness:   Anuja Cool is a 72 y.o. female who presents for follow up of above.     Ms. Cool is doing well today.  Still tolerating the therapy well.  No new complaints. Dr. Mervin changed her inhaler and the SOB is getting better.    Patients has completed cycle 1  Lurbinectadin. Will get CT chest after cycle 2 to make sure there is no progression of disease.    Family and Social history reviewed and is unchanged from 4/14/2022      ROS:  Review of Systems   Constitutional:  Positive for fatigue. Negative for fever.   Respiratory:  Negative for shortness of breath.    Cardiovascular:  Negative for chest pain and leg swelling.   Gastrointestinal:  Negative for abdominal pain and blood in stool.   Genitourinary:  Negative for hematuria.   Skin:  Negative for rash.        Current Outpatient Medications:     albuterol (PROVENTIL/VENTOLIN HFA) 90 mcg/actuation inhaler, Inhale 2 puffs into the lungs every 6 (six) hours as needed. Rescue, Disp: 18 g, Rfl: 5    alendronate (FOSAMAX) 70 MG tablet, Take 1 tablet by mouth once a week, Disp: 12 tablet, Rfl: 3    apixaban (ELIQUIS) 2.5 mg Tab, Take 1 tablet (2.5 mg total) by mouth 2 (two) times daily., Disp: 60 tablet, Rfl: 5    cholecalciferol, vitamin D3, 3,000 unit Tab, Take by mouth., Disp: , Rfl:     fluticasone-umeclidin-vilanter (TRELEGY ELLIPTA) 200-62.5-25 mcg inhaler, Inhale 1 puff into the lungs once daily., Disp: 90 each, Rfl: 3    ibuprofen (ADVIL,MOTRIN) 200 MG tablet, Take 200 mg by mouth every 6 (six) hours as needed for Pain., Disp: , Rfl:     levalbuterol (XOPENEX HFA) 45 mcg/actuation inhaler, INHALE 1 TO 2 PUFFS INTO LUNGS EVERY 4 HOURS AS NEEDED FOR WHEEZING AND FOR SHORTNESS OF BREATH, Disp: 45 g, Rfl: 3    methIMAzole (TAPAZOLE) 5 MG Tab, TAKE 1 TABLET BY MOUTH ONCE DAILY ON  MONDAY  THROUGH  FRIDAY  AND  TWO  TABLETS  ON  SATURDAY  AND  SUNDAY, Disp: 90 tablet, Rfl: 3    multivit with minerals/lutein (MULTIVITAMIN 50 PLUS ORAL), Take by mouth., Disp: , Rfl:     nicotine (NICODERM CQ) 14 mg/24 hr, Place 1 patch onto the skin every 24 hours., Disp: , Rfl:     ondansetron (ZOFRAN) 8 MG tablet, Take 1 tablet (8 mg total) by mouth every 8 (eight) hours as needed., Disp: 30 tablet, Rfl: 5    promethazine (PHENERGAN) 25 MG  "tablet, Take 1 tablet (25 mg total) by mouth every 4 to 6 hours as needed., Disp: 30 tablet, Rfl: 5    rosuvastatin (CRESTOR) 20 MG tablet, Take 1 tablet by mouth once daily, Disp: 90 tablet, Rfl: 3    azelastine (ASTELIN) 137 mcg (0.1 %) nasal spray, 1 spray (137 mcg total) by Nasal route 2 (two) times daily. (Patient not taking: Reported on 4/20/2023), Disp: 30 mL, Rfl: 0    HYDROcodone-acetaminophen (NORCO) 5-325 mg per tablet, Take 1 tablet by mouth every 6 (six) hours as needed for Pain., Disp: 40 tablet, Rfl: 0    levocetirizine (XYZAL) 5 MG tablet, Take 1 tablet (5 mg total) by mouth every evening., Disp: 30 tablet, Rfl: 2        Objective:       Physical Examination:     /61   Pulse 92   Temp 98.8 °F (37.1 °C)   Resp 18   Ht 5' 2" (1.575 m)   Wt 44.6 kg (98 lb 4.8 oz)   BMI 17.98 kg/m²     Physical Exam  Constitutional:       Appearance: Normal appearance. She is well-developed.   HENT:      Head: Normocephalic and atraumatic.      Right Ear: External ear normal.      Left Ear: External ear normal.      Nose: Nose normal.      Mouth/Throat:      Mouth: Mucous membranes are moist.   Eyes:      Conjunctiva/sclera: Conjunctivae normal.      Pupils: Pupils are equal, round, and reactive to light.   Neck:      Thyroid: No thyromegaly.      Trachea: No tracheal deviation.   Cardiovascular:      Rate and Rhythm: Normal rate and regular rhythm.      Heart sounds: Normal heart sounds.   Pulmonary:      Effort: Pulmonary effort is normal.      Breath sounds: Normal breath sounds.   Abdominal:      General: Bowel sounds are normal. There is no distension.      Palpations: Abdomen is soft. There is no mass.      Tenderness: There is no abdominal tenderness.   Musculoskeletal:      Right lower leg: No edema.      Left lower leg: No edema.   Skin:     Findings: No rash.   Neurological:      General: No focal deficit present.      Mental Status: She is alert and oriented to person, place, and time.      " Comments: Neuro intact througout   Psychiatric:         Mood and Affect: Mood normal.         Behavior: Behavior normal.         Thought Content: Thought content normal.         Judgment: Judgment normal.       Labs:   Recent Results (from the past 336 hour(s))   CBC Auto Differential    Collection Time: 05/04/23  8:29 AM   Result Value Ref Range    WBC 1.54 (LL) 3.90 - 12.70 K/uL    Hemoglobin 10.1 (L) 12.0 - 16.0 g/dL    Hematocrit 30.2 (L) 37.0 - 48.5 %    Platelets 158 150 - 450 K/uL   CBC Auto Differential    Collection Time: 04/27/23  1:51 PM   Result Value Ref Range    WBC 4.94 3.90 - 12.70 K/uL    Hemoglobin 10.0 (L) 12.0 - 16.0 g/dL    Hematocrit 30.2 (L) 37.0 - 48.5 %    Platelets 402 150 - 450 K/uL       CMP  Sodium   Date Value Ref Range Status   05/04/2023 133 (L) 136 - 145 mmol/L Final     Potassium   Date Value Ref Range Status   05/04/2023 4.8 3.5 - 5.1 mmol/L Final     Chloride   Date Value Ref Range Status   05/04/2023 98 95 - 110 mmol/L Final     CO2   Date Value Ref Range Status   05/04/2023 26 23 - 29 mmol/L Final     Glucose   Date Value Ref Range Status   05/04/2023 97 70 - 110 mg/dL Final     BUN   Date Value Ref Range Status   05/04/2023 7 (L) 8 - 23 mg/dL Final     Creatinine   Date Value Ref Range Status   05/04/2023 0.5 0.5 - 1.4 mg/dL Final     Calcium   Date Value Ref Range Status   05/04/2023 9.3 8.7 - 10.5 mg/dL Final     Total Protein   Date Value Ref Range Status   05/04/2023 7.4 6.0 - 8.4 g/dL Final     Albumin   Date Value Ref Range Status   05/04/2023 4.1 3.5 - 5.2 g/dL Final     Total Bilirubin   Date Value Ref Range Status   05/04/2023 0.3 0.1 - 1.0 mg/dL Final     Comment:     For infants and newborns, interpretation of results should be based  on gestational age, weight and in agreement with clinical  observations.    Premature Infant recommended reference ranges:  Up to 24 hours.............<8.0 mg/dL  Up to 48 hours............<12.0 mg/dL  3-5 days..................<15.0  mg/dL  6-29 days.................<15.0 mg/dL       Alkaline Phosphatase   Date Value Ref Range Status   05/04/2023 56 55 - 135 U/L Final     AST   Date Value Ref Range Status   05/04/2023 26 10 - 40 U/L Final     ALT   Date Value Ref Range Status   05/04/2023 18 10 - 44 U/L Final     Anion Gap   Date Value Ref Range Status   05/04/2023 9 8 - 16 mmol/L Final     eGFR if    Date Value Ref Range Status   07/25/2022 >60.0 >60 mL/min/1.73 m^2 Final     eGFR if non    Date Value Ref Range Status   07/25/2022 >60.0 >60 mL/min/1.73 m^2 Final     Comment:     Calculation used to obtain the estimated glomerular filtration  rate (eGFR) is the CKD-EPI equation.        No results found for: CEA  No results found for: PSA        Assessment/Plan:     Problem List Items Addressed This Visit          Pulmonary    Chronic obstructive pulmonary disease       Oncology    Small cell lung cancer    Adenocarcinoma Lung Cancer RLL - Primary    Relevant Orders    CT Chest With Contrast    Cancer associated pain     Other Visit Diagnoses       Non-small cell lung cancer, unspecified laterality        Relevant Orders    CT Chest With Contrast    Post-nasal drip                    Lung Cancer- continue Cycle 2  Lurbinectadin. Caris test insufficient tissue; CT after Cycle 2  COPD- Follow up with Pulm as scheduled   Productive cough- Mucinex  PND- Zyrtec daily PRN  5.   Cancer related pain- Refill Norco PRN    Discussion:     Follow up in about 3 weeks (around 5/31/2023) for with Dr. Tinoco.      Electronically signed by Martha Granado, MSN, APRN, AGNP-C, OCN

## 2023-05-15 NOTE — PLAN OF CARE
Problem: Fatigue  Goal: Improved Activity Tolerance  Outcome: Ongoing, Progressing  Intervention: Promote Improved Energy  Flowsheets (Taken 5/15/2023 1054)  Fatigue Management:   fatigue-related activity identified   frequent rest breaks encouraged   paced activity encouraged  Sleep/Rest Enhancement:   regular sleep/rest pattern promoted   relaxation techniques promoted

## 2023-05-18 NOTE — TELEPHONE ENCOUNTER
Patient notified CT scan stable continue current follow up and treatment plan. Verbalized understanding.

## 2023-05-25 NOTE — TELEPHONE ENCOUNTER
----- Message from SCOOTER Aggarwal sent at 5/25/2023 10:09 AM CDT -----  Please set her up for Neupogen daily for 3 days

## 2023-05-25 NOTE — TELEPHONE ENCOUNTER
WBC 1.56. Per Martha, orders placed for Neupogen x 3. Pt was notified and verbalized understanding.

## 2023-05-25 NOTE — TELEPHONE ENCOUNTER
----- Message from Gisel Alvarado sent at 5/25/2023  1:15 PM CDT -----  The patient wants a prescription for hydrocodone 5mg #40. # [phone]

## 2023-05-25 NOTE — TELEPHONE ENCOUNTER
Spoke to pt and notified her that her K level is high at 5.7. Per Martha, have labs re-drawn STAT. Orders are in. Pt verbalized understanding.

## 2023-05-25 NOTE — TELEPHONE ENCOUNTER
----- Message from SCOOTER Aggarwal sent at 5/25/2023 11:22 AM CDT -----  Please notify the patient that their potassium lab result is abnormal. Please have her repeat the potassium STAT

## 2023-05-26 NOTE — PROGRESS NOTES
Office Visit Note *    Patient Name: Anuja Cool  MRN: [id number]  : 1950      Reason for visit: COPD    HPI:     2022 - Here to establish care,  Diagnosed with COPD (severity unknown).  Recently changed to TRELEGY and feels that she is doing better on that.  Currently smoking about 3/4 PPD  (1-1.5 PPD x 50 years).  Also carries diagnosed of asthma, has h/o allergies (+ skin test, never desensitized).  HAs never had a screening CT chest and does not remember her last CXR.  ROS as below.  We discussed cigarette cessation at length.    3/17/2022 - Here for review.  CT scan showed subpleural masses and PET scan shows activity in those areas.  Reviewed images with pt and we will plan t proceed with CT guided biopsy (left lesion first).  No evidence of other + areas.  PFT c/w   GOLD II A (FEV1 - 52%, DLCO 23 %).  CT scan does show fairly extensive emphysema changes.    2022 - Here for review of biopsy results - + small cell cancer.  We discussed moving forward with MRI brain and referral to Radiation Oncology and Oncology.  All questions have been answered.    2022 - Here for follow up, has had first chemotherapy (carboplatin and etoposide) and she tolerated well, WBC is down some (being addressed).  To see radiation therapy today.  No new symptoms. Did have biopsy of right nodule which was + for adenocarcinoma.    2022 - Here for follow up, recent PET scan reviewed (see below) and doing well.  She is to see Dr Tinoco next month.  No other new issues.  We reviewed the PET scan images and compared the 2.  Reports that she tolerated the chemo well.    2022 - Here for follow up, chemo and XRT are done and she is getting followup studies.  She has had some back pain (MRI of spine is OK - no mets).  No respiratory issues.  Had synchronous adeno and small cell    2023 - Here for follow up, has completed first cycle of chemo (TOPOTECAN) and tolerated well, most recent CT reviewed (see  below).  No new respiratory issues.  We were not able to get a pulse ox reading in the office today.  Had pulmonary stress test at [facility] lowest sat 91%, did have some hypertension and had decreased exercise capacity.  Pt is currently stable on present medications with no recent increases in their symptoms or use of rescue medications.  Since our last visit there have been no hospitalizations or ER visits for their respiratory issues and there does not seem to be anything to suggest unrecognized exacerbations.  I have reviewed the medical regimen and re-educated the pt on the role of rescue and controlling medications.  Inhaler technique and understanding seems adequate.  The patient reports no issues with any of there medications for their COPD.  Refills will be taken care of as needed.  All questions answered.  She has had flu vaccine but has not had the bivalent Covid vaccine.    4/20/2023 - Here for follow up, she continues with treatment to start new chemo (lurbinectadin) next week because of progressive disease.  They are looking into whether or not the increased nodules could be biopsied.  Pt is currently stable on present medications with no recent increases in their symptoms or use of rescue medications.  Since our last visit there have been no hospitalizations or ER visits for their respiratory issues and there does not seem to be anything to suggest unrecognized exacerbations.  I have reviewed the medical regimen and re-educated the pt on the role of rescue and controlling medications.  Inhaler technique and understanding seems adequate.  The patient reports no issues with any of there medications for their COPD.  Refills will be taken care of as needed.  All questions answered.  She does feel that her xopenex inhaler is not helping as well and we will try Albuterol.  We had trouble getting pulse ox reading - repeated and got 95-96% on RA with good signal.    5/26/2023 - Here for follow up, doing well  and is tolerating VENTOLIN.  Has been on her new chemo and had repeat CT chest which is stable.  No new complaints or issues.  She feels that her breathing is better with the VENTOLIN.        Past Medical History    Past Medical History:   Diagnosis Date    Allergy     Arthritis     Asthma     Bursitis     COPD (chronic obstructive pulmonary disease)     Hypertension     Low sodium     Lung nodules 03/2022    Small cell carcinoma of left lung 03/31/2022    Thyroid disease     nodules    Tinea pedis        Past Surgical History    Past Surgical History:   Procedure Laterality Date    COLONOSCOPY N/A 05/08/2018    Procedure: COLONOSCOPY;  Surgeon: Grey Roberts MD;  Location: [facility] ENDO;  Service: Endoscopy;  Laterality: N/A;    CT BIOPSY LUNG W/ GUIDANCE Left 03/31/2022    HYSTERECTOMY      INSERTION OF TUNNELED CENTRAL VENOUS CATHETER (CVC) WITH SUBCUTANEOUS PORT N/A 4/26/2022    Procedure: [id number];  Surgeon: Noel Sadler MD;  Location: [facility] OR;  Service: General;  Laterality: N/A;    OOPHORECTOMY         Medications      Current Outpatient Medications:     albuterol (PROVENTIL/VENTOLIN HFA) 90 mcg/actuation inhaler, Inhale 2 puffs into the lungs every 6 (six) hours as needed. Rescue, Disp: 18 g, Rfl: 5    alendronate (FOSAMAX) 70 MG tablet, Take 1 tablet by mouth once a week, Disp: 12 tablet, Rfl: 3    apixaban (ELIQUIS) 2.5 mg Tab, Take 1 tablet (2.5 mg total) by mouth 2 (two) times daily., Disp: 60 tablet, Rfl: 5    cholecalciferol, vitamin D3, 3,000 unit Tab, Take by mouth., Disp: , Rfl:     fluticasone-umeclidin-vilanter (TRELEGY ELLIPTA) 200-62.5-25 mcg inhaler, Inhale 1 puff into the lungs once daily., Disp: 90 each, Rfl: 3    HYDROcodone-acetaminophen (NORCO) 5-325 mg per tablet, Take 1 tablet by mouth every 6 (six) hours as needed for Pain., Disp: 40 tablet, Rfl: 0    ibuprofen (ADVIL,MOTRIN) 200 MG tablet, Take 200 mg by mouth every 6 (six) hours as needed for Pain., Disp: , Rfl:      levalbuterol (XOPENEX HFA) 45 mcg/actuation inhaler, INHALE 1 TO 2 PUFFS INTO LUNGS EVERY 4 HOURS AS NEEDED FOR WHEEZING AND FOR SHORTNESS OF BREATH, Disp: 45 g, Rfl: 3    methIMAzole (TAPAZOLE) 5 MG Tab, TAKE 1 TABLET BY MOUTH ONCE DAILY ON  MONDAY  THROUGH  FRIDAY  AND  TWO  TABLETS  ON  SATURDAY  AND  , Disp: 90 tablet, Rfl: 3    multivit with minerals/lutein (MULTIVITAMIN 50 PLUS ORAL), Take by mouth., Disp: , Rfl:     nicotine (NICODERM CQ) 14 mg/24 hr, Place 1 patch onto the skin every 24 hours., Disp: , Rfl:     ondansetron (ZOFRAN) 8 MG tablet, Take 1 tablet (8 mg total) by mouth every 8 (eight) hours as needed., Disp: 30 tablet, Rfl: 5    promethazine (PHENERGAN) 25 MG tablet, Take 1 tablet (25 mg total) by mouth every 4 to 6 hours as needed., Disp: 30 tablet, Rfl: 5    rosuvastatin (CRESTOR) 20 MG tablet, Take 1 tablet by mouth once daily, Disp: 90 tablet, Rfl: 3    azelastine (ASTELIN) 137 mcg (0.1 %) nasal spray, 1 spray (137 mcg total) by Nasal route 2 (two) times daily. (Patient not taking: Reported on 2023), Disp: 30 mL, Rfl: 0    levocetirizine (XYZAL) 5 MG tablet, Take 1 tablet (5 mg total) by mouth every evening., Disp: 30 tablet, Rfl: 2  No current facility-administered medications for this visit.    Allergies    Review of patient's allergies indicates:   Allergen Reactions    Tinactin [tolnaftate] Dermatitis    Advair diskus [fluticasone propion-salmeterol]      shakes    Albuterol      tremors    Sulfa (sulfonamide antibiotics)      Unknown    Years ago       SocHx    Social History     Tobacco Use   Smoking Status Former    Packs/day: 0.50    Types: Cigarettes    Quit date: 4/3/2022    Years since quittin.1   Smokeless Tobacco Never       Social History     Substance and Sexual Activity   Alcohol Use Yes    Alcohol/week: 4.0 standard drinks    Types: 4 Glasses of wine per week    Comment: occ. glass of wine       Drug Use - no  Occupation - retired, customer service  Asbestos  exposure - no  Pets - no    FMHx    Family History   Problem Relation Age of Onset    Heart disease Mother     Cancer Mother         lung    Heart disease Father     Cancer Father         lymphoma    Diabetes Neg Hx          Review of Systems  Review of Systems   Constitutional: Negative for chills, diaphoresis, fever, malaise/fatigue and weight loss.        Weight loss   HENT: Negative for congestion.    Eyes: Negative for pain.   Respiratory: Positive for shortness of breath (ELLISON). Negative for cough, hemoptysis, sputum production, wheezing and stridor.    Cardiovascular: Negative for chest pain, palpitations, orthopnea, claudication, leg swelling and PND.   Gastrointestinal: Negative for abdominal pain, constipation, diarrhea, heartburn, nausea and vomiting.   Genitourinary: Negative for dysuria, frequency and urgency.   Musculoskeletal: Negative for falls and myalgias.   Neurological: Negative for sensory change, focal weakness and weakness.   Endo/Heme/Allergies:        + over active thyroid   Psychiatric/Behavioral: Negative for depression, substance abuse and suicidal ideas. The patient is not nervous/anxious.        Physical Exam    Vitals:    05/26/23 0950   BP: 110/70   BP Location: Left arm   Patient Position: Sitting   BP Method: Medium (Manual)   Weight: 44 kg (96 lb 14.4 oz)       Physical Exam  Vitals and nursing note reviewed.   Constitutional:       General: She is not in acute distress.     Appearance: She is well-developed. She is not ill-appearing, toxic-appearing or diaphoretic.      Comments: Thin female   HENT:      Head: Normocephalic and atraumatic.      Right Ear: External ear normal.      Left Ear: External ear normal.      Nose: Nose normal.   Eyes:      General: No scleral icterus.        Right eye: No discharge.         Left eye: No discharge.      Extraocular Movements: Extraocular movements intact.      Conjunctiva/sclera: Conjunctivae normal.      Pupils: Pupils are equal, round, and  reactive to light.   Neck:      Thyroid: No thyromegaly.      Vascular: No JVD.      Trachea: No tracheal deviation.   Cardiovascular:      Rate and Rhythm: Normal rate and regular rhythm.      Heart sounds: Normal heart sounds. No murmur heard.    No friction rub. No gallop.      Comments: + bilateral carotid bruits (known issue)  Pulmonary:      Effort: Pulmonary effort is normal. No respiratory distress.      Breath sounds: No stridor. No wheezing, rhonchi or rales.      Comments: Decreased BS  No acc m use  Chest:      Chest wall: No tenderness.   Abdominal:      General: Bowel sounds are normal. There is no distension.      Palpations: Abdomen is soft.      Tenderness: There is no abdominal tenderness. There is no guarding.   Musculoskeletal:         General: No tenderness. Normal range of motion.      Cervical back: Normal range of motion and neck supple.      Right lower leg: No edema.      Left lower leg: No edema.   Lymphadenopathy:      Cervical: No cervical adenopathy.   Skin:     General: Skin is warm and dry.   Neurological:      General: No focal deficit present.      Mental Status: She is alert and oriented to person, place, and time. Mental status is at baseline.      Cranial Nerves: No cranial nerve deficit.      Motor: No weakness.      Gait: Gait normal.   Psychiatric:         Mood and Affect: Mood normal.         Behavior: Behavior normal.         Thought Content: Thought content normal.         Judgment: Judgment normal.         Labs    Lab Results   Component Value Date    WBC 1.56 (LL) 05/25/2023    HGB 10.6 (L) 05/25/2023    HCT 31.3 (L) 05/25/2023     05/25/2023       Sodium   Date Value Ref Range Status   05/25/2023 133 (L) 136 - 145 mmol/L Final     Potassium   Date Value Ref Range Status   05/25/2023 4.9 3.5 - 5.1 mmol/L Final     Chloride   Date Value Ref Range Status   05/25/2023 96 95 - 110 mmol/L Final     CO2   Date Value Ref Range Status   05/25/2023 28 23 - 29 mmol/L Final      Glucose   Date Value Ref Range Status   05/25/2023 104 70 - 110 mg/dL Final     BUN   Date Value Ref Range Status   05/25/2023 11 8 - 23 mg/dL Final     Creatinine   Date Value Ref Range Status   05/25/2023 0.5 0.5 - 1.4 mg/dL Final     Calcium   Date Value Ref Range Status   05/25/2023 9.7 8.7 - 10.5 mg/dL Final     Total Protein   Date Value Ref Range Status   05/25/2023 7.2 6.0 - 8.4 g/dL Final     Albumin   Date Value Ref Range Status   05/25/2023 4.1 3.5 - 5.2 g/dL Final     Total Bilirubin   Date Value Ref Range Status   05/25/2023 0.1 0.1 - 1.0 mg/dL Final     Comment:     For infants and newborns, interpretation of results should be based  on gestational age, weight and in agreement with clinical  observations.    Premature Infant recommended reference ranges:  Up to 24 hours.............<8.0 mg/dL  Up to 48 hours............<12.0 mg/dL  3-5 days..................<15.0 mg/dL  6-29 days.................<15.0 mg/dL       Alkaline Phosphatase   Date Value Ref Range Status   05/25/2023 62 55 - 135 U/L Final     AST   Date Value Ref Range Status   05/25/2023 22 10 - 40 U/L Final     ALT   Date Value Ref Range Status   05/25/2023 18 10 - 44 U/L Final     Anion Gap   Date Value Ref Range Status   05/25/2023 9 8 - 16 mmol/L Final       Xrays    CT chest (1/27/23)  1. Bilateral noncalcified pulmonary nodules, demonstrating mixed response to therapy as described.  2. No new suspicious pulmonary nodules, or other evidence of metastatic disease in the chest    CT chest (4/18/2023)  1. Multiple noncalcified pulmonary nodules in both lungs as described, including enlarging right middle lobe and right lower lobe nodules suggesting worsening pulmonary metastatic disease.  2. Otherwise no additional findings of active metastatic disease in the chest, abdomen, or the pelvis.  3. 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  4. Additional observations as described.      Impression/Plan    Problem List Items Addressed  This Visit          Pulmonary    Chronic obstructive pulmonary disease     Continue present medications.  Will refill medications as needed.  Instructed patient to contact us with any issues concerning their medications (cost, reactions, etc.).  Have discussed with patient about inciting conditions which may exacerbate their disease.  We did discuss possible new therapies or de-escalation of therapy (if appropriate).  Asked patient if they were interested in pursuing pulmonary rehabilitation.  All questions answered  RTC 3 months  Patient instructed that they are to call if symptoms change or new issues develop prior to their next visit.              Oncology    Small Cell Lung Cancer FRANCIS     Continue with treatment         Adenocarcinoma Lung Cancer RLL     Aware                         Jonathan Jeffries MD

## 2023-05-31 NOTE — ASSESSMENT & PLAN NOTE
Patient is doing well on the LURB and her CT scan shows stability of the nodules at this time.  Will continue to monitor closely with scans approx every 3 cycles and discussed this today.

## 2023-05-31 NOTE — PROGRESS NOTES
PROGRESS NOTE    Subjective:       Patient ID: Anuja Cool is a 72 y.o. female.    2/25/2022:  CT lung Screen:  3.4cm FRANCIS mass and 2.0cm RLL    3/11/2022:  PET scan:  3.9cm hypermetabolic mass in FRANCIS  2.3cm hypermetabolic mass in RLL    3/31/2022:  FRANCIS CT biopsy:  Small Cell carcinoma    Carbo/Etop:  Cycle 1: 4/27/2022  Cycle 2: 5/25/2022  Cycle 3: 6/15/2022  Cycle 4: 7/6/2022    5/3/2022:  RLL CT Biopsy:  Adenocarcinoma    5/15/2022-5/22/2022:  SBRT to Right LL Adenocarcinoma    7/11/2022:  xrt to complete to left lung    8/1/2022:  PET:  1. Favorable interval response to therapy, with decreased size and FDG uptake of noncalcified pulmonary nodules in both lungs.  2. No findings of regional metastatic disease in the chest, or distant metastatic disease.    11/18/2022:  Chest CT:  1.  Masses in the anterior left upper lobe and right lower lobe are unchanged from previous PET/CT.  2.  There is interval development of multiple new bilateral pulmonary nodules as described, suspicious for metastatic disease.  3.  Additional incidental observations as described.    ---IR not able to biopsy above new lesions, appt 12/28/2022 with Dr. Nunes for EBUS consideration.   Patient seen 12/28/2022 by Dr. Nunes.  Patient refused biopsy.     Topotecan:  Cycle 1: 1/30/2023  Cycle 2: 2/20/2023  Cycle 3: 3/13/2023    Lurbinectedin  Cycle 1: 4/24/2023  Cycle 2: 5/15/2023  Cycle 3: 6/6/2023-due    Chief Complaint:  No chief complaint on file.  synchronous primary lung cancer-SCLC and Adenocarcinoma Follow up    History of Present Illness:   Anuja Cool is a 72 y.o. female who presents for follow up of above.     Ms. Cool is doing well today.  She continues on Lurb and doing ok with this.  No new side effects or problems to report.     Family and Social history reviewed and is unchanged from 4/14/2022      ROS:  Review of Systems   Constitutional:  Negative for  fever.   Respiratory:  Negative for shortness of breath.    Cardiovascular:  Negative for chest pain and leg swelling.   Gastrointestinal:  Negative for abdominal pain and blood in stool.   Genitourinary:  Negative for hematuria.   Skin:  Negative for rash.        Current Outpatient Medications:     albuterol (PROVENTIL/VENTOLIN HFA) 90 mcg/actuation inhaler, Inhale 2 puffs into the lungs every 6 (six) hours as needed. Rescue, Disp: 18 g, Rfl: 5    alendronate (FOSAMAX) 70 MG tablet, Take 1 tablet by mouth once a week, Disp: 12 tablet, Rfl: 3    apixaban (ELIQUIS) 2.5 mg Tab, Take 1 tablet (2.5 mg total) by mouth 2 (two) times daily., Disp: 60 tablet, Rfl: 5    cholecalciferol, vitamin D3, 3,000 unit Tab, Take by mouth., Disp: , Rfl:     fluticasone-umeclidin-vilanter (TRELEGY ELLIPTA) 200-62.5-25 mcg inhaler, Inhale 1 puff into the lungs once daily., Disp: 90 each, Rfl: 3    HYDROcodone-acetaminophen (NORCO) 5-325 mg per tablet, Take 1 tablet by mouth every 6 (six) hours as needed for Pain., Disp: 40 tablet, Rfl: 0    ibuprofen (ADVIL,MOTRIN) 200 MG tablet, Take 200 mg by mouth every 6 (six) hours as needed for Pain., Disp: , Rfl:     levalbuterol (XOPENEX HFA) 45 mcg/actuation inhaler, INHALE 1 TO 2 PUFFS INTO LUNGS EVERY 4 HOURS AS NEEDED FOR WHEEZING AND FOR SHORTNESS OF BREATH, Disp: 45 g, Rfl: 3    methIMAzole (TAPAZOLE) 5 MG Tab, TAKE 1 TABLET BY MOUTH ONCE DAILY ON  MONDAY  THROUGH  FRIDAY  AND  TWO  TABLETS  ON  SATURDAY  AND  SUNDAY, Disp: 90 tablet, Rfl: 3    multivit with minerals/lutein (MULTIVITAMIN 50 PLUS ORAL), Take by mouth., Disp: , Rfl:     nicotine (NICODERM CQ) 14 mg/24 hr, Place 1 patch onto the skin every 24 hours., Disp: , Rfl:     ondansetron (ZOFRAN) 8 MG tablet, Take 1 tablet (8 mg total) by mouth every 8 (eight) hours as needed., Disp: 30 tablet, Rfl: 5    promethazine (PHENERGAN) 25 MG tablet, Take 1 tablet (25 mg total) by mouth every 4 to 6 hours as needed., Disp: 30 tablet, Rfl: 5     "rosuvastatin (CRESTOR) 20 MG tablet, Take 1 tablet by mouth once daily, Disp: 90 tablet, Rfl: 3    azelastine (ASTELIN) 137 mcg (0.1 %) nasal spray, 1 spray (137 mcg total) by Nasal route 2 (two) times daily. (Patient not taking: Reported on 4/20/2023), Disp: 30 mL, Rfl: 0    levocetirizine (XYZAL) 5 MG tablet, Take 1 tablet (5 mg total) by mouth every evening., Disp: 30 tablet, Rfl: 2        Objective:       Physical Examination:     /60   Pulse 103   Temp 98.6 °F (37 °C)   Resp 20   Ht 5' 2" (1.575 m)   Wt 44.4 kg (97 lb 14.4 oz)   BMI 17.91 kg/m²     Physical Exam  Constitutional:       Appearance: She is well-developed.   HENT:      Head: Normocephalic and atraumatic.      Right Ear: External ear normal.      Left Ear: External ear normal.   Eyes:      Conjunctiva/sclera: Conjunctivae normal.      Pupils: Pupils are equal, round, and reactive to light.   Neck:      Thyroid: No thyromegaly.      Trachea: No tracheal deviation.   Cardiovascular:      Rate and Rhythm: Normal rate and regular rhythm.      Heart sounds: Normal heart sounds.   Pulmonary:      Effort: Pulmonary effort is normal.      Breath sounds: Normal breath sounds.   Abdominal:      General: Bowel sounds are normal. There is no distension.      Palpations: Abdomen is soft. There is no mass.      Tenderness: There is no abdominal tenderness.   Skin:     Findings: No rash.   Neurological:      Comments: Neuro intact througout   Psychiatric:         Behavior: Behavior normal.         Thought Content: Thought content normal.         Judgment: Judgment normal.       Labs:   Recent Results (from the past 336 hour(s))   CBC Auto Differential    Collection Time: 05/25/23  8:16 AM   Result Value Ref Range    WBC 1.56 (LL) 3.90 - 12.70 K/uL    Hemoglobin 10.6 (L) 12.0 - 16.0 g/dL    Hematocrit 31.3 (L) 37.0 - 48.5 %    Platelets 151 150 - 450 K/uL   CBC Auto Differential    Collection Time: 05/18/23  8:37 AM   Result Value Ref Range    WBC 6.21 " 3.90 - 12.70 K/uL    Hemoglobin 10.2 (L) 12.0 - 16.0 g/dL    Hematocrit 30.1 (L) 37.0 - 48.5 %    Platelets 326 150 - 450 K/uL       CMP  Sodium   Date Value Ref Range Status   05/25/2023 133 (L) 136 - 145 mmol/L Final     Potassium   Date Value Ref Range Status   05/25/2023 4.9 3.5 - 5.1 mmol/L Final     Chloride   Date Value Ref Range Status   05/25/2023 96 95 - 110 mmol/L Final     CO2   Date Value Ref Range Status   05/25/2023 28 23 - 29 mmol/L Final     Glucose   Date Value Ref Range Status   05/25/2023 104 70 - 110 mg/dL Final     BUN   Date Value Ref Range Status   05/25/2023 11 8 - 23 mg/dL Final     Creatinine   Date Value Ref Range Status   05/25/2023 0.5 0.5 - 1.4 mg/dL Final     Calcium   Date Value Ref Range Status   05/25/2023 9.7 8.7 - 10.5 mg/dL Final     Total Protein   Date Value Ref Range Status   05/25/2023 7.2 6.0 - 8.4 g/dL Final     Albumin   Date Value Ref Range Status   05/25/2023 4.1 3.5 - 5.2 g/dL Final     Total Bilirubin   Date Value Ref Range Status   05/25/2023 0.1 0.1 - 1.0 mg/dL Final     Comment:     For infants and newborns, interpretation of results should be based  on gestational age, weight and in agreement with clinical  observations.    Premature Infant recommended reference ranges:  Up to 24 hours.............<8.0 mg/dL  Up to 48 hours............<12.0 mg/dL  3-5 days..................<15.0 mg/dL  6-29 days.................<15.0 mg/dL       Alkaline Phosphatase   Date Value Ref Range Status   05/25/2023 62 55 - 135 U/L Final     AST   Date Value Ref Range Status   05/25/2023 22 10 - 40 U/L Final     ALT   Date Value Ref Range Status   05/25/2023 18 10 - 44 U/L Final     Anion Gap   Date Value Ref Range Status   05/25/2023 9 8 - 16 mmol/L Final     eGFR if    Date Value Ref Range Status   07/25/2022 >60.0 >60 mL/min/1.73 m^2 Final     eGFR if non    Date Value Ref Range Status   07/25/2022 >60.0 >60 mL/min/1.73 m^2 Final     Comment:      Calculation used to obtain the estimated glomerular filtration  rate (eGFR) is the CKD-EPI equation.        No results found for: CEA  No results found for: PSA        Assessment/Plan:     Problem List Items Addressed This Visit       Small cell lung cancer     Patient is doing well on the LURB and her CT scan shows stability of the nodules at this time.  Will continue to monitor closely with scans approx every 3 cycles and discussed this today.           Cancer associated pain     She is doing well from this standpoint.  Continue current care approach.          Adenocarcinoma Lung Cancer RLL     Don't see any clear signs of issues from  his standpoint currently.  Continue to monitor.           Discussion:     Follow up in about 3 weeks (around 6/21/2023) for for NP visit and 6 with me.      Electronically signed by Jonathan Wagner

## 2023-06-08 NOTE — TELEPHONE ENCOUNTER
----- Message from Livia Mae sent at 6/8/2023  9:00 AM CDT -----  Pt would like refill on Norco 5 mg qty 40, please send to [facility] on [address]     [phone]

## 2023-06-15 NOTE — PLAN OF CARE
Problem: Fatigue  Goal: Improved Activity Tolerance  Intervention: Promote Improved Energy  Flowsheets (Taken 6/15/2023 1413)  Fatigue Management: fatigue-related activity identified

## 2023-06-15 NOTE — TELEPHONE ENCOUNTER
----- Message from SCOOTER Aggarwal sent at 6/15/2023 12:10 PM CDT -----  Please set her up with neupogen daily x 3 days

## 2023-06-15 NOTE — TELEPHONE ENCOUNTER
WBC low at 0.72. Per Jonn Thomas x 3 ordered. Scheduling will call once we receive authorization. Pt was notified and verbalized understanding.

## 2023-06-19 NOTE — PLAN OF CARE
Problem: Fall Injury Risk  Goal: Absence of Fall and Fall-Related Injury  Outcome: Ongoing, Progressing  Intervention: Identify and Manage Contributors  Flowsheets (Taken 6/19/2023 0758)  Self-Care Promotion: safe use of adaptive equipment encouraged  Medication Review/Management: medications reviewed  Intervention: Promote Injury-Free Environment  Flowsheets (Taken 6/19/2023 0759)  Safety Promotion/Fall Prevention: assistive device/personal item within reach

## 2023-06-20 NOTE — PROGRESS NOTES
PROGRESS NOTE    Subjective:       Patient ID: Anuja Cool is a 73 y.o. female.    2/25/2022:  CT lung Screen:  3.4cm FRANCIS mass and 2.0cm RLL    3/11/2022:  PET scan:  3.9cm hypermetabolic mass in FRANCIS  2.3cm hypermetabolic mass in RLL    3/31/2022:  FRANCIS CT biopsy:  Small Cell carcinoma    Carbo/Etop:  Cycle 1: 4/27/2022  Cycle 2: 5/25/2022  Cycle 3: 6/15/2022  Cycle 4: 7/6/2022    5/3/2022:  RLL CT Biopsy:  Adenocarcinoma    5/15/2022-5/22/2022:  SBRT to Right LL Adenocarcinoma    7/11/2022:  xrt to complete to left lung    8/1/2022:  PET:  1. Favorable interval response to therapy, with decreased size and FDG uptake of noncalcified pulmonary nodules in both lungs.  2. No findings of regional metastatic disease in the chest, or distant metastatic disease.    11/18/2022:  Chest CT:  1.  Masses in the anterior left upper lobe and right lower lobe are unchanged from previous PET/CT.  2.  There is interval development of multiple new bilateral pulmonary nodules as described, suspicious for metastatic disease.  3.  Additional incidental observations as described.    ---IR not able to biopsy above new lesions, appt 12/28/2022 with Dr. Nunes for EBUS consideration.   Patient seen 12/28/2022 by Dr. Nunes.  Patient refused biopsy.     Topotecan:  Cycle 1: 1/30/2023  Cycle 2: 2/20/2023  Cycle 3: 3/13/2023    Lurbinectedin  Cycle 1: 4/24/2023  Cycle 2: 5/15/2023  Cycle 3: 6/6/2023  Cycle 4: 6/27/2023- Due    Chief Complaint:  synchronous primary lung cancer-SCLC and Adenocarcinoma Follow up    History of Present Illness:   Anuja Cool is a 73 y.o. female who presents for follow up of above.     Ms. Cool is doing well today.  She continues on Lurb and doing ok with this.  She is having increased fatigue with this last cycle. She is having increase SOB and oxygen saturation at rest is 86%.     Family and Social history reviewed and is unchanged from  4/14/2022      ROS:  Review of Systems   Constitutional:  Positive for fatigue. Negative for diaphoresis and fever.   Respiratory:  Positive for cough and shortness of breath.    Cardiovascular:  Negative for chest pain and leg swelling.   Gastrointestinal:  Negative for abdominal pain and blood in stool.   Genitourinary:  Negative for hematuria.   Musculoskeletal:  Positive for back pain.   Skin:  Negative for rash.   Hematological:  Negative for adenopathy.   Psychiatric/Behavioral:  Negative for agitation.         Current Outpatient Medications:     albuterol (PROVENTIL/VENTOLIN HFA) 90 mcg/actuation inhaler, Inhale 2 puffs into the lungs every 6 (six) hours as needed. Rescue, Disp: 18 g, Rfl: 5    alendronate (FOSAMAX) 70 MG tablet, Take 1 tablet by mouth once a week, Disp: 12 tablet, Rfl: 3    apixaban (ELIQUIS) 2.5 mg Tab, Take 1 tablet (2.5 mg total) by mouth 2 (two) times daily., Disp: 60 tablet, Rfl: 5    cholecalciferol, vitamin D3, 3,000 unit Tab, Take by mouth., Disp: , Rfl:     fluticasone-umeclidin-vilanter (TRELEGY ELLIPTA) 200-62.5-25 mcg inhaler, Inhale 1 puff into the lungs once daily., Disp: 90 each, Rfl: 3    ibuprofen (ADVIL,MOTRIN) 200 MG tablet, Take 200 mg by mouth every 6 (six) hours as needed for Pain., Disp: , Rfl:     levalbuterol (XOPENEX HFA) 45 mcg/actuation inhaler, INHALE 1 TO 2 PUFFS INTO LUNGS EVERY 4 HOURS AS NEEDED FOR WHEEZING AND FOR SHORTNESS OF BREATH, Disp: 45 g, Rfl: 3    methIMAzole (TAPAZOLE) 5 MG Tab, TAKE 1 TABLET BY MOUTH ONCE DAILY ON  MONDAY  THROUGH  FRIDAY  AND  TWO  TABLETS  ON  SATURDAY  AND  SUNDAY, Disp: 90 tablet, Rfl: 3    multivit with minerals/lutein (MULTIVITAMIN 50 PLUS ORAL), Take by mouth., Disp: , Rfl:     nicotine (NICODERM CQ) 14 mg/24 hr, Place 1 patch onto the skin every 24 hours., Disp: , Rfl:     ondansetron (ZOFRAN) 8 MG tablet, Take 1 tablet (8 mg total) by mouth every 8 (eight) hours as needed., Disp: 30 tablet, Rfl: 5    promethazine  "(PHENERGAN) 25 MG tablet, Take 1 tablet (25 mg total) by mouth every 4 to 6 hours as needed., Disp: 30 tablet, Rfl: 5    rosuvastatin (CRESTOR) 20 MG tablet, Take 1 tablet by mouth once daily, Disp: 90 tablet, Rfl: 3    azelastine (ASTELIN) 137 mcg (0.1 %) nasal spray, 1 spray (137 mcg total) by Nasal route 2 (two) times daily. (Patient not taking: Reported on 4/20/2023), Disp: 30 mL, Rfl: 0    HYDROcodone-acetaminophen (NORCO) 5-325 mg per tablet, Take 1 tablet by mouth every 6 (six) hours as needed for Pain., Disp: 40 tablet, Rfl: 0    levocetirizine (XYZAL) 5 MG tablet, Take 1 tablet (5 mg total) by mouth every evening., Disp: 30 tablet, Rfl: 2        Objective:       Physical Examination:     BP (!) 118/56   Pulse 110   Temp 97.9 °F (36.6 °C)   Resp 18   Ht 5' 2" (1.575 m)   Wt 44.5 kg (98 lb)   SpO2 (!) 86%   BMI 17.92 kg/m²     Physical Exam  Constitutional:       Appearance: Normal appearance. She is well-developed.   HENT:      Head: Normocephalic and atraumatic.      Right Ear: External ear normal.      Left Ear: External ear normal.      Nose: Nose normal.      Mouth/Throat:      Mouth: Mucous membranes are moist.   Eyes:      Conjunctiva/sclera: Conjunctivae normal.      Pupils: Pupils are equal, round, and reactive to light.   Neck:      Thyroid: No thyromegaly.      Trachea: No tracheal deviation.   Cardiovascular:      Rate and Rhythm: Normal rate and regular rhythm.      Heart sounds: Normal heart sounds.   Pulmonary:      Effort: Pulmonary effort is normal.      Breath sounds: Normal breath sounds.   Abdominal:      General: Bowel sounds are normal. There is no distension.      Palpations: Abdomen is soft. There is no mass.      Tenderness: There is no abdominal tenderness.   Musculoskeletal:         General: Normal range of motion.      Right lower leg: No edema.      Left lower leg: No edema.   Skin:     General: Skin is warm and dry.      Findings: No rash.   Neurological:      General: No " focal deficit present.      Mental Status: She is alert and oriented to person, place, and time.      Comments: Neuro intact througout   Psychiatric:         Mood and Affect: Mood normal.         Behavior: Behavior normal.         Thought Content: Thought content normal.         Judgment: Judgment normal.       Labs:   Recent Results (from the past 336 hour(s))   CBC Auto Differential    Collection Time: 06/21/23  9:49 AM   Result Value Ref Range    WBC 16.56 (H) 3.90 - 12.70 K/uL    Hemoglobin 10.0 (L) 12.0 - 16.0 g/dL    Hematocrit 30.6 (L) 37.0 - 48.5 %    Platelets 338 150 - 450 K/uL   CBC Auto Differential    Collection Time: 06/15/23  8:25 AM   Result Value Ref Range    WBC 0.72 (LL) 3.90 - 12.70 K/uL    Hemoglobin 10.0 (L) 12.0 - 16.0 g/dL    Hematocrit 30.0 (L) 37.0 - 48.5 %    Platelets 67 (L) 150 - 450 K/uL   CBC Auto Differential    Collection Time: 06/08/23  8:12 AM   Result Value Ref Range    WBC 4.51 3.90 - 12.70 K/uL    Hemoglobin 9.9 (L) 12.0 - 16.0 g/dL    Hematocrit 29.4 (L) 37.0 - 48.5 %    Platelets 283 150 - 450 K/uL       CMP  Sodium   Date Value Ref Range Status   06/21/2023 135 (L) 136 - 145 mmol/L Final     Potassium   Date Value Ref Range Status   06/21/2023 4.8 3.5 - 5.1 mmol/L Final     Chloride   Date Value Ref Range Status   06/21/2023 100 95 - 110 mmol/L Final     CO2   Date Value Ref Range Status   06/21/2023 28 23 - 29 mmol/L Final     Glucose   Date Value Ref Range Status   06/21/2023 104 70 - 110 mg/dL Final     BUN   Date Value Ref Range Status   06/21/2023 10 8 - 23 mg/dL Final     Creatinine   Date Value Ref Range Status   06/21/2023 0.5 0.5 - 1.4 mg/dL Final     Calcium   Date Value Ref Range Status   06/21/2023 9.2 8.7 - 10.5 mg/dL Final     Total Protein   Date Value Ref Range Status   06/21/2023 7.1 6.0 - 8.4 g/dL Final     Albumin   Date Value Ref Range Status   06/21/2023 3.9 3.5 - 5.2 g/dL Final     Total Bilirubin   Date Value Ref Range Status   06/21/2023 0.3 0.1 - 1.0  mg/dL Final     Comment:     For infants and newborns, interpretation of results should be based  on gestational age, weight and in agreement with clinical  observations.    Premature Infant recommended reference ranges:  Up to 24 hours.............<8.0 mg/dL  Up to 48 hours............<12.0 mg/dL  3-5 days..................<15.0 mg/dL  6-29 days.................<15.0 mg/dL       Alkaline Phosphatase   Date Value Ref Range Status   06/21/2023 96 55 - 135 U/L Final     AST   Date Value Ref Range Status   06/21/2023 23 10 - 40 U/L Final     ALT   Date Value Ref Range Status   06/21/2023 16 10 - 44 U/L Final     Anion Gap   Date Value Ref Range Status   06/21/2023 7 (L) 8 - 16 mmol/L Final     eGFR if    Date Value Ref Range Status   07/25/2022 >60.0 >60 mL/min/1.73 m^2 Final     eGFR if non    Date Value Ref Range Status   07/25/2022 >60.0 >60 mL/min/1.73 m^2 Final     Comment:     Calculation used to obtain the estimated glomerular filtration  rate (eGFR) is the CKD-EPI equation.        No results found for: CEA  No results found for: PSA        Assessment/Plan:     Problem List Items Addressed This Visit          Pulmonary    Chronic obstructive pulmonary disease       Oncology    Small cell lung cancer - Primary    Relevant Medications    ondansetron (ZOFRAN) 8 MG tablet    Other Relevant Orders    CBC Auto Differential    CMP    OXYGEN FOR HOME USE    Chemotherapy-induced neutropenia    Adenocarcinoma Lung Cancer RLL    Cancer associated pain     Other Visit Diagnoses       Hypoxia        Relevant Orders    OXYGEN FOR HOME USE    Fatigue, unspecified type              Lung Cancer- Continue with cycle 4 Lurb  SOB- Continue on Trelogy daily and Ventolin PRN; Oxygen for home use Concentrator with portable oxygen concentrator  Hypoxia- Oxygen for home use Concentrator with portable oxygen concentrator  Fatigue- Labs weekly  COPD- Continue follow up with Dr. Jeffries  Cancer related  pain- Refill norco today  6.   TCP- Repeat Labs today  7.   Chemo Induced Neutropenia- Labs today; Add Neulasta to treatment cycles    Discussion:     Follow up in about 3 weeks (around 7/12/2023) for with Dr. Tinoco and 6 weeks with me.      Electronically signed by Martha Granado, MSN, APRN, AGNP-C, OCN

## 2023-06-26 NOTE — PLAN OF CARE
Problem: Breathing Pattern Ineffective  Goal: Effective Breathing Pattern  Outcome: Met     Problem: Fatigue  Goal: Improved Activity Tolerance  Outcome: Met     Problem: Fall Injury Risk  Goal: Absence of Fall and Fall-Related Injury  Outcome: Met     Problem: Constipation  Goal: Effective Bowel Elimination  Outcome: Met

## 2023-07-06 NOTE — TELEPHONE ENCOUNTER
Spoke to pt and notified her of high Potassium level of 5.5. Martha would like her to come in and have it re-drawn. Pt verbalized understanding and said that she would come in tomorrow.

## 2023-07-06 NOTE — TELEPHONE ENCOUNTER
----- Message from SCOOTER Aggarwal sent at 7/6/2023  3:18 PM CDT -----  Can you have her come in to have her potassium redrawn please

## 2023-07-07 NOTE — TELEPHONE ENCOUNTER
No care due was identified.  [facility] Embedded Care Due Messages. Reference number: [id number].   7/07/2023 7:52:24 AM CDT

## 2023-07-07 NOTE — TELEPHONE ENCOUNTER
----- Message from Livia Mae sent at 7/7/2023  8:45 AM CDT -----  Refill on Norco 5 mg qty 40, please send to [facility] on [address]     [phone]

## 2023-07-07 NOTE — TELEPHONE ENCOUNTER
Refill Decision Note   Anuja Chehon  is requesting a refill authorization.  Brief Assessment and Rationale for Refill:  Approve     Medication Therapy Plan:       Medication Reconciliation Completed: No   Comments:     No Care Gaps recommended.     Note composed:9:44 AM 07/07/2023

## 2023-07-17 NOTE — PLAN OF CARE
Problem: Fatigue  Goal: Improved Activity Tolerance  Intervention: Promote Improved Energy  Flowsheets (Taken 7/17/2023 1300)  Fatigue Management: fatigue-related activity identified  Activity Management: Ambulated -L4

## 2023-07-20 NOTE — TELEPHONE ENCOUNTER
----- Message from Jonathan iTnoco MD sent at 7/20/2023  1:39 PM CDT -----  Call patient, K is high.  Recheck in one week.  Hold any K supplements if taking.

## 2023-07-20 NOTE — TELEPHONE ENCOUNTER
----- Message from Livia Mae sent at 7/20/2023 10:11 AM CDT -----  Pt would like a refill on Norco 5 mg qty 40, please send to [facility] on [address]     [phone]

## 2023-07-25 NOTE — TELEPHONE ENCOUNTER
----- Message from Minda Sharma LPN sent at 9/19/2017 10:24 AM CDT -----   FNA and cytology are ready  ----- Message -----  From: Snehal Perez  Sent: 9/19/2017   9:12 AM  To: Louann MCKEON Staff    Test results.  Please call patient at [phone].     [Joint Pain] : joint pain [Negative] : Heme/Lymph [Joint Stiffness] : no joint stiffness [Joint Swelling] : no joint swelling

## 2023-07-27 NOTE — PROGRESS NOTES
PROGRESS NOTE    Subjective:       Patient ID: Anuja Cool is a 73 y.o. female.    2/25/2022:  CT lung Screen:  3.4cm FRANCIS mass and 2.0cm RLL    3/11/2022:  PET scan:  3.9cm hypermetabolic mass in FRANCIS  2.3cm hypermetabolic mass in RLL    3/31/2022:  FRANCIS CT biopsy:  Small Cell carcinoma    Carbo/Etop:  Cycle 1: 4/27/2022  Cycle 2: 5/25/2022  Cycle 3: 6/15/2022  Cycle 4: 7/6/2022    5/3/2022:  RLL CT Biopsy:  Adenocarcinoma    5/15/2022-5/22/2022:  SBRT to Right LL Adenocarcinoma    7/11/2022:  xrt to complete to left lung    8/1/2022:  PET:  1. Favorable interval response to therapy, with decreased size and FDG uptake of noncalcified pulmonary nodules in both lungs.  2. No findings of regional metastatic disease in the chest, or distant metastatic disease.    11/18/2022:  Chest CT:  1.  Masses in the anterior left upper lobe and right lower lobe are unchanged from previous PET/CT.  2.  There is interval development of multiple new bilateral pulmonary nodules as described, suspicious for metastatic disease.  3.  Additional incidental observations as described.    ---IR not able to biopsy above new lesions, appt 12/28/2022 with Dr. Nunes for EBUS consideration.   Patient seen 12/28/2022 by Dr. Nunes.  Patient refused biopsy.     Topotecan:  Cycle 1: 1/30/2023  Cycle 2: 2/20/2023  Cycle 3: 3/13/2023    Lurbinectedin  Cycle 1: 4/24/2023  Cycle 2: 5/15/2023  Cycle 3: 6/6/2023  Cycle 4: 6/26/2023  Cycle 5: 7/17/2023      Chief Complaint:  No chief complaint on file.  synchronous primary lung cancer-SCLC and Adenocarcinoma Follow up    History of Present Illness:   Anuja Cool is a 73 y.o. female who presents for follow up of above.     Ms. Cool is doing well today.  She continues on Lurb and doing ok with this.  No new side effects or problems to report.     Family and Social history reviewed and is unchanged from 4/14/2022      ROS:  Review of  Systems   Constitutional:  Negative for fever.   Respiratory:  Negative for shortness of breath.    Cardiovascular:  Negative for chest pain and leg swelling.   Gastrointestinal:  Negative for abdominal pain and blood in stool.   Genitourinary:  Negative for hematuria.   Skin:  Negative for rash.        Current Outpatient Medications:     albuterol (PROVENTIL/VENTOLIN HFA) 90 mcg/actuation inhaler, Inhale 2 puffs into the lungs every 6 (six) hours as needed. Rescue, Disp: 18 g, Rfl: 5    alendronate (FOSAMAX) 70 MG tablet, Take 1 tablet by mouth once a week, Disp: 12 tablet, Rfl: 3    apixaban (ELIQUIS) 2.5 mg Tab, Take 1 tablet (2.5 mg total) by mouth 2 (two) times daily., Disp: 60 tablet, Rfl: 5    cholecalciferol, vitamin D3, 3,000 unit Tab, Take by mouth., Disp: , Rfl:     HYDROcodone-acetaminophen (NORCO) 5-325 mg per tablet, Take 1 tablet by mouth every 6 (six) hours as needed for Pain., Disp: 40 tablet, Rfl: 0    ibuprofen (ADVIL,MOTRIN) 200 MG tablet, Take 200 mg by mouth every 6 (six) hours as needed for Pain., Disp: , Rfl:     levalbuterol (XOPENEX HFA) 45 mcg/actuation inhaler, INHALE 1 TO 2 PUFFS INTO LUNGS EVERY 4 HOURS AS NEEDED FOR WHEEZING AND FOR SHORTNESS OF BREATH, Disp: 45 g, Rfl: 3    methIMAzole (TAPAZOLE) 5 MG Tab, TAKE 1 TABLET BY MOUTH ONCE DAILY ON  MONDAY  THROUGH  FRIDAY  AND  TWO  TABLETS  ON  SATURDAY  AND  SUNDAY, Disp: 90 tablet, Rfl: 3    multivit with minerals/lutein (MULTIVITAMIN 50 PLUS ORAL), Take by mouth., Disp: , Rfl:     nicotine (NICODERM CQ) 14 mg/24 hr, Place 1 patch onto the skin every 24 hours., Disp: , Rfl:     ondansetron (ZOFRAN) 8 MG tablet, Take 1 tablet (8 mg total) by mouth every 8 (eight) hours as needed., Disp: 30 tablet, Rfl: 5    promethazine (PHENERGAN) 25 MG tablet, Take 1 tablet (25 mg total) by mouth every 4 to 6 hours as needed., Disp: 30 tablet, Rfl: 5    rosuvastatin (CRESTOR) 20 MG tablet, Take 1 tablet by mouth once daily, Disp: 90 tablet, Rfl: 2     "ERICH ELLIPTA 200-62.5-25 mcg inhaler, INHALE 1 PUFF INTO LUNGS ONCE DAILY, Disp: 90 each, Rfl: 3    azelastine (ASTELIN) 137 mcg (0.1 %) nasal spray, 1 spray (137 mcg total) by Nasal route 2 (two) times daily. (Patient not taking: Reported on 4/20/2023), Disp: 30 mL, Rfl: 0    levocetirizine (XYZAL) 5 MG tablet, Take 1 tablet (5 mg total) by mouth every evening., Disp: 30 tablet, Rfl: 2        Objective:       Physical Examination:     /60   Pulse 101   Temp 98.1 °F (36.7 °C)   Resp 18   Ht 5' 2" (1.575 m)   Wt 43.1 kg (95 lb)   SpO2 98%   BMI 17.38 kg/m²     Physical Exam  Constitutional:       Appearance: She is well-developed.   HENT:      Head: Normocephalic and atraumatic.      Right Ear: External ear normal.      Left Ear: External ear normal.   Eyes:      Conjunctiva/sclera: Conjunctivae normal.      Pupils: Pupils are equal, round, and reactive to light.   Neck:      Thyroid: No thyromegaly.      Trachea: No tracheal deviation.   Cardiovascular:      Rate and Rhythm: Normal rate and regular rhythm.      Heart sounds: Normal heart sounds.   Pulmonary:      Effort: Pulmonary effort is normal.      Breath sounds: Normal breath sounds.   Abdominal:      General: Bowel sounds are normal. There is no distension.      Palpations: Abdomen is soft. There is no mass.      Tenderness: There is no abdominal tenderness.   Skin:     Findings: No rash.   Neurological:      Comments: Neuro intact througout   Psychiatric:         Behavior: Behavior normal.         Thought Content: Thought content normal.         Judgment: Judgment normal.       Labs:   Recent Results (from the past 336 hour(s))   CBC Auto Differential    Collection Time: 07/20/23  8:55 AM   Result Value Ref Range    WBC 7.52 3.90 - 12.70 K/uL    Hemoglobin 9.5 (L) 12.0 - 16.0 g/dL    Hematocrit 29.1 (L) 37.0 - 48.5 %    Platelets 342 150 - 450 K/uL       CMP  Sodium   Date Value Ref Range Status   07/20/2023 130 (L) 136 - 145 mmol/L Final "     Potassium   Date Value Ref Range Status   07/20/2023 5.6 (H) 3.5 - 5.1 mmol/L Final     Chloride   Date Value Ref Range Status   07/20/2023 95 95 - 110 mmol/L Final     CO2   Date Value Ref Range Status   07/20/2023 30 (H) 23 - 29 mmol/L Final     Glucose   Date Value Ref Range Status   07/20/2023 103 70 - 110 mg/dL Final     BUN   Date Value Ref Range Status   07/20/2023 17 8 - 23 mg/dL Final     Creatinine   Date Value Ref Range Status   07/20/2023 0.4 (L) 0.5 - 1.4 mg/dL Final     Calcium   Date Value Ref Range Status   07/20/2023 9.2 8.7 - 10.5 mg/dL Final     Total Protein   Date Value Ref Range Status   07/20/2023 6.8 6.0 - 8.4 g/dL Final     Albumin   Date Value Ref Range Status   07/20/2023 3.9 3.5 - 5.2 g/dL Final     Total Bilirubin   Date Value Ref Range Status   07/20/2023 0.7 0.1 - 1.0 mg/dL Final     Comment:     For infants and newborns, interpretation of results should be based  on gestational age, weight and in agreement with clinical  observations.    Premature Infant recommended reference ranges:  Up to 24 hours.............<8.0 mg/dL  Up to 48 hours............<12.0 mg/dL  3-5 days..................<15.0 mg/dL  6-29 days.................<15.0 mg/dL       Alkaline Phosphatase   Date Value Ref Range Status   07/20/2023 61 55 - 135 U/L Final     AST   Date Value Ref Range Status   07/20/2023 25 10 - 40 U/L Final     ALT   Date Value Ref Range Status   07/20/2023 21 10 - 44 U/L Final     Anion Gap   Date Value Ref Range Status   07/20/2023 5 (L) 8 - 16 mmol/L Final     eGFR if    Date Value Ref Range Status   07/25/2022 >60.0 >60 mL/min/1.73 m^2 Final     eGFR if non    Date Value Ref Range Status   07/25/2022 >60.0 >60 mL/min/1.73 m^2 Final     Comment:     Calculation used to obtain the estimated glomerular filtration  rate (eGFR) is the CKD-EPI equation.        No results found for: CEA  No results found for: PSA        Assessment/Plan:     Problem List Items  Addressed This Visit       Small cell lung cancer - Primary     Patient is doing well on the Lurb and has no clear significant side effect issues at this time.  No liver Dz, new bowel issues etc.  Will continue this therapy and plan for imaging to be done in August.  Continue therapy for now.          Relevant Orders    Hepatitis Panel, Acute    CPK with Reflex CKMB    CT Chest Abdomen Pelvis With Contrast    Cancer associated pain     Patient is doing ok from this standpoint.  Will continue current care approach.           Other Visit Diagnoses       Viral hepatitis without hepatic coma, unspecified chronicity, unspecified viral hepatitis type        Relevant Orders    Hepatitis Panel, Acute          Discussion:     Follow up in about 6 weeks (around 9/7/2023).      Electronically signed by Jonathan Wagner

## 2023-07-27 NOTE — ASSESSMENT & PLAN NOTE
Patient is doing well on the Lurb and has no clear significant side effect issues at this time.  No liver Dz, new bowel issues etc.  Will continue this therapy and plan for imaging to be done in August.  Continue therapy for now.

## 2023-08-07 NOTE — PLAN OF CARE
Problem: Fall Injury Risk  Goal: Absence of Fall and Fall-Related Injury  Outcome: Ongoing, Progressing  Intervention: Identify and Manage Contributors  Flowsheets (Taken 8/7/2023 1058)  Self-Care Promotion: independence encouraged  Medication Review/Management: medications reviewed  Intervention: Promote Injury-Free Environment  Flowsheets (Taken 8/7/2023 1058)  Safety Promotion/Fall Prevention: medications reviewed

## 2023-08-15 PROBLEM — I82.C11 ACUTE THROMBOSIS OF RIGHT INTERNAL JUGULAR VEIN: Status: ACTIVE | Noted: 2023-01-01

## 2023-08-15 NOTE — PROGRESS NOTES
Subjective:       Patient ID: Anuja Cool is a 73 y.o. female.    Chief Complaint: Follow-up (6mth f/u)    HPI  Review of Systems   Constitutional:  Negative for fatigue and unexpected weight change.   Respiratory:  Negative for chest tightness and shortness of breath.    Cardiovascular:  Negative for chest pain, palpitations and leg swelling.   Gastrointestinal:  Negative for abdominal pain.   Musculoskeletal:  Negative for arthralgias.   Neurological:  Negative for dizziness, syncope, light-headedness and headaches.       Patient Active Problem List   Diagnosis    Asthma    Allergy    Essential hypertension    Hyperlipidemia LDL goal <130    Tobacco use    Chronic obstructive pulmonary disease    Bilateral carotid bruits    OAB (overactive bladder)    Osteopenia    BMI 21.0-21.9, adult    Low serum thyroid stimulating hormone (TSH)    Multinodular goiter    Stenosis of carotid artery    Constipation    Postmenopausal    ASCVD (arteriosclerotic cardiovascular disease)    Hyperthyroidism    BMI less than 19,adult    Small Cell Lung Cancer FR[facility]IS    Small cell lung cancer    Chemotherapy-induced neutropenia    Dehydration    Hypotension    Adenocarcinoma Lung Cancer RLL    Pulmonary nodules/lesions, multiple    Cancer associated pain    Acute thrombosis of right internal jugular vein     Patient is here for a chronic conditions follow up.    Reviewed labs 8/2023  Mammo 9/22 neg    Pulm Dr. Jeffries and Heme/onc Dr. Tinoco treating small cell lung cancer diagnosed 4/2022. Dr. Sadler placed port 5/22. Started chemo 5/22 and completed 7/22. XRT 7/22. PET scan 8/1/22 1. Favorable interval response to therapy, with decreased size and FDG uptake of noncalcified pulmonary nodules in both lungs.CT chest 11/22 new pulm nodules suspicious for mets.---IR not able to biopsy above new lesions, appt 12/28/2022 with Dr. Nunes for EBUS consideration.   Patient seen 12/28/2022 by Dr. Nunes.  Patient refused biopsy.    Started topotecan x 3 cycles then lurbinectedin x 5 cycles by 7/2023. Has imaging planned for 8/2023  COPD- trelegy mild sob and torres  Losing weight- can't afford ensure.  weight stable 97 for last year, BMI 17     Thyroid ultrasound 6/2021 Multinodular thyroid gland.  Slight enlargement of the largest nodules bilaterally has occurred since previous exam s/p FNA 9/2021  Benign; cytologic pattern   consistent with benign follicular nodule (see comment).     H/o carotid stenosis 9/18 last u/s- No evidence of a hemodynamically significant carotid bifurcation stenosis.     Endocrine Dr. Graves H/o hyperthyroidism, goiter. Osteopenia with high frax risk now on fosomax since 2020  Objective:      Physical Exam  Vitals and nursing note reviewed.   Constitutional:       Appearance: She is well-developed.   Cardiovascular:      Rate and Rhythm: Normal rate and regular rhythm.      Heart sounds: Normal heart sounds.   Pulmonary:      Effort: Pulmonary effort is normal.      Breath sounds: Normal breath sounds.   Skin:     General: Skin is warm and dry.   Neurological:      Mental Status: She is alert and oriented to person, place, and time.         Assessment:       1. Essential hypertension    2. Hyperlipidemia LDL goal <130    3. Chronic obstructive pulmonary disease, unspecified COPD type    4. Small Cell Lung Cancer FRANCIS    5. Multinodular goiter    6. Low serum thyroid stimulating hormone (TSH)    7. Acute thrombosis of right internal jugular vein        Plan:     1. Essential hypertension  Controlled on current medications.  Continue current medications.      2. Hyperlipidemia LDL goal <130  Stable condition.  Continue current medications.  Will adjust based on lab findings or if condition changes.      3. Chronic obstructive pulmonary disease, unspecified COPD type  Cont current mgmt    4. Small Cell Lung Cancer FRANCIS  Cont onc care and mgmt    5. Multinodular goiter  Cont monitoring    6. Low serum thyroid stimulating  hormone (TSH)  Screen and treat as indicated:      7. Acute thrombosis of right internal jugular vein  monitor        Time spent with patient: 20 minutes    Patient with be reevaluated in 6 months or sooner prn    Greater than 50% of this visit was spent counseling as described in above documentation:Yes

## 2023-08-21 NOTE — PROGRESS NOTES
PROGRESS NOTE    Subjective:       Patient ID: Anuja Cool is a 73 y.o. female.    2/25/2022:  CT lung Screen:  3.4cm FRANCIS mass and 2.0cm RLL    3/11/2022:  PET scan:  3.9cm hypermetabolic mass in FRANCIS  2.3cm hypermetabolic mass in RLL    3/31/2022:  [facility] CT biopsy:  Small Cell carcinoma    Carbo/Etop:  Cycle 1: 4/27/2022  Cycle 2: 5/25/2022  Cycle 3: 6/15/2022  Cycle 4: 7/6/2022    5/3/2022:  [facility] CT Biopsy:  Adenocarcinoma    5/15/2022-5/22/2022:  SBRT to Right LL Adenocarcinoma    7/11/2022:  xrt to complete to left lung    8/1/2022:  PET:  1. Favorable interval response to therapy, with decreased size and FDG uptake of noncalcified pulmonary nodules in both lungs.  2. No findings of regional metastatic disease in the chest, or distant metastatic disease.    11/18/2022:  Chest CT:  1.  Masses in the anterior left upper lobe and right lower lobe are unchanged from previous PET/CT.  2.  There is interval development of multiple new bilateral pulmonary nodules as described, suspicious for metastatic disease.  3.  Additional incidental observations as described.    ---IR not able to biopsy above new lesions, appt 12/28/2022 with Dr. Nunes for EBUS consideration.   Patient seen 12/28/2022 by Dr. Nunes.  Patient refused biopsy.     Topotecan:  Cycle 1: 1/30/2023  Cycle 2: 2/20/2023  Cycle 3: 3/13/2023    Lurbinectedin  Cycle 1: 4/24/2023  Cycle 2: 5/15/2023  Cycle 3: 6/6/2023  Cycle 4: 6/26/2023  Cycle 5: 7/17/2023  Cycle 6: 8/7/2023  Cycle 7: 8/28/2023- Due    Chief Complaint:  synchronous primary lung cancer-SCLC and Adenocarcinoma Follow up    History of Present Illness:   Anuja Cool is a 73 y.o. female who presents for follow up of above.     Ms. Cool is doing well today.  She continues on Lurb and doing ok with this.  No new side effects or problems to report. Her recent CT CAP shows overall stable or decrease in lung lesions; one lesion in  the right middle lung is slightly larger. Discussed this in detail with the patient and her daughter today.    Family and Social history reviewed and is unchanged from 4/14/2022      ROS:  Review of Systems   Constitutional:  Negative for fever.   Respiratory:  Negative for shortness of breath.    Cardiovascular:  Negative for chest pain and leg swelling.   Gastrointestinal:  Negative for abdominal pain and blood in stool.   Genitourinary:  Negative for hematuria.   Skin:  Negative for rash.          Current Outpatient Medications:     albuterol (PROVENTIL/VENTOLIN HFA) 90 mcg/actuation inhaler, Inhale 2 puffs into the lungs every 6 (six) hours as needed. Rescue, Disp: 18 g, Rfl: 5    alendronate (FOSAMAX) 70 MG tablet, Take 1 tablet by mouth once a week, Disp: 12 tablet, Rfl: 3    azelastine (ASTELIN) 137 mcg (0.1 %) nasal spray, 1 spray (137 mcg total) by Nasal route 2 (two) times daily., Disp: 30 mL, Rfl: 0    cholecalciferol, vitamin D3, 3,000 unit Tab, Take by mouth., Disp: , Rfl:     ELIQUIS 2.5 mg Tab, Take 1 tablet by mouth twice daily, Disp: 60 tablet, Rfl: 0    HYDROcodone-acetaminophen (NORCO) 5-325 mg per tablet, Take 1 tablet by mouth every 6 (six) hours as needed for Pain., Disp: 40 tablet, Rfl: 0    ibuprofen (ADVIL,MOTRIN) 200 MG tablet, Take 200 mg by mouth every 6 (six) hours as needed for Pain., Disp: , Rfl:     levalbuterol (XOPENEX HFA) 45 mcg/actuation inhaler, INHALE 1 TO 2 PUFFS INTO LUNGS EVERY 4 HOURS AS NEEDED FOR WHEEZING AND FOR SHORTNESS OF BREATH, Disp: 45 g, Rfl: 3    levocetirizine (XYZAL) 5 MG tablet, Take 1 tablet (5 mg total) by mouth every evening., Disp: 30 tablet, Rfl: 2    methIMAzole (TAPAZOLE) 5 MG Tab, TAKE 1 TABLET BY MOUTH ONCE DAILY ON  MONDAY  THROUGH  FRIDAY  AND  TWO  TABLETS  ON  SATURDAY  AND  SUNDAY, Disp: 90 tablet, Rfl: 3    multivit with minerals/lutein (MULTIVITAMIN 50 PLUS ORAL), Take by mouth., Disp: , Rfl:     nicotine (NICODERM CQ) 14 mg/24 hr, Place 1  patch onto the skin every 24 hours., Disp: , Rfl:     ondansetron (ZOFRAN) 8 MG tablet, Take 1 tablet (8 mg total) by mouth every 8 (eight) hours as needed., Disp: 30 tablet, Rfl: 5    promethazine (PHENERGAN) 25 MG tablet, Take 1 tablet (25 mg total) by mouth every 4 to 6 hours as needed., Disp: 30 tablet, Rfl: 5    rosuvastatin (CRESTOR) 20 MG tablet, Take 1 tablet by mouth once daily, Disp: 90 tablet, Rfl: 2    TRELEGY ELLIPTA 200-62.5-25 mcg inhaler, INHALE 1 PUFF INTO LUNGS ONCE DAILY, Disp: 90 each, Rfl: 3        Objective:       Physical Examination:     BP (!) 143/63   Pulse 105   Temp 98.3 °F (36.8 °C)   Wt 42.7 kg (94 lb 3.2 oz)   SpO2 (!) 93%   BMI 17.23 kg/m²     Physical Exam  Constitutional:       Appearance: Normal appearance. She is well-developed.   HENT:      Head: Normocephalic and atraumatic.      Right Ear: External ear normal.      Left Ear: External ear normal.      Nose: Nose normal.      Mouth/Throat:      Mouth: Mucous membranes are moist.   Eyes:      Conjunctiva/sclera: Conjunctivae normal.      Pupils: Pupils are equal, round, and reactive to light.   Neck:      Thyroid: No thyromegaly.      Trachea: No tracheal deviation.   Cardiovascular:      Rate and Rhythm: Normal rate and regular rhythm.      Heart sounds: Normal heart sounds.   Pulmonary:      Effort: Pulmonary effort is normal.      Breath sounds: Normal breath sounds.   Abdominal:      General: Bowel sounds are normal. There is no distension.      Palpations: Abdomen is soft. There is no mass.      Tenderness: There is no abdominal tenderness.   Skin:     General: Skin is warm and dry.      Findings: No rash.   Neurological:      General: No focal deficit present.      Mental Status: She is alert and oriented to person, place, and time.      Comments: Neuro intact througout   Psychiatric:         Mood and Affect: Mood normal.         Behavior: Behavior normal.         Thought Content: Thought content normal.          Judgment: Judgment normal.         Labs:   Recent Results (from the past 336 hour(s))   CBC Auto Differential    Collection Time: 08/17/23  8:37 AM   Result Value Ref Range    WBC 18.22 (H) 3.90 - 12.70 K/uL    Hemoglobin 10.9 (L) 12.0 - 16.0 g/dL    Hematocrit 33.0 (L) 37.0 - 48.5 %    Platelets 133 (L) 150 - 450 K/uL   CBC Auto Differential    Collection Time: 08/10/23  8:09 AM   Result Value Ref Range    WBC 49.03 (HH) 3.90 - 12.70 K/uL    Hemoglobin 10.5 (L) 12.0 - 16.0 g/dL    Hematocrit 31.4 (L) 37.0 - 48.5 %    Platelets 186 150 - 450 K/uL       CMP  Sodium   Date Value Ref Range Status   08/17/2023 133 (L) 136 - 145 mmol/L Final     Potassium   Date Value Ref Range Status   08/17/2023 5.3 (H) 3.5 - 5.1 mmol/L Final     Chloride   Date Value Ref Range Status   08/17/2023 96 95 - 110 mmol/L Final     CO2   Date Value Ref Range Status   08/17/2023 29 23 - 29 mmol/L Final     Glucose   Date Value Ref Range Status   08/17/2023 102 70 - 110 mg/dL Final     BUN   Date Value Ref Range Status   08/17/2023 6 (L) 8 - 23 mg/dL Final     Creatinine   Date Value Ref Range Status   08/17/2023 0.5 0.5 - 1.4 mg/dL Final     Calcium   Date Value Ref Range Status   08/17/2023 9.1 8.7 - 10.5 mg/dL Final     Total Protein   Date Value Ref Range Status   08/17/2023 7.3 6.0 - 8.4 g/dL Final     Albumin   Date Value Ref Range Status   08/17/2023 4.1 3.5 - 5.2 g/dL Final     Total Bilirubin   Date Value Ref Range Status   08/17/2023 0.4 0.1 - 1.0 mg/dL Final     Comment:     For infants and newborns, interpretation of results should be based  on gestational age, weight and in agreement with clinical  observations.    Premature Infant recommended reference ranges:  Up to 24 hours.............<8.0 mg/dL  Up to 48 hours............<12.0 mg/dL  3-5 days..................<15.0 mg/dL  6-29 days.................<15.0 mg/dL       Alkaline Phosphatase   Date Value Ref Range Status   08/17/2023 85 55 - 135 U/L Final     AST   Date Value Ref  "Range Status   08/17/2023 18 10 - 40 U/L Final     ALT   Date Value Ref Range Status   08/17/2023 16 10 - 44 U/L Final     Anion Gap   Date Value Ref Range Status   08/17/2023 8 8 - 16 mmol/L Final     eGFR if    Date Value Ref Range Status   07/25/2022 >60.0 >60 mL/min/1.73 m^2 Final     eGFR if non    Date Value Ref Range Status   07/25/2022 >60.0 >60 mL/min/1.73 m^2 Final     Comment:     Calculation used to obtain the estimated glomerular filtration  rate (eGFR) is the CKD-EPI equation.        No results found for: "CEA"  No results found for: "PSA"        Assessment/Plan:     Problem List Items Addressed This Visit          Oncology    Small cell lung cancer    Relevant Orders    CT Chest W Wo Contrast    Adenocarcinoma Lung Cancer RLL - Primary    Relevant Orders    CT Chest W Wo Contrast    Cancer associated pain     SCLC- Continue Lurbinectadin; CT Chest in 8 weeks  NSCLC- Continue Lurbinectadin  3. Cancer associated pain- Continue Norco PRN    Discussion:     Follow up in about 3 weeks (around 9/11/2023) for with Dr. Tinoco and 6 weeks with me.      Electronically signed by Martha Granado, MSN, APRN, AGNP-C, OCN                "

## 2023-08-25 NOTE — TELEPHONE ENCOUNTER
----- Message from Mikaela Vicente sent at 8/25/2023 10:38 AM CDT -----  Regarding: YEARLY MAMMOGRAM ORDERS  Patient calling to set appt for yearly mammogram , please place orders for Digital Bilateral Mammogram Screening With MARCELINO , Then let me know they are in and I will call our patient to set appt/ thanking you in advance.

## 2023-08-28 NOTE — PLAN OF CARE
Problem: Fatigue  Goal: Improved Activity Tolerance  8/28/2023 1139 by Donna Manzanares, RN  Outcome: Met  8/28/2023 1139 by Donna Manzanares, RN  Outcome: Ongoing, Progressing

## 2023-08-31 NOTE — TELEPHONE ENCOUNTER
Critical labs   WBC 74.88    Per Dada Thomas will be taken off pt's next treatment cycle. Will continue to monitor.

## 2023-09-11 NOTE — PROGRESS NOTES
PROGRESS NOTE    Subjective:       Patient ID: Anuja Cool is a 73 y.o. female.    2/25/2022:  CT lung Screen:  3.4cm FRANCIS mass and 2.0cm RLL    3/11/2022:  PET scan:  3.9cm hypermetabolic mass in FR[facility]IS  2.3cm hypermetabolic mass in RLL    3/31/2022:  [facility] CT biopsy:  Small Cell carcinoma    Carbo/Etop:  Cycle 1: 4/27/2022  Cycle 2: 5/25/2022  Cycle 3: 6/15/2022  Cycle 4: 7/6/2022    5/3/2022:  [facility] CT Biopsy:  Adenocarcinoma    5/15/2022-5/22/2022:  SBRT to Right LL Adenocarcinoma    7/11/2022:  xrt to complete to left lung    8/1/2022:  PET:  1. Favorable interval response to therapy, with decreased size and FDG uptake of noncalcified pulmonary nodules in both lungs.  2. No findings of regional metastatic disease in the chest, or distant metastatic disease.    11/18/2022:  Chest CT:  1.  Masses in the anterior left upper lobe and right lower lobe are unchanged from previous PET/CT.  2.  There is interval development of multiple new bilateral pulmonary nodules as described, suspicious for metastatic disease.  3.  Additional incidental observations as described.    ---IR not able to biopsy above new lesions, appt 12/28/2022 with Dr. Nunes for EBUS consideration.   Patient seen 12/28/2022 by Dr. Nunes.  Patient refused biopsy.     Topotecan:  Cycle 1: 1/30/2023  Cycle 2: 2/20/2023  Cycle 3: 3/13/2023    Lurbinectedin  Cycle 1: 4/24/2023  Cycle 2: 5/15/2023  Cycle 3: 6/6/2023  Cycle 4: 6/26/2023  Cycle 5: 7/17/2023  Cycle 6: 8/7/2023  Cycle 7: 8/28/2023  Cycle 8: 9/18/2023-due    8/17/2023-CT chest/a/p:  -Stable 5 x 4 mm irregular scarring or nodular density in the right upper lobe image 60  -13 x 11 mm subpleural spiculated nodule anterior left upper lobe image 1:30 previously measured 15 x 10 mm  -Stable 10 x 9 mm pulmonary nodule superior segment right lower lobe image 159 with extension to the pleural surface  -5 x 6 mm spiculated nodule right upper  lobe image 183 slightly smaller.  -11 x 10 mm nodule right middle lobe image 188 previously measured 9 x 8 mm  -Stable 14 x 13 mm nodule posterior juxtapleural right lower lobe image 190  No new nodules, infiltrates or pleural effusions. The trachea and bronchi are normal    Chief Complaint:  No chief complaint on file.  synchronous primary lung cancer-SCLC and Adenocarcinoma Follow up    History of Present Illness:   Anuja Cool is a 73 y.o. female who presents for follow up of above.     Ms. Cool is doing well today.  She continues on Lurb and doing ok with this.  No new side effects or problems to report.   Complains of having a sinus infection currently.     Continues on Eliquis 2.5mg bid as of today, 9/11/2023    Family and Social history reviewed and is unchanged from 4/14/2022      ROS:  Review of Systems   Constitutional:  Negative for fever.   Respiratory:  Negative for shortness of breath.    Cardiovascular:  Negative for chest pain and leg swelling.   Gastrointestinal:  Negative for abdominal pain and blood in stool.   Genitourinary:  Negative for hematuria.   Skin:  Negative for rash.          Current Outpatient Medications:     alendronate (FOSAMAX) 70 MG tablet, Take 1 tablet by mouth once a week, Disp: 12 tablet, Rfl: 3    azelastine (ASTELIN) 137 mcg (0.1 %) nasal spray, 1 spray (137 mcg total) by Nasal route 2 (two) times daily., Disp: 30 mL, Rfl: 0    azithromycin (Z-LUKE) 250 MG tablet, Take 2 tablets by mouth on day 1; Take 1 tablet by mouth on days 2-5, Disp: 6 tablet, Rfl: 0    cholecalciferol, vitamin D3, 3,000 unit Tab, Take by mouth., Disp: , Rfl:     ELIQUIS 2.5 mg Tab, Take 1 tablet by mouth twice daily, Disp: 60 tablet, Rfl: 0    HYDROcodone-acetaminophen (NORCO) 5-325 mg per tablet, Take 1 tablet by mouth every 6 (six) hours as needed for Pain., Disp: 40 tablet, Rfl: 0    ibuprofen (ADVIL,MOTRIN) 200 MG tablet, Take 200 mg by mouth every 6 (six) hours as needed for Pain., Disp: ,  Rfl:     levalbuterol (XOPENEX HFA) 45 mcg/actuation inhaler, INHALE 1 TO 2 PUFFS INTO LUNGS EVERY 4 HOURS AS NEEDED FOR WHEEZING AND FOR SHORTNESS OF BREATH, Disp: 45 g, Rfl: 3    levocetirizine (XYZAL) 5 MG tablet, Take 1 tablet (5 mg total) by mouth every evening., Disp: 30 tablet, Rfl: 2    loratadine (CLARITIN) 10 mg tablet, Take 10 mg by mouth once daily., Disp: , Rfl:     methIMAzole (TAPAZOLE) 5 MG Tab, TAKE 1 TABLET BY MOUTH ONCE DAILY ON  MONDAY  THROUGH  FRIDAY  AND  TWO  TABLETS  ON  SATURDAY  AND  SUNDAY, Disp: 90 tablet, Rfl: 3    multivit with minerals/lutein (MULTIVITAMIN 50 PLUS ORAL), Take by mouth., Disp: , Rfl:     nicotine (NICODERM CQ) 14 mg/24 hr, Place 1 patch onto the skin every 24 hours., Disp: , Rfl:     ondansetron (ZOFRAN) 8 MG tablet, Take 1 tablet (8 mg total) by mouth every 8 (eight) hours as needed., Disp: 30 tablet, Rfl: 5    promethazine (PHENERGAN) 25 MG tablet, Take 1 tablet (25 mg total) by mouth every 4 to 6 hours as needed., Disp: 30 tablet, Rfl: 5    rosuvastatin (CRESTOR) 20 MG tablet, Take 1 tablet by mouth once daily, Disp: 90 tablet, Rfl: 2    TRELEGY ELLIPTA 200-62.5-25 mcg inhaler, INHALE 1 PUFF INTO LUNGS ONCE DAILY, Disp: 90 each, Rfl: 3    VENTOLIN HFA 90 mcg/actuation inhaler, INHALE 2 PUFFS BY MOUTH EVERY 6 HOURS AS NEEDED, Disp: 18 g, Rfl: 11        Objective:       Physical Examination:     /61   Pulse 102   Temp 98.3 °F (36.8 °C)   Wt 43.5 kg (96 lb)   BMI 17.56 kg/m²     Physical Exam  Constitutional:       Appearance: She is well-developed.   HENT:      Head: Normocephalic and atraumatic.      Right Ear: External ear normal.      Left Ear: External ear normal.   Eyes:      Conjunctiva/sclera: Conjunctivae normal.      Pupils: Pupils are equal, round, and reactive to light.   Neck:      Thyroid: No thyromegaly.      Trachea: No tracheal deviation.   Cardiovascular:      Rate and Rhythm: Normal rate and regular rhythm.      Heart sounds: Normal heart  sounds.   Pulmonary:      Effort: Pulmonary effort is normal.      Breath sounds: Normal breath sounds.   Abdominal:      General: Bowel sounds are normal. There is no distension.      Palpations: Abdomen is soft. There is no mass.      Tenderness: There is no abdominal tenderness.   Skin:     Findings: No rash.   Neurological:      Comments: Neuro intact througout   Psychiatric:         Behavior: Behavior normal.         Thought Content: Thought content normal.         Judgment: Judgment normal.         Labs:   Recent Results (from the past 336 hour(s))   CBC Auto Differential    Collection Time: 09/07/23  9:35 AM   Result Value Ref Range    WBC 24.85 (H) 3.90 - 12.70 K/uL    Hemoglobin 10.6 (L) 12.0 - 16.0 g/dL    Hematocrit 33.3 (L) 37.0 - 48.5 %    Platelets 292 150 - 450 K/uL   CBC Auto Differential    Collection Time: 08/31/23  8:54 AM   Result Value Ref Range    WBC 74.88 (HH) 3.90 - 12.70 K/uL    Hemoglobin 10.1 (L) 12.0 - 16.0 g/dL    Hematocrit 29.8 (L) 37.0 - 48.5 %    Platelets 303 150 - 450 K/uL       CMP  Sodium   Date Value Ref Range Status   09/07/2023 130 (L) 136 - 145 mmol/L Final     Potassium   Date Value Ref Range Status   09/07/2023 5.5 (H) 3.5 - 5.1 mmol/L Final     Chloride   Date Value Ref Range Status   09/07/2023 95 95 - 110 mmol/L Final     CO2   Date Value Ref Range Status   09/07/2023 29 23 - 29 mmol/L Final     Glucose   Date Value Ref Range Status   09/07/2023 91 70 - 110 mg/dL Final     BUN   Date Value Ref Range Status   09/07/2023 11 8 - 23 mg/dL Final     Creatinine   Date Value Ref Range Status   09/07/2023 0.6 0.5 - 1.4 mg/dL Final     Calcium   Date Value Ref Range Status   09/07/2023 9.5 8.7 - 10.5 mg/dL Final     Total Protein   Date Value Ref Range Status   09/07/2023 7.3 6.0 - 8.4 g/dL Final     Albumin   Date Value Ref Range Status   09/07/2023 3.8 3.5 - 5.2 g/dL Final     Total Bilirubin   Date Value Ref Range Status   09/07/2023 0.4 0.1 - 1.0 mg/dL Final     Comment:      "For infants and newborns, interpretation of results should be based  on gestational age, weight and in agreement with clinical  observations.    Premature Infant recommended reference ranges:  Up to 24 hours.............<8.0 mg/dL  Up to 48 hours............<12.0 mg/dL  3-5 days..................<15.0 mg/dL  6-29 days.................<15.0 mg/dL       Alkaline Phosphatase   Date Value Ref Range Status   09/07/2023 119 55 - 135 U/L Final     AST   Date Value Ref Range Status   09/07/2023 17 10 - 40 U/L Final     ALT   Date Value Ref Range Status   09/07/2023 15 10 - 44 U/L Final     Anion Gap   Date Value Ref Range Status   09/07/2023 6 (L) 8 - 16 mmol/L Final     eGFR if    Date Value Ref Range Status   07/25/2022 >60.0 >60 mL/min/1.73 m^2 Final     eGFR if non    Date Value Ref Range Status   07/25/2022 >60.0 >60 mL/min/1.73 m^2 Final     Comment:     Calculation used to obtain the estimated glomerular filtration  rate (eGFR) is the CKD-EPI equation.        No results found for: "CEA"  No results found for: "PSA"        Assessment/Plan:     Problem List Items Addressed This Visit       Small cell lung cancer     CT scan of the chest shows a predominately stable to improved response with one exception in the RUL which has slight growth.  I discussed this today and feel this is not enough to make a treatment change.  Will scan again in 2 months and if this area continues to grow then may consider focused XRT and continuation of current therapy which she is tolerating well.  Discussed this today.          Cancer associated pain     Patient doing ok from this standpoint.  Continue current care approach.          Acute non-recurrent frontal sinusitis     Will use Z-pack to treat.  Appears non-complicated at this time.          Relevant Medications    azithromycin (Z-LUKE) 250 MG tablet     Discussion:     Follow up in about 3 weeks (around 10/2/2023) for NP visit and 6 with " me.      Electronically signed by Jonathan Wagner

## 2023-09-11 NOTE — ASSESSMENT & PLAN NOTE
CT scan of the chest shows a predominately stable to improved response with one exception in the RUL which has slight growth.  I discussed this today and feel this is not enough to make a treatment change.  Will scan again in 2 months and if this area continues to grow then may consider focused XRT and continuation of current therapy which she is tolerating well.  Discussed this today.

## 2023-09-13 NOTE — PROGRESS NOTES
Patient ID: Anuja Cool is a 73 y.o. female.    Chief Complaint:  Nodular thyroid disease/hyperthyroidism    History of Present Illness    Anuja Cool is a 73 y.o. female here for a f/u care visit today on account of thyroid nodular disease and TSH suppression suggestive of possible hyperthyroidism.     Last thyroid USS was from 9/23 which shows a 2.2 cm nodule in the left thyroid (previously 2.7 cm). FNA was benign. This was previously biopsied in 2017, 2021 were benign Thyroid u/s also showed a nonocclusive clot in the right IJV. No fhx of DM or thyroid disease. No hx of radiation. Her son has lung cancer. 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    Taking 5 mg of methimazole daily.     +ELLISON. No dysphagia.     + constipation, insomnia (trouble staying asleep), cold intolerance.     No hair loss, tremors, changes in nails, wt loss, sweating, mental fog, palpitations.     Her last DEXA was from 3/22 and showed osteopenia with a high fracture risk. Taking fosamax 70 mg weekly (3/20) and otc vitamin d (2000 IU). No fractures, falls or recent steriod injections. No exercise.     Patient has long standing poor sleep. Patients sleep is fragmented mainly because of nocturia and frequency.  Grav 3 Para 3+0 (2 alive).     No tearing or grittiness. She does have bl cataracts L >> R for which she is pending cataract surgery.  She smokes 5 cigg daily. She smoked for 50 years. She will drink a glass of wine 4x weekly. Patient drinks 2 cups of decaffienated coffee.    Wt Readings from Last 15 Encounters:   09/13/23 43.6 kg (96 lb 0.2 oz)   09/11/23 43.5 kg (96 lb)   08/28/23 43.8 kg (96 lb 9 oz)   08/21/23 42.7 kg (94 lb 3.2 oz)   08/15/23 42.5 kg (93 lb 11.1 oz)   08/07/23 42.7 kg (94 lb 1.6 oz)   07/27/23 43.1 kg (95 lb)   07/17/23 43.1 kg (95 lb)   06/26/23 44.1 kg (97 lb 3.2 oz)   06/21/23 44.5 kg (98 lb)   06/19/23 44 kg (97 lb)   06/16/23 44.3 kg (97 lb 11.2 oz)   06/15/23 44.6  "kg (98 lb 6.4 oz)   06/05/23 45 kg (99 lb 1.6 oz)   05/31/23 44.4 kg (97 lb 14.4 oz)      ROS:   Constitutional: energy is improved, Difficulty sleeping, weight stable.  Eyes: No recent visual changes, no SOB  Cardiovascular: Denies current anginal symptoms  Respiratory: + cough, sob (COPD)  Gastrointestinal: Denies recent bowel disturbances  GenitoUrinary - No dysuria  Skin: No new skin rash  Neurologic: No focal neurologic complaints  Endo: no polyphagia, polyuria or polydipsia  Remainder ROS negative     Objective:   /70 (BP Location: Left arm, Patient Position: Sitting, BP Method: Small (Manual))   Pulse 105   Temp 98.6 °F (37 °C) (Oral)   Ht 5' 2" (1.575 m)   Wt 43.6 kg (96 lb 0.2 oz)   SpO2 (!) 94%   BMI 17.56 kg/m²  Body surface area is 1.38 meters squared.       Lab Review:     Personally reviewed labs below:    PE:  GENERAL: Well developed, well nourished  RESPIRATORY: Normal effort,   PSYCH: normal mood and affect  NEURO: steady gait, CN ll-Xll grossly intact    Lab Visit on 09/07/2023   Component Date Value Ref Range Status    WBC 09/07/2023 24.85 (H)  3.90 - 12.70 K/uL Final    RBC 09/07/2023 3.28 (L)  4.00 - 5.40 M/uL Final    Hemoglobin 09/07/2023 10.6 (L)  12.0 - 16.0 g/dL Final    Hematocrit 09/07/2023 33.3 (L)  37.0 - 48.5 % Final    MCV 09/07/2023 102 (H)  82 - 98 fL Final    MCH 09/07/2023 32.3 (H)  27.0 - 31.0 pg Final    MCHC 09/07/2023 31.8 (L)  32.0 - 36.0 g/dL Final    RDW 09/07/2023 17.1 (H)  11.5 - 14.5 % Final    Platelets 09/07/2023 292  150 - 450 K/uL Final    MPV 09/07/2023 9.9  9.2 - 12.9 fL Final    Immature Granulocytes 09/07/2023 CANCELED  0.0 - 0.5 % Final-Edited    Result canceled by the ancillary.    Immature Grans (Abs) 09/07/2023 CANCELED  0.00 - 0.04 K/uL Final-Edited    Comment: Mild elevation in immature granulocytes is non specific and   can be seen in a variety of conditions including stress response,   acute inflammation, trauma and pregnancy. Correlation with " other   laboratory and clinical findings is essential.    Result canceled by the ancillary.      nRBC 09/07/2023 0  0 /100 WBC Final    Gran % 09/07/2023 61.0  38.0 - 73.0 % Final    Lymph % 09/07/2023 6.0 (L)  18.0 - 48.0 % Final    Mono % 09/07/2023 14.0  4.0 - 15.0 % Final    Eosinophil % 09/07/2023 0.0  0.0 - 8.0 % Final    Basophil % 09/07/2023 0.0  0.0 - 1.9 % Final    Bands 09/07/2023 19.0  % Final    Platelet Estimate 09/07/2023 Appears normal   Final    Aniso 09/07/2023 Slight   Final    Differential Method 09/07/2023 Manual   Corrected    Comment: CORRECTED RESULT; previously reported as Automated on 09/07/2023 at   14:50.      Sodium 09/07/2023 130 (L)  136 - 145 mmol/L Final    Potassium 09/07/2023 5.5 (H)  3.5 - 5.1 mmol/L Final    Chloride 09/07/2023 95  95 - 110 mmol/L Final    CO2 09/07/2023 29  23 - 29 mmol/L Final    Glucose 09/07/2023 91  70 - 110 mg/dL Final    BUN 09/07/2023 11  8 - 23 mg/dL Final    Creatinine 09/07/2023 0.6  0.5 - 1.4 mg/dL Final    Calcium 09/07/2023 9.5  8.7 - 10.5 mg/dL Final    Total Protein 09/07/2023 7.3  6.0 - 8.4 g/dL Final    Albumin 09/07/2023 3.8  3.5 - 5.2 g/dL Final    Total Bilirubin 09/07/2023 0.4  0.1 - 1.0 mg/dL Final    Comment: For infants and newborns, interpretation of results should be based  on gestational age, weight and in agreement with clinical  observations.    Premature Infant recommended reference ranges:  Up to 24 hours.............<8.0 mg/dL  Up to 48 hours............<12.0 mg/dL  3-5 days..................<15.0 mg/dL  6-29 days.................<15.0 mg/dL      Alkaline Phosphatase 09/07/2023 119  55 - 135 U/L Final    AST 09/07/2023 17  10 - 40 U/L Final    ALT 09/07/2023 15  10 - 44 U/L Final    eGFR 09/07/2023 >60.0  >60 mL/min/1.73 m^2 Final    Anion Gap 09/07/2023 6 (L)  8 - 16 mmol/L Final   Lab Visit on 09/06/2023   Component Date Value Ref Range Status    TSH 09/06/2023 0.971  0.400 - 4.000 uIU/mL Final    Free T4 09/06/2023 0.81  0.71 -  1.51 ng/dL Final   Lab Visit on 08/31/2023   Component Date Value Ref Range Status    WBC 08/31/2023 74.88 (HH)  3.90 - 12.70 K/uL Final    Comment: WBC critical result(s) called and verbal readback obtained from   ABDON NASCIMENTO RN  by EDWIN 08/31/2023 09:06      RBC 08/31/2023 2.98 (L)  4.00 - 5.40 M/uL Final    Hemoglobin 08/31/2023 10.1 (L)  12.0 - 16.0 g/dL Final    Hematocrit 08/31/2023 29.8 (L)  37.0 - 48.5 % Final    MCV 08/31/2023 100 (H)  82 - 98 fL Final    MCH 08/31/2023 33.9 (H)  27.0 - 31.0 pg Final    MCHC 08/31/2023 33.9  32.0 - 36.0 g/dL Final    RDW 08/31/2023 17.3 (H)  11.5 - 14.5 % Final    Platelets 08/31/2023 303  150 - 450 K/uL Final    MPV 08/31/2023 8.3 (L)  9.2 - 12.9 fL Final    Immature Granulocytes 08/31/2023 CANCELED  0.0 - 0.5 % Final    Result canceled by the ancillary.    Immature Grans (Abs) 08/31/2023 CANCELED  0.00 - 0.04 K/uL Final    Comment: Mild elevation in immature granulocytes is non specific and   can be seen in a variety of conditions including stress response,   acute inflammation, trauma and pregnancy. Correlation with other   laboratory and clinical findings is essential.    Result canceled by the ancillary.      Lymph # 08/31/2023 CANCELED  1.0 - 4.8 K/uL Final    Result canceled by the ancillary.    Mono # 08/31/2023 CANCELED  0.3 - 1.0 K/uL Final    Result canceled by the ancillary.    Eos # 08/31/2023 CANCELED  0.0 - 0.5 K/uL Final    Result canceled by the ancillary.    Baso # 08/31/2023 CANCELED  0.00 - 0.20 K/uL Final    Result canceled by the ancillary.    nRBC 08/31/2023 0  0 /100 WBC Final    Gran % 08/31/2023 79.0 (H)  38.0 - 73.0 % Final    Lymph % 08/31/2023 2.0 (L)  18.0 - 48.0 % Final    Mono % 08/31/2023 2.0 (L)  4.0 - 15.0 % Final    Eosinophil % 08/31/2023 0.0  0.0 - 8.0 % Final    Basophil % 08/31/2023 0.0  0.0 - 1.9 % Final    Bands 08/31/2023 17.0  % Final    Platelet Estimate 08/31/2023 Appears normal   Final    Aniso 08/31/2023 Slight   Final     Hypo 08/31/2023 Occasional   Final    Differential Method 08/31/2023 Manual   Final    Sodium 08/31/2023 132 (L)  136 - 145 mmol/L Final    Potassium 08/31/2023 5.4 (H)  3.5 - 5.1 mmol/L Final    Chloride 08/31/2023 97  95 - 110 mmol/L Final    CO2 08/31/2023 29  23 - 29 mmol/L Final    Glucose 08/31/2023 102  70 - 110 mg/dL Final    BUN 08/31/2023 19  8 - 23 mg/dL Final    Creatinine 08/31/2023 0.6  0.5 - 1.4 mg/dL Final    Calcium 08/31/2023 9.2  8.7 - 10.5 mg/dL Final    Total Protein 08/31/2023 7.0  6.0 - 8.4 g/dL Final    Albumin 08/31/2023 3.7  3.5 - 5.2 g/dL Final    Total Bilirubin 08/31/2023 0.5  0.1 - 1.0 mg/dL Final    Comment: For infants and newborns, interpretation of results should be based  on gestational age, weight and in agreement with clinical  observations.    Premature Infant recommended reference ranges:  Up to 24 hours.............<8.0 mg/dL  Up to 48 hours............<12.0 mg/dL  3-5 days..................<15.0 mg/dL  6-29 days.................<15.0 mg/dL      Alkaline Phosphatase 08/31/2023 76  55 - 135 U/L Final    AST 08/31/2023 21  10 - 40 U/L Final    ALT 08/31/2023 16  10 - 44 U/L Final    eGFR 08/31/2023 >60.0  >60 mL/min/1.73 m^2 Final    Anion Gap 08/31/2023 6 (L)  8 - 16 mmol/L Final   Lab Visit on 08/24/2023   Component Date Value Ref Range Status    WBC 08/24/2023 11.11  3.90 - 12.70 K/uL Final    RBC 08/24/2023 3.35 (L)  4.00 - 5.40 M/uL Final    Hemoglobin 08/24/2023 11.1 (L)  12.0 - 16.0 g/dL Final    Hematocrit 08/24/2023 33.7 (L)  37.0 - 48.5 % Final    MCV 08/24/2023 101 (H)  82 - 98 fL Final    MCH 08/24/2023 33.1 (H)  27.0 - 31.0 pg Final    MCHC 08/24/2023 32.9  32.0 - 36.0 g/dL Final    RDW 08/24/2023 16.5 (H)  11.5 - 14.5 % Final    Platelets 08/24/2023 323  150 - 450 K/uL Final    Reviewed by Technologist.    MPV 08/24/2023 8.4 (L)  9.2 - 12.9 fL Final    Immature Granulocytes 08/24/2023 1.4 (H)  0.0 - 0.5 % Final    Gran # (ANC) 08/24/2023 8.6 (H)  1.8 - 7.7 K/uL  Final    Immature Grans (Abs) 08/24/2023 0.15 (H)  0.00 - 0.04 K/uL Final    Comment: Mild elevation in immature granulocytes is non specific and   can be seen in a variety of conditions including stress response,   acute inflammation, trauma and pregnancy. Correlation with other   laboratory and clinical findings is essential.      Lymph # 08/24/2023 1.2  1.0 - 4.8 K/uL Final    Mono # 08/24/2023 1.0  0.3 - 1.0 K/uL Final    Eos # 08/24/2023 0.1  0.0 - 0.5 K/uL Final    Baso # 08/24/2023 0.03  0.00 - 0.20 K/uL Final    nRBC 08/24/2023 0  0 /100 WBC Final    Gran % 08/24/2023 77.4 (H)  38.0 - 73.0 % Final    Lymph % 08/24/2023 10.9 (L)  18.0 - 48.0 % Final    Mono % 08/24/2023 8.7  4.0 - 15.0 % Final    Eosinophil % 08/24/2023 1.3  0.0 - 8.0 % Final    Basophil % 08/24/2023 0.3  0.0 - 1.9 % Final    Differential Method 08/24/2023 Automated   Final    Sodium 08/24/2023 132 (L)  136 - 145 mmol/L Final    Potassium 08/24/2023 5.4 (H)  3.5 - 5.1 mmol/L Final    Chloride 08/24/2023 95  95 - 110 mmol/L Final    CO2 08/24/2023 30 (H)  23 - 29 mmol/L Final    Glucose 08/24/2023 91  70 - 110 mg/dL Final    BUN 08/24/2023 12  8 - 23 mg/dL Final    Creatinine 08/24/2023 0.5  0.5 - 1.4 mg/dL Final    Calcium 08/24/2023 9.4  8.7 - 10.5 mg/dL Final    Total Protein 08/24/2023 7.3  6.0 - 8.4 g/dL Final    Albumin 08/24/2023 3.9  3.5 - 5.2 g/dL Final    Total Bilirubin 08/24/2023 0.3  0.1 - 1.0 mg/dL Final    Comment: For infants and newborns, interpretation of results should be based  on gestational age, weight and in agreement with clinical  observations.    Premature Infant recommended reference ranges:  Up to 24 hours.............<8.0 mg/dL  Up to 48 hours............<12.0 mg/dL  3-5 days..................<15.0 mg/dL  6-29 days.................<15.0 mg/dL      Alkaline Phosphatase 08/24/2023 80  55 - 135 U/L Final    AST 08/24/2023 18  10 - 40 U/L Final    ALT 08/24/2023 13  10 - 44 U/L Final    eGFR 08/24/2023 >60.0  >60  mL/min/1.73 m^2 Final    Anion Gap 08/24/2023 7 (L)  8 - 16 mmol/L Final   Lab Visit on 08/17/2023   Component Date Value Ref Range Status    WBC 08/17/2023 18.22 (H)  3.90 - 12.70 K/uL Final    RBC 08/17/2023 3.28 (L)  4.00 - 5.40 M/uL Final    Hemoglobin 08/17/2023 10.9 (L)  12.0 - 16.0 g/dL Final    Hematocrit 08/17/2023 33.0 (L)  37.0 - 48.5 % Final    MCV 08/17/2023 101 (H)  82 - 98 fL Final    MCH 08/17/2023 33.2 (H)  27.0 - 31.0 pg Final    MCHC 08/17/2023 33.0  32.0 - 36.0 g/dL Final    RDW 08/17/2023 16.6 (H)  11.5 - 14.5 % Final    Results confirmed, test repeated    Platelets 08/17/2023 133 (L)  150 - 450 K/uL Final    Results confirmed, test repeated    MPV 08/17/2023 10.7  9.2 - 12.9 fL Final    Immature Granulocytes 08/17/2023 1.1 (H)  0.0 - 0.5 % Final    Gran # (ANC) 08/17/2023 14.2 (H)  1.8 - 7.7 K/uL Final    Immature Grans (Abs) 08/17/2023 0.20 (H)  0.00 - 0.04 K/uL Final    Comment: Mild elevation in immature granulocytes is non specific and   can be seen in a variety of conditions including stress response,   acute inflammation, trauma and pregnancy. Correlation with other   laboratory and clinical findings is essential.      Lymph # 08/17/2023 1.5  1.0 - 4.8 K/uL Final    Mono # 08/17/2023 2.2 (H)  0.3 - 1.0 K/uL Final    Eos # 08/17/2023 0.1  0.0 - 0.5 K/uL Final    Baso # 08/17/2023 0.06  0.00 - 0.20 K/uL Final    nRBC 08/17/2023 0  0 /100 WBC Final    Gran % 08/17/2023 78.0 (H)  38.0 - 73.0 % Final    Lymph % 08/17/2023 8.1 (L)  18.0 - 48.0 % Final    Mono % 08/17/2023 12.0  4.0 - 15.0 % Final    Eosinophil % 08/17/2023 0.5  0.0 - 8.0 % Final    Basophil % 08/17/2023 0.3  0.0 - 1.9 % Final    Platelet Estimate 08/17/2023 Decreased (A)   Final    Aniso 08/17/2023 Slight   Final    Poik 08/17/2023 Slight   Final    Poly 08/17/2023 Occasional   Final    Ovalocytes 08/17/2023 Occasional   Final    Differential Method 08/17/2023 Automated   Final    Sodium 08/17/2023 133 (L)  136 - 145 mmol/L  Final    Potassium 08/17/2023 5.3 (H)  3.5 - 5.1 mmol/L Final    Chloride 08/17/2023 96  95 - 110 mmol/L Final    CO2 08/17/2023 29  23 - 29 mmol/L Final    Glucose 08/17/2023 102  70 - 110 mg/dL Final    BUN 08/17/2023 6 (L)  8 - 23 mg/dL Final    Creatinine 08/17/2023 0.5  0.5 - 1.4 mg/dL Final    Calcium 08/17/2023 9.1  8.7 - 10.5 mg/dL Final    Total Protein 08/17/2023 7.3  6.0 - 8.4 g/dL Final    Albumin 08/17/2023 4.1  3.5 - 5.2 g/dL Final    Total Bilirubin 08/17/2023 0.4  0.1 - 1.0 mg/dL Final    Comment: For infants and newborns, interpretation of results should be based  on gestational age, weight and in agreement with clinical  observations.    Premature Infant recommended reference ranges:  Up to 24 hours.............<8.0 mg/dL  Up to 48 hours............<12.0 mg/dL  3-5 days..................<15.0 mg/dL  6-29 days.................<15.0 mg/dL      Alkaline Phosphatase 08/17/2023 85  55 - 135 U/L Final    AST 08/17/2023 18  10 - 40 U/L Final    ALT 08/17/2023 16  10 - 44 U/L Final    eGFR 08/17/2023 >60.0  >60 mL/min/1.73 m^2 Final    Anion Gap 08/17/2023 8  8 - 16 mmol/L Final   Lab Visit on 08/10/2023   Component Date Value Ref Range Status    WBC 08/10/2023 49.03 (HH)  3.90 - 12.70 K/uL Final    Comment: WBC  result(s) called and verbal readback obtained from Candace Daniels RN by MOON 08/10/2023 09:54      RBC 08/10/2023 3.10 (L)  4.00 - 5.40 M/uL Final    Hemoglobin 08/10/2023 10.5 (L)  12.0 - 16.0 g/dL Final    Hematocrit 08/10/2023 31.4 (L)  37.0 - 48.5 % Final    MCV 08/10/2023 101 (H)  82 - 98 fL Final    MCH 08/10/2023 33.9 (H)  27.0 - 31.0 pg Final    MCHC 08/10/2023 33.4  32.0 - 36.0 g/dL Final    RDW 08/10/2023 17.2 (H)  11.5 - 14.5 % Final    Platelets 08/10/2023 186  150 - 450 K/uL Final    Results confirmed, test repeated    MPV 08/10/2023 8.6 (L)  9.2 - 12.9 fL Final    Immature Granulocytes 08/10/2023 CANCELED  0.0 - 0.5 % Final    Result canceled by the ancillary.    Immature Grans  (Abs) 08/10/2023 CANCELED  0.00 - 0.04 K/uL Final    Comment: Mild elevation in immature granulocytes is non specific and   can be seen in a variety of conditions including stress response,   acute inflammation, trauma and pregnancy. Correlation with other   laboratory and clinical findings is essential.    Result canceled by the ancillary.      nRBC 08/10/2023 0  0 /100 WBC Final    Gran % 08/10/2023 93.0 (H)  38.0 - 73.0 % Final    Lymph % 08/10/2023 2.0 (L)  18.0 - 48.0 % Final    Mono % 08/10/2023 5.0  4.0 - 15.0 % Final    Eosinophil % 08/10/2023 0.0  0.0 - 8.0 % Final    Basophil % 08/10/2023 0.0  0.0 - 1.9 % Final    Platelet Estimate 08/10/2023 Appears normal   Final    Aniso 08/10/2023 Moderate   Final    Poik 08/10/2023 Moderate   Final    Differential Method 08/10/2023 Manual   Final    Sodium 08/10/2023 130 (L)  136 - 145 mmol/L Final    Potassium 08/10/2023 5.2 (H)  3.5 - 5.1 mmol/L Final    Chloride 08/10/2023 93 (L)  95 - 110 mmol/L Final    CO2 08/10/2023 27  23 - 29 mmol/L Final    Glucose 08/10/2023 100  70 - 110 mg/dL Final    BUN 08/10/2023 18  8 - 23 mg/dL Final    Creatinine 08/10/2023 0.5  0.5 - 1.4 mg/dL Final    Calcium 08/10/2023 8.9  8.7 - 10.5 mg/dL Final    Total Protein 08/10/2023 7.0  6.0 - 8.4 g/dL Final    Albumin 08/10/2023 4.0  3.5 - 5.2 g/dL Final    Total Bilirubin 08/10/2023 0.8  0.1 - 1.0 mg/dL Final    Comment: For infants and newborns, interpretation of results should be based  on gestational age, weight and in agreement with clinical  observations.    Premature Infant recommended reference ranges:  Up to 24 hours.............<8.0 mg/dL  Up to 48 hours............<12.0 mg/dL  3-5 days..................<15.0 mg/dL  6-29 days.................<15.0 mg/dL      Alkaline Phosphatase 08/10/2023 75  55 - 135 U/L Final    AST 08/10/2023 25  10 - 40 U/L Final    ALT 08/10/2023 20  10 - 44 U/L Final    eGFR 08/10/2023 >60.0  >60 mL/min/1.73 m^2 Final    Anion Gap 08/10/2023 10  8 - 16  mmol/L Final   Lab Visit on 08/03/2023   Component Date Value Ref Range Status    WBC 08/03/2023 4.98  3.90 - 12.70 K/uL Final    RBC 08/03/2023 3.31 (L)  4.00 - 5.40 M/uL Final    Hemoglobin 08/03/2023 10.9 (L)  12.0 - 16.0 g/dL Final    Hematocrit 08/03/2023 33.4 (L)  37.0 - 48.5 % Final    MCV 08/03/2023 101 (H)  82 - 98 fL Final    MCH 08/03/2023 32.9 (H)  27.0 - 31.0 pg Final    MCHC 08/03/2023 32.6  32.0 - 36.0 g/dL Final    RDW 08/03/2023 16.4 (H)  11.5 - 14.5 % Final    Platelets 08/03/2023 348  150 - 450 K/uL Final    Results confirmed, test repeated    MPV 08/03/2023 9.3  9.2 - 12.9 fL Final    Immature Granulocytes 08/03/2023 0.2  0.0 - 0.5 % Final    Gran # (ANC) 08/03/2023 3.2  1.8 - 7.7 K/uL Final    Immature Grans (Abs) 08/03/2023 0.01  0.00 - 0.04 K/uL Final    Comment: Mild elevation in immature granulocytes is non specific and   can be seen in a variety of conditions including stress response,   acute inflammation, trauma and pregnancy. Correlation with other   laboratory and clinical findings is essential.      Lymph # 08/03/2023 0.8 (L)  1.0 - 4.8 K/uL Final    Mono # 08/03/2023 0.7  0.3 - 1.0 K/uL Final    Eos # 08/03/2023 0.2  0.0 - 0.5 K/uL Final    Baso # 08/03/2023 0.02  0.00 - 0.20 K/uL Final    nRBC 08/03/2023 0  0 /100 WBC Final    Gran % 08/03/2023 63.6  38.0 - 73.0 % Final    Lymph % 08/03/2023 16.9 (L)  18.0 - 48.0 % Final    Mono % 08/03/2023 14.5  4.0 - 15.0 % Final    Eosinophil % 08/03/2023 4.4  0.0 - 8.0 % Final    Basophil % 08/03/2023 0.4  0.0 - 1.9 % Final    Platelet Estimate 08/03/2023 Appears normal   Final    Differential Method 08/03/2023 Automated   Final    Sodium 08/03/2023 130 (L)  136 - 145 mmol/L Final    Potassium 08/03/2023 5.4 (H)  3.5 - 5.1 mmol/L Final    Chloride 08/03/2023 96  95 - 110 mmol/L Final    CO2 08/03/2023 30 (H)  23 - 29 mmol/L Final    Glucose 08/03/2023 105  70 - 110 mg/dL Final    BUN 08/03/2023 13  8 - 23 mg/dL Final    Creatinine 08/03/2023 0.5   0.5 - 1.4 mg/dL Final    Calcium 08/03/2023 9.5  8.7 - 10.5 mg/dL Final    Total Protein 08/03/2023 7.6  6.0 - 8.4 g/dL Final    Albumin 08/03/2023 4.2  3.5 - 5.2 g/dL Final    Total Bilirubin 08/03/2023 0.3  0.1 - 1.0 mg/dL Final    Comment: For infants and newborns, interpretation of results should be based  on gestational age, weight and in agreement with clinical  observations.    Premature Infant recommended reference ranges:  Up to 24 hours.............<8.0 mg/dL  Up to 48 hours............<12.0 mg/dL  3-5 days..................<15.0 mg/dL  6-29 days.................<15.0 mg/dL      Alkaline Phosphatase 08/03/2023 77  55 - 135 U/L Final    AST 08/03/2023 20  10 - 40 U/L Final    ALT 08/03/2023 15  10 - 44 U/L Final    eGFR 08/03/2023 >60.0  >60 mL/min/1.73 m^2 Final    Anion Gap 08/03/2023 4 (L)  8 - 16 mmol/L Final   Lab Visit on 07/27/2023   Component Date Value Ref Range Status    WBC 07/27/2023 3.15 (L)  3.90 - 12.70 K/uL Final    RBC 07/27/2023 2.93 (L)  4.00 - 5.40 M/uL Final    Hemoglobin 07/27/2023 9.6 (L)  12.0 - 16.0 g/dL Final    Hematocrit 07/27/2023 29.9 (L)  37.0 - 48.5 % Final    MCV 07/27/2023 102 (H)  82 - 98 fL Final    MCH 07/27/2023 32.8 (H)  27.0 - 31.0 pg Final    MCHC 07/27/2023 32.1  32.0 - 36.0 g/dL Final    RDW 07/27/2023 15.2 (H)  11.5 - 14.5 % Final    Platelets 07/27/2023 215  150 - 450 K/uL Final    Results confirmed, test repeated    MPV 07/27/2023 9.7  9.2 - 12.9 fL Final    Immature Granulocytes 07/27/2023 0.3  0.0 - 0.5 % Final    Gran # (ANC) 07/27/2023 2.1  1.8 - 7.7 K/uL Final    Immature Grans (Abs) 07/27/2023 0.01  0.00 - 0.04 K/uL Final    Comment: Mild elevation in immature granulocytes is non specific and   can be seen in a variety of conditions including stress response,   acute inflammation, trauma and pregnancy. Correlation with other   laboratory and clinical findings is essential.      Lymph # 07/27/2023 0.6 (L)  1.0 - 4.8 K/uL Final    Mono # 07/27/2023 0.3  0.3  - 1.0 K/uL Final    Eos # 07/27/2023 0.1  0.0 - 0.5 K/uL Final    Baso # 07/27/2023 0.02  0.00 - 0.20 K/uL Final    nRBC 07/27/2023 0  0 /100 WBC Final    Gran % 07/27/2023 65.4  38.0 - 73.0 % Final    Lymph % 07/27/2023 20.0  18.0 - 48.0 % Final    Mono % 07/27/2023 10.8  4.0 - 15.0 % Final    Eosinophil % 07/27/2023 2.9  0.0 - 8.0 % Final    Basophil % 07/27/2023 0.6  0.0 - 1.9 % Final    Platelet Estimate 07/27/2023 Appears normal   Final    Differential Method 07/27/2023 Automated   Final    Sodium 07/27/2023 133 (L)  136 - 145 mmol/L Final    Potassium 07/27/2023 5.3 (H)  3.5 - 5.1 mmol/L Final    Chloride 07/27/2023 98  95 - 110 mmol/L Final    CO2 07/27/2023 30 (H)  23 - 29 mmol/L Final    Glucose 07/27/2023 111 (H)  70 - 110 mg/dL Final    BUN 07/27/2023 11  8 - 23 mg/dL Final    Creatinine 07/27/2023 0.4 (L)  0.5 - 1.4 mg/dL Final    Calcium 07/27/2023 9.5  8.7 - 10.5 mg/dL Final    Total Protein 07/27/2023 7.3  6.0 - 8.4 g/dL Final    Albumin 07/27/2023 4.1  3.5 - 5.2 g/dL Final    Total Bilirubin 07/27/2023 0.4  0.1 - 1.0 mg/dL Final    Comment: For infants and newborns, interpretation of results should be based  on gestational age, weight and in agreement with clinical  observations.    Premature Infant recommended reference ranges:  Up to 24 hours.............<8.0 mg/dL  Up to 48 hours............<12.0 mg/dL  3-5 days..................<15.0 mg/dL  6-29 days.................<15.0 mg/dL      Alkaline Phosphatase 07/27/2023 68  55 - 135 U/L Final    AST 07/27/2023 20  10 - 40 U/L Final    ALT 07/27/2023 19  10 - 44 U/L Final    eGFR 07/27/2023 >60.0  >60 mL/min/1.73 m^2 Final    Anion Gap 07/27/2023 5 (L)  8 - 16 mmol/L Final    Hep A IgM 07/27/2023 Negative  Negative Final    Hepatitis B Surface Ag 07/27/2023 Negative  Negative Final    Hep B C IgM 07/27/2023 Negative  Negative Final    Hepatitis C Ab 07/27/2023 Non Reactive  Non Reactive Final    Comment: Performed at:  [facility]  [id number]  [place], [place]  [id number]  : Bandar Obregon MD, Phone:  [phone]      CPK 07/27/2023 61  20 - 180 U/L Final    CPK MB 07/27/2023 4.0  0.1 - 6.5 ng/mL Final    HCV Quant by NAAT Interpretation 07/27/2023 Comment   Final    Comment: Not infected with HCV unless early or acute  infection is suspected (which may be delayed  in an immunocompromised individual), or  other evidence exists to indicate HCV  infection.  Performed at:  [facility]  [address] [place], [place]  [id number]  : Bandar Obregon MD, Phone:  [phone]     Lab Visit on 07/20/2023   Component Date Value Ref Range Status    WBC 07/20/2023 7.52  3.90 - 12.70 K/uL Final    RBC 07/20/2023 2.88 (L)  4.00 - 5.40 M/uL Final    Hemoglobin 07/20/2023 9.5 (L)  12.0 - 16.0 g/dL Final    Hematocrit 07/20/2023 29.1 (L)  37.0 - 48.5 % Final    MCV 07/20/2023 101 (H)  82 - 98 fL Final    MCH 07/20/2023 33.0 (H)  27.0 - 31.0 pg Final    MCHC 07/20/2023 32.6  32.0 - 36.0 g/dL Final    RDW 07/20/2023 15.5 (H)  11.5 - 14.5 % Final    Platelets 07/20/2023 342  150 - 450 K/uL Final    MPV 07/20/2023 8.4 (L)  9.2 - 12.9 fL Final    Immature Granulocytes 07/20/2023 0.4  0.0 - 0.5 % Final    Gran # (ANC) 07/20/2023 6.3  1.8 - 7.7 K/uL Final    Immature Grans (Abs) 07/20/2023 0.03  0.00 - 0.04 K/uL Final    Comment: Mild elevation in immature granulocytes is non specific and   can be seen in a variety of conditions including stress response,   acute inflammation, trauma and pregnancy. Correlation with other   laboratory and clinical findings is essential.      Lymph # 07/20/2023 0.8 (L)  1.0 - 4.8 K/uL Final    Mono # 07/20/2023 0.4  0.3 - 1.0 K/uL Final    Eos # 07/20/2023 0.1  0.0 - 0.5 K/uL Final    Baso # 07/20/2023 0.02  0.00 - 0.20 K/uL Final    nRBC 07/20/2023 0  0 /100 WBC Final    Gran % 07/20/2023 83.1 (H)  38.0 - 73.0 % Final    Lymph % 07/20/2023 10.4 (L)  18.0 - 48.0 % Final    Mono % 07/20/2023 5.1   4.0 - 15.0 % Final    Eosinophil % 07/20/2023 0.7  0.0 - 8.0 % Final    Basophil % 07/20/2023 0.3  0.0 - 1.9 % Final    Differential Method 07/20/2023 Automated   Final    Sodium 07/20/2023 130 (L)  136 - 145 mmol/L Final    Potassium 07/20/2023 5.6 (H)  3.5 - 5.1 mmol/L Final    Chloride 07/20/2023 95  95 - 110 mmol/L Final    CO2 07/20/2023 30 (H)  23 - 29 mmol/L Final    Glucose 07/20/2023 103  70 - 110 mg/dL Final    BUN 07/20/2023 17  8 - 23 mg/dL Final    Creatinine 07/20/2023 0.4 (L)  0.5 - 1.4 mg/dL Final    Calcium 07/20/2023 9.2  8.7 - 10.5 mg/dL Final    Total Protein 07/20/2023 6.8  6.0 - 8.4 g/dL Final    Albumin 07/20/2023 3.9  3.5 - 5.2 g/dL Final    Total Bilirubin 07/20/2023 0.7  0.1 - 1.0 mg/dL Final    Comment: For infants and newborns, interpretation of results should be based  on gestational age, weight and in agreement with clinical  observations.    Premature Infant recommended reference ranges:  Up to 24 hours.............<8.0 mg/dL  Up to 48 hours............<12.0 mg/dL  3-5 days..................<15.0 mg/dL  6-29 days.................<15.0 mg/dL      Alkaline Phosphatase 07/20/2023 61  55 - 135 U/L Final    AST 07/20/2023 25  10 - 40 U/L Final    ALT 07/20/2023 21  10 - 44 U/L Final    eGFR 07/20/2023 >60.0  >60 mL/min/1.73 m^2 Final    Anion Gap 07/20/2023 5 (L)  8 - 16 mmol/L Final   Lab Visit on 07/13/2023   Component Date Value Ref Range Status    WBC 07/13/2023 10.25  3.90 - 12.70 K/uL Final    RBC 07/13/2023 3.28 (L)  4.00 - 5.40 M/uL Final    Hemoglobin 07/13/2023 11.1 (L)  12.0 - 16.0 g/dL Final    Hematocrit 07/13/2023 33.7 (L)  37.0 - 48.5 % Final    MCV 07/13/2023 103 (H)  82 - 98 fL Final    MCH 07/13/2023 33.8 (H)  27.0 - 31.0 pg Final    MCHC 07/13/2023 32.9  32.0 - 36.0 g/dL Final    RDW 07/13/2023 15.9 (H)  11.5 - 14.5 % Final    Platelets 07/13/2023 466 (H)  150 - 450 K/uL Final    Results confirmed, test repeated    MPV 07/13/2023 9.6  9.2 - 12.9 fL Final    Immature  Granulocytes 07/13/2023 0.4  0.0 - 0.5 % Final    Gran # (ANC) 07/13/2023 7.9 (H)  1.8 - 7.7 K/uL Final    Immature Grans (Abs) 07/13/2023 0.04  0.00 - 0.04 K/uL Final    Comment: Mild elevation in immature granulocytes is non specific and   can be seen in a variety of conditions including stress response,   acute inflammation, trauma and pregnancy. Correlation with other   laboratory and clinical findings is essential.      Lymph # 07/13/2023 1.0  1.0 - 4.8 K/uL Final    Mono # 07/13/2023 1.1 (H)  0.3 - 1.0 K/uL Final    Eos # 07/13/2023 0.2  0.0 - 0.5 K/uL Final    Baso # 07/13/2023 0.03  0.00 - 0.20 K/uL Final    nRBC 07/13/2023 0  0 /100 WBC Final    Gran % 07/13/2023 76.8 (H)  38.0 - 73.0 % Final    Lymph % 07/13/2023 10.0 (L)  18.0 - 48.0 % Final    Mono % 07/13/2023 10.5  4.0 - 15.0 % Final    Eosinophil % 07/13/2023 2.0  0.0 - 8.0 % Final    Basophil % 07/13/2023 0.3  0.0 - 1.9 % Final    Platelet Estimate 07/13/2023 Increased (A)   Final    Differential Method 07/13/2023 Automated   Final    Sodium 07/13/2023 133 (L)  136 - 145 mmol/L Final    Potassium 07/13/2023 4.7  3.5 - 5.1 mmol/L Final    Chloride 07/13/2023 98  95 - 110 mmol/L Final    CO2 07/13/2023 27  23 - 29 mmol/L Final    Glucose 07/13/2023 80  70 - 110 mg/dL Final    BUN 07/13/2023 17  8 - 23 mg/dL Final    Creatinine 07/13/2023 0.5  0.5 - 1.4 mg/dL Final    Calcium 07/13/2023 9.5  8.7 - 10.5 mg/dL Final    Total Protein 07/13/2023 7.5  6.0 - 8.4 g/dL Final    Albumin 07/13/2023 4.1  3.5 - 5.2 g/dL Final    Total Bilirubin 07/13/2023 0.3  0.1 - 1.0 mg/dL Final    Comment: For infants and newborns, interpretation of results should be based  on gestational age, weight and in agreement with clinical  observations.    Premature Infant recommended reference ranges:  Up to 24 hours.............<8.0 mg/dL  Up to 48 hours............<12.0 mg/dL  3-5 days..................<15.0 mg/dL  6-29 days.................<15.0 mg/dL      Alkaline Phosphatase  07/13/2023 81  55 - 135 U/L Final    AST 07/13/2023 23  10 - 40 U/L Final    ALT 07/13/2023 15  10 - 44 U/L Final    eGFR 07/13/2023 >60.0  >60 mL/min/1.73 m^2 Final    Anion Gap 07/13/2023 8  8 - 16 mmol/L Final   There may be more visits with results that are not included.      EXAMINATION:  US SOFT TISSUE HEAD NECK THYROID     CLINICAL HISTORY:  Nontoxic multinodular goiter     TECHNIQUE:  Ultrasound of the thyroid and cervical lymph nodes was performed.     COMPARISON:  09/06/2022     FINDINGS:  The right hemithyroid measures 3.7 x 1.7 x 1.3 cm and the left 4.2 x 2.2 x 1.6 cm.  The thyroid isthmus measures 3 mm.  Total thyroid volume is 12 cc.     The thyroid parenchyma is heterogeneous without hypervascularity.     Several nodules are present bilaterally.  On the right the largest is an upper pole solid hypoechoic circumscribed nodule measuring 1.1 cm.  On the left, the largest is a lower pole mixed cystic and solid nodule measuring 2.2 cm.  Previously this measured 2.7 cm.     No evidence for cervical lymphadenopathy.     Impression:     Multinodular thyroid gland, without detrimental change.        Electronically signed by: Mt Rucker MD  Date:                                            09/06/2023    1. Hyperthyroidism        2. Multiple thyroid nodules        3. Osteopenia, unspecified location        4. Tobacco use        5. Hypertension, unspecified type        6. Chronic obstructive pulmonary disease, unspecified COPD type        7. Hyperlipidemia, unspecified hyperlipidemia type           Hyperthyroidism-Pt is not symptomatic. Abs for Graves' Disease were negative. Discussed treatment options for hyperthyroidism including methimazole, [place] or an elective thyroidectomy. The pt elects to continue methimazole. Continue 5 mg qd.  Nodular thyroid disease-repeat thyroid u/s 9/24. Nodule decreased in size.   Osteopenia with a high fracture risk-continue calcium and vitamin D OTC supplements and will  repeat DEXA ~ 3/24. Continue alendronate 70 mg weekly.  Tobacco use-encouraged to quit smoking  Hypertension-stable-continue meds  COPD-referral to pulmonology. Continue Xopenex.   HPT-[id number] statin-monitor    F/u in 6 mths 4/23 w/ labs prior and dexa

## 2023-09-14 NOTE — PROGRESS NOTES
"  Anuja Cool presented for a  Medicare AWV and comprehensive Health Risk Assessment today. The following components were reviewed and updated:    Medical history  Family History  Social history  Allergies and Current Medications  Health Risk Assessment  Health Maintenance  Care Team         ** See Completed Assessments for Annual Wellness Visit within the encounter summary.**         The following assessments were completed:  Living Situation  CAGE  Depression Screening  Timed Get Up and Go  Whisper Test  Cognitive Function Screening - pt declined, reports "chemo brain"  Nutrition Screening  ADL Screening  PAQ Screening    Patient on chronic opioids - Norco  Risk factors reviewed for any potential opioid use disorder   Pain evaluated during visit 2/10  Current treatment plan documented.  Will refer to specialist, as appropriate - followed by Oncology        Vitals:    09/14/23 0822   BP: 137/65   Pulse: 99   Weight: 43.5 kg (96 lb)     Body mass index is 17.56 kg/m².  Physical Exam  Vitals reviewed.   Constitutional:       Appearance: She is underweight.      Comments: Chemo port right chest wall   HENT:      Head: Normocephalic.   Eyes:      Pupils: Pupils are equal, round, and reactive to light.   Cardiovascular:      Rate and Rhythm: Normal rate and regular rhythm.      Heart sounds: Normal heart sounds.   Pulmonary:      Effort: Pulmonary effort is normal.      Breath sounds: Normal breath sounds.   Abdominal:      General: Bowel sounds are normal.      Palpations: Abdomen is soft.   Musculoskeletal:         General: Normal range of motion.      Cervical back: Normal range of motion.      Right lower leg: No edema.      Left lower leg: No edema.   Skin:     General: Skin is warm and dry.   Neurological:      Mental Status: She is alert and oriented to person, place, and time.   Psychiatric:         Behavior: Behavior normal.               Diagnoses and health risks identified today and associated " "recommendations/orders:    1. Encounter for preventive health examination  - Above assessments completed. Preventive measures and health maintenance reviewed with patient.  -discussed overdue vaccines  - Ambulatory referral/consult to Outpatient Case Management    2. Small Cell Lung Cancer FRANCIS  Stable, followed by Hem/Onc  -chemo every two weeks    3. Cancer associated pain  Stable, followed by Oncology  -takes norco and claritin for bone pain    4. Chemotherapy-induced neutropenia  Stable, followed by Hem/Onc    5. Chronic obstructive pulmonary disease, unspecified COPD type  Stable, followed by Pulmonology  -on trelegy and ventolin    6. Dependence on supplemental oxygen  -O2 at night and prn    7. 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 Hyperlipidemia LDL goal <130  Stable, followed by PCP  -on statin    9. BMI less than 19,adult, 10. Underweight  -pt reports good appetite, stable weight    11. Hyperthyroidism, 12. Multinodular goiter  Stable, followed by Endo  -on methimazole    13. Osteopenia, unspecified location  Stable, followed by Endo  -on alendronate    14. Tobacco use  -encouraged cessation, defers, counseled  -pt reports she has cut bat to 5 cigarettes per day and "doesn't inhale"  -provided number for smoking cessation        Provided Anuja with a 5-10 year written screening schedule and personal prevention plan. Recommendations were developed using the USPSTF age appropriate recommendations. Education, counseling, and referrals were provided as needed. After Visit Summary printed and given to patient which includes a list of additional screenings\tests needed.    Follow up in about 1 year (around 9/14/2024) for your next annual wellness visit.    Karyna Gabriel NP      "

## 2023-09-14 NOTE — PATIENT INSTRUCTIONS
Counseling and Referral of Other Preventative  (Italic type indicates deductible and co-insurance are waived)    Patient Name: Anuja Cool  Today's Date: 9/14/2023    Health Maintenance       Date Due Completion Date    TETANUS VACCINE Never done ---    Shingles Vaccine (1 of 2) 07/22/2015 5/27/2015    COVID-19 Vaccine (4 - Pfizer series) 03/08/2022 1/11/2022    DEXA Scan 03/04/2024 3/4/2022    Mammogram 09/08/2024 9/8/2023    Lipid Panel 03/06/2028 3/6/2023    Colorectal Cancer Screening 05/08/2028 5/8/2018        Orders Placed This Encounter   Procedures    Ambulatory referral/consult to Outpatient Case Management       The following information is provided to all patients.  This information is to help you find resources for any of the problems found today that may be affecting your health:                Living healthy guide: www.[facility].[place].[facility]      Understanding Diabetes: www.diabetes.org      Eating healthy: www.cdc.gov/healthyweight      [facility] home safety checklist: www.cdc.gov/steadi/patient.html      Agency on Aging: www.goea.[place]      Alcoholics anonymous (AA): www.aa.org      Physical Activity: www.daniela.nih.gov/zs4orxg      Tobacco use: www.quitwithusla.org

## 2023-09-18 NOTE — PLAN OF CARE
Problem: Fall Injury Risk  Goal: Absence of Fall and Fall-Related Injury  Outcome: Ongoing, Progressing  Intervention: Identify and Manage Contributors  Flowsheets (Taken 9/18/2023 1048)  Self-Care Promotion: independence encouraged  Medication Review/Management: medications reviewed  Intervention: Promote Injury-Free Environment  Flowsheets (Taken 9/18/2023 1048)  Safety Promotion/Fall Prevention: medications reviewed

## 2023-09-21 NOTE — TELEPHONE ENCOUNTER
Critical labs  WBC 98.25  Notified ALEXSANDER Granado NP  Pt received neulasta 9/18.   Per ALEXSANDER Granado NP I have cancelled neulasta in pts treatment plan.

## 2023-09-25 ENCOUNTER — OUTPATIENT CASE MANAGEMENT (OUTPATIENT)
Dept: ADMINISTRATIVE | Facility: OTHER | Age: 73
End: 2023-09-25
Payer: MEDICARE

## 2023-09-25 ENCOUNTER — TELEPHONE (OUTPATIENT)
Dept: HEMATOLOGY/ONCOLOGY | Facility: CLINIC | Age: 73
End: 2023-09-25

## 2023-09-25 NOTE — PROGRESS NOTES
Attempt #:  1  This LMSW attempted to reach patient/caregiver to provide resource , however no answer. SW unable to leave a message as mail box has not been set up.

## 2025-03-06 NOTE — PROGRESS NOTES
Patient ID: Anuja Cool is a 72 y.o. female.    Chief Complaint:  Nodular thyroid disease/hyperthyroidism    History of Present Illness    Anuja Cool is a 72 y.o. female here for a f/u care visit today on account of thyroid nodular disease and TSH suppression suggestive of possible hyperthyroidism.   Last thyroid USS was from 9/22 which shows a 2.7 cm nodule in the left thyroid. FNA was benign. This was previously biopsied in 2017, 2021 were benign Thyroid u/s also showed a nonocclusive clot in the right IJV. No fhx of DM or thyroid disease. No hx of radiation. Her diet is low in seafood, kelp and soy. Her son has lung cancer. Taking 5 mg of methimazole daily.     No dysphagia. Occasional voice changes while coughing and sob.    + constipation, insomnia (trouble staying asleep), night sweats.     No hair loss, tremors, changes in nails, wt loss, palpitations.     Her last DEXA was from 3/22 and showed osteopenia with a high fracture risk. Taking fosamax 70 mg weekly (3/20) and otc vitamin d (2000 IU). No fractures, falls or recent steriod injections. No exercise.     Patient has long standing poor sleep. Patients sleep is fragmented mainly because of nocturia and frequency.  Grav 3 Para 3+0 (2 alive).     No tearing or grittiness. She does have bilataal cataracts L >> R for which she is pending cataract surgery.  She smokes 5 cigg daily. She smoked for 50 years. She will drink a glass of wine 4x weekly. Patient drinks 2 cups of decaffienated coffee.    Wt Readings from Last 10 Encounters:   09/12/22 43.9 kg (96 lb 14.3 oz)   09/07/22 44.7 kg (98 lb 9.6 oz)   08/24/22 44.1 kg (97 lb 4.8 oz)   08/10/22 44 kg (97 lb)   08/08/22 44 kg (97 lb 1.6 oz)   08/01/22 44.5 kg (98 lb)   07/18/22 44.5 kg (98 lb)   07/13/22 45.7 kg (100 lb 12.8 oz)   07/12/22 45.4 kg (100 lb 1.6 oz)   07/11/22 44.1 kg (97 lb 3.2 oz)      ROS:   Constitutional: energy is improved, Difficulty sleeping, weight stable.  Eyes: No recent  New Shingles Vaccine (Shingrix) see if insurance covers it , can get through [facility] or go to local pharmacy      Medicare Wellness Visit  Plan for Preventive Care    A good way for you to stay healthy is to use preventive care.  Medicare covers many services that can help you stay healthy.* The goal of these services is to find any health problems as quickly as possible. Finding problems early can help make them easier to treat.  Your personal plan below lists the services you may need and when they are due.      Health Maintenance Summary     Shingles Vaccine (1 of 2)  Never done    Traditional Medicare- Medicare Wellness Visit (Yearly)  Due since 3/1/2025    Breast Cancer Screening (Yearly)  Scheduled for 3/13/2025    Colorectal Cancer Screening (Colonoscopy - Every 6 Months)  Scheduled for 4/24/2025    COVID-19 Vaccine (5 - 2024-25 season)  Postponed until 6/2/2025    Influenza Vaccine (1)  Postponed until 6/30/2025    Pneumococcal Vaccine 50+ (2 of 2 - PPSV23)  Postponed until 10/3/2025    Depression Screening (Yearly)  Next due on 3/6/2026    DTaP/Tdap/Td Vaccine (2 - Td or Tdap)  Next due on 11/6/2028    Respiratory Syncytial Virus (RSV) Vaccine 60+ (1 - 1-dose 75+ series)  Next due on 4/24/2032    Hepatitis C Screening   Completed    Osteoporosis Screening   Completed    Hepatitis A Vaccine   Aged Out    Meningococcal Vaccine   Aged Out    Hepatitis B Vaccine (For Physician/[facility] Discussion)   Aged Out    Meningococcal Serogroup B Vaccine   Aged Out    HPV Vaccine   Aged Out           Preventive Care for Women and Men    Heart Screenings (Cardiovascular):  Blood tests are used to check your cholesterol, lipid and triglyceride levels. High levels can increase your risk for heart disease and stroke. High levels can be treated with medications, diet and exercise. Lowering your levels can help keep your heart and blood vessels healthy.  Your provider will order these tests if they are needed.    An ultrasound is  "visual changes, no SOB  Cardiovascular: Denies current anginal symptoms  Respiratory: + cough, sob (COPD)  Gastrointestinal: Denies recent bowel disturbances  GenitoUrinary - No dysuria  Skin: No new skin rash  Neurologic: No focal neurologic complaints  Endo: no polyphagia, polyuria or polydipsia  Remainder ROS negative     Objective:   /80 (BP Location: Left arm, Patient Position: Sitting, BP Method: Small (Manual))   Pulse 74   Temp 98.1 °F (36.7 °C) (Oral)   Ht 5' 2" (1.575 m)   Wt 43.9 kg (96 lb 14.3 oz)   SpO2 99%   BMI 17.72 kg/m²  Body surface area is 1.39 meters squared.     Physical Exam  Vitals and nursing note reviewed.   Constitutional:       General: She is not in acute distress.     Appearance: She is well-developed. She is not ill-appearing or diaphoretic.      Comments: Elderly female  Not pale, anicteric and afebrile. Well hydrated. Not in any acute distress.   HENT:      Head: Normocephalic and atraumatic. Not macrocephalic. No right periorbital erythema or left periorbital erythema. Hair is normal.      Nose: Nose normal.      Mouth/Throat:      Mouth: Mucous membranes are not pale and not dry.      Pharynx: No oropharyngeal exudate.   Eyes:      General: Lids are normal. No scleral icterus.        Right eye: No discharge.         Left eye: No discharge.      Conjunctiva/sclera: Conjunctivae normal.      Pupils: Pupils are equal, round, and reactive to light.      Comments: No evidence of any opthalmopathy   Neck:      Thyroid: No thyroid mass or thyromegaly.      Vascular: Normal carotid pulses. No carotid bruit or JVD.      Trachea: Trachea and phonation normal. No tracheal deviation.        Comments: No nuchal AN.   Cardiovascular:      Rate and Rhythm: Normal rate and regular rhythm.      Chest Wall: PMI is not displaced.      Pulses: Normal pulses.      Heart sounds: S1 normal and S2 normal. Murmur (soft 3/6 ESM maximal over cardiac base with no radiation to the neck) heard.     " done to see if you have an abdominal aortic aneurysm (AAA).  This is an enlargement of one of the main blood vessels that delivers blood to the body.   In the [place], 9,000 deaths are caused by AAA.  You may not even know you have this problem and as many as 1 in 3 people will have a serious problem if it is not treated.  Early diagnosis allows for more effective treatment and cure.  If you have a family history of AAA or are a male age 65-75 who has smoked, you are at higher risk of an AAA.  Your provider can order this test, if needed.    Colorectal Screening:  There are many tests that are used to check for cancer of your colon and rectum. You and your provider should discuss what test is best for you and when to have it done.  Options include:  Screening Colonoscopy: exam of the entire colon, seen through a flexible lighted tube.  Flexible Sigmoidoscopy: exam of the last third (sigmoid portion) of the colon and rectum, seen through a flexible lighted tube.  Cologuard DNA stool test: a sample of your stool is used to screen for cancer and unseen blood in your stool.  Fecal Occult Blood Test: a sample of your stool is studied to find any unseen blood    Flu Shot:  An immunization that helps to prevent influenza (the flu). You should get this every year. The best time to get the shot is in the fall.    Pneumococcal Shot:  Vaccines help prevent pneumococcal disease, which is any type of illness caused by Streptococcus pneumoniae bacteria. There are two kinds of pneumococcal vaccines available in the [place]:   Pneumococcal conjugate vaccines (PCV20 or Gvglgzv70®)  Pneumococcal polysaccharide vaccine (PPSV23 or Kbojteuoh76®)  For those who have never received any pneumococcal conjugate vaccine, CDC recommends PVC20 for adults 65 years or older and adults 19 through 64 years old with certain medical conditions or risk factors.   For those who have previously received PCV13, this should be followed by a  No gallop.   Pulmonary:      Effort: Pulmonary effort is normal. No respiratory distress.      Breath sounds: Normal breath sounds. No wheezing or rales.   Abdominal:      Palpations: Abdomen is soft. There is no hepatomegaly or splenomegaly.      Hernia: There is no hernia in the ventral area.   Musculoskeletal:         General: No tenderness.      Right shoulder: No deformity, bony tenderness or crepitus. Normal range of motion. Normal strength. Normal pulse.      Cervical back: Full passive range of motion without pain, normal range of motion and neck supple.      Comments: No pedal edema. Has no digital clubbing and no nail changes suggestive of thyroid acropachy. She has no pretibial myxedema but does have fine tremors of the outstretched hands.   Lymphadenopathy:      Cervical: No cervical adenopathy.   Skin:     General: Skin is warm and dry.      Coloration: Skin is not pale.      Findings: No bruising, ecchymosis, erythema, petechiae or rash.      Nails: There is no clubbing.      Comments: Age appropriate diffuse cutaneous atrophy. No ecchymoses seen.   Neurological:      Mental Status: She is alert and oriented to person, place, and time.      Cranial Nerves: No cranial nerve deficit.      Sensory: No sensory deficit.      Motor: No tremor, atrophy or abnormal muscle tone.      Coordination: Coordination normal.      Gait: Gait normal.      Deep Tendon Reflexes: Reflexes are normal and symmetric. Reflexes normal.   Psychiatric:         Speech: Speech normal.         Behavior: Behavior normal.         Thought Content: Thought content normal.         Judgment: Judgment normal.     Lab Review:     Personally reviewed labs below:    Lab Visit on 09/06/2022   Component Date Value Ref Range Status    TSH 09/06/2022 0.780  0.400 - 4.000 uIU/mL Final   Lab Visit on 08/15/2022   Component Date Value Ref Range Status    WBC 08/15/2022 8.44  3.90 - 12.70 K/uL Final    RBC 08/15/2022 2.88 (L)  4.00 - 5.40 M/uL Final  dose of PPSV23.     Hepatitis B Shot:  An immunization that helps to protect people from getting Hepatitis B. Hepatitis B is a virus that spreads through contact with infected blood or body fluids. Many people with the virus do not have symptoms.  The virus can lead to serious problems, such as liver disease. Some people are at higher risk than others. Your doctor will tell you if you need this shot.     Diabetes Screening:  A test to measure sugar (glucose) in your blood is called a fasting blood sugar. Fasting means you cannot have food or drink for at least 8 hours before the test. This test can detect diabetes long before you may notice symptoms.    Glaucoma Screening:  Glaucoma screening is performed by your eye doctor. The test measures the fluid pressure inside your eyes to determine if you have glaucoma.     Hepatitis C Screening:  A blood test to see if you have the hepatitis C virus.  Hepatitis C attacks the liver and is a major cause of chronic liver disease.  Medicare will cover a single screening for all adults born between 1945 & 1965, or high risk patients (people who have injected illegal drugs or people who have had blood transfusions).  High risk patients who continue to inject illegal drugs can be screened for Hepatitis C every year.    Smoking and Tobacco-Use Cessation Counseling:  Tobacco is the single greatest cause of disease and early death in our country today. Medication and counseling together can increase a person’s chance of quitting for good.   Medicare covers two quitting attempts per year, with four counseling sessions per attempt (eight sessions in a 12 month period)    Preventive Screening tests for Women    Screening Mammograms and Breast Exams:  An x-ray of your breasts to check for breast cancer before you or your doctor may be able to feel it.  If breast cancer is found early it can usually be treated with success.    Pelvic Exams and Pap Tests:  An exam to check for cervical     Hemoglobin 08/15/2022 10.3 (L)  12.0 - 16.0 g/dL Final    Hematocrit 08/15/2022 31.3 (L)  37.0 - 48.5 % Final    MCV 08/15/2022 109 (H)  82 - 98 fL Final    MCH 08/15/2022 35.8 (H)  27.0 - 31.0 pg Final    MCHC 08/15/2022 32.9  32.0 - 36.0 g/dL Final    RDW 08/15/2022 19.1 (H)  11.5 - 14.5 % Final    Platelets 08/15/2022 305  150 - 450 K/uL Final    MPV 08/15/2022 8.0 (L)  9.2 - 12.9 fL Final    Immature Granulocytes 08/15/2022 0.2  0.0 - 0.5 % Final    Gran # (ANC) 08/15/2022 6.7  1.8 - 7.7 K/uL Final    Immature Grans (Abs) 08/15/2022 0.02  0.00 - 0.04 K/uL Final    Comment: Mild elevation in immature granulocytes is non specific and   can be seen in a variety of conditions including stress response,   acute inflammation, trauma and pregnancy. Correlation with other   laboratory and clinical findings is essential.      Lymph # 08/15/2022 0.7 (L)  1.0 - 4.8 K/uL Final    Mono # 08/15/2022 0.8  0.3 - 1.0 K/uL Final    Eos # 08/15/2022 0.1  0.0 - 0.5 K/uL Final    Baso # 08/15/2022 0.03  0.00 - 0.20 K/uL Final    nRBC 08/15/2022 0  0 /100 WBC Final    Gran % 08/15/2022 79.4 (H)  38.0 - 73.0 % Final    Lymph % 08/15/2022 8.8 (L)  18.0 - 48.0 % Final    Mono % 08/15/2022 9.5  4.0 - 15.0 % Final    Eosinophil % 08/15/2022 1.7  0.0 - 8.0 % Final    Basophil % 08/15/2022 0.4  0.0 - 1.9 % Final    Differential Method 08/15/2022 Automated   Final    Sodium 08/15/2022 132 (L)  136 - 145 mmol/L Final    Potassium 08/15/2022 3.9  3.5 - 5.1 mmol/L Final    Chloride 08/15/2022 96  95 - 110 mmol/L Final    CO2 08/15/2022 25  23 - 29 mmol/L Final    Glucose 08/15/2022 82  70 - 110 mg/dL Final    BUN 08/15/2022 15  8 - 23 mg/dL Final    Creatinine 08/15/2022 0.4 (L)  0.5 - 1.4 mg/dL Final    Calcium 08/15/2022 9.2  8.7 - 10.5 mg/dL Final    Total Protein 08/15/2022 7.1  6.0 - 8.4 g/dL Final    Albumin 08/15/2022 4.2  3.5 - 5.2 g/dL Final    Total Bilirubin 08/15/2022 0.6  0.1 - 1.0 mg/dL Final    Comment: For infants and  and vaginal cancer. A Pap test is a lab test in which cells are taken from your cervix and sent to the lab to look for signs of cervical cancer. If cancer of the cervix is found early, chances for a cure are good. Testing can generally end at age 65, or if a woman has a hysterectomy for a benign condition. Your provider may recommend more frequent testing if certain abnormal results are found.    Bone Mass Measurements:  A painless x-ray of your bone density to see if you are at risk for a broken bone. Bone density refers to the thickness of bones or how tightly the bone tissue is packed.    Preventive Screening tests for Men    Prostate Screening:  Should you have a prostate cancer test (PSA)?  It is up to you to decide if you want a prostate cancer test. Talk to your clinician to find out if the test is right for you.  Things for you to consider and talk about should include:  Benefits and harms of the test  Your family history  How your race/ethnicity may influence the test  If the test may impact other medical conditions you have  Your values on screenings and treatments    *Medicare pays for many preventive services to keep you healthy. For some of these services, you might have to pay a deductible, coinsurance, and / or copayment.  The amounts vary depending on the type of services you need and the kind of Medicare health plan you have.    For further details on screenings offered by Medicare please visit: https://www.medicare.gov/coverage/preventive-screening-services      newborns, interpretation of results should be based  on gestational age, weight and in agreement with clinical  observations.    Premature Infant recommended reference ranges:  Up to 24 hours.............<8.0 mg/dL  Up to 48 hours............<12.0 mg/dL  3-5 days..................<15.0 mg/dL  6-29 days.................<15.0 mg/dL      Alkaline Phosphatase 08/15/2022 66  55 - 135 U/L Final    AST 08/15/2022 19  10 - 40 U/L Final    ALT 08/15/2022 15  10 - 44 U/L Final    Anion Gap 08/15/2022 11  8 - 16 mmol/L Final    eGFR 08/15/2022 >60.0  >60 mL/min/1.73 m^2 Final    Magnesium 08/15/2022 1.6  1.6 - 2.6 mg/dL Final   Lab Visit on 08/08/2022   Component Date Value Ref Range Status    WBC 08/08/2022 9.49  3.90 - 12.70 K/uL Final    RBC 08/08/2022 2.77 (L)  4.00 - 5.40 M/uL Final    Hemoglobin 08/08/2022 9.6 (L)  12.0 - 16.0 g/dL Final    Hematocrit 08/08/2022 29.1 (L)  37.0 - 48.5 % Final    MCV 08/08/2022 105 (H)  82 - 98 fL Final    MCH 08/08/2022 34.7 (H)  27.0 - 31.0 pg Final    MCHC 08/08/2022 33.0  32.0 - 36.0 g/dL Final    RDW 08/08/2022 22.1 (H)  11.5 - 14.5 % Final    Platelets 08/08/2022 334  150 - 450 K/uL Final    MPV 08/08/2022 8.4 (L)  9.2 - 12.9 fL Final    Immature Granulocytes 08/08/2022 0.4  0.0 - 0.5 % Final    Gran # (ANC) 08/08/2022 7.1  1.8 - 7.7 K/uL Final    Immature Grans (Abs) 08/08/2022 0.04  0.00 - 0.04 K/uL Final    Comment: Mild elevation in immature granulocytes is non specific and   can be seen in a variety of conditions including stress response,   acute inflammation, trauma and pregnancy. Correlation with other   laboratory and clinical findings is essential.      Lymph # 08/08/2022 1.1  1.0 - 4.8 K/uL Final    Mono # 08/08/2022 1.2 (H)  0.3 - 1.0 K/uL Final    Eos # 08/08/2022 0.1  0.0 - 0.5 K/uL Final    Baso # 08/08/2022 0.03  0.00 - 0.20 K/uL Final    nRBC 08/08/2022 0  0 /100 WBC Final    Gran % 08/08/2022 74.5 (H)  38.0 - 73.0 % Final    Lymph % 08/08/2022 12.0 (L)  18.0 - 48.0  % Final    Mono % 08/08/2022 12.1  4.0 - 15.0 % Final    Eosinophil % 08/08/2022 0.7  0.0 - 8.0 % Final    Basophil % 08/08/2022 0.3  0.0 - 1.9 % Final    Platelet Estimate 08/08/2022 Appears normal   Final    Aniso 08/08/2022 Moderate   Final    Poik 08/08/2022 Moderate   Final    Differential Method 08/08/2022 Automated   Final    Sodium 08/08/2022 129 (L)  136 - 145 mmol/L Final    Potassium 08/08/2022 4.7  3.5 - 5.1 mmol/L Final    Chloride 08/08/2022 94 (L)  95 - 110 mmol/L Final    CO2 08/08/2022 25  23 - 29 mmol/L Final    Glucose 08/08/2022 107  70 - 110 mg/dL Final    BUN 08/08/2022 17  8 - 23 mg/dL Final    Creatinine 08/08/2022 0.4 (L)  0.5 - 1.4 mg/dL Final    Calcium 08/08/2022 9.4  8.7 - 10.5 mg/dL Final    Total Protein 08/08/2022 7.6  6.0 - 8.4 g/dL Final    Albumin 08/08/2022 4.5  3.5 - 5.2 g/dL Final    Total Bilirubin 08/08/2022 0.8  0.1 - 1.0 mg/dL Final    Comment: For infants and newborns, interpretation of results should be based  on gestational age, weight and in agreement with clinical  observations.    Premature Infant recommended reference ranges:  Up to 24 hours.............<8.0 mg/dL  Up to 48 hours............<12.0 mg/dL  3-5 days..................<15.0 mg/dL  6-29 days.................<15.0 mg/dL      Alkaline Phosphatase 08/08/2022 78  55 - 135 U/L Final    AST 08/08/2022 21  10 - 40 U/L Final    ALT 08/08/2022 15  10 - 44 U/L Final    Anion Gap 08/08/2022 10  8 - 16 mmol/L Final    eGFR 08/08/2022 >60.0  >60 mL/min/1.73 m^2 Final    Magnesium 08/08/2022 1.6  1.6 - 2.6 mg/dL Final   Hospital Outpatient Visit on 08/01/2022   Component Date Value Ref Range Status    POC Glucose 08/01/2022 106  70 - 110 Final   Lab Visit on 08/01/2022   Component Date Value Ref Range Status    WBC 08/01/2022 7.22  3.90 - 12.70 K/uL Final    RBC 08/01/2022 2.61 (L)  4.00 - 5.40 M/uL Final    Hemoglobin 08/01/2022 8.8 (L)  12.0 - 16.0 g/dL Final    Hematocrit 08/01/2022 25.8 (L)  37.0 - 48.5 % Final    MCV  08/01/2022 99 (H)  82 - 98 fL Final    MCH 08/01/2022 33.7 (H)  27.0 - 31.0 pg Final    MCHC 08/01/2022 34.1  32.0 - 36.0 g/dL Final    RDW 08/01/2022 18.6 (H)  11.5 - 14.5 % Final    Platelets 08/01/2022 159  150 - 450 K/uL Final    Results confirmed, test repeated    MPV 08/01/2022 10.1  9.2 - 12.9 fL Final    Immature Granulocytes 08/01/2022 0.7 (H)  0.0 - 0.5 % Final    Gran # (ANC) 08/01/2022 5.5  1.8 - 7.7 K/uL Final    Immature Grans (Abs) 08/01/2022 0.05 (H)  0.00 - 0.04 K/uL Final    Comment: Mild elevation in immature granulocytes is non specific and   can be seen in a variety of conditions including stress response,   acute inflammation, trauma and pregnancy. Correlation with other   laboratory and clinical findings is essential.      Lymph # 08/01/2022 0.9 (L)  1.0 - 4.8 K/uL Final    Mono # 08/01/2022 0.7  0.3 - 1.0 K/uL Final    Eos # 08/01/2022 0.0  0.0 - 0.5 K/uL Final    Baso # 08/01/2022 0.02  0.00 - 0.20 K/uL Final    nRBC 08/01/2022 0  0 /100 WBC Final    Gran % 08/01/2022 76.6 (H)  38.0 - 73.0 % Final    Lymph % 08/01/2022 11.9 (L)  18.0 - 48.0 % Final    Mono % 08/01/2022 10.1  4.0 - 15.0 % Final    Eosinophil % 08/01/2022 0.4  0.0 - 8.0 % Final    Basophil % 08/01/2022 0.3  0.0 - 1.9 % Final    Platelet Estimate 08/01/2022 Appears normal   Final    Differential Method 08/01/2022 Automated   Final    Sodium 08/01/2022 136  136 - 145 mmol/L Final    Potassium 08/01/2022 4.4  3.5 - 5.1 mmol/L Final    Chloride 08/01/2022 102  95 - 110 mmol/L Final    CO2 08/01/2022 27  23 - 29 mmol/L Final    Glucose 08/01/2022 106  70 - 110 mg/dL Final    BUN 08/01/2022 11  8 - 23 mg/dL Final    Creatinine 08/01/2022 0.4 (L)  0.5 - 1.4 mg/dL Final    Calcium 08/01/2022 9.0  8.7 - 10.5 mg/dL Final    Total Protein 08/01/2022 7.6  6.0 - 8.4 g/dL Final    Albumin 08/01/2022 4.3  3.5 - 5.2 g/dL Final    Total Bilirubin 08/01/2022 0.7  0.1 - 1.0 mg/dL Final    Comment: For infants and newborns, interpretation of  results should be based  on gestational age, weight and in agreement with clinical  observations.    Premature Infant recommended reference ranges:  Up to 24 hours.............<8.0 mg/dL  Up to 48 hours............<12.0 mg/dL  3-5 days..................<15.0 mg/dL  6-29 days.................<15.0 mg/dL      Alkaline Phosphatase 08/01/2022 87  55 - 135 U/L Final    AST 08/01/2022 19  10 - 40 U/L Final    ALT 08/01/2022 13  10 - 44 U/L Final    Anion Gap 08/01/2022 7 (L)  8 - 16 mmol/L Final    eGFR 08/01/2022 >60.0  >60 mL/min/1.73 m^2 Final    Magnesium 08/01/2022 1.7  1.6 - 2.6 mg/dL Final   Lab Visit on 07/27/2022   Component Date Value Ref Range Status    WBC 07/27/2022 10.82  3.90 - 12.70 K/uL Final    RBC 07/27/2022 2.59 (L)  4.00 - 5.40 M/uL Final    Hemoglobin 07/27/2022 8.7 (L)  12.0 - 16.0 g/dL Final    Hematocrit 07/27/2022 25.5 (L)  37.0 - 48.5 % Final    MCV 07/27/2022 99 (H)  82 - 98 fL Final    MCH 07/27/2022 33.6 (H)  27.0 - 31.0 pg Final    MCHC 07/27/2022 34.1  32.0 - 36.0 g/dL Final    RDW 07/27/2022 17.2 (H)  11.5 - 14.5 % Final    Platelets 07/27/2022 50 (L)  150 - 450 K/uL Final    Results confirmed, test repeated    MPV 07/27/2022 9.7  9.2 - 12.9 fL Final    Immature Granulocytes 07/27/2022 0.6 (H)  0.0 - 0.5 % Final    Gran # (ANC) 07/27/2022 8.8 (H)  1.8 - 7.7 K/uL Final    Immature Grans (Abs) 07/27/2022 0.07 (H)  0.00 - 0.04 K/uL Final    Comment: Mild elevation in immature granulocytes is non specific and   can be seen in a variety of conditions including stress response,   acute inflammation, trauma and pregnancy. Correlation with other   laboratory and clinical findings is essential.      Lymph # 07/27/2022 1.0  1.0 - 4.8 K/uL Final    Mono # 07/27/2022 0.9  0.3 - 1.0 K/uL Final    Eos # 07/27/2022 0.1  0.0 - 0.5 K/uL Final    Baso # 07/27/2022 0.02  0.00 - 0.20 K/uL Final    nRBC 07/27/2022 0  0 /100 WBC Final    Gran % 07/27/2022 81.6 (H)  38.0 - 73.0 % Final    Lymph % 07/27/2022 9.1  (L)  18.0 - 48.0 % Final    Mono % 07/27/2022 7.9  4.0 - 15.0 % Final    Eosinophil % 07/27/2022 0.6  0.0 - 8.0 % Final    Basophil % 07/27/2022 0.2  0.0 - 1.9 % Final    Platelet Estimate 07/27/2022 Decreased (A)   Final    Differential Method 07/27/2022 Automated   Final   Infusion on 07/26/2022   Component Date Value Ref Range Status    UNIT NUMBER 07/25/2022 [id number]   Final    Product Code 07/25/2022 [id number]   Final    DISPENSE STATUS 07/25/2022 TRANSFUSED   Final    CODING SYSTEM 07/25/2022 [id number]   Final    Unit Blood Type Code 07/25/2022 5100   Final    Unit Blood Type 07/25/2022 O POS   Final    Unit Expiration 07/25/2022 [id number]   Final   Lab Visit on 07/25/2022   Component Date Value Ref Range Status    WBC 07/25/2022 8.52  3.90 - 12.70 K/uL Final    RBC 07/25/2022 2.60 (L)  4.00 - 5.40 M/uL Final    Hemoglobin 07/25/2022 8.6 (L)  12.0 - 16.0 g/dL Final    Hematocrit 07/25/2022 25.5 (L)  37.0 - 48.5 % Final    MCV 07/25/2022 98  82 - 98 fL Final    MCH 07/25/2022 33.1 (H)  27.0 - 31.0 pg Final    MCHC 07/25/2022 33.7  32.0 - 36.0 g/dL Final    RDW 07/25/2022 17.5 (H)  11.5 - 14.5 % Final    Platelets 07/25/2022 16 (LL)  150 - 450 K/uL Final    Comment: Results confirmed, test repeated  Plt  result(s) called and verbal readback obtained from Candace Daniels RN by MOON 07/25/2022 09:50      MPV 07/25/2022 11.8  9.2 - 12.9 fL Final    Immature Granulocytes 07/25/2022 0.6 (H)  0.0 - 0.5 % Final    Gran # (ANC) 07/25/2022 6.5  1.8 - 7.7 K/uL Final    Immature Grans (Abs) 07/25/2022 0.05 (H)  0.00 - 0.04 K/uL Final    Comment: Mild elevation in immature granulocytes is non specific and   can be seen in a variety of conditions including stress response,   acute inflammation, trauma and pregnancy. Correlation with other   laboratory and clinical findings is essential.      Lymph # 07/25/2022 1.0  1.0 - 4.8 K/uL Final    Mono # 07/25/2022 0.9  0.3 - 1.0 K/uL Final    Eos # 07/25/2022 0.1  0.0 - 0.5  K/uL Final    Baso # 07/25/2022 0.03  0.00 - 0.20 K/uL Final    nRBC 07/25/2022 0  0 /100 WBC Final    Gran % 07/25/2022 76.3 (H)  38.0 - 73.0 % Final    Lymph % 07/25/2022 11.5 (L)  18.0 - 48.0 % Final    Mono % 07/25/2022 10.4  4.0 - 15.0 % Final    Eosinophil % 07/25/2022 0.8  0.0 - 8.0 % Final    Basophil % 07/25/2022 0.4  0.0 - 1.9 % Final    Platelet Estimate 07/25/2022 Decreased (A)   Final    Differential Method 07/25/2022 Automated   Final    Sodium 07/25/2022 134 (L)  136 - 145 mmol/L Final    Potassium 07/25/2022 4.4  3.5 - 5.1 mmol/L Final    Chloride 07/25/2022 102  95 - 110 mmol/L Final    CO2 07/25/2022 29  23 - 29 mmol/L Final    Glucose 07/25/2022 104  70 - 110 mg/dL Final    BUN 07/25/2022 9  8 - 23 mg/dL Final    Creatinine 07/25/2022 0.4 (L)  0.5 - 1.4 mg/dL Final    Calcium 07/25/2022 9.1  8.7 - 10.5 mg/dL Final    Total Protein 07/25/2022 7.2  6.0 - 8.4 g/dL Final    Albumin 07/25/2022 4.1  3.5 - 5.2 g/dL Final    Total Bilirubin 07/25/2022 0.4  0.1 - 1.0 mg/dL Final    Comment: For infants and newborns, interpretation of results should be based  on gestational age, weight and in agreement with clinical  observations.    Premature Infant recommended reference ranges:  Up to 24 hours.............<8.0 mg/dL  Up to 48 hours............<12.0 mg/dL  3-5 days..................<15.0 mg/dL  6-29 days.................<15.0 mg/dL      Alkaline Phosphatase 07/25/2022 96  55 - 135 U/L Final    AST 07/25/2022 17  10 - 40 U/L Final    ALT 07/25/2022 12  10 - 44 U/L Final    Anion Gap 07/25/2022 3 (L)  8 - 16 mmol/L Final    eGFR if African American 07/25/2022 >60.0  >60 mL/min/1.73 m^2 Final    eGFR if non African American 07/25/2022 >60.0  >60 mL/min/1.73 m^2 Final    Comment: Calculation used to obtain the estimated glomerular filtration  rate (eGFR) is the CKD-EPI equation.       Magnesium 07/25/2022 1.6  1.6 - 2.6 mg/dL Final   Lab Visit on 07/18/2022   Component Date Value Ref Range Status    WBC  07/18/2022 21.75 (H)  3.90 - 12.70 K/uL Final    RBC 07/18/2022 2.85 (L)  4.00 - 5.40 M/uL Final    Hemoglobin 07/18/2022 9.7 (L)  12.0 - 16.0 g/dL Final    Hematocrit 07/18/2022 28.3 (L)  37.0 - 48.5 % Final    MCV 07/18/2022 99 (H)  82 - 98 fL Final    MCH 07/18/2022 34.0 (H)  27.0 - 31.0 pg Final    MCHC 07/18/2022 34.3  32.0 - 36.0 g/dL Final    RDW 07/18/2022 18.2 (H)  11.5 - 14.5 % Final    Platelets 07/18/2022 136 (L)  150 - 450 K/uL Final    MPV 07/18/2022 9.7  9.2 - 12.9 fL Final    Immature Granulocytes 07/18/2022 3.2 (H)  0.0 - 0.5 % Final    Gran # (ANC) 07/18/2022 19.3 (H)  1.8 - 7.7 K/uL Final    Immature Grans (Abs) 07/18/2022 0.69 (H)  0.00 - 0.04 K/uL Final    Comment: Mild elevation in immature granulocytes is non specific and   can be seen in a variety of conditions including stress response,   acute inflammation, trauma and pregnancy. Correlation with other   laboratory and clinical findings is essential.      Lymph # 07/18/2022 0.7 (L)  1.0 - 4.8 K/uL Final    Mono # 07/18/2022 1.0  0.3 - 1.0 K/uL Final    Eos # 07/18/2022 0.0  0.0 - 0.5 K/uL Final    Baso # 07/18/2022 0.10  0.00 - 0.20 K/uL Final    nRBC 07/18/2022 0  0 /100 WBC Final    Gran % 07/18/2022 88.8 (H)  38.0 - 73.0 % Final    Lymph % 07/18/2022 3.0 (L)  18.0 - 48.0 % Final    Mono % 07/18/2022 4.4  4.0 - 15.0 % Final    Eosinophil % 07/18/2022 0.1  0.0 - 8.0 % Final    Basophil % 07/18/2022 0.5  0.0 - 1.9 % Final    Differential Method 07/18/2022 Automated   Final    Sodium 07/18/2022 133 (L)  136 - 145 mmol/L Final    Potassium 07/18/2022 4.4  3.5 - 5.1 mmol/L Final    Chloride 07/18/2022 96  95 - 110 mmol/L Final    CO2 07/18/2022 31 (H)  23 - 29 mmol/L Final    Glucose 07/18/2022 91  70 - 110 mg/dL Final    BUN 07/18/2022 20  8 - 23 mg/dL Final    Creatinine 07/18/2022 <0.3 (L)  0.5 - 1.4 mg/dL Final    Calcium 07/18/2022 9.4  8.7 - 10.5 mg/dL Final    Total Protein 07/18/2022 7.3  6.0 - 8.4 g/dL Final    Albumin 07/18/2022 4.3   3.5 - 5.2 g/dL Final    Total Bilirubin 07/18/2022 0.9  0.1 - 1.0 mg/dL Final    Comment: For infants and newborns, interpretation of results should be based  on gestational age, weight and in agreement with clinical  observations.    Premature Infant recommended reference ranges:  Up to 24 hours.............<8.0 mg/dL  Up to 48 hours............<12.0 mg/dL  3-5 days..................<15.0 mg/dL  6-29 days.................<15.0 mg/dL      Alkaline Phosphatase 07/18/2022 104  55 - 135 U/L Final    AST 07/18/2022 19  10 - 40 U/L Final    ALT 07/18/2022 14  10 - 44 U/L Final    Anion Gap 07/18/2022 6 (L)  8 - 16 mmol/L Final    eGFR if  07/18/2022 >60.0  >60 mL/min/1.73 m^2 Final    eGFR if non African American 07/18/2022 >60.0  >60 mL/min/1.73 m^2 Final    Comment: Calculation used to obtain the estimated glomerular filtration  rate (eGFR) is the CKD-EPI equation.       Magnesium 07/18/2022 1.5 (L)  1.6 - 2.6 mg/dL Final   Lab Visit on 07/11/2022   Component Date Value Ref Range Status    WBC 07/11/2022 6.89  3.90 - 12.70 K/uL Final    RBC 07/11/2022 3.57 (L)  4.00 - 5.40 M/uL Final    Hemoglobin 07/11/2022 11.9 (L)  12.0 - 16.0 g/dL Final    Hematocrit 07/11/2022 34.7 (L)  37.0 - 48.5 % Final    MCV 07/11/2022 97  82 - 98 fL Final    MCH 07/11/2022 33.3 (H)  27.0 - 31.0 pg Final    MCHC 07/11/2022 34.3  32.0 - 36.0 g/dL Final    RDW 07/11/2022 17.7 (H)  11.5 - 14.5 % Final    Platelets 07/11/2022 313  150 - 450 K/uL Final    Results confirmed, test repeated    MPV 07/11/2022 9.3  9.2 - 12.9 fL Final    Immature Granulocytes 07/11/2022 0.3  0.0 - 0.5 % Final    Gran # (ANC) 07/11/2022 5.0  1.8 - 7.7 K/uL Final    Immature Grans (Abs) 07/11/2022 0.02  0.00 - 0.04 K/uL Final    Comment: Mild elevation in immature granulocytes is non specific and   can be seen in a variety of conditions including stress response,   acute inflammation, trauma and pregnancy. Correlation with other   laboratory and clinical  findings is essential.      Lymph # 07/11/2022 0.8 (L)  1.0 - 4.8 K/uL Final    Mono # 07/11/2022 0.9  0.3 - 1.0 K/uL Final    Eos # 07/11/2022 0.1  0.0 - 0.5 K/uL Final    Baso # 07/11/2022 0.04  0.00 - 0.20 K/uL Final    nRBC 07/11/2022 0  0 /100 WBC Final    Gran % 07/11/2022 72.1  38.0 - 73.0 % Final    Lymph % 07/11/2022 11.9 (L)  18.0 - 48.0 % Final    Mono % 07/11/2022 13.4  4.0 - 15.0 % Final    Eosinophil % 07/11/2022 1.7  0.0 - 8.0 % Final    Basophil % 07/11/2022 0.6  0.0 - 1.9 % Final    Platelet Estimate 07/11/2022 Appears normal   Final    Differential Method 07/11/2022 Automated   Final    Sodium 07/11/2022 132 (L)  136 - 145 mmol/L Final    Potassium 07/11/2022 4.2  3.5 - 5.1 mmol/L Final    Chloride 07/11/2022 97  95 - 110 mmol/L Final    CO2 07/11/2022 27  23 - 29 mmol/L Final    Glucose 07/11/2022 92  70 - 110 mg/dL Final    BUN 07/11/2022 10  8 - 23 mg/dL Final    Creatinine 07/11/2022 0.5  0.5 - 1.4 mg/dL Final    Calcium 07/11/2022 9.2  8.7 - 10.5 mg/dL Final    Total Protein 07/11/2022 7.7  6.0 - 8.4 g/dL Final    Albumin 07/11/2022 4.3  3.5 - 5.2 g/dL Final    Total Bilirubin 07/11/2022 0.9  0.1 - 1.0 mg/dL Final    Comment: For infants and newborns, interpretation of results should be based  on gestational age, weight and in agreement with clinical  observations.    Premature Infant recommended reference ranges:  Up to 24 hours.............<8.0 mg/dL  Up to 48 hours............<12.0 mg/dL  3-5 days..................<15.0 mg/dL  6-29 days.................<15.0 mg/dL      Alkaline Phosphatase 07/11/2022 72  55 - 135 U/L Final    AST 07/11/2022 22  10 - 40 U/L Final    ALT 07/11/2022 14  10 - 44 U/L Final    Anion Gap 07/11/2022 8  8 - 16 mmol/L Final    eGFR if African American 07/11/2022 >60.0  >60 mL/min/1.73 m^2 Final    eGFR if non African American 07/11/2022 >60.0  >60 mL/min/1.73 m^2 Final    Comment: Calculation used to obtain the estimated glomerular filtration  rate (eGFR) is the  CKD-EPI equation.       Magnesium 07/11/2022 1.7  1.6 - 2.6 mg/dL Final   There may be more visits with results that are not included.      1. Hyperthyroidism  TSH    T4, Free    Comprehensive Metabolic Panel    Lipid Panel      2. Hypovitaminosis D  Vitamin D         Hyperthyroidism-Pt is not symptomatic. Abs for Graves' Disease were negative. Discussed treatment options for hyperthyroidism including methimazole, [place] or an elective thyroidectomy. The pt elects to continue methimazole.    Nodular thyroid disease-repeat thyroid u/s 9/23.  Osteopenia with a high fracture risk-continue calcium and vitamin D OTC supplements and will repeat DEXA ~ 3/24. Continue alendronate 70 mg weekly.  Tobacco use-encouraged to quit smoking  Hypertension-stable-continue meds  COPD-referral to pulmonology. Continue Xopenex.   [id number] statin-recheck  [id number] sob, pain, n/v. Will notify oncology.    F/u in 6 mths w/ labs prior

## (undated) DEVICE — APPLICATOR CHLORAPREP ORN 26ML

## (undated) DEVICE — CONTAINER SPECIMEN OR STER 4OZ

## (undated) DEVICE — COVER SURG LIGHT HANDLE

## (undated) DEVICE — SOL 9P NACL IRR PIC IL

## (undated) DEVICE — SUT 3-0 VICRYL / SH (J416)

## (undated) DEVICE — BLADE SURG CARBON STEEL SZ11

## (undated) DEVICE — STRAP OR TABLE 5IN X 72IN

## (undated) DEVICE — PACK SET UP 190 OMC-NS

## (undated) DEVICE — TOWEL OR DISP STRL BLUE 4/PK

## (undated) DEVICE — DRAPE C ARM 42 X 120 10/BX

## (undated) DEVICE — PACK BASIC

## (undated) DEVICE — ELECTRODE REM PLYHSV RETURN 9

## (undated) DEVICE — GLOVE SURG ULTRA TOUCH 7.5

## (undated) DEVICE — SUT 2-0 VICRYL / SH (J417)

## (undated) DEVICE — NDL SAFETY 21G X 1 1/2 ECLPSE

## (undated) DEVICE — ADHESIVE DERMABOND ADVANCED

## (undated) DEVICE — SLEEVE SCD EXPRESS KNEE MEDIUM

## (undated) DEVICE — SEE MEDLINE ITEM 157117

## (undated) DEVICE — BLADE SURG #15 CARBON STEEL

## (undated) DEVICE — DRAPE LAP TIBURON 77X122IN

## (undated) DEVICE — SUT MONOCRYL 4-0 PS-2

## (undated) DEVICE — ELECTRODE BLADE INSULATED 1 IN

## (undated) DEVICE — SYR SALINE PREFILLED FLSH 10ML

## (undated) DEVICE — PENCIL ROCKER SWITCH 10FT CORD

## (undated) DEVICE — COVER TRNSDUC CIV-FLX 8.9X91.5

## (undated) DEVICE — SYR 10CC LUER LOCK